# Patient Record
Sex: FEMALE | Race: WHITE | NOT HISPANIC OR LATINO | Employment: UNEMPLOYED | URBAN - METROPOLITAN AREA
[De-identification: names, ages, dates, MRNs, and addresses within clinical notes are randomized per-mention and may not be internally consistent; named-entity substitution may affect disease eponyms.]

---

## 2019-06-25 ENCOUNTER — APPOINTMENT (OUTPATIENT)
Dept: RADIOLOGY | Facility: CLINIC | Age: 59
End: 2019-06-25
Payer: COMMERCIAL

## 2019-06-25 ENCOUNTER — CONSULT (OUTPATIENT)
Dept: PAIN MEDICINE | Facility: CLINIC | Age: 59
End: 2019-06-25
Payer: COMMERCIAL

## 2019-06-25 VITALS
BODY MASS INDEX: 25.76 KG/M2 | DIASTOLIC BLOOD PRESSURE: 82 MMHG | HEIGHT: 68 IN | RESPIRATION RATE: 18 BRPM | WEIGHT: 170 LBS | SYSTOLIC BLOOD PRESSURE: 130 MMHG | HEART RATE: 78 BPM

## 2019-06-25 DIAGNOSIS — M51.36 DEGENERATION OF INTERVERTEBRAL DISC OF LUMBAR REGION: Primary | ICD-10-CM

## 2019-06-25 DIAGNOSIS — M43.10 ANTEROLISTHESIS: ICD-10-CM

## 2019-06-25 DIAGNOSIS — M47.816 LUMBAR SPONDYLOSIS: ICD-10-CM

## 2019-06-25 DIAGNOSIS — M51.36 DEGENERATION OF INTERVERTEBRAL DISC OF LUMBAR REGION: ICD-10-CM

## 2019-06-25 DIAGNOSIS — M54.16 LUMBAR RADICULOPATHY: ICD-10-CM

## 2019-06-25 PROCEDURE — 99244 OFF/OP CNSLTJ NEW/EST MOD 40: CPT | Performed by: ANESTHESIOLOGY

## 2019-06-25 PROCEDURE — 72114 X-RAY EXAM L-S SPINE BENDING: CPT

## 2019-06-25 RX ORDER — UREA 10 %
800 LOTION (ML) TOPICAL
COMMUNITY
End: 2020-01-01

## 2019-06-25 RX ORDER — CHOLECALCIFEROL (VITAMIN D3) 1250 MCG
50000 CAPSULE ORAL
COMMUNITY
Start: 2019-05-07 | End: 2020-01-01

## 2019-06-25 RX ORDER — MULTIVITAMIN
TABLET ORAL
COMMUNITY
End: 2020-01-01

## 2019-06-25 RX ORDER — GABAPENTIN 300 MG/1
300 CAPSULE ORAL
Qty: 30 CAPSULE | Refills: 1 | Status: SHIPPED | OUTPATIENT
Start: 2019-06-25 | End: 2020-01-01 | Stop reason: SDUPTHER

## 2019-06-25 RX ORDER — METHION/INOS/CHOL BT/B COM/LIV 110MG-86MG
CAPSULE ORAL
COMMUNITY
End: 2020-01-01 | Stop reason: HOSPADM

## 2019-06-25 RX ORDER — BACLOFEN 10 MG/1
10 TABLET ORAL
COMMUNITY
Start: 2019-05-07 | End: 2021-01-01 | Stop reason: HOSPADM

## 2019-06-25 RX ORDER — FUROSEMIDE 40 MG/1
40 TABLET ORAL
COMMUNITY
Start: 2019-05-20 | End: 2021-01-01 | Stop reason: HOSPADM

## 2019-06-25 RX ORDER — SPIRONOLACTONE 100 MG/1
TABLET, FILM COATED ORAL
COMMUNITY
Start: 2019-05-20 | End: 2021-01-01 | Stop reason: HOSPADM

## 2019-07-12 ENCOUNTER — TELEPHONE (OUTPATIENT)
Dept: RADIOLOGY | Facility: CLINIC | Age: 59
End: 2019-07-12

## 2019-07-12 NOTE — TELEPHONE ENCOUNTER
Pt called stating that the Gabapentin is not helping her  Pt states that she is being woken up by cramps every 15 mins         Pt can be reached at [phone]

## 2019-07-12 NOTE — TELEPHONE ENCOUNTER
----- Message from Inga Fischer MD sent at 7/10/2019 11:35 AM EDT -----  Please related results to patient  Patient does have arthritis in the lumbar spine  In addition, there is a compression deformity of L1 which could be chronic  Keep follow-up office visit with me  Patient is currently in physical therapy

## 2019-07-15 NOTE — TELEPHONE ENCOUNTER
ANTHONY    S/w pt  States gabapentin was helping a little but it made her too drowsy during the day so pt states she stopped it one week ago and wanted SI to be aware  Pt states she restarted her baclofen which helps the pain and cramps  States she gets cramps to feet, legs and hands  Pt is on a diuretic and advised dehydration or electrolyte imbalance can contribute to cramps  Pt verbalized understanding and states she is well hydrated  Will f/u 8/7as scheduled

## 2019-09-04 ENCOUNTER — TELEPHONE (OUTPATIENT)
Dept: PAIN MEDICINE | Facility: CLINIC | Age: 59
End: 2019-09-04

## 2019-09-04 NOTE — TELEPHONE ENCOUNTER
Recvd 2nd request from [facility] for Medical Records  Scanned to documents  [facility]

## 2019-09-09 NOTE — TELEPHONE ENCOUNTER
I have not received this request and it appears that  a request was sent to [facility]   Can you please follow up

## 2019-10-25 ENCOUNTER — OFFICE VISIT (OUTPATIENT)
Dept: GASTROENTEROLOGY | Facility: CLINIC | Age: 59
End: 2019-10-25
Payer: COMMERCIAL

## 2019-10-25 VITALS
SYSTOLIC BLOOD PRESSURE: 130 MMHG | DIASTOLIC BLOOD PRESSURE: 60 MMHG | RESPIRATION RATE: 18 BRPM | BODY MASS INDEX: 28.64 KG/M2 | HEART RATE: 88 BPM | WEIGHT: 189 LBS | HEIGHT: 68 IN

## 2019-10-25 DIAGNOSIS — B18.2 CHRONIC HEPATITIS C WITHOUT HEPATIC COMA (HCC): Primary | ICD-10-CM

## 2019-10-25 DIAGNOSIS — F10.10 ALCOHOL ABUSE: ICD-10-CM

## 2019-10-25 DIAGNOSIS — K74.69 OTHER CIRRHOSIS OF LIVER (HCC): ICD-10-CM

## 2019-10-25 DIAGNOSIS — Z12.11 COLON CANCER SCREENING: ICD-10-CM

## 2019-10-25 DIAGNOSIS — R18.8 OTHER ASCITES: ICD-10-CM

## 2019-10-25 PROCEDURE — 99244 OFF/OP CNSLTJ NEW/EST MOD 40: CPT | Performed by: INTERNAL MEDICINE

## 2019-10-25 RX ORDER — GABAPENTIN 300 MG/1
CAPSULE ORAL
Refills: 0 | COMMUNITY
Start: 2019-08-22 | End: 2019-11-04 | Stop reason: ALTCHOICE

## 2019-10-25 NOTE — H&P (VIEW-ONLY)
[facility]      Chief Complaint:  Cirrhosis    HPI:  Jose Vasques is a 64 y o   female who presents with cirrhosis  She is self-referred  According to the patient she has a history of diagnosed cirrhosis going back to 2013  Patient has history of heavy alcohol use and still actually drinks a beer a day or more and sometimes 2 or 3  She has been told in the past stop drinking but has not  She also has a history of transfusion at age 34 following ectopic pregnancy surgery  She has a history of intranasal drug use but not intravenous drug use  Recent testing shows 5 77 log 10 HCV viral RNA  Hepatitis-B serologies were negative  She has never had an EGD or colonoscopy  Patient denies any abdominal pain, change in bowel habits or stool caliber, melanotic stool, hematochezia, rectal bleeding, tenesmus, or weight loss  No heartburn chest pain or dysphagia  No dizziness or loss of consciousness  No other significant GI complaints at the present time  No jaundice  No new swelling  She has chronic joint pain         Review of Systems:   Constitutional: No fever or chills, feels well, no tiredness, no recent weight gain or weight loss  HENT: No complaints of earache, no hearing loss, no nosebleeds, no nasal discharge, no sore throat, no hoarseness  Eyes: No complaints of eye pain, no red eyes, no discharge from eyes, no itchy eyes  Cardiovascular: No complaints of slow heart rate, no fast heart rate, no chest pain, no palpitations, no leg claudication, no lower extremity edema  Respiratory: No complaints of shortness of breath, no wheezing, no cough, no SOB on exertion, no orthopnea  Gastrointestinal: As noted in HPI  Genitourinary: No complaints of dysuria, no incontinence, no hesitancy, no nocturia  Musculoskeletal:  Positiv f arthralgia, no myalgias, no joint swelling or stiffness, no limb pain or swelling     Neurological: No complaints of headache, no confusion, no convulsions, no numbness or tingling, no dizziness or fainting, no limb weakness, no difficulty walking  Skin: No complaints of skin rash or skin lesions, no itching, no skin wound, no dry skin  Hematological/Lymphatic: No complaints of swollen glands, does not bleed easy  Allergic/Immunologic: No immunocompromised state  Endocrine:  No complaints of polyuria, no polydipsia  Psychiatric/Behavioral: is not suicidal, no sleep disturbances, no anxiety or depression, no change in personality, no emotional problems  Historical Information   Past Medical History:   Diagnosis Date    Arthritis     Cirrhosis ([facility] [address] )     Hepatitis C      Past Surgical History:   Procedure Laterality Date    ECTOPIC PREGNANCY SURGERY      MOUTH SURGERY      TONSILLECTOMY       Social History   Social History     Substance and Sexual Activity   Alcohol Use Yes    Frequency: Monthly or less    Drinks per session: 1 or 2     Social History     Substance and Sexual Activity   Drug Use Yes    Types: Marijuana     Social History     Tobacco Use   Smoking Status Current Every Day Smoker   Smokeless Tobacco Never Used     Family History   Problem Relation Age of Onset    Alcohol abuse Mother     Heart disease Father          Current Medications: has a current medication list which includes the following prescription(s): baclofen, folic acid, furosemide, gabapentin, multi vitamin daily, spironolactone, vitamin b-1, vitamin d3, and gabapentin  Vital Signs: /60   Pulse 88   Resp 18   Ht 5' 8" (1 727 m)   Wt 85 7 kg (189 lb)   BMI 28 74 kg/m²       Physical Exam:   Constitutional  General Appearance: No acute distress, well appearing and well nourished  Head  Normocephalic  Eyes  Conjunctivae and lids: No swelling, erythema, or discharge  Pupils and irises: Equal, round and reactive to light     Ears, Nose, Mouth, and Throat  External inspection of ears and nose: Normal  Nasal mucosa, septum and turbinates: Normal without edema or erythema/   Oropharynx: Normal with no erythema, edema, exudate or lesions  Edentulous  Neck  Normal range of motion  Neck supple  Cardiovascular  Auscultation of the heart: Normal rate and rhythm, normal S1 and S2 without murmurs  Examination of the extremities for edema and/or varicosities: Normal  Pulmonary/Chest  Respiratory effort: No increased work of breathing or signs of respiratory distress  Auscultation of lungs: Clear to auscultation, equal breath sounds bilaterally, no wheezes, rales, no rhonchi  Abdomen  Abdomen: Non-tender, no masses  No fluid wave  Liver and spleen: No hepatomegaly or splenomegaly  Musculoskeletal  Gait and station: normal   Digits and Nails: normal without clubbing or cyanosis  Inspection/palpation of joints, bones, and muscles: Normal  Neurological  No nystagmus or asterixis  Skin  Skin and subcutaneous tissue: Normal without rashes or lesions  Lymphatic  Palpation of the lymph nodes in neck: No lymphadenopathy  Psychiatric  Orientation to person, place and time: Normal   Mood and affect: Normal          Labs:  No results found for: ALT, AST, BUN, CALCIUM, CL, CHOL, CO2, CREATININE, GFRAA, GFRNONAA, HDL, HCT, HGB, HGBA1C, LDL, MG, PHOS, PLT, K, PSA, NA, TRIG, WBC      X-Rays & Procedures:   CT abdomen w contrast    (Results Pending)           ______________________________________________________________________      Assessment & Plan:     Diagnoses and all orders for this visit:    Chronic hepatitis C without hepatic coma (HCC)  -     CBC; Future  -     HCV FIBROSURE; Future  -     Hepatic function panel; Future  -     Hepatitis C RNA, quantitative, PCR; Future  -     Hepatitis C genotype; Future  -     HIV 1/2 AG-AB combo; Future  -     Rapid drug screen, urine  -     CT abdomen w contrast; Future  -     Basic metabolic panel; Future    Other cirrhosis of liver (HCC)  -     CBC;  Future  -     HCV FIBROSURE; Future  - Hepatic function panel; Future  -     Hepatitis C RNA, quantitative, PCR; Future  -     Hepatitis C genotype; Future  -     HIV 1/2 AG-AB combo; Future  -     Rapid drug screen, urine  -     CT abdomen w contrast; Future  -     Basic metabolic panel; Future    Alcohol abuse    Other ascites    Colon cancer screening      Patient will undergo full hepatitis C workup  Hepatitis B is known to be negative by recent serologies  She will undergo EGD, CT scan of the abdomen, and colonoscopy  She will see me again in 2 months  In the meanwhile I have advised her that she must stop drinking entirely  It is unfortunate that she still drinks beer on a regular basis  I explained the necessity for this as far as her liver disease is concerned  She understands and will seek to comply  She will continue her current medication with Lasix 40 mg a day and Aldactone 150 mg a day  Further recommendations will depend on the study results

## 2019-10-25 NOTE — LETTER
October 25, 2019     Pateros Leah, DO  [address] [facility] [address] [place]  [address]    Patient: Karel Harrington   YOB: 1960   Date of Visit: 10/25/2019       Dear Dr Dillon Caballero: Thank you for referring Margo Huertas to me for evaluation  Below are my notes for this consultation  If you have questions, please do not hesitate to call me  I look forward to following your patient along with you  Sincerely,        Angelia Pate MD        CC: No Recipients  Angelia Pate MD  10/25/2019  9:09 AM  Incomplete  [facility]      Chief Complaint:  Cirrhosis    HPI:  Karel Harrington is a 64 y o   female who presents with cirrhosis  She is self-referred  According to the patient she has a history of diagnosed cirrhosis going back to 2013  Patient has history of heavy alcohol use and still actually drinks a beer a day or more and sometimes 2 or 3  She has been told in the past stop drinking but has not  She also has a history of transfusion at age 34 following ectopic pregnancy surgery  She has a history of intranasal drug use but not intravenous drug use  Recent testing shows 5 77 log 10 HCV viral RNA  Hepatitis-B serologies were negative  She has never had an EGD or colonoscopy  Patient denies any abdominal pain, change in bowel habits or stool caliber, melanotic stool, hematochezia, rectal bleeding, tenesmus, or weight loss  No heartburn chest pain or dysphagia  No dizziness or loss of consciousness  No other significant GI complaints at the present time  No jaundice  No new swelling  She has chronic joint pain         Review of Systems:   Constitutional: No fever or chills, feels well, no tiredness, no recent weight gain or weight loss  HENT: No complaints of earache, no hearing loss, no nosebleeds, no nasal discharge, no sore throat, no hoarseness      Eyes: No complaints of eye pain, no red eyes, no discharge from eyes, no itchy eyes   Cardiovascular: No complaints of slow heart rate, no fast heart rate, no chest pain, no palpitations, no leg claudication, no lower extremity edema  Respiratory: No complaints of shortness of breath, no wheezing, no cough, no SOB on exertion, no orthopnea  Gastrointestinal: As noted in HPI  Genitourinary: No complaints of dysuria, no incontinence, no hesitancy, no nocturia  Musculoskeletal:  Positiv f arthralgia, no myalgias, no joint swelling or stiffness, no limb pain or swelling  Neurological: No complaints of headache, no confusion, no convulsions, no numbness or tingling, no dizziness or fainting, no limb weakness, no difficulty walking  Skin: No complaints of skin rash or skin lesions, no itching, no skin wound, no dry skin  Hematological/Lymphatic: No complaints of swollen glands, does not bleed easy  Allergic/Immunologic: No immunocompromised state  Endocrine:  No complaints of polyuria, no polydipsia  Psychiatric/Behavioral: is not suicidal, no sleep disturbances, no anxiety or depression, no change in personality, no emotional problems         Historical Information   Past Medical History:   Diagnosis Date    Arthritis     Cirrhosis ([facility] [address]     Hepatitis C      Past Surgical History:   Procedure Laterality Date    ECTOPIC PREGNANCY SURGERY      MOUTH SURGERY      TONSILLECTOMY       Social History   Social History     Substance and Sexual Activity   Alcohol Use Yes    Frequency: Monthly or less    Drinks per session: 1 or 2     Social History     Substance and Sexual Activity   Drug Use Yes    Types: Marijuana     Social History     Tobacco Use   Smoking Status Current Every Day Smoker   Smokeless Tobacco Never Used     Family History   Problem Relation Age of Onset    Alcohol abuse Mother     Heart disease Father          Current Medications: has a current medication list which includes the following prescription(s): baclofen, folic acid, furosemide, gabapentin, multi vitamin daily, spironolactone, vitamin b-1, vitamin d3, and gabapentin  Vital Signs: /60   Pulse 88   Resp 18   Ht 5' 8" (1 727 m)   Wt 85 7 kg (189 lb)   BMI 28 74 kg/m²        Physical Exam:   Constitutional  General Appearance: No acute distress, well appearing and well nourished  Head  Normocephalic  Eyes  Conjunctivae and lids: No swelling, erythema, or discharge  Pupils and irises: Equal, round and reactive to light  Ears, Nose, Mouth, and Throat  External inspection of ears and nose: Normal  Nasal mucosa, septum and turbinates: Normal without edema or erythema/   Oropharynx: Normal with no erythema, edema, exudate or lesions  Edentulous  Neck  Normal range of motion  Neck supple  Cardiovascular  Auscultation of the heart: Normal rate and rhythm, normal S1 and S2 without murmurs  Examination of the extremities for edema and/or varicosities: Normal  Pulmonary/Chest  Respiratory effort: No increased work of breathing or signs of respiratory distress  Auscultation of lungs: Clear to auscultation, equal breath sounds bilaterally, no wheezes, rales, no rhonchi  Abdomen  Abdomen: Non-tender, no masses  No fluid wave  Liver and spleen: No hepatomegaly or splenomegaly  Musculoskeletal  Gait and station: normal   Digits and Nails: normal without clubbing or cyanosis  Inspection/palpation of joints, bones, and muscles: Normal  Neurological  No nystagmus or asterixis  Skin  Skin and subcutaneous tissue: Normal without rashes or lesions  Lymphatic  Palpation of the lymph nodes in neck: No lymphadenopathy     Psychiatric  Orientation to person, place and time: Normal   Mood and affect: Normal          Labs:  No results found for: ALT, AST, BUN, CALCIUM, CL, CHOL, CO2, CREATININE, GFRAA, GFRNONAA, HDL, HCT, HGB, HGBA1C, LDL, MG, PHOS, PLT, K, PSA, NA, TRIG, WBC      X-Rays & Procedures:   CT abdomen w contrast    (Results Pending) ______________________________________________________________________      Assessment & Plan:     Diagnoses and all orders for this visit:    Chronic hepatitis C without hepatic coma ([facility] [address] )  -     CBC; Future  -     HCV FIBROSURE; Future  -     Hepatic function panel; Future  -     Hepatitis C RNA, quantitative, PCR; Future  -     Hepatitis C genotype; Future  -     HIV 1/2 AG-AB combo; Future  -     Rapid drug screen, urine  -     CT abdomen w contrast; Future  -     Basic metabolic panel; Future    Other cirrhosis of liver (HCC)  -     CBC; Future  -     HCV FIBROSURE; Future  -     Hepatic function panel; Future  -     Hepatitis C RNA, quantitative, PCR; Future  -     Hepatitis C genotype; Future  -     HIV 1/2 AG-AB combo; Future  -     Rapid drug screen, urine  -     CT abdomen w contrast; Future  -     Basic metabolic panel; Future    Alcohol abuse    Other ascites    Colon cancer screening      Patient will undergo full hepatitis C workup  Hepatitis B is known to be negative by recent serologies  She will undergo EGD, CT scan of the abdomen, and colonoscopy  She will see me again in 2 months  In the meanwhile I have advised her that she must stop drinking entirely  It is unfortunate that she still drinks beer on a regular basis  I explained the necessity for this as far as her liver disease is concerned  She understands and will seek to comply  She will continue her current medication with Lasix 40 mg a day and Aldactone 150 mg a day  Further recommendations will depend on the study results

## 2019-10-25 NOTE — PROGRESS NOTES
[facility]      Chief Complaint:  Cirrhosis    HPI:  Pamela Delatorre is a 64 y o   female who presents with cirrhosis  She is self-referred  According to the patient she has a history of diagnosed cirrhosis going back to 2013  Patient has history of heavy alcohol use and still actually drinks a beer a day or more and sometimes 2 or 3  She has been told in the past stop drinking but has not  She also has a history of transfusion at age 34 following ectopic pregnancy surgery  She has a history of intranasal drug use but not intravenous drug use  Recent testing shows 5 77 log 10 HCV viral RNA  Hepatitis-B serologies were negative  She has never had an EGD or colonoscopy  Patient denies any abdominal pain, change in bowel habits or stool caliber, melanotic stool, hematochezia, rectal bleeding, tenesmus, or weight loss  No heartburn chest pain or dysphagia  No dizziness or loss of consciousness  No other significant GI complaints at the present time  No jaundice  No new swelling  She has chronic joint pain         Review of Systems:   Constitutional: No fever or chills, feels well, no tiredness, no recent weight gain or weight loss  HENT: No complaints of earache, no hearing loss, no nosebleeds, no nasal discharge, no sore throat, no hoarseness  Eyes: No complaints of eye pain, no red eyes, no discharge from eyes, no itchy eyes  Cardiovascular: No complaints of slow heart rate, no fast heart rate, no chest pain, no palpitations, no leg claudication, no lower extremity edema  Respiratory: No complaints of shortness of breath, no wheezing, no cough, no SOB on exertion, no orthopnea  Gastrointestinal: As noted in HPI  Genitourinary: No complaints of dysuria, no incontinence, no hesitancy, no nocturia  Musculoskeletal:  Positiv f arthralgia, no myalgias, no joint swelling or stiffness, no limb pain or swelling     Neurological: No complaints of headache, no confusion, no convulsions, no numbness or tingling, no dizziness or fainting, no limb weakness, no difficulty walking  Skin: No complaints of skin rash or skin lesions, no itching, no skin wound, no dry skin  Hematological/Lymphatic: No complaints of swollen glands, does not bleed easy  Allergic/Immunologic: No immunocompromised state  Endocrine:  No complaints of polyuria, no polydipsia  Psychiatric/Behavioral: is not suicidal, no sleep disturbances, no anxiety or depression, no change in personality, no emotional problems  Historical Information   Past Medical History:   Diagnosis Date    Arthritis     Cirrhosis ([facility] [address]     Hepatitis C      Past Surgical History:   Procedure Laterality Date    ECTOPIC PREGNANCY SURGERY      MOUTH SURGERY      TONSILLECTOMY       Social History   Social History     Substance and Sexual Activity   Alcohol Use Yes    Frequency: Monthly or less    Drinks per session: 1 or 2     Social History     Substance and Sexual Activity   Drug Use Yes    Types: Marijuana     Social History     Tobacco Use   Smoking Status Current Every Day Smoker   Smokeless Tobacco Never Used     Family History   Problem Relation Age of Onset    Alcohol abuse Mother     Heart disease Father          Current Medications: has a current medication list which includes the following prescription(s): baclofen, folic acid, furosemide, gabapentin, multi vitamin daily, spironolactone, vitamin b-1, vitamin d3, and gabapentin  Vital Signs: /60   Pulse 88   Resp 18   Ht 5' 8" (1 727 m)   Wt 85 7 kg (189 lb)   BMI 28 74 kg/m²       Physical Exam:   Constitutional  General Appearance: No acute distress, well appearing and well nourished  Head  Normocephalic  Eyes  Conjunctivae and lids: No swelling, erythema, or discharge  Pupils and irises: Equal, round and reactive to light     Ears, Nose, Mouth, and Throat  External inspection of ears and nose: Normal  Nasal mucosa, septum and turbinates: Normal without edema or erythema/   Oropharynx: Normal with no erythema, edema, exudate or lesions  Edentulous  Neck  Normal range of motion  Neck supple  Cardiovascular  Auscultation of the heart: Normal rate and rhythm, normal S1 and S2 without murmurs  Examination of the extremities for edema and/or varicosities: Normal  Pulmonary/Chest  Respiratory effort: No increased work of breathing or signs of respiratory distress  Auscultation of lungs: Clear to auscultation, equal breath sounds bilaterally, no wheezes, rales, no rhonchi  Abdomen  Abdomen: Non-tender, no masses  No fluid wave  Liver and spleen: No hepatomegaly or splenomegaly  Musculoskeletal  Gait and station: normal   Digits and Nails: normal without clubbing or cyanosis  Inspection/palpation of joints, bones, and muscles: Normal  Neurological  No nystagmus or asterixis  Skin  Skin and subcutaneous tissue: Normal without rashes or lesions  Lymphatic  Palpation of the lymph nodes in neck: No lymphadenopathy  Psychiatric  Orientation to person, place and time: Normal   Mood and affect: Normal          Labs:  No results found for: ALT, AST, BUN, CALCIUM, CL, CHOL, CO2, CREATININE, GFRAA, GFRNONAA, HDL, HCT, HGB, HGBA1C, LDL, MG, PHOS, PLT, K, PSA, NA, TRIG, WBC      X-Rays & Procedures:   CT abdomen w contrast    (Results Pending)           ______________________________________________________________________      Assessment & Plan:     Diagnoses and all orders for this visit:    Chronic hepatitis C without hepatic coma (HCC)  -     CBC; Future  -     HCV FIBROSURE; Future  -     Hepatic function panel; Future  -     Hepatitis C RNA, quantitative, PCR; Future  -     Hepatitis C genotype; Future  -     HIV 1/2 AG-AB combo; Future  -     Rapid drug screen, urine  -     CT abdomen w contrast; Future  -     Basic metabolic panel; Future    Other cirrhosis of liver (HCC)  -     CBC;  Future  -     HCV FIBROSURE; Future  - Hepatic function panel; Future  -     Hepatitis C RNA, quantitative, PCR; Future  -     Hepatitis C genotype; Future  -     HIV 1/2 AG-AB combo; Future  -     Rapid drug screen, urine  -     CT abdomen w contrast; Future  -     Basic metabolic panel; Future    Alcohol abuse    Other ascites    Colon cancer screening      Patient will undergo full hepatitis C workup  Hepatitis B is known to be negative by recent serologies  She will undergo EGD, CT scan of the abdomen, and colonoscopy  She will see me again in 2 months  In the meanwhile I have advised her that she must stop drinking entirely  It is unfortunate that she still drinks beer on a regular basis  I explained the necessity for this as far as her liver disease is concerned  She understands and will seek to comply  She will continue her current medication with Lasix 40 mg a day and Aldactone 150 mg a day  Further recommendations will depend on the study results

## 2019-11-04 ENCOUNTER — ANESTHESIA EVENT (OUTPATIENT)
Dept: GASTROENTEROLOGY | Facility: HOSPITAL | Age: 59
End: 2019-11-04

## 2019-11-04 ENCOUNTER — TELEPHONE (OUTPATIENT)
Dept: GASTROENTEROLOGY | Facility: CLINIC | Age: 59
End: 2019-11-04

## 2019-11-05 ENCOUNTER — APPOINTMENT (OUTPATIENT)
Dept: LAB | Facility: HOSPITAL | Age: 59
End: 2019-11-05
Attending: INTERNAL MEDICINE
Payer: COMMERCIAL

## 2019-11-05 ENCOUNTER — HOSPITAL ENCOUNTER (OUTPATIENT)
Dept: GASTROENTEROLOGY | Facility: HOSPITAL | Age: 59
Setting detail: OUTPATIENT SURGERY
Discharge: HOME | End: 2019-11-05
Attending: INTERNAL MEDICINE | Admitting: INTERNAL MEDICINE
Payer: COMMERCIAL

## 2019-11-05 ENCOUNTER — ANESTHESIA (OUTPATIENT)
Dept: GASTROENTEROLOGY | Facility: HOSPITAL | Age: 59
End: 2019-11-05

## 2019-11-05 VITALS
HEIGHT: 68 IN | RESPIRATION RATE: 18 BRPM | BODY MASS INDEX: 28.67 KG/M2 | WEIGHT: 189.15 LBS | DIASTOLIC BLOOD PRESSURE: 65 MMHG | OXYGEN SATURATION: 100 % | HEART RATE: 72 BPM | TEMPERATURE: 97.6 F | SYSTOLIC BLOOD PRESSURE: 137 MMHG

## 2019-11-05 DIAGNOSIS — R18.8 OTHER ASCITES: ICD-10-CM

## 2019-11-05 DIAGNOSIS — Z12.11 COLON CANCER SCREENING: ICD-10-CM

## 2019-11-05 DIAGNOSIS — B18.2 CHRONIC HEPATITIS C WITHOUT HEPATIC COMA (HCC): ICD-10-CM

## 2019-11-05 DIAGNOSIS — F10.10 ALCOHOL ABUSE: ICD-10-CM

## 2019-11-05 DIAGNOSIS — K74.69 OTHER CIRRHOSIS OF LIVER (HCC): ICD-10-CM

## 2019-11-05 LAB
ALBUMIN SERPL BCP-MCNC: 2.4 G/DL (ref 3.5–5)
ALP SERPL-CCNC: 102 U/L (ref 46–116)
ALT SERPL W P-5'-P-CCNC: 56 U/L (ref 12–78)
ANION GAP SERPL CALCULATED.3IONS-SCNC: 4 MMOL/L (ref 4–13)
AST SERPL W P-5'-P-CCNC: 104 U/L (ref 5–45)
BILIRUB DIRECT SERPL-MCNC: 1.56 MG/DL (ref 0–0.2)
BILIRUB SERPL-MCNC: 3.5 MG/DL (ref 0.2–1)
BUN SERPL-MCNC: 7 MG/DL (ref 5–25)
CALCIUM SERPL-MCNC: 8.6 MG/DL (ref 8.3–10.1)
CHLORIDE SERPL-SCNC: 97 MMOL/L (ref 100–108)
CO2 SERPL-SCNC: 27 MMOL/L (ref 21–32)
CREAT SERPL-MCNC: 0.88 MG/DL (ref 0.6–1.3)
ERYTHROCYTE [DISTWIDTH] IN BLOOD BY AUTOMATED COUNT: 14.6 % (ref 11.6–15.1)
GFR SERPL CREATININE-BSD FRML MDRD: 73 ML/MIN/1.73SQ M
GLUCOSE P FAST SERPL-MCNC: 115 MG/DL (ref 65–99)
HCT VFR BLD AUTO: 36.1 % (ref 34.8–46.1)
HGB BLD-MCNC: 12.3 G/DL (ref 11.5–15.4)
MCH RBC QN AUTO: 34.9 PG (ref 26.8–34.3)
MCHC RBC AUTO-ENTMCNC: 34.1 G/DL (ref 31.4–37.4)
MCV RBC AUTO: 103 FL (ref 82–98)
PLATELET # BLD AUTO: 62 THOUSANDS/UL (ref 149–390)
PMV BLD AUTO: 11.5 FL (ref 8.9–12.7)
POTASSIUM SERPL-SCNC: 4.1 MMOL/L (ref 3.5–5.3)
PROT SERPL-MCNC: 7.7 G/DL (ref 6.4–8.2)
RBC # BLD AUTO: 3.52 MILLION/UL (ref 3.81–5.12)
SODIUM SERPL-SCNC: 128 MMOL/L (ref 136–145)
WBC # BLD AUTO: 4.44 THOUSAND/UL (ref 4.31–10.16)

## 2019-11-05 PROCEDURE — 88305 TISSUE EXAM BY PATHOLOGIST: CPT | Performed by: PATHOLOGY

## 2019-11-05 PROCEDURE — 85027 COMPLETE CBC AUTOMATED: CPT

## 2019-11-05 PROCEDURE — 84460 ALANINE AMINO (ALT) (SGPT): CPT

## 2019-11-05 PROCEDURE — 45384 COLONOSCOPY W/LESION REMOVAL: CPT | Performed by: INTERNAL MEDICINE

## 2019-11-05 PROCEDURE — 87902 NFCT AGT GNTYP ALYS HEP C: CPT

## 2019-11-05 PROCEDURE — NC001 PR NO CHARGE: Performed by: INTERNAL MEDICINE

## 2019-11-05 PROCEDURE — 82977 ASSAY OF GGT: CPT

## 2019-11-05 PROCEDURE — 83883 ASSAY NEPHELOMETRY NOT SPEC: CPT

## 2019-11-05 PROCEDURE — 82247 BILIRUBIN TOTAL: CPT

## 2019-11-05 PROCEDURE — 87389 HIV-1 AG W/HIV-1&-2 AB AG IA: CPT

## 2019-11-05 PROCEDURE — 83010 ASSAY OF HAPTOGLOBIN QUANT: CPT

## 2019-11-05 PROCEDURE — 80076 HEPATIC FUNCTION PANEL: CPT

## 2019-11-05 PROCEDURE — 43239 EGD BIOPSY SINGLE/MULTIPLE: CPT | Performed by: INTERNAL MEDICINE

## 2019-11-05 PROCEDURE — 87522 HEPATITIS C REVRS TRNSCRPJ: CPT

## 2019-11-05 PROCEDURE — 36415 COLL VENOUS BLD VENIPUNCTURE: CPT

## 2019-11-05 PROCEDURE — 82172 ASSAY OF APOLIPOPROTEIN: CPT

## 2019-11-05 PROCEDURE — 80048 BASIC METABOLIC PNL TOTAL CA: CPT

## 2019-11-05 RX ORDER — LIDOCAINE HYDROCHLORIDE 10 MG/ML
0.5 INJECTION, SOLUTION EPIDURAL; INFILTRATION; INTRACAUDAL; PERINEURAL ONCE AS NEEDED
Status: DISCONTINUED | OUTPATIENT
Start: 2019-11-05 | End: 2019-11-09 | Stop reason: HOSPADM

## 2019-11-05 RX ORDER — LIDOCAINE HYDROCHLORIDE 10 MG/ML
INJECTION, SOLUTION INFILTRATION; PERINEURAL AS NEEDED
Status: DISCONTINUED | OUTPATIENT
Start: 2019-11-05 | End: 2019-11-05 | Stop reason: SURG

## 2019-11-05 RX ORDER — SODIUM CHLORIDE, SODIUM LACTATE, POTASSIUM CHLORIDE, CALCIUM CHLORIDE 600; 310; 30; 20 MG/100ML; MG/100ML; MG/100ML; MG/100ML
125 INJECTION, SOLUTION INTRAVENOUS CONTINUOUS
Status: DISCONTINUED | OUTPATIENT
Start: 2019-11-05 | End: 2019-11-09 | Stop reason: HOSPADM

## 2019-11-05 RX ORDER — PROPOFOL 10 MG/ML
INJECTION, EMULSION INTRAVENOUS AS NEEDED
Status: DISCONTINUED | OUTPATIENT
Start: 2019-11-05 | End: 2019-11-05 | Stop reason: SURG

## 2019-11-05 RX ORDER — KETAMINE HYDROCHLORIDE 50 MG/ML
INJECTION, SOLUTION, CONCENTRATE INTRAMUSCULAR; INTRAVENOUS AS NEEDED
Status: DISCONTINUED | OUTPATIENT
Start: 2019-11-05 | End: 2019-11-05 | Stop reason: SURG

## 2019-11-05 RX ADMIN — LIDOCAINE HYDROCHLORIDE 100 MG: 10 INJECTION, SOLUTION INFILTRATION; PERINEURAL at 09:56

## 2019-11-05 RX ADMIN — KETAMINE HYDROCHLORIDE 20 MG: 50 INJECTION INTRAMUSCULAR; INTRAVENOUS at 09:57

## 2019-11-05 RX ADMIN — PROPOFOL 20 MG: 10 INJECTION, EMULSION INTRAVENOUS at 10:12

## 2019-11-05 RX ADMIN — PROPOFOL 100 MG: 10 INJECTION, EMULSION INTRAVENOUS at 09:57

## 2019-11-05 RX ADMIN — PROPOFOL 20 MG: 10 INJECTION, EMULSION INTRAVENOUS at 10:19

## 2019-11-05 RX ADMIN — SODIUM CHLORIDE, SODIUM LACTATE, POTASSIUM CHLORIDE, AND CALCIUM CHLORIDE 125 ML/HR: .6; .31; .03; .02 INJECTION, SOLUTION INTRAVENOUS at 09:50

## 2019-11-05 RX ADMIN — PROPOFOL 50 MG: 10 INJECTION, EMULSION INTRAVENOUS at 10:01

## 2019-11-05 RX ADMIN — PROPOFOL 50 MG: 10 INJECTION, EMULSION INTRAVENOUS at 10:05

## 2019-11-05 NOTE — ANESTHESIA PREPROCEDURE EVALUATION
Review of Systems/Medical History  Patient summary reviewed  Chart reviewed  No history of anesthetic complications     Cardiovascular  Negative cardio ROS    Pulmonary  Negative pulmonary ROS Smoker cigarette smoker  , Tobacco cessation counseling given ,        GI/Hepatic  Negative GI/hepatic ROS   Liver disease , cirrhosis, Hepatitis C,        Negative  ROS        Endo/Other  Negative endo/other ROS      GYN  Negative gynecology ROS          Hematology  Negative hematology ROS      Musculoskeletal  Negative musculoskeletal ROS Back pain , lumbar pain,   Arthritis     Neurology  Negative neurology ROS      Psychology   Negative psychology ROS          No results found for this or any previous visit (from the past 1008 hour(s))  Physical Exam    Airway    Mallampati score: II  TM Distance: >3 FB  Neck ROM: full     Dental   No notable dental hx     Cardiovascular  Comment: Negative ROS, Rhythm: regular, Rate: normal,     Pulmonary  Breath sounds clear to auscultation,     Other Findings        Anesthesia Plan  ASA Score- 3     Anesthesia Type- IV sedation with anesthesia with ASA Monitors  Additional Monitors:   Airway Plan:         Plan Factors-    Induction-     Postoperative Plan-     Informed Consent- Anesthetic plan and risks discussed with patient  I personally reviewed this patient with the CRNA  Discussed and agreed on the Anesthesia Plan with the CRNA  Margarette Davalos

## 2019-11-05 NOTE — DISCHARGE INSTRUCTIONS

## 2019-11-05 NOTE — INTERVAL H&P NOTE
H&P reviewed  After examining the patient I find no changes in the patients condition since the H&P had been written      Vitals:    11/05/19 0945   Pulse: 87   Resp: 18   Temp: 97 8 °F (36 6 °C)   SpO2: 100%

## 2019-11-06 LAB — HIV 1+2 AB+HIV1 P24 AG SERPL QL IA: NORMAL

## 2019-11-07 LAB
A2 MACROGLOB SERPL-MCNC: 302 MG/DL (ref 110–276)
ALT SERPL W P-5'-P-CCNC: 55 IU/L (ref 0–40)
APO A-I SERPL-MCNC: 128 MG/DL (ref 116–209)
BILIRUB SERPL-MCNC: 3.2 MG/DL (ref 0–1.2)
COMMENT: ABNORMAL
FIBROSIS SCORING:: ABNORMAL
FIBROSIS STAGE SERPL QL: ABNORMAL
GGT SERPL-CCNC: 48 IU/L (ref 0–60)
HAPTOGLOB SERPL-MCNC: <10 MG/DL (ref 34–200)
HCV RNA SERPL NAA+PROBE-ACNC: NORMAL IU/ML
HCV RNA SERPL NAA+PROBE-LOG IU: 6.09 LOG10 IU/ML
INTERPRETATIONS: ABNORMAL
LIVER FIBR SCORE SERPL CALC.FIBROSURE: 0.94 (ref 0–0.21)
NECROINFLAMM ACTIVITY SCORING:: ABNORMAL
NECROINFLAMMATORY ACT GRADE SERPL QL: ABNORMAL
NECROINFLAMMATORY ACT SCORE SERPL: 0.56 (ref 0–0.17)
SERVICE CMNT-IMP: ABNORMAL
TEST INFORMATION: NORMAL

## 2019-11-08 LAB
HCV GENTYP SERPL NAA+PROBE: 3
HCV PLEASE NOTE: NORMAL

## 2019-11-12 ENCOUNTER — TELEPHONE (OUTPATIENT)
Dept: GASTROENTEROLOGY | Facility: CLINIC | Age: 59
End: 2019-11-12

## 2019-11-12 DIAGNOSIS — A04.8 H. PYLORI INFECTION: Primary | ICD-10-CM

## 2019-11-15 DIAGNOSIS — A04.8 H. PYLORI INFECTION: Primary | ICD-10-CM

## 2019-11-15 RX ORDER — OMEPRAZOLE 20 MG/1
20 CAPSULE, DELAYED RELEASE ORAL 2 TIMES DAILY
Qty: 28 CAPSULE | Refills: 0 | Status: SHIPPED | OUTPATIENT
Start: 2019-11-15 | End: 2020-01-01

## 2019-11-15 RX ORDER — CLARITHROMYCIN 500 MG/1
500 TABLET, COATED ORAL 2 TIMES DAILY
Qty: 28 TABLET | Refills: 0 | Status: SHIPPED | OUTPATIENT
Start: 2019-11-15 | End: 2019-11-29

## 2019-11-15 RX ORDER — AMOXICILLIN 500 MG/1
1000 TABLET, FILM COATED ORAL 2 TIMES DAILY
Qty: 56 TABLET | Refills: 0 | Status: SHIPPED | OUTPATIENT
Start: 2019-11-15 | End: 2019-11-29

## 2019-12-20 ENCOUNTER — TELEPHONE (OUTPATIENT)
Dept: GASTROENTEROLOGY | Facility: CLINIC | Age: 59
End: 2019-12-20

## 2019-12-20 DIAGNOSIS — R93.5 ABNORMAL ABDOMINAL CT SCAN: Primary | ICD-10-CM

## 2019-12-20 NOTE — TELEPHONE ENCOUNTER
PER DR LANGFORD PT NEEDS AN MRI , ORDERS PLACED AND MAILED TO PT / CALLED PT AND LVMOM STATING SHE NEEDS TO HAVE AN MRI DONE

## 2020-01-01 ENCOUNTER — HOSPITAL ENCOUNTER (OUTPATIENT)
Dept: MRI IMAGING | Facility: HOSPITAL | Age: 60
Discharge: HOME | End: 2020-09-11
Attending: SURGERY
Payer: COMMERCIAL

## 2020-01-01 ENCOUNTER — OFFICE VISIT (OUTPATIENT)
Dept: SURGICAL ONCOLOGY | Facility: CLINIC | Age: 60
End: 2020-01-01
Payer: COMMERCIAL

## 2020-01-01 ENCOUNTER — ANESTHESIA (OUTPATIENT)
Dept: CT IMAGING | Facility: HOSPITAL | Age: 60
End: 2020-01-01

## 2020-01-01 ENCOUNTER — TELEPHONE (OUTPATIENT)
Dept: SURGICAL ONCOLOGY | Facility: CLINIC | Age: 60
End: 2020-01-01

## 2020-01-01 ENCOUNTER — TELEPHONE (OUTPATIENT)
Dept: PAIN MEDICINE | Facility: MEDICAL CENTER | Age: 60
End: 2020-01-01

## 2020-01-01 ENCOUNTER — ANESTHESIA EVENT (OUTPATIENT)
Dept: CT IMAGING | Facility: HOSPITAL | Age: 60
End: 2020-01-01

## 2020-01-01 ENCOUNTER — TELEMEDICINE (OUTPATIENT)
Dept: PAIN MEDICINE | Facility: CLINIC | Age: 60
End: 2020-01-01
Payer: COMMERCIAL

## 2020-01-01 ENCOUNTER — OFFICE VISIT (OUTPATIENT)
Dept: PAIN MEDICINE | Facility: CLINIC | Age: 60
End: 2020-01-01
Payer: COMMERCIAL

## 2020-01-01 ENCOUNTER — TELEPHONE (OUTPATIENT)
Dept: GASTROENTEROLOGY | Facility: CLINIC | Age: 60
End: 2020-01-01

## 2020-01-01 ENCOUNTER — TELEMEDICINE (OUTPATIENT)
Dept: VASCULAR SURGERY | Facility: CLINIC | Age: 60
End: 2020-01-01
Payer: COMMERCIAL

## 2020-01-01 ENCOUNTER — APPOINTMENT (OUTPATIENT)
Dept: LAB | Facility: CLINIC | Age: 60
End: 2020-01-01
Payer: COMMERCIAL

## 2020-01-01 ENCOUNTER — HOSPITAL ENCOUNTER (OUTPATIENT)
Dept: MRI IMAGING | Facility: HOSPITAL | Age: 60
Discharge: HOME | End: 2020-06-16
Attending: SURGERY
Payer: COMMERCIAL

## 2020-01-01 ENCOUNTER — EVALUATION (OUTPATIENT)
Dept: PHYSICAL THERAPY | Facility: CLINIC | Age: 60
End: 2020-01-01
Payer: COMMERCIAL

## 2020-01-01 ENCOUNTER — HOSPITAL ENCOUNTER (OUTPATIENT)
Dept: CT IMAGING | Facility: HOSPITAL | Age: 60
Discharge: HOME | End: 2020-07-28
Attending: RADIOLOGY
Payer: COMMERCIAL

## 2020-01-01 ENCOUNTER — HOSPITAL ENCOUNTER (OUTPATIENT)
Dept: MRI IMAGING | Facility: HOSPITAL | Age: 60
Discharge: HOME | End: 2020-07-07
Attending: ANESTHESIOLOGY
Payer: COMMERCIAL

## 2020-01-01 VITALS
BODY MASS INDEX: 26.16 KG/M2 | WEIGHT: 172.6 LBS | RESPIRATION RATE: 20 BRPM | DIASTOLIC BLOOD PRESSURE: 77 MMHG | SYSTOLIC BLOOD PRESSURE: 130 MMHG | HEART RATE: 90 BPM | HEIGHT: 68 IN

## 2020-01-01 VITALS
TEMPERATURE: 98.4 F | HEIGHT: 68 IN | DIASTOLIC BLOOD PRESSURE: 76 MMHG | RESPIRATION RATE: 18 BRPM | WEIGHT: 173 LBS | SYSTOLIC BLOOD PRESSURE: 129 MMHG | HEART RATE: 88 BPM | BODY MASS INDEX: 26.22 KG/M2

## 2020-01-01 VITALS
DIASTOLIC BLOOD PRESSURE: 64 MMHG | RESPIRATION RATE: 17 BRPM | WEIGHT: 173.94 LBS | SYSTOLIC BLOOD PRESSURE: 124 MMHG | TEMPERATURE: 97.6 F | OXYGEN SATURATION: 97 % | HEART RATE: 88 BPM | BODY MASS INDEX: 26.36 KG/M2 | HEIGHT: 68 IN

## 2020-01-01 VITALS
SYSTOLIC BLOOD PRESSURE: 122 MMHG | BODY MASS INDEX: 26.67 KG/M2 | TEMPERATURE: 98.7 F | HEIGHT: 68 IN | DIASTOLIC BLOOD PRESSURE: 76 MMHG | HEART RATE: 91 BPM | WEIGHT: 176 LBS

## 2020-01-01 VITALS
WEIGHT: 172 LBS | RESPIRATION RATE: 18 BRPM | HEIGHT: 68 IN | HEART RATE: 86 BPM | BODY MASS INDEX: 26.07 KG/M2 | DIASTOLIC BLOOD PRESSURE: 88 MMHG | SYSTOLIC BLOOD PRESSURE: 122 MMHG | TEMPERATURE: 97.5 F

## 2020-01-01 DIAGNOSIS — M51.36 DEGENERATION OF INTERVERTEBRAL DISC OF LUMBAR REGION: ICD-10-CM

## 2020-01-01 DIAGNOSIS — M47.816 LUMBAR SPONDYLOSIS: Primary | ICD-10-CM

## 2020-01-01 DIAGNOSIS — R16.0 MASS OF LEFT LOBE OF LIVER: Primary | ICD-10-CM

## 2020-01-01 DIAGNOSIS — M51.36 DEGENERATION OF INTERVERTEBRAL DISC OF LUMBAR REGION: Primary | ICD-10-CM

## 2020-01-01 DIAGNOSIS — M47.816 LUMBAR SPONDYLOSIS: ICD-10-CM

## 2020-01-01 DIAGNOSIS — C22.0 HEPATOCELLULAR CARCINOMA (HCC): Primary | ICD-10-CM

## 2020-01-01 DIAGNOSIS — G89.4 CHRONIC PAIN SYNDROME: Primary | ICD-10-CM

## 2020-01-01 DIAGNOSIS — M54.16 LUMBAR RADICULOPATHY: ICD-10-CM

## 2020-01-01 DIAGNOSIS — C22.0 HEPATOCELLULAR CARCINOMA (HCC): ICD-10-CM

## 2020-01-01 DIAGNOSIS — G89.4 CHRONIC PAIN SYNDROME: ICD-10-CM

## 2020-01-01 DIAGNOSIS — Z20.828 EXPOSURE TO SARS-ASSOCIATED CORONAVIRUS: ICD-10-CM

## 2020-01-01 DIAGNOSIS — M54.16 LUMBAR RADICULOPATHY: Primary | ICD-10-CM

## 2020-01-01 DIAGNOSIS — R16.0 MASS OF LEFT LOBE OF LIVER: ICD-10-CM

## 2020-01-01 DIAGNOSIS — M43.10 ANTEROLISTHESIS: ICD-10-CM

## 2020-01-01 LAB
AFP-TM SERPL-MCNC: 3.6 NG/ML (ref 0.5–8)
AFP-TM SERPL-MCNC: 4.9 NG/ML (ref 0.5–8)
ALBUMIN SERPL BCP-MCNC: 2.6 G/DL (ref 3.5–5)
ALP SERPL-CCNC: 107 U/L (ref 46–116)
ALT SERPL W P-5'-P-CCNC: 42 U/L (ref 12–78)
ANION GAP SERPL CALCULATED.3IONS-SCNC: 10 MMOL/L (ref 4–13)
ANION GAP SERPL CALCULATED.3IONS-SCNC: 6 MMOL/L (ref 4–13)
AST SERPL W P-5'-P-CCNC: 103 U/L (ref 5–45)
BILIRUB SERPL-MCNC: 3.19 MG/DL (ref 0.2–1)
BUN SERPL-MCNC: 11 MG/DL (ref 5–25)
BUN SERPL-MCNC: 5 MG/DL (ref 5–25)
BUN SERPL-MCNC: 7 MG/DL (ref 5–25)
CALCIUM SERPL-MCNC: 8.3 MG/DL (ref 8.3–10.1)
CALCIUM SERPL-MCNC: 8.5 MG/DL (ref 8.3–10.1)
CHLORIDE SERPL-SCNC: 89 MMOL/L (ref 100–108)
CHLORIDE SERPL-SCNC: 90 MMOL/L (ref 100–108)
CO2 SERPL-SCNC: 21 MMOL/L (ref 21–32)
CO2 SERPL-SCNC: 26 MMOL/L (ref 21–32)
CREAT SERPL-MCNC: 0.69 MG/DL (ref 0.6–1.3)
CREAT SERPL-MCNC: 0.77 MG/DL (ref 0.6–1.3)
CREAT SERPL-MCNC: 0.83 MG/DL (ref 0.6–1.3)
ERYTHROCYTE [DISTWIDTH] IN BLOOD BY AUTOMATED COUNT: 14.5 % (ref 11.6–15.1)
GFR SERPL CREATININE-BSD FRML MDRD: 78 ML/MIN/1.73SQ M
GFR SERPL CREATININE-BSD FRML MDRD: 85 ML/MIN/1.73SQ M
GFR SERPL CREATININE-BSD FRML MDRD: 96 ML/MIN/1.73SQ M
GLUCOSE P FAST SERPL-MCNC: 71 MG/DL (ref 65–99)
GLUCOSE P FAST SERPL-MCNC: 94 MG/DL (ref 65–99)
GLUCOSE SERPL-MCNC: 94 MG/DL (ref 65–140)
HCT VFR BLD AUTO: 34.2 % (ref 34.8–46.1)
HGB BLD-MCNC: 12.3 G/DL (ref 11.5–15.4)
INR PPP: 1.42 (ref 0.84–1.19)
MCH RBC QN AUTO: 38.1 PG (ref 26.8–34.3)
MCHC RBC AUTO-ENTMCNC: 36 G/DL (ref 31.4–37.4)
MCV RBC AUTO: 106 FL (ref 82–98)
PLATELET # BLD AUTO: 67 THOUSANDS/UL (ref 149–390)
PMV BLD AUTO: 10.3 FL (ref 8.9–12.7)
POTASSIUM SERPL-SCNC: 3.6 MMOL/L (ref 3.5–5.3)
POTASSIUM SERPL-SCNC: 5 MMOL/L (ref 3.5–5.3)
PROT SERPL-MCNC: 7.6 G/DL (ref 6.4–8.2)
PROTHROMBIN TIME: 16.7 SECONDS (ref 11.6–14.5)
RBC # BLD AUTO: 3.23 MILLION/UL (ref 3.81–5.12)
SARS-COV-2 RNA SPEC QL NAA+PROBE: NOT DETECTED
SODIUM SERPL-SCNC: 120 MMOL/L (ref 136–145)
SODIUM SERPL-SCNC: 122 MMOL/L (ref 136–145)
WBC # BLD AUTO: 3.77 THOUSAND/UL (ref 4.31–10.16)

## 2020-01-01 PROCEDURE — 82105 ALPHA-FETOPROTEIN SERUM: CPT

## 2020-01-01 PROCEDURE — A9585 GADOBUTROL INJECTION: HCPCS | Performed by: SURGERY

## 2020-01-01 PROCEDURE — 77013 CT GUIDE FOR TISSUE ABLATION: CPT

## 2020-01-01 PROCEDURE — 99214 OFFICE O/P EST MOD 30 MIN: CPT | Performed by: SURGERY

## 2020-01-01 PROCEDURE — 97161 PT EVAL LOW COMPLEX 20 MIN: CPT | Performed by: PHYSICAL THERAPIST

## 2020-01-01 PROCEDURE — 72148 MRI LUMBAR SPINE W/O DYE: CPT

## 2020-01-01 PROCEDURE — 99442 PR PHYS/QHP TELEPHONE EVALUATION 11-20 MIN: CPT | Performed by: RADIOLOGY

## 2020-01-01 PROCEDURE — G1004 CDSM NDSC: HCPCS

## 2020-01-01 PROCEDURE — 84520 ASSAY OF UREA NITROGEN: CPT

## 2020-01-01 PROCEDURE — 99214 OFFICE O/P EST MOD 30 MIN: CPT | Performed by: ANESTHESIOLOGY

## 2020-01-01 PROCEDURE — 80048 BASIC METABOLIC PNL TOTAL CA: CPT | Performed by: RADIOLOGY

## 2020-01-01 PROCEDURE — 97110 THERAPEUTIC EXERCISES: CPT | Performed by: PHYSICAL THERAPIST

## 2020-01-01 PROCEDURE — 80053 COMPREHEN METABOLIC PANEL: CPT

## 2020-01-01 PROCEDURE — 74183 MRI ABD W/O CNTR FLWD CNTR: CPT

## 2020-01-01 PROCEDURE — 85610 PROTHROMBIN TIME: CPT | Performed by: RADIOLOGY

## 2020-01-01 PROCEDURE — C1886 CATHETER, ABLATION: HCPCS

## 2020-01-01 PROCEDURE — 99213 OFFICE O/P EST LOW 20 MIN: CPT | Performed by: NURSE PRACTITIONER

## 2020-01-01 PROCEDURE — 85027 COMPLETE CBC AUTOMATED: CPT | Performed by: RADIOLOGY

## 2020-01-01 PROCEDURE — 36415 COLL VENOUS BLD VENIPUNCTURE: CPT

## 2020-01-01 PROCEDURE — U0003 INFECTIOUS AGENT DETECTION BY NUCLEIC ACID (DNA OR RNA); SEVERE ACUTE RESPIRATORY SYNDROME CORONAVIRUS 2 (SARS-COV-2) (CORONAVIRUS DISEASE [COVID-19]), AMPLIFIED PROBE TECHNIQUE, MAKING USE OF HIGH THROUGHPUT TECHNOLOGIES AS DESCRIBED BY CMS-2020-01-R: HCPCS | Performed by: RADIOLOGY

## 2020-01-01 PROCEDURE — 82565 ASSAY OF CREATININE: CPT

## 2020-01-01 RX ORDER — LIDOCAINE WITH 8.4% SOD BICARB 0.9%(10ML)
SYRINGE (ML) INJECTION CODE/TRAUMA/SEDATION MEDICATION
Status: COMPLETED | OUTPATIENT
Start: 2020-01-01 | End: 2020-01-01

## 2020-01-01 RX ORDER — SODIUM CHLORIDE 9 MG/ML
75 INJECTION, SOLUTION INTRAVENOUS CONTINUOUS
Status: DISCONTINUED | OUTPATIENT
Start: 2020-01-01 | End: 2020-01-01 | Stop reason: HOSPADM

## 2020-01-01 RX ORDER — ACETAMINOPHEN 325 MG/1
975 TABLET ORAL ONCE
Status: COMPLETED | OUTPATIENT
Start: 2020-01-01 | End: 2020-01-01

## 2020-01-01 RX ORDER — ALBUTEROL SULFATE 2.5 MG/3ML
2.5 SOLUTION RESPIRATORY (INHALATION) ONCE AS NEEDED
Status: CANCELLED | OUTPATIENT
Start: 2020-01-01

## 2020-01-01 RX ORDER — METHYLPREDNISOLONE 4 MG/1
TABLET ORAL
Qty: 1 EACH | Refills: 0 | Status: SHIPPED | OUTPATIENT
Start: 2020-01-01 | End: 2020-01-01

## 2020-01-01 RX ORDER — METOCLOPRAMIDE HYDROCHLORIDE 5 MG/ML
10 INJECTION INTRAMUSCULAR; INTRAVENOUS ONCE AS NEEDED
Status: CANCELLED | OUTPATIENT
Start: 2020-01-01

## 2020-01-01 RX ORDER — AMITRIPTYLINE HYDROCHLORIDE 10 MG/1
10 TABLET, FILM COATED ORAL
Qty: 30 TABLET | Refills: 0 | Status: SHIPPED | OUTPATIENT
Start: 2020-01-01 | End: 2020-01-01

## 2020-01-01 RX ORDER — HYDROMORPHONE HCL/PF 1 MG/ML
0.4 SYRINGE (ML) INJECTION
Status: CANCELLED | OUTPATIENT
Start: 2020-01-01

## 2020-01-01 RX ORDER — MIDAZOLAM HYDROCHLORIDE 2 MG/2ML
INJECTION, SOLUTION INTRAMUSCULAR; INTRAVENOUS AS NEEDED
Status: DISCONTINUED | OUTPATIENT
Start: 2020-01-01 | End: 2020-01-01 | Stop reason: SURG

## 2020-01-01 RX ORDER — FENTANYL CITRATE/PF 50 MCG/ML
25 SYRINGE (ML) INJECTION
Status: CANCELLED | OUTPATIENT
Start: 2020-01-01

## 2020-01-01 RX ORDER — GABAPENTIN 300 MG/1
300 CAPSULE ORAL
Qty: 90 CAPSULE | Refills: 0 | Status: SHIPPED | OUTPATIENT
Start: 2020-01-01 | End: 2020-01-01

## 2020-01-01 RX ORDER — ONDANSETRON 2 MG/ML
4 INJECTION INTRAMUSCULAR; INTRAVENOUS ONCE AS NEEDED
Status: CANCELLED | OUTPATIENT
Start: 2020-01-01

## 2020-01-01 RX ORDER — PROMETHAZINE HYDROCHLORIDE 25 MG/ML
12.5 INJECTION, SOLUTION INTRAMUSCULAR; INTRAVENOUS ONCE AS NEEDED
Status: CANCELLED | OUTPATIENT
Start: 2020-01-01

## 2020-01-01 RX ORDER — GABAPENTIN 300 MG/1
300 CAPSULE ORAL 2 TIMES DAILY
Qty: 60 CAPSULE | Refills: 1 | Status: SHIPPED | OUTPATIENT
Start: 2020-01-01 | End: 2021-01-01 | Stop reason: HOSPADM

## 2020-01-01 RX ORDER — SODIUM CHLORIDE, SODIUM LACTATE, POTASSIUM CHLORIDE, CALCIUM CHLORIDE 600; 310; 30; 20 MG/100ML; MG/100ML; MG/100ML; MG/100ML
50 INJECTION, SOLUTION INTRAVENOUS CONTINUOUS
Status: CANCELLED | OUTPATIENT
Start: 2020-01-01

## 2020-01-01 RX ORDER — PROPOFOL 10 MG/ML
INJECTION, EMULSION INTRAVENOUS CONTINUOUS PRN
Status: DISCONTINUED | OUTPATIENT
Start: 2020-01-01 | End: 2020-01-01 | Stop reason: SURG

## 2020-01-01 RX ORDER — FENTANYL CITRATE 50 UG/ML
INJECTION, SOLUTION INTRAMUSCULAR; INTRAVENOUS AS NEEDED
Status: DISCONTINUED | OUTPATIENT
Start: 2020-01-01 | End: 2020-01-01 | Stop reason: SURG

## 2020-01-01 RX ADMIN — FENTANYL CITRATE 25 MCG: 50 INJECTION, SOLUTION INTRAMUSCULAR; INTRAVENOUS at 09:23

## 2020-01-01 RX ADMIN — MIDAZOLAM HYDROCHLORIDE 2 MG: 1 INJECTION, SOLUTION INTRAMUSCULAR; INTRAVENOUS at 09:15

## 2020-01-01 RX ADMIN — FENTANYL CITRATE 25 MCG: 50 INJECTION, SOLUTION INTRAMUSCULAR; INTRAVENOUS at 09:38

## 2020-01-01 RX ADMIN — FENTANYL CITRATE 25 MCG: 50 INJECTION, SOLUTION INTRAMUSCULAR; INTRAVENOUS at 09:30

## 2020-01-01 RX ADMIN — ACETAMINOPHEN 975 MG: 325 TABLET, FILM COATED ORAL at 13:19

## 2020-01-01 RX ADMIN — PROPOFOL 80 MCG/KG/MIN: 10 INJECTION, EMULSION INTRAVENOUS at 09:23

## 2020-01-01 RX ADMIN — GADOBUTROL 7 ML: 604.72 INJECTION INTRAVENOUS at 12:09

## 2020-01-01 RX ADMIN — SODIUM CHLORIDE: 0.9 INJECTION, SOLUTION INTRAVENOUS at 08:50

## 2020-01-01 RX ADMIN — GADOBUTROL 7 ML: 604.72 INJECTION INTRAVENOUS at 13:07

## 2020-01-01 RX ADMIN — Medication 10 ML: at 09:37

## 2020-01-01 RX ADMIN — FENTANYL CITRATE 25 MCG: 50 INJECTION, SOLUTION INTRAMUSCULAR; INTRAVENOUS at 09:52

## 2020-01-06 ENCOUNTER — TELEPHONE (OUTPATIENT)
Dept: SURGICAL ONCOLOGY | Facility: CLINIC | Age: 60
End: 2020-01-06

## 2020-01-06 ENCOUNTER — TELEPHONE (OUTPATIENT)
Dept: GASTROENTEROLOGY | Facility: CLINIC | Age: 60
End: 2020-01-06

## 2020-01-06 DIAGNOSIS — C22.0 HEPATOCELLULAR CARCINOMA (HCC): Primary | ICD-10-CM

## 2020-01-06 NOTE — TELEPHONE ENCOUNTER
Results given  Patient probably has hepatocellular carcinoma    Please get her an appointment with Dr Radha Vargas

## 2020-01-06 NOTE — TELEPHONE ENCOUNTER
New Patient Encounter    New Patient Intake Form   Patient Details:  Kb Mclaughlin  1960  [id number]    Background Information:  [address] starts by opening a telephone encounter and gathering the following information   Who is calling to schedule? If not self, relationship to patient? self   Referring Provider Theone English   What is the diagnosis? Liver lesion pos surg  resection   Is this diagnosis confirmed Yes   Is there a confirmed diagnosis from a biopsy/tissue reviewed by pathology? No   Is there any prior history of Cancer? No   If yes, please explain    When was the diagnosis? 11/2019   Is patient aware of diagnosis? Yes   Reason for visit? NP DX   Have you had any testing done? If so: when, where? Yes   Are records in [employer]? yes   Was the patient told to bring a disk? yes   Scheduling Information:   Preferred [place]:  [facility] Provider? dumont   Are there any dates/time the patient cannot be seen? no      Miscellaneous: n/a   After completing the above information, please route to Financial Counselor and the appropriate Nurse Navigator for review

## 2020-01-29 ENCOUNTER — HOSPITAL ENCOUNTER (OUTPATIENT)
Dept: MRI IMAGING | Facility: HOSPITAL | Age: 60
Discharge: HOME | End: 2020-01-29
Attending: INTERNAL MEDICINE
Payer: COMMERCIAL

## 2020-01-29 DIAGNOSIS — R93.5 ABNORMAL ABDOMINAL CT SCAN: ICD-10-CM

## 2020-01-29 PROCEDURE — 74183 MRI ABD W/O CNTR FLWD CNTR: CPT

## 2020-01-29 PROCEDURE — A9585 GADOBUTROL INJECTION: HCPCS | Performed by: INTERNAL MEDICINE

## 2020-01-29 RX ADMIN — GADOBUTROL 8 ML: 604.72 INJECTION INTRAVENOUS at 17:27

## 2020-02-18 ENCOUNTER — TELEPHONE (OUTPATIENT)
Dept: GASTROENTEROLOGY | Facility: CLINIC | Age: 60
End: 2020-02-18

## 2020-02-18 NOTE — TELEPHONE ENCOUNTER
Patient wants you to call her she cant make it into office and Dr Yara Terrell due to transportation  She had MRI done and wants results that she thought Dr Yara Terrell office had ordered

## 2020-02-18 NOTE — TELEPHONE ENCOUNTER
Discussed results with patient  I also sent a note to Dr Karoline Lanza for him to review these results for when the patient goes in to see him

## 2020-02-19 PROBLEM — R16.0 MASS OF LEFT LOBE OF LIVER: Status: ACTIVE | Noted: 2020-02-19

## 2020-02-20 ENCOUNTER — CONSULT (OUTPATIENT)
Dept: SURGICAL ONCOLOGY | Facility: CLINIC | Age: 60
End: 2020-02-20
Payer: COMMERCIAL

## 2020-02-20 VITALS
HEIGHT: 68 IN | HEART RATE: 78 BPM | WEIGHT: 183 LBS | TEMPERATURE: 98 F | BODY MASS INDEX: 27.74 KG/M2 | DIASTOLIC BLOOD PRESSURE: 84 MMHG | SYSTOLIC BLOOD PRESSURE: 122 MMHG

## 2020-02-20 DIAGNOSIS — R16.0 MASS OF LEFT LOBE OF LIVER: Primary | ICD-10-CM

## 2020-02-20 PROCEDURE — 99243 OFF/OP CNSLTJ NEW/EST LOW 30: CPT | Performed by: SURGERY

## 2020-02-20 RX ORDER — MELATONIN
COMMUNITY
End: 2021-01-01 | Stop reason: HOSPADM

## 2020-02-20 NOTE — PROGRESS NOTES
Surgical Oncology Consult       [facility]  [facility] [place]  [address]  [facility] [address]  1960  [id number]  [facility]  [facility] [place]  [address]  [facility] [place] [address]    Diagnoses and all orders for this visit:    Mass of left lobe of liver    Other orders  -     cholecalciferol (VITAMIN D3) 1,000 units tablet        Chief Complaint   Patient presents with    Consult     Pt with history of hepatits and cirrhosis here for a consult for a liver mass  Denies any nausea, vomiting, weight loss, bloating, or change in appetite  Pt complains of weight gain  Return in about 3 months (around 5/20/2020) for Office Visit  No history exists  History of Present Illness:  77-year-old female with a history of cirrhosis  The patient states she had cirrhosis initially diagnosed in 2013  This was felt to be due to alcohol abuse  The patient states she has stop drinking since she met Dr Hari velez  She does have a history of transfusion at age 34 following an ectopic pregnancy  She was found to have an elevated hepatitis C virus  Because of her cirrhosis she was getting screening imaging and she had a suspicious liver lesion seen on previous CT  MRI on January 29, 2020 reveals a 2 4 x 1 8 cm enhancing lesion in segment 4 of the liver  This was LIRADS 4A  There was partial thrombosis of the intrahepatic left and right portal veins and large collaterals consistent with portal hypertension  There was also nonenhancing pancreatic cysts  The largest measured 1 cm  The main duct was normal   I personally reviewed the films  She denies any abdominal pain, nausea, vomiting, change in appetite or fatigue      Review of Systems  Complete ROS Surg Onc:   Constitutional: The patient denies new or recent history of general fatigue, no recent weight loss, no change in appetite  Eyes: No complaints of visual problems, no scleral icterus  ENT: no complaints of ear pain, no hoarseness, no difficulty swallowing,  no tinnitus and no new masses in head, oral cavity, or neck  Cardiovascular: No complaints of chest pain, no palpitations, no ankle edema  Respiratory: No complaints of shortness of breath, no cough  Gastrointestinal: No complaints of jaundice, no bloody stools, no pale stools  Genitourinary: No complaints of dysuria, no hematuria, no nocturia, no frequent urination, no urethral discharge  Musculoskeletal: No complaints of weakness, paralysis, joint stiffness or arthralgias  Integumentary: No complaints of rash, no new lesions  Neurological: No complaints of convulsions, no seizures, no dizziness  Hematologic/Lymphatic: No complaints of easy bruising  Endocrine:  No hot or cold intolerance  No polydipsia, polyphagia, or polyuria  Allergy/immunology:  No environmental allergies  No food allergies  Not immunocompromised  Skin:  No pallor or rash  No wound            Patient Active Problem List   Diagnosis    Degeneration of intervertebral disc of lumbar region    Lumbar spondylosis    Mass of left lobe of liver     Past Medical History:   Diagnosis Date    Arthritis     [facility]     Hepatitis C      Past Surgical History:   Procedure Laterality Date    ECTOPIC PREGNANCY SURGERY      MOUTH SURGERY      TONSILLECTOMY       Family History   Problem Relation Age of Onset    Alcohol abuse Mother     Heart disease Father      Social History     Socioeconomic History    Marital status:      Spouse name: Not on file    Number of children: Not on file    Years of education: Not on file    Highest education level: Not on file   Occupational History    Not on file   Social Needs    Financial resource strain: Not on file    Food insecurity:     Worry: Not on file     Inability: Not on file    Transportation needs:     Medical: Not on file     Non-medical: Not on file   Tobacco Use    Smoking status: Current Every Day Smoker     Packs/day: 0 50    Smokeless tobacco: Never Used   Substance and Sexual Activity    Alcohol use: Not Currently     Frequency: Monthly or less     Drinks per session: 1 or 2    Drug use: Yes     Frequency: 7 0 times per week     Types: Marijuana     Comment: yesterday was last dose    Sexual activity: Not on file   Lifestyle    Physical activity:     Days per week: Not on file     Minutes per session: Not on file    Stress: Not on file   Relationships    Social connections:     Talks on phone: Not on file     Gets together: Not on file     Attends Rastafarian service: Not on file     Active member of club or organization: Not on file     Attends meetings of clubs or organizations: Not on file     Relationship status: Not on file    Intimate partner violence:     Fear of current or ex partner: Not on file     Emotionally abused: Not on file     Physically abused: Not on file     Forced sexual activity: Not on file   Other Topics Concern    Not on file   Social History Narrative    Not on file       Current Outpatient Medications:     baclofen 10 mg tablet, Take 10 mg by mouth, Disp: , Rfl:     cholecalciferol (VITAMIN D3) 1,000 units tablet, , Disp: , Rfl:     folic acid (FOLVITE) 903 MCG tablet, Take 800 mcg by mouth, Disp: , Rfl:     furosemide (LASIX) 40 mg tablet, Take 40 mg by mouth, Disp: , Rfl:     spironolactone (ALDACTONE) 100 mg tablet, TAKE 1 & 1/2 TABLETS BY MOUTH DAILY, Disp: , Rfl:     Thiamine HCl (VITAMIN B-1) 100 MG TABS, Take by mouth, Disp: , Rfl:     Cholecalciferol (VITAMIN D3) 87146 units CAPS, Take 50,000 Units by mouth, Disp: , Rfl:     gabapentin (NEURONTIN) 300 mg capsule, Take 1 capsule (300 mg total) by mouth daily at bedtime for 30 days, Disp: 30 capsule, Rfl: 1    Multiple Vitamin (MULTI VITAMIN DAILY) TABS, Take by mouth, Disp: , Rfl:     omeprazole (PriLOSEC) 20 mg delayed release capsule, Take 1 capsule (20 mg total) by mouth 2 (two) times a day for 14 days, Disp: 28 capsule, Rfl: 0  No Known Allergies  Vitals:    02/20/20 1436   BP: 122/84   Pulse: 78   Temp: 98 °F (36 7 °C)       Physical Exam   Constitutional: General appearance: The Patient is well-developed and well-nourished who appears the stated age in no acute distress  Patient is pleasant and talkative  HEENT:  Normocephalic  Sclerae are anicteric  Mucous membranes are moist  Neck is supple without adenopathy  No JVD  Chest: The lungs are clear to auscultation  Cardiac: Heart is regular rate  Abdomen: Abdomen is soft, non-tender, non-distended and without masses  Extremities: There is no clubbing or cyanosis  There is no edema  Symmetric  Neuro: Grossly nonfocal  Gait is normal      Lymphatic: No evidence of cervical adenopathy bilaterally  No evidence of axillary adenopathy bilaterally  No evidence of inguinal adenopathy bilaterally  Skin: Warm, anicteric  Psych:  Patient is pleasant and talkative  Breasts:      Pathology:  [unfilled]    Labs:      Imaging  Mri Abdomen W Wo Contrast    Result Date: 2/18/2020  Narrative: MRI - ABDOMEN - WITH AND WITHOUT CONTRAST INDICATION:  Liver cirrhosis with chronic hepatitis C  COMPARISON: As of 2/18/2020, the patient's outside CT has not arrived for comparison  If this is made available, an addendum will be cemented 2 this report  A small focus of arterial enhancement in segment 4A of the liver had been described measuring "23 x 21 mm"   TECHNIQUE:  The following pulse sequences were obtained prior to and following the administration of intravenous contrast:     Coronal and axial T2 with TE of 90 and 180 respectively, axial T2 with fat saturation, axial FIESTA fat-sat, axial T1-weighted in-and-out-of phase, axial DWI/ADC, precontrast axial T1 with fat saturation, post-contrast dynamic axial T1 with fat saturation at 20, 70, and 180 seconds, coronal T1  with fat saturation and 7 minute delayed axial T1 with fat saturation  IV Contrast:  8 mL of gadobutrol injection (MULTI-DOSE) FINDINGS: LOWER CHEST:   Unremarkable  LIVER: Mild hepatic steatosis  Diffuse surface nodularity consistent with known cirrhosis  Within segment 4A, a mildly enhancing mass measures 2 4 x 1 8 cm (11/37) which demonstrates central washout on delayed images with persistent peripheral rim enhancement  The lesion is hyperintense on the T1-weighted sequence, minimally hyperintense on the T2-weighted sequence, and does not restrict water diffusion  The signal and enhancement characteristics favoring an LR4 lesion and close surveillance recommended  Several benign cysts are also scattered throughout the liver  The hepatic veins are patent  There is marked attenuation however of the intrahepatic portal veins at the zeeshan hepatis concerning for partial thrombosis  BILE DUCTS: No intrahepatic or extrahepatic bile duct dilation  GALLBLADDER:  Multiple gallstones with no wall thickening or pericholecystic fluid to suggest acute cholecystitis  PANCREAS: Multiple nonenhancing cysts are scattered throughout the parenchyma the largest measuring approximately 1 cm the pancreatic neck  There is no dilatation of the main duct nor acute changes in the peripancreatic fat  ADRENAL GLANDS:  Normal  SPLEEN:  Normal  KIDNEYS/PROXIMAL URETERS: No hydroureteronephrosis  No suspicious renal mass  Small left midpole cyst noted  BOWEL:   No dilated loops of bowel  PERITONEUM/RETROPERITONEUM: No ascites  LYMPH NODES: No abdominal lymphadenopathy  VASCULAR STRUCTURES:  A prominent collateral shunt via the IMV into the splenic vein noted as described on the outside CT  ABDOMINAL WALL:  Unremarkable  OSSEOUS STRUCTURES:  No suspicious osseous lesion  Impression: 1  Cirrhosis with enhancing 2 4 x 1 8 cm lesion in segment 4A of the left lobe   Based on the Liver-Imaging-Reporting and Data System lexicon version 2018, this is a LI-RADS 4 (probably [address] ) lesion  For lesions without a definite diagnosis by imaging (LR-2, LR-3, LR-4), decisions between accelerated follow-up (shorter than standard surveillance interval), alternative imaging, biopsy or treatment without biopsy, do not follow directly from the LI-RADS category or from a clinician's estimated probability of [address] , but rather from a clinical assessment that integrates all available medical information, including patient biomarkers, prior probability of developing or having HCC, co-morbidities and patient preference  2   Partial thrombosis of the intrahepatic left and right portal veins  Large collaterals of the IMV consistent with portal hypertension  3   Multiple nonenhancing pancreatic cysts, the largest measuring 1 cm with no dilatation of the main duct  For simple cyst(s) less than 1 5 cm, recommend yearly followup 5 times, then every 2 year for 2 times  If cyst(s) stable after 9 years, no further  followups  Recommend next followup in 1 year  Preferred imaging modality: abdomen MRI and MRCP with and without IV contrast, or triple phase abdomen CT with IV contrast, or abdomen MRI and MRCP without IV contrast  4   Cholelithiasis  Workstation performed: [id number]     I reviewed the above laboratory and imaging data  Discussion/Summary:  73-year-old female with hepatitis C and a LIRADS 4A lesion  I discussed with her this may need to be biopsied based on its appearance  I am concerned about much it enhances  Will plan on presenting her case at upper GI working group  If treatment is recommended, I would recommend biopsy and immediate ablation of the lesion at the same sitting  If this is still indeterminate, I would plan on repeating the MRI in 3 months  I will tentatively see her in 3 months  We will call her with the results of the consensus from working group and a biopsy is recommended we will schedule this    In regard to the pancreatic cyst, I discussed these may be side branch IPMN  There is a 10-20% risk of malignancy in her lifetime  These will be observed with serial MRI as well  She is agreeable to this plan  All her questions were answered

## 2020-02-20 NOTE — LETTER
February 20, 2020     Natalya Schofield DO  [address] [address] [facility]  [address]    Patient: Leonora Mei   YOB: 1960   Date of Visit: 2/20/2020       Dear Dr Chris Soto: Thank you for referring Ekta Quinn to me for evaluation  Below are my notes for this consultation  If you have questions, please do not hesitate to call me  I look forward to following your patient along with you  Sincerely,        Mario Benavidez MD        CC: MD Mario Rich MD  2/20/2020  3:09 PM  Sign at close encounter               Alexis Fry [address]  [address] [facility] [address] [facility]  1960  [id number]  [address] [facility]  [facility] [place]  [address] [facility],[address]  [facility] [address]    Diagnoses and all orders for this visit:    Mass of left lobe of liver    Other orders  -     cholecalciferol (VITAMIN D3) 1,000 units tablet        Chief Complaint   Patient presents with    Consult     Pt with history of hepatits and cirrhosis here for a consult for a liver mass  Denies any nausea, vomiting, weight loss, bloating, or change in appetite  Pt complains of weight gain  Return in about 3 months (around 5/20/2020) for Office Visit  No history exists  History of Present Illness:  19-year-old female with a history of cirrhosis  The patient states she had cirrhosis initially diagnosed in 2013  This was felt to be due to alcohol abuse  The patient states she has stop drinking since she met Dr Catracho Barone line  She does have a history of transfusion at age 34 following an ectopic pregnancy  She was found to have an elevated hepatitis C virus  Because of her cirrhosis she was getting screening imaging and she had a suspicious liver lesion seen on previous CT    MRI on January 29, 2020 reveals a 2 4 x 1 8 cm enhancing lesion in segment 4 of the liver  This was LIRADS 4A  There was partial thrombosis of the intrahepatic left and right portal veins and large collaterals consistent with portal hypertension  There was also nonenhancing pancreatic cysts  The largest measured 1 cm  The main duct was normal   I personally reviewed the films  She denies any abdominal pain, nausea, vomiting, change in appetite or fatigue  Review of Systems  Complete ROS Surg Onc:   Constitutional: The patient denies new or recent history of general fatigue, no recent weight loss, no change in appetite  Eyes: No complaints of visual problems, no scleral icterus  ENT: no complaints of ear pain, no hoarseness, no difficulty swallowing,  no tinnitus and no new masses in head, oral cavity, or neck  Cardiovascular: No complaints of chest pain, no palpitations, no ankle edema  Respiratory: No complaints of shortness of breath, no cough  Gastrointestinal: No complaints of jaundice, no bloody stools, no pale stools  Genitourinary: No complaints of dysuria, no hematuria, no nocturia, no frequent urination, no urethral discharge  Musculoskeletal: No complaints of weakness, paralysis, joint stiffness or arthralgias  Integumentary: No complaints of rash, no new lesions  Neurological: No complaints of convulsions, no seizures, no dizziness  Hematologic/Lymphatic: No complaints of easy bruising  Endocrine:  No hot or cold intolerance  No polydipsia, polyphagia, or polyuria  Allergy/immunology:  No environmental allergies  No food allergies  Not immunocompromised  Skin:  No pallor or rash  No wound            Patient Active Problem List   Diagnosis    Degeneration of intervertebral disc of lumbar region    Lumbar spondylosis    Mass of left lobe of liver     Past Medical History:   Diagnosis Date    Arthritis     Cirrhosis ([facility] [address] )     Hepatitis C      Past Surgical History:   Procedure Laterality Date    ECTOPIC PREGNANCY SURGERY      MOUTH SURGERY      TONSILLECTOMY       Family History   Problem Relation Age of Onset    Alcohol abuse Mother     Heart disease Father      Social History     Socioeconomic History    Marital status:      Spouse name: Not on file    Number of children: Not on file    Years of education: Not on file    Highest education level: Not on file   Occupational History    Not on file   Social Needs    Financial resource strain: Not on file    Food insecurity:     Worry: Not on file     Inability: Not on file    Transportation needs:     Medical: Not on file     Non-medical: Not on file   Tobacco Use    Smoking status: Current Every Day Smoker     Packs/day: 0 50    Smokeless tobacco: Never Used   Substance and Sexual Activity    Alcohol use: Not Currently     Frequency: Monthly or less     Drinks per session: 1 or 2    Drug use: Yes     Frequency: 7 0 times per week     Types: Marijuana     Comment: yesterday was last dose    Sexual activity: Not on file   Lifestyle    Physical activity:     Days per week: Not on file     Minutes per session: Not on file    Stress: Not on file   Relationships    Social connections:     Talks on phone: Not on file     Gets together: Not on file     Attends Faith service: Not on file     Active member of club or organization: Not on file     Attends meetings of clubs or organizations: Not on file     Relationship status: Not on file    Intimate partner violence:     Fear of current or ex partner: Not on file     Emotionally abused: Not on file     Physically abused: Not on file     Forced sexual activity: Not on file   Other Topics Concern    Not on file   Social History Narrative    Not on file       Current Outpatient Medications:     baclofen 10 mg tablet, Take 10 mg by mouth, Disp: , Rfl:     cholecalciferol (VITAMIN D3) 1,000 units tablet, , Disp: , Rfl:     folic acid (FOLVITE) 139 MCG tablet, Take 800 mcg by mouth, Disp: , Rfl:     furosemide (LASIX) 40 mg tablet, Take 40 mg by mouth, Disp: , Rfl:     spironolactone (ALDACTONE) 100 mg tablet, TAKE 1 & 1/2 TABLETS BY MOUTH DAILY, Disp: , Rfl:     Thiamine HCl (VITAMIN B-1) 100 MG TABS, Take by mouth, Disp: , Rfl:     Cholecalciferol (VITAMIN D3) 76697 units CAPS, Take 50,000 Units by mouth, Disp: , Rfl:     gabapentin (NEURONTIN) 300 mg capsule, Take 1 capsule (300 mg total) by mouth daily at bedtime for 30 days, Disp: 30 capsule, Rfl: 1    Multiple Vitamin (MULTI VITAMIN DAILY) TABS, Take by mouth, Disp: , Rfl:     omeprazole (PriLOSEC) 20 mg delayed release capsule, Take 1 capsule (20 mg total) by mouth 2 (two) times a day for 14 days, Disp: 28 capsule, Rfl: 0  No Known Allergies  Vitals:    02/20/20 1436   BP: 122/84   Pulse: 78   Temp: 98 °F (36 7 °C)       Physical Exam   Constitutional: General appearance: The Patient is well-developed and well-nourished who appears the stated age in no acute distress  Patient is pleasant and talkative  HEENT:  Normocephalic  Sclerae are anicteric  Mucous membranes are moist  Neck is supple without adenopathy  No JVD  Chest: The lungs are clear to auscultation  Cardiac: Heart is regular rate  Abdomen: Abdomen is soft, non-tender, non-distended and without masses  Extremities: There is no clubbing or cyanosis  There is no edema  Symmetric  Neuro: Grossly nonfocal  Gait is normal      Lymphatic: No evidence of cervical adenopathy bilaterally  No evidence of axillary adenopathy bilaterally  No evidence of inguinal adenopathy bilaterally  Skin: Warm, anicteric  Psych:  Patient is pleasant and talkative  Breasts:      Pathology:  [unfilled]    Labs:      Imaging  Mri Abdomen W Wo Contrast    Result Date: 2/18/2020  Narrative: MRI - ABDOMEN - WITH AND WITHOUT CONTRAST INDICATION:  Liver cirrhosis with chronic hepatitis C  COMPARISON: As of 2/18/2020, the patient's outside CT has not arrived for comparison    If this is made available, an addendum will be cemented 2 this report  A small focus of arterial enhancement in segment 4A of the liver had been described measuring "23 x 21 mm"  TECHNIQUE:  The following pulse sequences were obtained prior to and following the administration of intravenous contrast:     Coronal and axial T2 with TE of 90 and 180 respectively, axial T2 with fat saturation, axial FIESTA fat-sat, axial T1-weighted in-and-out-of phase, axial DWI/ADC, precontrast axial T1 with fat saturation, post-contrast dynamic axial T1 with fat saturation at 20, 70, and 180 seconds, coronal T1  with fat saturation and 7 minute delayed axial T1 with fat saturation  IV Contrast:  8 mL of gadobutrol injection (MULTI-DOSE) FINDINGS: LOWER CHEST:   Unremarkable  LIVER: Mild hepatic steatosis  Diffuse surface nodularity consistent with known cirrhosis  Within segment 4A, a mildly enhancing mass measures 2 4 x 1 8 cm (11/37) which demonstrates central washout on delayed images with persistent peripheral rim enhancement  The lesion is hyperintense on the T1-weighted sequence, minimally hyperintense on the T2-weighted sequence, and does not restrict water diffusion  The signal and enhancement characteristics favoring an LR4 lesion and close surveillance recommended  Several benign cysts are also scattered throughout the liver  The hepatic veins are patent  There is marked attenuation however of the intrahepatic portal veins at the zeeshan hepatis concerning for partial thrombosis  BILE DUCTS: No intrahepatic or extrahepatic bile duct dilation  GALLBLADDER:  Multiple gallstones with no wall thickening or pericholecystic fluid to suggest acute cholecystitis  PANCREAS: Multiple nonenhancing cysts are scattered throughout the parenchyma the largest measuring approximately 1 cm the pancreatic neck  There is no dilatation of the main duct nor acute changes in the peripancreatic fat   ADRENAL GLANDS:  Normal  SPLEEN:  Normal  KIDNEYS/PROXIMAL URETERS: No hydroureteronephrosis  No suspicious renal mass  Small left midpole cyst noted  BOWEL:   No dilated loops of bowel  PERITONEUM/RETROPERITONEUM: No ascites  LYMPH NODES: No abdominal lymphadenopathy  VASCULAR STRUCTURES:  A prominent collateral shunt via the IMV into the splenic vein noted as described on the outside CT  ABDOMINAL WALL:  Unremarkable  OSSEOUS STRUCTURES:  No suspicious osseous lesion  Impression: 1  Cirrhosis with enhancing 2 4 x 1 8 cm lesion in segment 4A of the left lobe  Based on the Liver-Imaging-Reporting and Data System lexicon version 2018, this is a LI-RADS 4 (probably [address] ) lesion  For lesions without a definite diagnosis by imaging (LR-2, LR-3, LR-4), decisions between accelerated follow-up (shorter than standard surveillance interval), alternative imaging, biopsy or treatment without biopsy, do not follow directly from the LI-RADS category or from a clinician's estimated probability of [address] , but rather from a clinical assessment that integrates all available medical information, including patient biomarkers, prior probability of developing or having HCC, co-morbidities and patient preference  2   Partial thrombosis of the intrahepatic left and right portal veins  Large collaterals of the IMV consistent with portal hypertension  3   Multiple nonenhancing pancreatic cysts, the largest measuring 1 cm with no dilatation of the main duct  For simple cyst(s) less than 1 5 cm, recommend yearly followup 5 times, then every 2 year for 2 times  If cyst(s) stable after 9 years, no further  followups  Recommend next followup in 1 year  Preferred imaging modality: abdomen MRI and MRCP with and without IV contrast, or triple phase abdomen CT with IV contrast, or abdomen MRI and MRCP without IV contrast  4   Cholelithiasis  Workstation performed: [id number]     I reviewed the above laboratory and imaging data      Discussion/Summary:  53-year-old female with hepatitis C and a LIRADS 4A lesion  I discussed with her this may need to be biopsied based on its appearance  I am concerned about much it enhances  Will plan on presenting her case at upper GI working group  If treatment is recommended, I would recommend biopsy and immediate ablation of the lesion at the same sitting  If this is still indeterminate, I would plan on repeating the MRI in 3 months  I will tentatively see her in 3 months  We will call her with the results of the consensus from working group and a biopsy is recommended we will schedule this  In regard to the pancreatic cyst, I discussed these may be side branch IPMN  There is a 10-20% risk of malignancy in her lifetime  These will be observed with serial MRI as well  She is agreeable to this plan  All her questions were answered

## 2020-03-05 ENCOUNTER — DOCUMENTATION (OUTPATIENT)
Dept: HEMATOLOGY ONCOLOGY | Facility: CLINIC | Age: 60
End: 2020-03-05

## 2020-03-05 DIAGNOSIS — R16.0 MASS OF LEFT LOBE OF LIVER: Primary | ICD-10-CM

## 2020-03-05 NOTE — PROGRESS NOTES
Rectal/GI Multidisciplinary Case Review date: 3/5/2020      Presenting Doctor: Dr Lizzie Rivers      Diagnosis: Brittany Juarez was presented at the Rectal/GI Multidisciplinary Conference today  PHYSICIAN RECOMMENDED PLAN:    -Recheck bilirubin level    -If bilirubin is normal - refer to Rad/Onc  -If bilirubin is elevated - refer to IR for percutaneous ablation, also refer to [facility] and asked her to please have bloodwork done and informed her of above plan, she stated she does not use a [facility] lab, printed order for CMP and put in mail, informed her she should receive it early next week and to please call me when she has this done so we can obtain results, she was agreeable, provided her with my contact information    Team agreed to plan

## 2020-05-19 PROBLEM — G89.4 CHRONIC PAIN SYNDROME: Status: ACTIVE | Noted: 2020-01-01

## 2020-06-12 NOTE — TELEPHONE ENCOUNTER
S/w pt  Pt upset and states she has severe pain that has been getting worse in back and legs  States it takes her all day to get OOB  States she tried and failed PT  "I can not do it "  Pt taking gabapentin, tylenol and muscle relaxer and using biofreeze    Pt states SI has to do something for her now or she will go to ED  Advised will check with SI for recommendations and if pain is that severe she should go to ED

## 2020-06-12 NOTE — TELEPHONE ENCOUNTER
Pt called stating she is in severe pain  She has to rub bio freeze all over her back down to her feet  Pt states someone has to give her a call today!! Or she will go to the hospital !! She states we don't understand she is really in severe pain!! Please call her immediately ! ! At the end og conversation she said understand me I want the Dr to call me!     Pt can be reached at [phone]

## 2020-06-17 NOTE — TELEPHONE ENCOUNTER
Reviewed  I will order MRI LS  Upon completion, I will give her a call to review accordingly  She may benefit from pain intervention   In the interim, she can up her neurontion to 300mg TID

## 2020-06-17 NOTE — TELEPHONE ENCOUNTER
S/W pt and advised of below  Pt states she just had an MRI of her abdomen with and without contrast yesterday and would like to know if SI can get what he needs from that study? Pt states she really does not want to take the Gabapentin that often as all it really does is make her sleep  She states she will try it though  Advised to CB with issues    Pt is requesting something stronger for pain  States she has been going through this since 2017  And is "sick of it!"  States she can hardly walk when she wakes up in the am and it takes her til 1300 til she can move around      Please advise

## 2020-06-17 NOTE — TELEPHONE ENCOUNTER
Patient is calling back to speak to Jg Newell she is going to take one therapy pack today and has one left for tomorrow  She is in sever pain still its not working       Please advise     Best call back [phone]

## 2020-06-18 NOTE — TELEPHONE ENCOUNTER
Not much I can ascertain from MRI abdomen results  I will place an order for MRI of lumbar spine without contrast   Upon review, I will consider pain intervention

## 2020-06-19 NOTE — TELEPHONE ENCOUNTER
S/w pt and advised of same  Provided central scheduling contact #  Advised SPA will cb as soon as results are available

## 2020-06-22 NOTE — TELEPHONE ENCOUNTER
Pt scheduled MRI 7/7  Asking for recommendations for pain until then  Completed steroid raoul last week

## 2020-06-24 NOTE — TELEPHONE ENCOUNTER
Pt called stating she would like to know if she should go to a chiropractor         Pt can be reached at [phone]

## 2020-06-24 NOTE — TELEPHONE ENCOUNTER
I spoke with patient  Patient states that she would like Elavil at bedtime to help with neuropathic pain as well as sleep  Prescription sent to her [address] on file

## 2020-07-17 NOTE — PROGRESS NOTES
Assessment:  1  Lumbar spondylosis     2  Degeneration of intervertebral disc of lumbar region  Ambulatory referral to Orthopedic Surgery    X-ray lumbar spine complete with bending   3  Anterolisthesis  X-ray lumbar spine complete with bending       Plan:  21-year-old female who presents today for followup evaluation  Patient reports worsening low back pain with bilateral leg weakness  Most recent MRI demonstrates grade 1 anterolisthesis, severe canal stenosis at L4-L5 with moderate to severe degenerative facet arthrosis  She is hesitant regarding proceeding forward with injections  I will order bending lumbar plain films to r/o instability  At this time, I will refer her to orthopedic spine surgeon Dr Viv Joiner for surgical evaluation  She will continue with gabapentin as prescribed  My impressions and treatment recommendations were discussed in detail with the patient who verbalized understanding and had no further questions  Discharge instructions were provided  I personally saw and examined the patient and I agree with the above discussed plan of care  History of Present Illness:  Lisa Rodriguez is a 64 y o  female who presents for a follow up office visit in regards to Back Pain; Shoulder Pain; Leg Pain; and Neck Pain  The patients current symptoms include dull achy low back pain which is shooting in nature  MRI demonstrates chronic compression deformity and DDD with moderate to severe stenosis  Patient reports bilateral leg weakness  Pain is rated 7/10  She has done PT and partakes in home exercises  She is on gabapentin OTC and baclofen  I have personally reviewed and/or updated the patient's past medical history, past surgical history, family history, social history, current medications, allergies, and vital signs today  Review of Systems   Respiratory: Negative for shortness of breath  Cardiovascular: Negative for chest pain     Gastrointestinal: Negative for constipation, diarrhea, nausea and vomiting  Musculoskeletal: Positive for arthralgias, back pain and gait problem  Negative for joint swelling and myalgias  Decreased range of motion, muscle weakness, joint stiffness and swelling  Skin: Negative for rash  Neurological: Negative for dizziness, seizures and weakness  All other systems reviewed and are negative        Patient Active Problem List   Diagnosis    Degeneration of intervertebral disc of lumbar region    Lumbar spondylosis    Mass of left lobe of liver    Chronic pain syndrome       Past Medical History:   Diagnosis Date    Arthritis     Cirrhosis ([address] )     Hepatitis C        Past Surgical History:   Procedure Laterality Date    ECTOPIC PREGNANCY SURGERY      MOUTH SURGERY      TONSILLECTOMY         Family History   Problem Relation Age of Onset    Alcohol abuse Mother     Heart disease Father        Social History     Occupational History    Not on file   Tobacco Use    Smoking status: Current Every Day Smoker     Packs/day: 0 50    Smokeless tobacco: Never Used   Substance and Sexual Activity    Alcohol use: Not Currently     Frequency: Monthly or less     Drinks per session: 1 or 2    Drug use: Yes     Frequency: 7 0 times per week     Types: Marijuana     Comment: everyday    Sexual activity: Not on file       Current Outpatient Medications on File Prior to Visit   Medication Sig    amitriptyline (ELAVIL) 10 mg tablet Take 1 tablet (10 mg total) by mouth daily at bedtime    baclofen 10 mg tablet Take 10 mg by mouth    cholecalciferol (VITAMIN D3) 1,000 units tablet     folic acid (FOLVITE) 901 MCG tablet Take 800 mcg by mouth    furosemide (LASIX) 40 mg tablet Take 40 mg by mouth    gabapentin (NEURONTIN) 300 mg capsule Take 1 capsule (300 mg total) by mouth daily at bedtime for 30 days    gabapentin (NEURONTIN) 300 mg capsule Take 1 capsule (300 mg total) by mouth daily at bedtime    methylPREDNISolone 4 MG tablet therapy pack Use as directed on package    Multiple Vitamin (MULTI VITAMIN DAILY) TABS Take by mouth    spironolactone (ALDACTONE) 100 mg tablet TAKE 1 & 1/2 TABLETS BY MOUTH DAILY    Thiamine HCl (VITAMIN B-1) 100 MG TABS Take by mouth    Cholecalciferol (VITAMIN D3) 82352 units CAPS Take 50,000 Units by mouth    omeprazole (PriLOSEC) 20 mg delayed release capsule Take 1 capsule (20 mg total) by mouth 2 (two) times a day for 14 days     No current facility-administered medications on file prior to visit  No Known Allergies    Physical Exam:    /77   Pulse 90   Resp 20   Ht 5' 8" (1 727 m)   Wt 78 3 kg (172 lb 9 6 oz)   BMI 26 24 kg/m²     Constitutional:normal, well developed, well nourished, alert, in no distress and non-toxic and no overt pain behavior  Eyes:anicteric  HEENT:grossly intact  Neck:supple, symmetric, trachea midline and no masses   Pulmonary:even and unlabored  Cardiovascular:No edema or pitting edema present  Skin:Normal without rashes or lesions and well hydrated  Psychiatric:Mood and affect appropriate  Neurologic:Cranial Nerves II-XII grossly intact  Musculoskeletal:normal    Imaging    MRI LUMBAR SPINE WITHOUT CONTRAST     INDICATION: [id number]: Spondylosis without myelopathy or radiculopathy, lumbar region  M51 [id number]: Other intervertebral disc degeneration, lumbar region      COMPARISON:  6/25/2019 x-rays     TECHNIQUE:  Sagittal T1, sagittal T2, sagittal inversion recovery, axial T1 and axial T2, coronal T2     IMAGE QUALITY:  Diagnostic     FINDINGS:     VERTEBRAL BODIES:  There are 5 lumbar type vertebral bodies  Mild dextroscoliosis, apex approximately L2-3  Moderate chronic compression deformity of T12, unchanged, although it was described at L1 on the prior study        SACRUM:  Normal signal within the sacrum   No evidence of insufficiency or stress fracture      DISTAL CORD AND CONUS:  Normal size and signal within the distal cord and conus      PARASPINAL SOFT TISSUES:  Paraspinal soft tissues are unremarkable      LOWER THORACIC DISC SPACES:  Normal disc height and signal   No disc herniation, canal stenosis or foraminal narrowing      LUMBAR DISC SPACES:     L1-L2:  Mild degenerative spondylosis and bulging annulus, no stenosis     L2-L3:  Mild degenerative spondylosis and bulging annulus, no stenosis     L3-L4:  Moderate degenerative sclerotic changes involve the left side of the interspace and the endplates  There appear to be left-sided Schmorl's defects at the inferior endplate of L3 and the superior endplate of L4 containing granulation tissue  Generalized bulging annulus  Moderate left foraminal stenosis      L4-L5:  Moderate degenerative disc disease, severe degenerative facet arthrosis, grade 1 anterolisthesis, bulging annulus, severe central canal and bilateral foraminal stenosis     L5-S1:  Mild degenerative disc disease  Moderate degenerative facet arthrosis  Mild bilateral foraminal stenosis      IMPRESSION:  Multilevel degenerative spondylosis     Moderate left foraminal stenosis L3-4     Grade 1 anterolisthesis, severe central canal and bilateral foraminal stenosis L4-5     Mild bilateral foraminal stenosis L5-S1     Chronic compression deformity at T12, unchanged compared with 6/25/2019, although it was described as being at the L1 level on that study  If surgical intervention is planned, plain films of the thoracic spine would be recommended to count the number of   ribs  The current level assignment on this report assumes that the lumbosacral junction is typical in configuration and not transitional and that there may be 11 ribs

## 2020-07-28 NOTE — H&P
Interventional Radiology  History and Physical 7/28/2020     History of Present Illness:  64year old female with LI-RADS 5 lesion in segment 4A of liver presents for ablation      Past Medical History:   Diagnosis Date    Arthritis     Cirrhosis ([address] )     Hepatitis C         Past Surgical History:   Procedure Laterality Date    ECTOPIC PREGNANCY SURGERY      MOUTH SURGERY      TONSILLECTOMY          Social History     Tobacco Use   Smoking Status Current Every Day Smoker    Packs/day: 0 50   Smokeless Tobacco Never Used        Social History     Substance and Sexual Activity   Alcohol Use Not Currently    Frequency: Monthly or less    Drinks per session: 1 or 2        Social History     Substance and Sexual Activity   Drug Use Yes    Frequency: 7 0 times per week    Types: Marijuana    Comment: everyday        No Known Allergies    Current Outpatient Medications   Medication Sig Dispense Refill    amitriptyline (ELAVIL) 10 mg tablet Take 1 tablet (10 mg total) by mouth daily at bedtime 30 tablet 0    baclofen 10 mg tablet Take 10 mg by mouth      cholecalciferol (VITAMIN D3) 1,000 units tablet       Cholecalciferol (VITAMIN D3) 55192 units CAPS Take 50,000 Units by mouth      folic acid (FOLVITE) 863 MCG tablet Take 800 mcg by mouth      furosemide (LASIX) 40 mg tablet Take 40 mg by mouth      gabapentin (NEURONTIN) 300 mg capsule Take 1 capsule (300 mg total) by mouth daily at bedtime for 30 days 30 capsule 1    gabapentin (NEURONTIN) 300 mg capsule Take 1 capsule (300 mg total) by mouth daily at bedtime 90 capsule 0    methylPREDNISolone 4 MG tablet therapy pack Use as directed on package 1 each 0    Multiple Vitamin (MULTI VITAMIN DAILY) TABS Take by mouth      omeprazole (PriLOSEC) 20 mg delayed release capsule Take 1 capsule (20 mg total) by mouth 2 (two) times a day for 14 days 28 capsule 0    spironolactone (ALDACTONE) 100 mg tablet TAKE 1 & 1/2 TABLETS BY MOUTH DAILY      Thiamine HCl (VITAMIN B-1) 100 MG TABS Take by mouth       No current facility-administered medications for this encounter  Objective:    Vitals:    07/28/20 0800   Resp: 18   Temp: 97 9 °F (36 6 °C)   TempSrc: Temporal   Weight: 78 9 kg (173 lb 15 1 oz)   Height: 5' 8" (1 727 m)        Physical Exam    Const: NAD  Abd: Soft    Lab Results   Component Value Date    WBC 4 44 11/05/2019    HGB 12 3 11/05/2019    HCT 36 1 11/05/2019     (H) 11/05/2019    PLT 62 (L) 11/05/2019     I have personally reviewed pertinent imaging and laboratory results  Assessment/Plan:  - Segment 4A LI-RADS 5 lesion microwave ablation  This procedure has been fully reviewed with the patient and written informed consent has been obtained

## 2020-07-28 NOTE — DISCHARGE INSTRUCTIONS
Ablation of the Liver   WHAT YOU NEED TO KNOW:   Microwave ablation (MFA) is a procedure that uses electrical currents to destroy cancer cells in the liver  A needle electrode delivers an electrical current that creates heat and destroys the tumor  This procedure is commonly used for small tumors  DISCHARGE INSTRUCTIONS:   Call 911 for the following:   · You have chest pain or shortness of breath  Seek care immediately if:   · You have severe pain that does not get better with medicine  Contact your healthcare provider if:   · You have a fever  · You continue to have pain a week after your procedure  · You have questions or concerns about your condition or care  Medicines:   · Pain medicine  may be given  Ask how to take this medicine safely  · Take your medicine as directed  Contact your healthcare provider if you think your medicine is not helping or if you have side effects  Tell him or her if you are allergic to any medicine  Keep a list of the medicines, vitamins, and herbs you take  Include the amounts, and when and why you take them  Bring the list or the pill bottles to follow-up visits  Carry your medicine list with you in case of an emergency  Follow up with your healthcare provider as directed: You will need to return within a month for a CT scan or MRI of your liver  Write down your questions so you remember to ask them during your visits  Rest as needed:  Slowly start to do more each day  Return to your daily activities as directed by your healthcare provider  © 2017 [employer] Information is for End User's use only and may not be sold, redistributed or otherwise used for commercial purposes  All illustrations and images included in CareNotes® are the copyrighted property of A D A M , Inc  or Ernie Persaud  The above information is an  only  It is not intended as medical advice for individual conditions or treatments   Talk to your doctor, nurse or pharmacist before following any medical regimen to see if it is safe and effective for you

## 2020-07-28 NOTE — ANESTHESIA POSTPROCEDURE EVALUATION
Post-Op Assessment Note    CV Status:  Stable    Pain management: adequate     Mental Status:  Alert and awake   Hydration Status:  Euvolemic   PONV Controlled:  Controlled   Airway Patency:  Patent   Post Op Vitals Reviewed: Yes      Staff: CRNA           BP   120/18   Temp   98   Pulse  78   Resp   20   SpO2   99

## 2020-07-28 NOTE — BRIEF OP NOTE (RAD/CATH)
CT GUIDED AND MONITORING PARENCHYMAL TISSUE ABLATION Procedure Note    PATIENT NAME: Micaela Maya  : 1960  MRN: [id number]    Pre-op Diagnosis:   1  Hepatocellular carcinoma (HCC)      Post-op Diagnosis:   1  Hepatocellular carcinoma [facility])        Surgeon:   Ava Liz MD    Estimated Blood Loss: 2 mL    Findings: Successful segment 4 liver LI-RADS 5 lesion ablation using microwave      Specimens: None    Complications:  None    Anesthesia: Local and Ebonie Cosby MD     Date: 2020  Time: 10:36 AM

## 2020-07-28 NOTE — ANESTHESIA PREPROCEDURE EVALUATION
Review of Systems/Medical History  Patient summary reviewed  Chart reviewed  No history of anesthetic complications     Cardiovascular  EKG reviewed,    Pulmonary       GI/Hepatic    Liver disease (Hepatocellular carcinoma ) , cirrhosis and history of liver cancer, Hepatitis C,             Endo/Other     GYN       Hematology   Musculoskeletal  Osteoarthritis,   Arthritis     Neurology   Psychology       PSH:    MOUTH SURGERY ECTOPIC PREGNANCY SURGERY   TONSILLECTOMY          Physical Exam    Airway    Mallampati score: II  TM Distance: >3 FB  Neck ROM: full     Dental   No notable dental hx     Cardiovascular  Cardiovascular exam normal    Pulmonary  Pulmonary exam normal Breath sounds clear to auscultation,     Other Findings        Anesthesia Plan  ASA Score- 3     Anesthesia Type- IV sedation with anesthesia with ASA Monitors  Additional Monitors:   Airway Plan:         Plan Factors- Patient instructed to abstain from smoking on day of procedure       Induction- intravenous  Postoperative Plan- Plan for postoperative opioid use  Informed Consent- Anesthetic plan and risks discussed with patient  I personally reviewed this patient with the CRNA  Discussed and agreed on the Anesthesia Plan with the CRNA             Lab Results   Component Value Date    WBC 4 44 11/05/2019    HGB 12 3 11/05/2019    HCT 36 1 11/05/2019     (H) 11/05/2019    PLT 62 (L) 11/05/2019     Lab Results   Component Value Date    CALCIUM 8 5 06/03/2020    K 3 6 06/03/2020    CO2 21 06/03/2020    CL 89 (L) 06/03/2020    BUN 5 06/03/2020    CREATININE 0 77 06/03/2020     No results found for: INR, PROTIME  No results found for: PTT        Discussed with pt the benefits/alternatives and risks or General Anesthesia including breathing tube remaining in place if not strong enough, PONV, damage to lips and teeth, sore throat, eye injury or blindness    I, Dr Sakshi Jansen, the attending physician, have personally seen and evaluated the patient prior to anesthetic care  I have reviewed the pre-anesthetic record, and other medical records if appropriate to the anesthetic care  If a CRNA is involved in the case, I have reviewed the CRNA assessment, if present, and agree  The patient is in a suitable condition to proceed with my formulated anesthetic plan

## 2020-08-03 NOTE — TELEPHONE ENCOUNTER
Patient is returning nurses phone call regarding med refill  Last ovs is 7/17/2020    Please advise     [phone]    Thank you

## 2020-08-03 NOTE — TELEPHONE ENCOUNTER
Pt contacted Call Center requested refill of their medication  Medication Name:  Gabapentin     Dosage of Med:  300 mg     Frequency of Med:  Twice at bed time     Remaining Medication:  2 pills left     Pharmacy and Location:  [facility] [address]       Pt  Preferred Callback Phone Number:      Thank you

## 2020-08-03 NOTE — TELEPHONE ENCOUNTER
Attempted to reach pt on both lines, both not accepting calls  Please try again 8/4  Last ordered June 2019 with 1 refill

## 2020-08-04 NOTE — TELEPHONE ENCOUNTER
S/W pt and advised of below  Advised will need NP appt first week of September with [facility]  Pt did not want to make appt at this time and will CB to schedule

## 2020-08-04 NOTE — TELEPHONE ENCOUNTER
Patient called requesting to speak to a nurse about her Gabapentin medication status   Please advise asap, thx    Call back# [phone]

## 2020-08-04 NOTE — TELEPHONE ENCOUNTER
See below  Pt states she has been taking Gabapentin 300mg, 2 tabs nightly, which she said SI told her she could do    Please send updated Rx until she can be seen by NP

## 2020-08-04 NOTE — TELEPHONE ENCOUNTER
ES SPA Patient of Dr Fu Crescent    Can refill of Gabapentin be sent? Last filled 5/19/2020 as 90 day supply  Last OV 7/17/2020    Will schedule OV f/u with NP in one month

## 2020-09-16 PROBLEM — C22.0 HEPATOCELLULAR CARCINOMA (HCC): Status: ACTIVE | Noted: 2020-01-01

## 2020-09-17 NOTE — LETTER
September 17, 2020     Lambert Lam DO  [address] [address] [facility]  [address]    Patient: Brody Garnica   YOB: 1960   Date of Visit: 9/17/2020       Dear Dr Maria Fernanda Harkins: Thank you for referring Brody Garnica to me for evaluation  Below are my notes for this consultation  If you have questions, please do not hesitate to call me  I look forward to following your patient along with you  Sincerely,        Sejal Heard MD        CC: No Recipients  Sejal Heard MD  9/17/2020  2:34 PM  Incomplete               Surgical Oncology Follow Up       Trish Marcano [place]GICAL ONCOLOG[facility] [place]  [address] [place]  [place] [address]    [facility]  1960  [id number]  Trish Marcano [facility] SURGICAL ONCOLOGY [place]  [address]  [place] [address]    Diagnoses and all orders for this visit:    Mass of left lobe of liver    Hepatocellular carcinoma ([facility] [address] )  -     Cancel: US head neck lymph node mapping; Future  -     MRI abdomen w wo contrast; Future  -     AFP tumor marker; Future  -     BUN; Future  -     Creatinine, serum; Future        Chief Complaint   Patient presents with    Follow-up     1 Month       Return in about 3 months (around 12/17/2020) for Office Visit, Imaging - See orders, Labs - See Treatment Plan  Oncology History    No history exists  Staging: [facility] [address]   Treatment history:   Percutaneous liver ablation, July 2020  Current treatment:  observation  Disease status:   Questionable residual enhancement    History of Present Illness:  Patient returns in follow-up of her [facility] [address]  This was percutaneously ablated in July of 2020  Follow-up AFP level is normal   Her MRI from September 11, 2020 reveals that lesion is stable in size  There is questionable residual enhancement at the periphery  Postprocedure changes are seen as well  There is decreased thrombus in the portal veins    Pancreatic cysts are stable  I personally reviewed the films  Her main complaint continues to be back pain  She has no abdominal pain, nausea or vomiting  Her appetite is good  Review of Systems  Complete ROS Surg Onc:   Complete ROS Surg Onc:   Constitutional: The patient denies new or recent history of general fatigue, no recent weight loss, no change in appetite  Eyes: No complaints of visual problems, no scleral icterus  ENT: no complaints of ear pain, no hoarseness, no difficulty swallowing,  no tinnitus and no new masses in head, oral cavity, or neck  Cardiovascular: No complaints of chest pain, no palpitations, no ankle edema  Respiratory: No complaints of shortness of breath, no cough  Gastrointestinal: No complaints of jaundice, no bloody stools, no pale stools  Genitourinary: No complaints of dysuria, no hematuria, no nocturia, no frequent urination, no urethral discharge  Musculoskeletal: No complaints of weakness, paralysis, joint stiffness or arthralgias  Integumentary: No complaints of rash, no new lesions  Neurological: No complaints of convulsions, no seizures, no dizziness  Hematologic/Lymphatic: No complaints of easy bruising  Endocrine:  No hot or cold intolerance  No polydipsia, polyphagia, or polyuria  Allergy/immunology:  No environmental allergies  No food allergies  Not immunocompromised  Skin:  No pallor or rash  No wound          Patient Active Problem List   Diagnosis    Degeneration of intervertebral disc of lumbar region    Lumbar spondylosis    Mass of left lobe of liver    Chronic pain syndrome    Hepatocellular carcinoma (HCC)     Past Medical History:   Diagnosis Date    Arthritis     Cirrhosis ([address] )     Extremity pain     BLE    Hepatitis C     Joint pain     Bilateral Shoulders    Liver disease     Low back pain      Past Surgical History:   Procedure Laterality Date    CT GUIDED AND MONITORING PARENCHYMAL TISSUE ABLATION  7/28/2020    ECTOPIC PREGNANCY SURGERY      MOUTH SURGERY      TONSILLECTOMY       Family History   Problem Relation Age of Onset    Alcohol abuse Mother     Heart disease Father      Social History     Socioeconomic History    Marital status:      Spouse name: Not on file    Number of children: Not on file    Years of education: Not on file    Highest education level: Not on file   Occupational History    Not on file   Social Needs    Financial resource strain: Not on file    Food insecurity     Worry: Not on file     Inability: Not on file    Transportation needs     Medical: Not on file     Non-medical: Not on file   Tobacco Use    Smoking status: Current Every Day Smoker     Packs/day: 0 50    Smokeless tobacco: Never Used   Substance and Sexual Activity    Alcohol use: Not Currently     Frequency: Monthly or less     Drinks per session: 1 or 2    Drug use: Yes     Frequency: 7 0 times per week     Types: Marijuana     Comment: everyday    Sexual activity: Not on file   Lifestyle    Physical activity     Days per week: Not on file     Minutes per session: Not on file    Stress: Not on file   Relationships    Social connections     Talks on phone: Not on file     Gets together: Not on file     Attends Jewish service: Not on file     Active member of club or organization: Not on file     Attends meetings of clubs or organizations: Not on file     Relationship status: Not on file    Intimate partner violence     Fear of current or ex partner: Not on file     Emotionally abused: Not on file     Physically abused: Not on file     Forced sexual activity: Not on file   Other Topics Concern    Not on file   Social History Narrative    Not on file       Current Outpatient Medications:     baclofen 10 mg tablet, Take 10 mg by mouth, Disp: , Rfl:     cholecalciferol (VITAMIN D3) 1,000 units tablet, , Disp: , Rfl:     furosemide (LASIX) 40 mg tablet, Take 40 mg by mouth, Disp: , Rfl:     gabapentin (NEURONTIN) 300 mg capsule, Take 1 capsule (300 mg total) by mouth 2 (two) times a day, Disp: 60 capsule, Rfl: 1    spironolactone (ALDACTONE) 100 mg tablet, TAKE 1 & 1/2 TABLETS BY MOUTH DAILY, Disp: , Rfl:   No Known Allergies  Vitals:    09/17/20 1414   BP: 122/88   Pulse: 86   Resp: 18   Temp: 97 5 °F (36 4 °C)       Physical Exam  Constitutional: General appearance: The Patient is well-developed and well-nourished who appears the stated age in no acute distress  Patient is pleasant and talkative  HEENT:  Normocephalic  Sclerae are anicteric  Mucous membranes are moist  Neck is supple without adenopathy  No JVD  Chest: The lungs are clear to auscultation  Cardiac: Heart is regular rate  Abdomen: Abdomen is soft, non-tender, non-distended and without masses  Extremities: There is no clubbing or cyanosis  There is no edema  Symmetric  Neuro: Grossly nonfocal  Gait is normal      Lymphatic: No evidence of cervical adenopathy bilaterally  No evidence of axillary adenopathy bilaterally  Skin: Warm, anicteric  Psych:  Patient is pleasant and talkative  Breasts:        Pathology:  [unfilled]    Labs:   AFP level is 3 6  Imaging  Mri Abdomen W Wo Contrast    Result Date: 9/16/2020  Narrative: MRI - ABDOMEN - WITH AND WITHOUT CONTRAST INDICATION:  Hepatocellular carcinoma  Patient had microwave ablation of segment 4 liver lesion 7/28/2020  Follow-up  COMPARISON: MRI abdomen 6/16/2020, 1/29/2020 TECHNIQUE:  The following pulse sequences were obtained prior to and following the administration of intravenous contrast:  Coronal and axial T2 with TE of 90 and 180 respectively, axial T2 with fat saturation, axial FIESTA fat-sat, axial T1-weighted in-and-out-of phase, axial DWI/ADC, precontrast axial T1 with fat saturation, post-contrast dynamic axial T1 with fat saturation at 20, 70, and 180 seconds, coronal T1  with fat saturation and 7 minute delayed axial T1 with fat saturation    2D radial MRCP images were also provided  Pre- and postcontrast subtraction images were also obtained  IV Contrast:  7 mL of gadobutrol injection (MULTI-DOSE) FINDINGS: LOWER CHEST:   Unremarkable  LIVER: Diffuse nodularity typical of cirrhosis  2 7 x 2 4 cm well-circumscribed mass in segment 4A which is isointense centrally on T1 with peripheral T1 hyperintensity and isointense centrally on T2 with peripheral T2 low signal   Previously this measured 2 7 x 2 1 cm  On the subtraction images 42-45  (series [id number]) there is questionable minimal residual nodular enhancement around the periphery of the lesion  Tiny amount of residual viable tumor cannot be excluded  No discernible central enhancement  3 7 x 2 4 cm hypoenhancing area of iso to slightly increased T1 signal in the left lobe anterior and inferior to the above ablated lesion, likely reflecting posttreatment hemorrhage/edema  There is mild central left hepatic duct dilatation now noted  No new masses  Subcentimeter cysts in the right lobe  Hepatic veins grossly patent  Normal enhancement of the main portal vein  Attenuation of the intrahepatic portal veins with decreasing thrombus compared to previous study  BILE DUCTS: CBD again prominent at about 9 mm (previously measured 10 mm by report), tapering distally  No choledocholithiasis, stricture or obstructing mass  GALLBLADDER:  Cholelithiasis again noted  PANCREAS: Multiple small pancreatic cysts, unchanged from prior study, most likely side branch IPMNs  Main pancreatic duct normal in course and caliber  ADRENAL GLANDS:  Normal  SPLEEN:  Mildly enlarged measuring 13 7 cm in length  KIDNEYS/PROXIMAL URETERS: Small left renal cyst  BOWEL:   No dilated loops of bowel  PERITONEUM/RETROPERITONEUM: No significant ascites  LYMPH NODES: No pathologic lymphadenopathy  VASCULAR STRUCTURES:  No aneurysm  Large varix on the left compatible with portosystemic shunt  ABDOMINAL WALL:  Unremarkable   OSSEOUS STRUCTURES: Chronic compression fracture of T12, unchanged since the last MRI  Impression: 1  Status post ablation of segment 4A lesion, similar in size to previous study accounting for interobserver variability  There is no central arterial enhancement  There is questionable minimal residual nodular enhancement around the periphery of the lesion (series [id number] images 42-45) for which a tiny amount of residual viable tumor cannot be excluded  No new lesions detected  Three-month follow-up MRI recommended for reevaluation  2  3 7 x 2 4 cm hypoenhancing area in the left lobe adjacent to the ablated lesion likely reflects posttreatment hemorrhage/edema  3  Attenuation of the intrahepatic portal veins with decreasing thrombus compared to previous study  4  Cirrhosis with splenomegaly and large left mesocaval collateral vessel indicative of portal hypertension  5  Multiple small pancreatic cysts, unchanged from prior study, most likely side branch IPMNs  6  Cholelithiasis  The study was marked in [facility] for significant notification and follow-up  Workstation performed: [id number]     I reviewed the above laboratory and imaging data  Discussion/Summary:   53-year-old female with hepatitis C status post percutaneous ablation a LIRADS 5 lesion  She is doing well  Her AFP level is normal   This may all be postprocedural   Will plan on repeating her MRI in 3 months  If there is residual enhancement, her repeat percutaneous ablation will be scheduled  I will see her back in 3 months with a repeat MRI and AFP level  She is agreeable to this  All her questions were answered

## 2020-09-17 NOTE — PROGRESS NOTES
Surgical Oncology Follow Up       [facility]  [facility] [place]  [address]  [place] [id number]    Bryan Barbara  1960  [id number]  [facility] [place]  [facility] [place]  [address]  [place] [id number]    Diagnoses and all orders for this visit:    Mass of left lobe of liver    Hepatocellular carcinoma ([address] )  -     Cancel: US head neck lymph node mapping; Future  -     MRI abdomen w wo contrast; Future  -     AFP tumor marker; Future  -     BUN; Future  -     Creatinine, serum; Future        Chief Complaint   Patient presents with    Follow-up     1 Month       Return in about 3 months (around 12/17/2020) for Office Visit, Imaging - See orders, Labs - See Treatment Plan  Oncology History    No history exists  Staging: [address]   Treatment history:   Percutaneous liver ablation, July 2020  Current treatment:  observation  Disease status:   Questionable residual enhancement    History of Present Illness:  Patient returns in follow-up of her [address]  This was percutaneously ablated in July of 2020  Follow-up AFP level is normal   Her MRI from September 11, 2020 reveals that lesion is stable in size  There is questionable residual enhancement at the periphery  Postprocedure changes are seen as well  There is decreased thrombus in the portal veins  Pancreatic cysts are stable  I personally reviewed the films  Her main complaint continues to be back pain  She has no abdominal pain, nausea or vomiting  Her appetite is good  Review of Systems  Complete ROS Surg Onc:   Complete ROS Surg Onc:   Constitutional: The patient denies new or recent history of general fatigue, no recent weight loss, no change in appetite  Eyes: No complaints of visual problems, no scleral icterus     ENT: no complaints of ear pain, no hoarseness, no difficulty swallowing,  no tinnitus and no new masses in head, oral cavity, or neck  Cardiovascular: No complaints of chest pain, no palpitations, no ankle edema  Respiratory: No complaints of shortness of breath, no cough  Gastrointestinal: No complaints of jaundice, no bloody stools, no pale stools  Genitourinary: No complaints of dysuria, no hematuria, no nocturia, no frequent urination, no urethral discharge  Musculoskeletal: No complaints of weakness, paralysis, joint stiffness or arthralgias  Integumentary: No complaints of rash, no new lesions  Neurological: No complaints of convulsions, no seizures, no dizziness  Hematologic/Lymphatic: No complaints of easy bruising  Endocrine:  No hot or cold intolerance  No polydipsia, polyphagia, or polyuria  Allergy/immunology:  No environmental allergies  No food allergies  Not immunocompromised  Skin:  No pallor or rash  No wound          Patient Active Problem List   Diagnosis    Degeneration of intervertebral disc of lumbar region    Lumbar spondylosis    Mass of left lobe of liver    Chronic pain syndrome    Hepatocellular carcinoma (HCC)     Past Medical History:   Diagnosis Date    Arthritis     Cirrhosis ([address] )     Extremity pain     BLE    Hepatitis C     Joint pain     Bilateral Shoulders    Liver disease     Low back pain      Past Surgical History:   Procedure Laterality Date    CT GUIDED AND MONITORING PARENCHYMAL TISSUE ABLATION  7/28/2020    ECTOPIC PREGNANCY SURGERY      MOUTH SURGERY      TONSILLECTOMY       Family History   Problem Relation Age of Onset    Alcohol abuse Mother     Heart disease Father      Social History     Socioeconomic History    Marital status:      Spouse name: Not on file    Number of children: Not on file    Years of education: Not on file    Highest education level: Not on file   Occupational History    Not on file   Social Needs    Financial resource strain: Not on file    Food insecurity     Worry: Not on file     Inability: Not on file   [employer] needs     Medical: Not on file     Non-medical: Not on file   Tobacco Use    Smoking status: Current Every Day Smoker     Packs/day: 0 50    Smokeless tobacco: Never Used   Substance and Sexual Activity    Alcohol use: Not Currently     Frequency: Monthly or less     Drinks per session: 1 or 2    Drug use: Yes     Frequency: 7 0 times per week     Types: Marijuana     Comment: everyday    Sexual activity: Not on file   Lifestyle    Physical activity     Days per week: Not on file     Minutes per session: Not on file    Stress: Not on file   Relationships    Social connections     Talks on phone: Not on file     Gets together: Not on file     Attends Zoroastrian service: Not on file     Active member of club or organization: Not on file     Attends meetings of clubs or organizations: Not on file     Relationship status: Not on file    Intimate partner violence     Fear of current or ex partner: Not on file     Emotionally abused: Not on file     Physically abused: Not on file     Forced sexual activity: Not on file   Other Topics Concern    Not on file   Social History Narrative    Not on file       Current Outpatient Medications:     baclofen 10 mg tablet, Take 10 mg by mouth, Disp: , Rfl:     cholecalciferol (VITAMIN D3) 1,000 units tablet, , Disp: , Rfl:     furosemide (LASIX) 40 mg tablet, Take 40 mg by mouth, Disp: , Rfl:     gabapentin (NEURONTIN) 300 mg capsule, Take 1 capsule (300 mg total) by mouth 2 (two) times a day, Disp: 60 capsule, Rfl: 1    spironolactone (ALDACTONE) 100 mg tablet, TAKE 1 & 1/2 TABLETS BY MOUTH DAILY, Disp: , Rfl:   No Known Allergies  Vitals:    09/17/20 1414   BP: 122/88   Pulse: 86   Resp: 18   Temp: 97 5 °F (36 4 °C)       Physical Exam  Constitutional: General appearance: The Patient is well-developed and well-nourished who appears the stated age in no acute distress  Patient is pleasant and talkative  HEENT:  Normocephalic    Sclerae are anicteric  Mucous membranes are moist  Neck is supple without adenopathy  No JVD  Chest: The lungs are clear to auscultation  Cardiac: Heart is regular rate  Abdomen: Abdomen is soft, non-tender, non-distended and without masses  Extremities: There is no clubbing or cyanosis  There is no edema  Symmetric  Neuro: Grossly nonfocal  Gait is normal      Lymphatic: No evidence of cervical adenopathy bilaterally  No evidence of axillary adenopathy bilaterally  Skin: Warm, anicteric  Psych:  Patient is pleasant and talkative  Breasts:        Pathology:  [unfilled]    Labs:   AFP level is 3 6  Imaging  Mri Abdomen W Wo Contrast    Result Date: 9/16/2020  Narrative: MRI - ABDOMEN - WITH AND WITHOUT CONTRAST INDICATION:  Hepatocellular carcinoma  Patient had microwave ablation of segment 4 liver lesion 7/28/2020  Follow-up  COMPARISON: MRI abdomen 6/16/2020, 1/29/2020 TECHNIQUE:  The following pulse sequences were obtained prior to and following the administration of intravenous contrast:  Coronal and axial T2 with TE of 90 and 180 respectively, axial T2 with fat saturation, axial FIESTA fat-sat, axial T1-weighted in-and-out-of phase, axial DWI/ADC, precontrast axial T1 with fat saturation, post-contrast dynamic axial T1 with fat saturation at 20, 70, and 180 seconds, coronal T1  with fat saturation and 7 minute delayed axial T1 with fat saturation  2D radial MRCP images were also provided  Pre- and postcontrast subtraction images were also obtained  IV Contrast:  7 mL of gadobutrol injection (MULTI-DOSE) FINDINGS: LOWER CHEST:   Unremarkable  LIVER: Diffuse nodularity typical of cirrhosis  2 7 x 2 4 cm well-circumscribed mass in segment 4A which is isointense centrally on T1 with peripheral T1 hyperintensity and isointense centrally on T2 with peripheral T2 low signal   Previously this measured 2 7 x 2 1 cm   On the subtraction images 42-45  (series [id number]) there is questionable minimal residual nodular enhancement around the periphery of the lesion  Tiny amount of residual viable tumor cannot be excluded  No discernible central enhancement  3 7 x 2 4 cm hypoenhancing area of iso to slightly increased T1 signal in the left lobe anterior and inferior to the above ablated lesion, likely reflecting posttreatment hemorrhage/edema  There is mild central left hepatic duct dilatation now noted  No new masses  Subcentimeter cysts in the right lobe  Hepatic veins grossly patent  Normal enhancement of the main portal vein  Attenuation of the intrahepatic portal veins with decreasing thrombus compared to previous study  BILE DUCTS: CBD again prominent at about 9 mm (previously measured 10 mm by report), tapering distally  No choledocholithiasis, stricture or obstructing mass  GALLBLADDER:  Cholelithiasis again noted  PANCREAS: Multiple small pancreatic cysts, unchanged from prior study, most likely side branch IPMNs  Main pancreatic duct normal in course and caliber  ADRENAL GLANDS:  Normal  SPLEEN:  Mildly enlarged measuring 13 7 cm in length  KIDNEYS/PROXIMAL URETERS: Small left renal cyst  BOWEL:   No dilated loops of bowel  PERITONEUM/RETROPERITONEUM: No significant ascites  LYMPH NODES: No pathologic lymphadenopathy  VASCULAR STRUCTURES:  No aneurysm  Large varix on the left compatible with portosystemic shunt  ABDOMINAL WALL:  Unremarkable  OSSEOUS STRUCTURES:  Chronic compression fracture of T12, unchanged since the last MRI  Impression: 1  Status post ablation of segment 4A lesion, similar in size to previous study accounting for interobserver variability  There is no central arterial enhancement  There is questionable minimal residual nodular enhancement around the periphery of the lesion (series [id number] images 42-45) for which a tiny amount of residual viable tumor cannot be excluded  No new lesions detected  Three-month follow-up MRI recommended for reevaluation    2  3 7 x 2 4 cm hypoenhancing area in the left lobe adjacent to the ablated lesion likely reflects posttreatment hemorrhage/edema  3  Attenuation of the intrahepatic portal veins with decreasing thrombus compared to previous study  4  Cirrhosis with splenomegaly and large left mesocaval collateral vessel indicative of portal hypertension  5  Multiple small pancreatic cysts, unchanged from prior study, most likely side branch IPMNs  6  Cholelithiasis  The study was marked in [facility] for significant notification and follow-up  Workstation performed: [id number]     I reviewed the above laboratory and imaging data  Discussion/Summary:   17-year-old female with hepatitis C status post percutaneous ablation a LIRADS 5 lesion  She is doing well  Her AFP level is normal   This may all be postprocedural   Will plan on repeating her MRI in 3 months  If there is residual enhancement, her repeat percutaneous ablation will be scheduled  I will see her back in 3 months with a repeat MRI and AFP level  She is agreeable to this  All her questions were answered

## 2021-01-01 ENCOUNTER — APPOINTMENT (EMERGENCY)
Dept: CT IMAGING | Facility: HOSPITAL | Age: 61
DRG: 279 | End: 2021-01-01
Payer: COMMERCIAL

## 2021-01-01 ENCOUNTER — TELEPHONE (OUTPATIENT)
Dept: NEPHROLOGY | Facility: CLINIC | Age: 61
End: 2021-01-01

## 2021-01-01 ENCOUNTER — APPOINTMENT (INPATIENT)
Dept: RADIOLOGY | Facility: HOSPITAL | Age: 61
DRG: 053 | End: 2021-01-01
Payer: COMMERCIAL

## 2021-01-01 ENCOUNTER — APPOINTMENT (INPATIENT)
Dept: NEUROLOGY | Facility: CLINIC | Age: 61
DRG: 053 | End: 2021-01-01
Payer: COMMERCIAL

## 2021-01-01 ENCOUNTER — APPOINTMENT (INPATIENT)
Dept: CT IMAGING | Facility: HOSPITAL | Age: 61
DRG: 279 | End: 2021-01-01
Payer: COMMERCIAL

## 2021-01-01 ENCOUNTER — APPOINTMENT (INPATIENT)
Dept: ULTRASOUND IMAGING | Facility: HOSPITAL | Age: 61
DRG: 279 | End: 2021-01-01
Payer: COMMERCIAL

## 2021-01-01 ENCOUNTER — APPOINTMENT (INPATIENT)
Dept: NON INVASIVE DIAGNOSTICS | Facility: HOSPITAL | Age: 61
DRG: 053 | End: 2021-01-01
Payer: COMMERCIAL

## 2021-01-01 ENCOUNTER — HOSPITAL ENCOUNTER (INPATIENT)
Facility: HOSPITAL | Age: 61
LOS: 5 days | Discharge: HOME | DRG: 279 | End: 2021-03-16
Attending: EMERGENCY MEDICINE | Admitting: STUDENT IN AN ORGANIZED HEALTH CARE EDUCATION/TRAINING PROGRAM
Payer: COMMERCIAL

## 2021-01-01 ENCOUNTER — TELEPHONE (OUTPATIENT)
Dept: SURGICAL ONCOLOGY | Facility: CLINIC | Age: 61
End: 2021-01-01

## 2021-01-01 ENCOUNTER — APPOINTMENT (INPATIENT)
Dept: MRI IMAGING | Facility: HOSPITAL | Age: 61
DRG: 279 | End: 2021-01-01
Payer: COMMERCIAL

## 2021-01-01 ENCOUNTER — TELEPHONE (OUTPATIENT)
Dept: CT IMAGING | Facility: HOSPITAL | Age: 61
End: 2021-01-01

## 2021-01-01 ENCOUNTER — APPOINTMENT (INPATIENT)
Dept: GASTROENTEROLOGY | Facility: HOSPITAL | Age: 61
DRG: 053 | End: 2021-01-01
Payer: COMMERCIAL

## 2021-01-01 ENCOUNTER — OFFICE VISIT (OUTPATIENT)
Dept: SURGICAL ONCOLOGY | Facility: CLINIC | Age: 61
End: 2021-01-01
Payer: COMMERCIAL

## 2021-01-01 ENCOUNTER — LAB (OUTPATIENT)
Dept: LAB | Facility: CLINIC | Age: 61
End: 2021-01-01
Payer: COMMERCIAL

## 2021-01-01 ENCOUNTER — APPOINTMENT (INPATIENT)
Dept: RADIOLOGY | Facility: HOSPITAL | Age: 61
DRG: 279 | End: 2021-01-01
Payer: COMMERCIAL

## 2021-01-01 ENCOUNTER — PATIENT OUTREACH (OUTPATIENT)
Dept: HEMATOLOGY ONCOLOGY | Facility: CLINIC | Age: 61
End: 2021-01-01

## 2021-01-01 ENCOUNTER — HOSPITAL ENCOUNTER (INPATIENT)
Facility: HOSPITAL | Age: 61
LOS: 5 days | DRG: 279 | End: 2021-04-08
Attending: EMERGENCY MEDICINE | Admitting: INTERNAL MEDICINE
Payer: COMMERCIAL

## 2021-01-01 ENCOUNTER — DOCUMENTATION (OUTPATIENT)
Dept: HEMATOLOGY ONCOLOGY | Facility: CLINIC | Age: 61
End: 2021-01-01

## 2021-01-01 ENCOUNTER — HOSPITAL ENCOUNTER (OUTPATIENT)
Dept: MRI IMAGING | Facility: HOSPITAL | Age: 61
Discharge: HOME | End: 2021-01-28
Attending: SURGERY
Payer: COMMERCIAL

## 2021-01-01 ENCOUNTER — TELEPHONE (OUTPATIENT)
Dept: RADIOLOGY | Facility: HOSPITAL | Age: 61
End: 2021-01-01

## 2021-01-01 ENCOUNTER — APPOINTMENT (INPATIENT)
Dept: RADIOLOGY | Facility: HOSPITAL | Age: 61
DRG: 053 | End: 2021-01-01
Attending: INTERNAL MEDICINE
Payer: COMMERCIAL

## 2021-01-01 ENCOUNTER — HOSPITAL ENCOUNTER (INPATIENT)
Facility: HOSPITAL | Age: 61
LOS: 11 days | DRG: 053 | End: 2021-04-19
Attending: INTERNAL MEDICINE | Admitting: ANESTHESIOLOGY
Payer: COMMERCIAL

## 2021-01-01 VITALS
OXYGEN SATURATION: 96 % | TEMPERATURE: 95.7 F | DIASTOLIC BLOOD PRESSURE: 67 MMHG | SYSTOLIC BLOOD PRESSURE: 112 MMHG | BODY MASS INDEX: 32.11 KG/M2 | WEIGHT: 211.2 LBS

## 2021-01-01 VITALS
OXYGEN SATURATION: 100 % | HEIGHT: 68 IN | HEART RATE: 84 BPM | BODY MASS INDEX: 21.35 KG/M2 | SYSTOLIC BLOOD PRESSURE: 96 MMHG | WEIGHT: 140.87 LBS | RESPIRATION RATE: 17 BRPM | TEMPERATURE: 98 F | DIASTOLIC BLOOD PRESSURE: 53 MMHG

## 2021-01-01 VITALS
RESPIRATION RATE: 20 BRPM | HEART RATE: 80 BPM | TEMPERATURE: 97.7 F | BODY MASS INDEX: 22.95 KG/M2 | HEIGHT: 68 IN | OXYGEN SATURATION: 91 % | WEIGHT: 151.46 LBS | SYSTOLIC BLOOD PRESSURE: 99 MMHG | DIASTOLIC BLOOD PRESSURE: 65 MMHG

## 2021-01-01 VITALS
TEMPERATURE: 98.6 F | RESPIRATION RATE: 18 BRPM | DIASTOLIC BLOOD PRESSURE: 90 MMHG | HEART RATE: 91 BPM | SYSTOLIC BLOOD PRESSURE: 136 MMHG

## 2021-01-01 DIAGNOSIS — K70.30 ALCOHOLIC CIRRHOSIS (HCC): ICD-10-CM

## 2021-01-01 DIAGNOSIS — N17.9 ACUTE RENAL FAILURE, UNSPECIFIED ACUTE RENAL FAILURE TYPE (HCC): Primary | ICD-10-CM

## 2021-01-01 DIAGNOSIS — K30 DELAYED GASTRIC EMPTYING: ICD-10-CM

## 2021-01-01 DIAGNOSIS — D64.9 ANEMIA: ICD-10-CM

## 2021-01-01 DIAGNOSIS — R17 SERUM TOTAL BILIRUBIN ELEVATED: ICD-10-CM

## 2021-01-01 DIAGNOSIS — C22.0 HEPATOCELLULAR CARCINOMA (HCC): ICD-10-CM

## 2021-01-01 DIAGNOSIS — B19.20 DECOMPENSATED CIRRHOSIS RELATED TO HEPATITIS C VIRUS (HCV) (HCC): ICD-10-CM

## 2021-01-01 DIAGNOSIS — T07.XXXA WOUNDS, MULTIPLE: ICD-10-CM

## 2021-01-01 DIAGNOSIS — N17.9 ACUTE KIDNEY INJURY (HCC): ICD-10-CM

## 2021-01-01 DIAGNOSIS — G93.40 ACUTE ENCEPHALOPATHY: ICD-10-CM

## 2021-01-01 DIAGNOSIS — J18.9 PNEUMONIA: Primary | ICD-10-CM

## 2021-01-01 DIAGNOSIS — R56.9 SEIZURE (HCC): ICD-10-CM

## 2021-01-01 DIAGNOSIS — C22.0 HEPATOCELLULAR CARCINOMA (HCC): Primary | ICD-10-CM

## 2021-01-01 DIAGNOSIS — L03.119 CELLULITIS OF LOWER EXTREMITY, UNSPECIFIED LATERALITY: ICD-10-CM

## 2021-01-01 DIAGNOSIS — N17.9 AKI (ACUTE KIDNEY INJURY) (HCC): ICD-10-CM

## 2021-01-01 DIAGNOSIS — N39.0 UTI (URINARY TRACT INFECTION): ICD-10-CM

## 2021-01-01 DIAGNOSIS — E87.2 LACTIC ACIDOSIS: ICD-10-CM

## 2021-01-01 DIAGNOSIS — K74.69 DECOMPENSATED CIRRHOSIS RELATED TO HEPATITIS C VIRUS (HCV) (HCC): ICD-10-CM

## 2021-01-01 DIAGNOSIS — K72.90 HEPATIC ENCEPHALOPATHY (HCC): Primary | ICD-10-CM

## 2021-01-01 DIAGNOSIS — J96.01 ACUTE RESPIRATORY FAILURE WITH HYPOXIA (HCC): ICD-10-CM

## 2021-01-01 DIAGNOSIS — K56.7 ILEUS (HCC): ICD-10-CM

## 2021-01-01 DIAGNOSIS — K72.90 HEPATIC ENCEPHALOPATHY (HCC): ICD-10-CM

## 2021-01-01 DIAGNOSIS — B19.20 HEPATITIS C: ICD-10-CM

## 2021-01-01 DIAGNOSIS — K74.60 CIRRHOSIS OF LIVER WITHOUT ASCITES, UNSPECIFIED HEPATIC CIRRHOSIS TYPE (HCC): ICD-10-CM

## 2021-01-01 DIAGNOSIS — R41.82 ALTERED MENTAL STATUS: Primary | ICD-10-CM

## 2021-01-01 LAB
25(OH)D3 SERPL-MCNC: 25.3 NG/ML (ref 30–100)
A1 AG RBC QL: POSITIVE
ABO GROUP BLD BPU: NORMAL
ABO GROUP BLD: NORMAL
ACTH PLAS-MCNC: 9.6 PG/ML (ref 7.2–63.3)
AFP-TM SERPL-MCNC: 2.9 NG/ML (ref 0.5–8)
AFP-TM SERPL-MCNC: 5.8 NG/ML (ref 0.5–8)
ALB CSF/SERPL: 20 {RATIO} (ref 0–8)
ALBUMIN CSF-MCNC: 53 MG/DL (ref 11–48)
ALBUMIN MFR CSF ELPH: 45.7 % (ref 56.8–76.4)
ALBUMIN SERPL BCP-MCNC: 1.8 G/DL (ref 3.5–5)
ALBUMIN SERPL BCP-MCNC: 1.9 G/DL (ref 3.5–5)
ALBUMIN SERPL BCP-MCNC: 2 G/DL (ref 3.5–5)
ALBUMIN SERPL BCP-MCNC: 2.1 G/DL (ref 3.5–5)
ALBUMIN SERPL BCP-MCNC: 2.2 G/DL (ref 3.5–5)
ALBUMIN SERPL BCP-MCNC: 2.2 G/DL (ref 3.5–5)
ALBUMIN SERPL BCP-MCNC: 2.3 G/DL (ref 3.5–5)
ALBUMIN SERPL BCP-MCNC: 2.4 G/DL (ref 3.5–5)
ALBUMIN SERPL BCP-MCNC: 2.4 G/DL (ref 3.5–5)
ALBUMIN SERPL BCP-MCNC: 2.5 G/DL (ref 3.5–5)
ALBUMIN SERPL BCP-MCNC: 2.5 G/DL (ref 3.5–5)
ALBUMIN SERPL BCP-MCNC: 2.7 G/DL (ref 3.5–5)
ALBUMIN SERPL BCP-MCNC: 3 G/DL (ref 3.5–5)
ALBUMIN SERPL BCP-MCNC: 3.3 G/DL (ref 3.5–5)
ALBUMIN SERPL BCP-MCNC: 3.6 G/DL (ref 3.5–5)
ALBUMIN SERPL BCP-MCNC: 3.8 G/DL (ref 3.5–5)
ALBUMIN SERPL BCP-MCNC: 4.2 G/DL (ref 3.5–5)
ALBUMIN SERPL-MCNC: 2.7 G/DL (ref 3.8–4.9)
ALP SERPL-CCNC: 111 U/L (ref 46–116)
ALP SERPL-CCNC: 112 U/L (ref 46–116)
ALP SERPL-CCNC: 112 U/L (ref 46–116)
ALP SERPL-CCNC: 58 U/L (ref 46–116)
ALP SERPL-CCNC: 58 U/L (ref 46–116)
ALP SERPL-CCNC: 60 U/L (ref 46–116)
ALP SERPL-CCNC: 61 U/L (ref 46–116)
ALP SERPL-CCNC: 74 U/L (ref 46–116)
ALP SERPL-CCNC: 83 U/L (ref 46–116)
ALP SERPL-CCNC: 84 U/L (ref 46–116)
ALP SERPL-CCNC: 86 U/L (ref 46–116)
ALP SERPL-CCNC: 89 U/L (ref 46–116)
ALP SERPL-CCNC: 90 U/L (ref 46–116)
ALP SERPL-CCNC: 90 U/L (ref 46–116)
ALP SERPL-CCNC: 95 U/L (ref 46–116)
ALP SERPL-CCNC: 97 U/L (ref 46–116)
ALP SERPL-CCNC: 98 U/L (ref 46–116)
ALP SERPL-CCNC: 99 U/L (ref 46–116)
ALPHA1 GLOB MFR CSF ELPH: 3.4 % (ref 1.1–6.6)
ALPHA2 GLOB MFR CSF ELPH: 5.2 % (ref 3–12.6)
ALT SERPL W P-5'-P-CCNC: 20 U/L (ref 12–78)
ALT SERPL W P-5'-P-CCNC: 20 U/L (ref 12–78)
ALT SERPL W P-5'-P-CCNC: 26 U/L (ref 12–78)
ALT SERPL W P-5'-P-CCNC: 26 U/L (ref 12–78)
ALT SERPL W P-5'-P-CCNC: 27 U/L (ref 12–78)
ALT SERPL W P-5'-P-CCNC: 30 U/L (ref 12–78)
ALT SERPL W P-5'-P-CCNC: 34 U/L (ref 12–78)
ALT SERPL W P-5'-P-CCNC: 35 U/L (ref 12–78)
ALT SERPL W P-5'-P-CCNC: 38 U/L (ref 12–78)
ALT SERPL W P-5'-P-CCNC: 39 U/L (ref 12–78)
ALT SERPL W P-5'-P-CCNC: 40 U/L (ref 12–78)
ALT SERPL W P-5'-P-CCNC: 41 U/L (ref 12–78)
ALT SERPL W P-5'-P-CCNC: 48 U/L (ref 12–78)
ALT SERPL W P-5'-P-CCNC: 55 U/L (ref 12–78)
ALT SERPL W P-5'-P-CCNC: 69 U/L (ref 12–78)
ALT SERPL W P-5'-P-CCNC: 80 U/L (ref 12–78)
AMMONIA PLAS-SCNC: 101 UMOL/L (ref 11–35)
AMMONIA PLAS-SCNC: 22 UMOL/L (ref 11–35)
AMMONIA PLAS-SCNC: 48 UMOL/L (ref 11–35)
AMMONIA PLAS-SCNC: 50 UMOL/L (ref 11–35)
AMMONIA PLAS-SCNC: 51 UMOL/L (ref 11–35)
AMMONIA PLAS-SCNC: 72 UMOL/L (ref 11–35)
AMMONIA PLAS-SCNC: 79 UMOL/L (ref 11–35)
AMMONIA PLAS-SCNC: 81 UMOL/L (ref 11–35)
AMPHETAMINES SERPL QL SCN: NEGATIVE
ANION GAP SERPL CALCULATED.3IONS-SCNC: 10 MMOL/L (ref 4–13)
ANION GAP SERPL CALCULATED.3IONS-SCNC: 10 MMOL/L (ref 4–13)
ANION GAP SERPL CALCULATED.3IONS-SCNC: 11 MMOL/L (ref 4–13)
ANION GAP SERPL CALCULATED.3IONS-SCNC: 12 MMOL/L (ref 4–13)
ANION GAP SERPL CALCULATED.3IONS-SCNC: 14 MMOL/L (ref 4–13)
ANION GAP SERPL CALCULATED.3IONS-SCNC: 19 MMOL/L (ref 4–13)
ANION GAP SERPL CALCULATED.3IONS-SCNC: 2 MMOL/L (ref 4–13)
ANION GAP SERPL CALCULATED.3IONS-SCNC: 20 MMOL/L (ref 4–13)
ANION GAP SERPL CALCULATED.3IONS-SCNC: 4 MMOL/L (ref 4–13)
ANION GAP SERPL CALCULATED.3IONS-SCNC: 5 MMOL/L (ref 4–13)
ANION GAP SERPL CALCULATED.3IONS-SCNC: 6 MMOL/L (ref 4–13)
ANION GAP SERPL CALCULATED.3IONS-SCNC: 7 MMOL/L (ref 4–13)
ANION GAP SERPL CALCULATED.3IONS-SCNC: 9 MMOL/L (ref 4–13)
ANION GAP SERPL CALCULATED.3IONS-SCNC: 9 MMOL/L (ref 4–13)
ANISOCYTOSIS BLD QL SMEAR: PRESENT
APAP SERPL-MCNC: <2 UG/ML (ref 10–20)
APAP SERPL-MCNC: <2 UG/ML (ref 10–20)
APPEARANCE CSF: ABNORMAL
APTT PPP: 32 SECONDS (ref 23–37)
APTT PPP: 33 SECONDS (ref 23–37)
AST SERPL W P-5'-P-CCNC: 131 U/L (ref 5–45)
AST SERPL W P-5'-P-CCNC: 131 U/L (ref 5–45)
AST SERPL W P-5'-P-CCNC: 255 U/L (ref 5–45)
AST SERPL W P-5'-P-CCNC: 293 U/L (ref 5–45)
AST SERPL W P-5'-P-CCNC: 35 U/L (ref 5–45)
AST SERPL W P-5'-P-CCNC: 39 U/L (ref 5–45)
AST SERPL W P-5'-P-CCNC: 42 U/L (ref 5–45)
AST SERPL W P-5'-P-CCNC: 45 U/L (ref 5–45)
AST SERPL W P-5'-P-CCNC: 46 U/L (ref 5–45)
AST SERPL W P-5'-P-CCNC: 49 U/L (ref 5–45)
AST SERPL W P-5'-P-CCNC: 53 U/L (ref 5–45)
AST SERPL W P-5'-P-CCNC: 54 U/L (ref 5–45)
AST SERPL W P-5'-P-CCNC: 61 U/L (ref 5–45)
AST SERPL W P-5'-P-CCNC: 65 U/L (ref 5–45)
AST SERPL W P-5'-P-CCNC: 73 U/L (ref 5–45)
AST SERPL W P-5'-P-CCNC: 76 U/L (ref 5–45)
AST SERPL W P-5'-P-CCNC: 85 U/L (ref 5–45)
AST SERPL W P-5'-P-CCNC: 88 U/L (ref 5–45)
AST SERPL W P-5'-P-CCNC: 97 U/L (ref 5–45)
AST SERPL W P-5'-P-CCNC: 99 U/L (ref 5–45)
ATRIAL RATE: 107 BPM
ATRIAL RATE: 113 BPM
ATRIAL RATE: 134 BPM
ATRIAL RATE: 241 BPM
ATRIAL RATE: 82 BPM
ATRIAL RATE: 86 BPM
ATRIAL RATE: 99 BPM
B-GLOBULIN MFR CSF ELPH: 29.1 % (ref 7.3–17.9)
BACTERIA BLD CULT: NORMAL
BACTERIA BRONCH AEROBE CULT: ABNORMAL
BACTERIA CSF CULT: NO GROWTH
BACTERIA SPT RESP CULT: NO GROWTH
BACTERIA UR CULT: ABNORMAL
BACTERIA UR QL AUTO: ABNORMAL /HPF
BACTERIA WND AEROBE CULT: ABNORMAL
BARBITURATES UR QL: NEGATIVE
BASE EXCESS BLDA CALC-SCNC: -11 MMOL/L (ref -2–3)
BASE EXCESS BLDA CALC-SCNC: -3.4 MMOL/L
BASE EXCESS BLDA CALC-SCNC: -4.3 MMOL/L
BASOPHILS # BLD AUTO: 0 THOUSANDS/ΜL (ref 0–0.1)
BASOPHILS # BLD AUTO: 0.01 THOUSANDS/ΜL (ref 0–0.1)
BASOPHILS # BLD AUTO: 0.02 THOUSANDS/ΜL (ref 0–0.1)
BASOPHILS # BLD AUTO: 0.02 THOUSANDS/ΜL (ref 0–0.1)
BASOPHILS # BLD AUTO: 0.03 THOUSANDS/ΜL (ref 0–0.1)
BASOPHILS # BLD AUTO: 0.03 THOUSANDS/ΜL (ref 0–0.1)
BASOPHILS # BLD AUTO: 0.05 THOUSANDS/ΜL (ref 0–0.1)
BASOPHILS # BLD AUTO: 0.08 THOUSANDS/ΜL (ref 0–0.1)
BASOPHILS # BLD MANUAL: 0 THOUSAND/UL (ref 0–0.1)
BASOPHILS # BLD MANUAL: 0.18 THOUSAND/UL (ref 0–0.1)
BASOPHILS NFR BLD AUTO: 0 % (ref 0–1)
BASOPHILS NFR BLD AUTO: 1 % (ref 0–1)
BASOPHILS NFR MAR MANUAL: 0 % (ref 0–1)
BASOPHILS NFR MAR MANUAL: 0 % (ref 0–1)
BASOPHILS NFR MAR MANUAL: 2 % (ref 0–1)
BENZODIAZ UR QL: NEGATIVE
BILIRUB DIRECT SERPL-MCNC: 1.97 MG/DL (ref 0–0.2)
BILIRUB DIRECT SERPL-MCNC: 2.11 MG/DL (ref 0–0.2)
BILIRUB DIRECT SERPL-MCNC: 2.9 MG/DL (ref 0–0.2)
BILIRUB DIRECT SERPL-MCNC: 8.41 MG/DL (ref 0–0.2)
BILIRUB DIRECT SERPL-MCNC: 8.99 MG/DL (ref 0–0.2)
BILIRUB SERPL-MCNC: 10.25 MG/DL (ref 0.2–1)
BILIRUB SERPL-MCNC: 11.29 MG/DL (ref 0.2–1)
BILIRUB SERPL-MCNC: 12.79 MG/DL (ref 0.2–1)
BILIRUB SERPL-MCNC: 16.58 MG/DL (ref 0.2–1)
BILIRUB SERPL-MCNC: 2.01 MG/DL (ref 0.2–1)
BILIRUB SERPL-MCNC: 2.6 MG/DL (ref 0.2–1)
BILIRUB SERPL-MCNC: 3 MG/DL (ref 0.2–1)
BILIRUB SERPL-MCNC: 3.26 MG/DL (ref 0.2–1)
BILIRUB SERPL-MCNC: 3.3 MG/DL (ref 0.2–1)
BILIRUB SERPL-MCNC: 3.3 MG/DL (ref 0.2–1)
BILIRUB SERPL-MCNC: 4.94 MG/DL (ref 0.2–1)
BILIRUB SERPL-MCNC: 5.44 MG/DL (ref 0.2–1)
BILIRUB SERPL-MCNC: 6.39 MG/DL (ref 0.2–1)
BILIRUB SERPL-MCNC: 6.41 MG/DL (ref 0.2–1)
BILIRUB SERPL-MCNC: 6.47 MG/DL (ref 0.2–1)
BILIRUB SERPL-MCNC: 6.48 MG/DL (ref 0.2–1)
BILIRUB SERPL-MCNC: 6.98 MG/DL (ref 0.2–1)
BILIRUB SERPL-MCNC: 7.46 MG/DL (ref 0.2–1)
BILIRUB SERPL-MCNC: 8.1 MG/DL (ref 0.2–1)
BILIRUB SERPL-MCNC: 8.21 MG/DL (ref 0.2–1)
BILIRUB UR QL STRIP: ABNORMAL
BILIRUB UR QL STRIP: ABNORMAL
BILIRUB UR QL STRIP: NEGATIVE
BILIRUB UR QL STRIP: NEGATIVE
BLASTS NFR BLD MANUAL: 0 %
BLD GP AB SCN SERPL QL: POSITIVE
BLD SMEAR INTERP: NORMAL
BLOOD GROUP ANTIBODIES SERPL: NORMAL
BPU ID: NORMAL
BUN SERPL-MCNC: 11 MG/DL (ref 5–25)
BUN SERPL-MCNC: 11 MG/DL (ref 5–25)
BUN SERPL-MCNC: 12 MG/DL (ref 5–25)
BUN SERPL-MCNC: 13 MG/DL (ref 5–25)
BUN SERPL-MCNC: 15 MG/DL (ref 5–25)
BUN SERPL-MCNC: 16 MG/DL (ref 5–25)
BUN SERPL-MCNC: 16 MG/DL (ref 5–25)
BUN SERPL-MCNC: 17 MG/DL (ref 5–25)
BUN SERPL-MCNC: 17 MG/DL (ref 5–25)
BUN SERPL-MCNC: 18 MG/DL (ref 5–25)
BUN SERPL-MCNC: 19 MG/DL (ref 5–25)
BUN SERPL-MCNC: 20 MG/DL (ref 5–25)
BUN SERPL-MCNC: 20 MG/DL (ref 5–25)
BUN SERPL-MCNC: 23 MG/DL (ref 5–25)
BUN SERPL-MCNC: 27 MG/DL (ref 5–25)
BUN SERPL-MCNC: 29 MG/DL (ref 5–25)
BUN SERPL-MCNC: 29 MG/DL (ref 5–25)
BUN SERPL-MCNC: 30 MG/DL (ref 5–25)
BUN SERPL-MCNC: 33 MG/DL (ref 5–25)
BUN SERPL-MCNC: 35 MG/DL (ref 5–25)
BUN SERPL-MCNC: 35 MG/DL (ref 5–25)
BUN SERPL-MCNC: 39 MG/DL (ref 5–25)
BUN SERPL-MCNC: 40 MG/DL (ref 5–25)
BUN SERPL-MCNC: 40 MG/DL (ref 5–25)
BUN SERPL-MCNC: 42 MG/DL (ref 5–25)
BUN SERPL-MCNC: 45 MG/DL (ref 5–25)
BUN SERPL-MCNC: 51 MG/DL (ref 5–25)
BUN SERPL-MCNC: 6 MG/DL (ref 5–25)
BUN SERPL-MCNC: 64 MG/DL (ref 5–25)
BUN SERPL-MCNC: 73 MG/DL (ref 5–25)
BUN SERPL-MCNC: 74 MG/DL (ref 5–25)
BUN SERPL-MCNC: 80 MG/DL (ref 5–25)
BURR CELLS BLD QL SMEAR: PRESENT
C DIFF TOX B TCDB STL QL NAA+PROBE: NEGATIVE
C GATTII+NEOFOR DNA CSF QL NAA+NON-PROBE: NOT DETECTED
CA-I BLD-SCNC: 1.19 MMOL/L (ref 1.12–1.32)
CA-I BLD-SCNC: 1.2 MMOL/L (ref 1.12–1.32)
CA-I BLD-SCNC: 1.25 MMOL/L (ref 1.12–1.32)
CA-I BLD-SCNC: 1.27 MMOL/L (ref 1.12–1.32)
CALCIUM ALBUM COR SERPL-MCNC: 10 MG/DL (ref 8.3–10.1)
CALCIUM ALBUM COR SERPL-MCNC: 10 MG/DL (ref 8.3–10.1)
CALCIUM ALBUM COR SERPL-MCNC: 10.2 MG/DL (ref 8.3–10.1)
CALCIUM ALBUM COR SERPL-MCNC: 10.3 MG/DL (ref 8.3–10.1)
CALCIUM ALBUM COR SERPL-MCNC: 10.4 MG/DL (ref 8.3–10.1)
CALCIUM ALBUM COR SERPL-MCNC: 10.4 MG/DL (ref 8.3–10.1)
CALCIUM ALBUM COR SERPL-MCNC: 10.7 MG/DL (ref 8.3–10.1)
CALCIUM ALBUM COR SERPL-MCNC: 9.2 MG/DL (ref 8.3–10.1)
CALCIUM ALBUM COR SERPL-MCNC: 9.4 MG/DL (ref 8.3–10.1)
CALCIUM ALBUM COR SERPL-MCNC: 9.6 MG/DL (ref 8.3–10.1)
CALCIUM ALBUM COR SERPL-MCNC: 9.9 MG/DL (ref 8.3–10.1)
CALCIUM SERPL-MCNC: 7.5 MG/DL (ref 8.3–10.1)
CALCIUM SERPL-MCNC: 7.6 MG/DL (ref 8.3–10.1)
CALCIUM SERPL-MCNC: 7.8 MG/DL (ref 8.3–10.1)
CALCIUM SERPL-MCNC: 7.9 MG/DL (ref 8.3–10.1)
CALCIUM SERPL-MCNC: 8 MG/DL (ref 8.3–10.1)
CALCIUM SERPL-MCNC: 8.1 MG/DL (ref 8.3–10.1)
CALCIUM SERPL-MCNC: 8.2 MG/DL (ref 8.3–10.1)
CALCIUM SERPL-MCNC: 8.3 MG/DL (ref 8.3–10.1)
CALCIUM SERPL-MCNC: 8.4 MG/DL (ref 8.3–10.1)
CALCIUM SERPL-MCNC: 8.5 MG/DL (ref 8.3–10.1)
CALCIUM SERPL-MCNC: 8.6 MG/DL (ref 8.3–10.1)
CALCIUM SERPL-MCNC: 8.7 MG/DL (ref 8.3–10.1)
CALCIUM SERPL-MCNC: 8.7 MG/DL (ref 8.3–10.1)
CALCIUM SERPL-MCNC: 8.8 MG/DL (ref 8.3–10.1)
CALCIUM SERPL-MCNC: 9 MG/DL (ref 8.3–10.1)
CALCIUM SERPL-MCNC: 9.2 MG/DL (ref 8.3–10.1)
CALCIUM SERPL-MCNC: 9.2 MG/DL (ref 8.3–10.1)
CALCIUM SERPL-MCNC: 9.3 MG/DL (ref 8.3–10.1)
CALCIUM SERPL-MCNC: 9.4 MG/DL (ref 8.3–10.1)
CHLORIDE SERPL-SCNC: 100 MMOL/L (ref 100–108)
CHLORIDE SERPL-SCNC: 104 MMOL/L (ref 100–108)
CHLORIDE SERPL-SCNC: 108 MMOL/L (ref 100–108)
CHLORIDE SERPL-SCNC: 113 MMOL/L (ref 100–108)
CHLORIDE SERPL-SCNC: 114 MMOL/L (ref 100–108)
CHLORIDE SERPL-SCNC: 115 MMOL/L (ref 100–108)
CHLORIDE SERPL-SCNC: 115 MMOL/L (ref 100–108)
CHLORIDE SERPL-SCNC: 117 MMOL/L (ref 100–108)
CHLORIDE SERPL-SCNC: 118 MMOL/L (ref 100–108)
CHLORIDE SERPL-SCNC: 120 MMOL/L (ref 100–108)
CHLORIDE SERPL-SCNC: 122 MMOL/L (ref 100–108)
CHLORIDE SERPL-SCNC: 123 MMOL/L (ref 100–108)
CHLORIDE SERPL-SCNC: 124 MMOL/L (ref 100–108)
CHLORIDE SERPL-SCNC: 125 MMOL/L (ref 100–108)
CHLORIDE SERPL-SCNC: 125 MMOL/L (ref 100–108)
CHLORIDE SERPL-SCNC: 126 MMOL/L (ref 100–108)
CHLORIDE SERPL-SCNC: 129 MMOL/L (ref 100–108)
CHLORIDE SERPL-SCNC: 130 MMOL/L (ref 100–108)
CHLORIDE SERPL-SCNC: 134 MMOL/L (ref 100–108)
CHLORIDE SERPL-SCNC: 134 MMOL/L (ref 100–108)
CHLORIDE SERPL-SCNC: 137 MMOL/L (ref 100–108)
CHLORIDE SERPL-SCNC: 90 MMOL/L (ref 100–108)
CHLORIDE SERPL-SCNC: 91 MMOL/L (ref 100–108)
CHLORIDE SERPL-SCNC: 93 MMOL/L (ref 100–108)
CHLORIDE SERPL-SCNC: 93 MMOL/L (ref 100–108)
CHLORIDE SERPL-SCNC: 96 MMOL/L (ref 100–108)
CHLORIDE SERPL-SCNC: 97 MMOL/L (ref 100–108)
CHLORIDE UR-SCNC: <10 MMOL/L
CK SERPL-CCNC: 76 U/L (ref 26–192)
CLARITY UR: ABNORMAL
CLARITY UR: NORMAL
CMV DNA CSF QL NAA+NON-PROBE: NOT DETECTED
CO2 SERPL-SCNC: 16 MMOL/L (ref 21–32)
CO2 SERPL-SCNC: 17 MMOL/L (ref 21–32)
CO2 SERPL-SCNC: 18 MMOL/L (ref 21–32)
CO2 SERPL-SCNC: 19 MMOL/L (ref 21–32)
CO2 SERPL-SCNC: 20 MMOL/L (ref 21–32)
CO2 SERPL-SCNC: 22 MMOL/L (ref 21–32)
CO2 SERPL-SCNC: 23 MMOL/L (ref 21–32)
CO2 SERPL-SCNC: 24 MMOL/L (ref 21–32)
CO2 SERPL-SCNC: 25 MMOL/L (ref 21–32)
CO2 SERPL-SCNC: 26 MMOL/L (ref 21–32)
CO2 SERPL-SCNC: 27 MMOL/L (ref 21–32)
COCAINE UR QL: NEGATIVE
COLOR UR: ABNORMAL
COLOR UR: NORMAL
COLOR UR: YELLOW
COLOR UR: YELLOW
CORTIS SERPL-MCNC: 10.3 UG/DL
CORTIS SERPL-MCNC: 13.1 UG/DL
CORTIS SERPL-MCNC: 23.8 UG/DL
CORTIS SERPL-MCNC: 33.6 UG/DL
CORTIS SERPL-MCNC: 9.6 UG/DL
CREAT SERPL-MCNC: 0.86 MG/DL (ref 0.6–1.3)
CREAT SERPL-MCNC: 0.92 MG/DL (ref 0.6–1.3)
CREAT SERPL-MCNC: 0.93 MG/DL (ref 0.6–1.3)
CREAT SERPL-MCNC: 0.96 MG/DL (ref 0.6–1.3)
CREAT SERPL-MCNC: 0.97 MG/DL (ref 0.6–1.3)
CREAT SERPL-MCNC: 0.97 MG/DL (ref 0.6–1.3)
CREAT SERPL-MCNC: 0.98 MG/DL (ref 0.6–1.3)
CREAT SERPL-MCNC: 0.99 MG/DL (ref 0.6–1.3)
CREAT SERPL-MCNC: 0.99 MG/DL (ref 0.6–1.3)
CREAT SERPL-MCNC: 1.01 MG/DL (ref 0.6–1.3)
CREAT SERPL-MCNC: 1.04 MG/DL (ref 0.6–1.3)
CREAT SERPL-MCNC: 1.06 MG/DL (ref 0.6–1.3)
CREAT SERPL-MCNC: 1.06 MG/DL (ref 0.6–1.3)
CREAT SERPL-MCNC: 1.08 MG/DL (ref 0.6–1.3)
CREAT SERPL-MCNC: 1.08 MG/DL (ref 0.6–1.3)
CREAT SERPL-MCNC: 1.13 MG/DL (ref 0.6–1.3)
CREAT SERPL-MCNC: 1.14 MG/DL (ref 0.6–1.3)
CREAT SERPL-MCNC: 1.15 MG/DL (ref 0.6–1.3)
CREAT SERPL-MCNC: 1.16 MG/DL (ref 0.6–1.3)
CREAT SERPL-MCNC: 1.19 MG/DL (ref 0.6–1.3)
CREAT SERPL-MCNC: 1.21 MG/DL (ref 0.6–1.3)
CREAT SERPL-MCNC: 1.21 MG/DL (ref 0.6–1.3)
CREAT SERPL-MCNC: 1.23 MG/DL (ref 0.6–1.3)
CREAT SERPL-MCNC: 1.25 MG/DL (ref 0.6–1.3)
CREAT SERPL-MCNC: 1.41 MG/DL (ref 0.6–1.3)
CREAT SERPL-MCNC: 1.45 MG/DL (ref 0.6–1.3)
CREAT SERPL-MCNC: 1.82 MG/DL (ref 0.6–1.3)
CREAT SERPL-MCNC: 1.98 MG/DL (ref 0.6–1.3)
CREAT SERPL-MCNC: 2.3 MG/DL (ref 0.6–1.3)
CREAT SERPL-MCNC: 3.83 MG/DL (ref 0.6–1.3)
CREAT SERPL-MCNC: 4.05 MG/DL (ref 0.6–1.3)
CROSSMATCH: NORMAL
E AG RBC QL: NEGATIVE
E COLI K1 DNA CSF QL NAA+NON-PROBE: NOT DETECTED
EOSINOPHIL # BLD AUTO: 0 THOUSAND/ΜL (ref 0–0.61)
EOSINOPHIL # BLD AUTO: 0.01 THOUSAND/ΜL (ref 0–0.61)
EOSINOPHIL # BLD AUTO: 0.08 THOUSAND/ΜL (ref 0–0.61)
EOSINOPHIL # BLD AUTO: 0.09 THOUSAND/ΜL (ref 0–0.61)
EOSINOPHIL # BLD AUTO: 0.15 THOUSAND/ΜL (ref 0–0.61)
EOSINOPHIL # BLD AUTO: 0.17 THOUSAND/ΜL (ref 0–0.61)
EOSINOPHIL # BLD AUTO: 0.32 THOUSAND/ΜL (ref 0–0.61)
EOSINOPHIL # BLD AUTO: 0.45 THOUSAND/ΜL (ref 0–0.61)
EOSINOPHIL # BLD AUTO: 0.77 THOUSAND/ΜL (ref 0–0.61)
EOSINOPHIL # BLD MANUAL: 0 THOUSAND/UL (ref 0–0.4)
EOSINOPHIL # BLD MANUAL: 0 THOUSAND/UL (ref 0–0.4)
EOSINOPHIL NFR BLD AUTO: 0 % (ref 0–6)
EOSINOPHIL NFR BLD AUTO: 1 % (ref 0–6)
EOSINOPHIL NFR BLD AUTO: 10 % (ref 0–6)
EOSINOPHIL NFR BLD AUTO: 2 % (ref 0–6)
EOSINOPHIL NFR BLD AUTO: 3 % (ref 0–6)
EOSINOPHIL NFR BLD AUTO: 3 % (ref 0–6)
EOSINOPHIL NFR BLD AUTO: 4 % (ref 0–6)
EOSINOPHIL NFR BLD AUTO: 6 % (ref 0–6)
EOSINOPHIL NFR BLD MANUAL: 0 % (ref 0–6)
EOSINOPHIL NFR BLD MANUAL: 0 % (ref 0–6)
EOSINOPHIL NFR BLD MANUAL: 1 % (ref 0–6)
EOSINOPHIL NFR CSF MANUAL: 2 %
ERYTHROCYTE [DISTWIDTH] IN BLOOD BY AUTOMATED COUNT: 18.9 % (ref 11.6–15.1)
ERYTHROCYTE [DISTWIDTH] IN BLOOD BY AUTOMATED COUNT: 19.2 % (ref 11.6–15.1)
ERYTHROCYTE [DISTWIDTH] IN BLOOD BY AUTOMATED COUNT: 19.2 % (ref 11.6–15.1)
ERYTHROCYTE [DISTWIDTH] IN BLOOD BY AUTOMATED COUNT: 19.3 % (ref 11.6–15.1)
ERYTHROCYTE [DISTWIDTH] IN BLOOD BY AUTOMATED COUNT: 19.4 % (ref 11.6–15.1)
ERYTHROCYTE [DISTWIDTH] IN BLOOD BY AUTOMATED COUNT: 19.4 % (ref 11.6–15.1)
ERYTHROCYTE [DISTWIDTH] IN BLOOD BY AUTOMATED COUNT: 19.6 % (ref 11.6–15.1)
ERYTHROCYTE [DISTWIDTH] IN BLOOD BY AUTOMATED COUNT: 19.7 % (ref 11.6–15.1)
ERYTHROCYTE [DISTWIDTH] IN BLOOD BY AUTOMATED COUNT: 19.9 % (ref 11.6–15.1)
ERYTHROCYTE [DISTWIDTH] IN BLOOD BY AUTOMATED COUNT: 20.1 % (ref 11.6–15.1)
ERYTHROCYTE [DISTWIDTH] IN BLOOD BY AUTOMATED COUNT: 20.7 % (ref 11.6–15.1)
ERYTHROCYTE [DISTWIDTH] IN BLOOD BY AUTOMATED COUNT: 23.1 % (ref 11.6–15.1)
ERYTHROCYTE [DISTWIDTH] IN BLOOD BY AUTOMATED COUNT: 23.5 % (ref 11.6–15.1)
ERYTHROCYTE [DISTWIDTH] IN BLOOD BY AUTOMATED COUNT: 23.5 % (ref 11.6–15.1)
ERYTHROCYTE [DISTWIDTH] IN BLOOD BY AUTOMATED COUNT: 25.1 % (ref 11.6–15.1)
ERYTHROCYTE [DISTWIDTH] IN BLOOD BY AUTOMATED COUNT: 26.4 % (ref 11.6–15.1)
ERYTHROCYTE [DISTWIDTH] IN BLOOD BY AUTOMATED COUNT: 26.8 % (ref 11.6–15.1)
ERYTHROCYTE [DISTWIDTH] IN BLOOD BY AUTOMATED COUNT: 27 % (ref 11.6–15.1)
ERYTHROCYTE [DISTWIDTH] IN BLOOD BY AUTOMATED COUNT: 27.5 % (ref 11.6–15.1)
ERYTHROCYTE [DISTWIDTH] IN BLOOD BY AUTOMATED COUNT: 28.1 % (ref 11.6–15.1)
ERYTHROCYTE [DISTWIDTH] IN BLOOD BY AUTOMATED COUNT: 28.2 % (ref 11.6–15.1)
ERYTHROCYTE [DISTWIDTH] IN BLOOD BY AUTOMATED COUNT: 28.4 % (ref 11.6–15.1)
ERYTHROCYTE [DISTWIDTH] IN BLOOD BY AUTOMATED COUNT: 28.4 % (ref 11.6–15.1)
ERYTHROCYTE [DISTWIDTH] IN BLOOD BY AUTOMATED COUNT: 29.1 % (ref 11.6–15.1)
ERYTHROCYTE [DISTWIDTH] IN BLOOD BY AUTOMATED COUNT: 29.5 % (ref 11.6–15.1)
ERYTHROCYTE [DISTWIDTH] IN BLOOD BY AUTOMATED COUNT: 29.9 % (ref 11.6–15.1)
ETHANOL SERPL-MCNC: <3 MG/DL (ref 0–3)
ETHANOL SERPL-MCNC: <3 MG/DL (ref 0–3)
EV RNA CSF QL NAA+NON-PROBE: NOT DETECTED
FERRITIN SERPL-MCNC: 206 NG/ML (ref 8–388)
FERRITIN SERPL-MCNC: 208 NG/ML (ref 8–388)
FIBRINOGEN PPP-MCNC: 149 MG/DL (ref 227–495)
FLUAV RNA RESP QL NAA+PROBE: NEGATIVE
FLUAV RNA RESP QL NAA+PROBE: NEGATIVE
FLUBV RNA RESP QL NAA+PROBE: NEGATIVE
FLUBV RNA RESP QL NAA+PROBE: NEGATIVE
FOLATE SERPL-MCNC: 4.6 NG/ML (ref 3.1–17.5)
FY SUP(A) AG RBC QL: NEGATIVE
GAMMA GLOB MFR CSF ELPH: 14.3 % (ref 3–13)
GFR SERPL CREATININE-BSD FRML MDRD: 11 ML/MIN/1.73SQ M
GFR SERPL CREATININE-BSD FRML MDRD: 12 ML/MIN/1.73SQ M
GFR SERPL CREATININE-BSD FRML MDRD: 23 ML/MIN/1.73SQ M
GFR SERPL CREATININE-BSD FRML MDRD: 27 ML/MIN/1.73SQ M
GFR SERPL CREATININE-BSD FRML MDRD: 30 ML/MIN/1.73SQ M
GFR SERPL CREATININE-BSD FRML MDRD: 39 ML/MIN/1.73SQ M
GFR SERPL CREATININE-BSD FRML MDRD: 41 ML/MIN/1.73SQ M
GFR SERPL CREATININE-BSD FRML MDRD: 47 ML/MIN/1.73SQ M
GFR SERPL CREATININE-BSD FRML MDRD: 48 ML/MIN/1.73SQ M
GFR SERPL CREATININE-BSD FRML MDRD: 49 ML/MIN/1.73SQ M
GFR SERPL CREATININE-BSD FRML MDRD: 49 ML/MIN/1.73SQ M
GFR SERPL CREATININE-BSD FRML MDRD: 50 ML/MIN/1.73SQ M
GFR SERPL CREATININE-BSD FRML MDRD: 52 ML/MIN/1.73SQ M
GFR SERPL CREATININE-BSD FRML MDRD: 52 ML/MIN/1.73SQ M
GFR SERPL CREATININE-BSD FRML MDRD: 53 ML/MIN/1.73SQ M
GFR SERPL CREATININE-BSD FRML MDRD: 56 ML/MIN/1.73SQ M
GFR SERPL CREATININE-BSD FRML MDRD: 56 ML/MIN/1.73SQ M
GFR SERPL CREATININE-BSD FRML MDRD: 58 ML/MIN/1.73SQ M
GFR SERPL CREATININE-BSD FRML MDRD: 58 ML/MIN/1.73SQ M
GFR SERPL CREATININE-BSD FRML MDRD: 59 ML/MIN/1.73SQ M
GFR SERPL CREATININE-BSD FRML MDRD: 61 ML/MIN/1.73SQ M
GFR SERPL CREATININE-BSD FRML MDRD: 63 ML/MIN/1.73SQ M
GFR SERPL CREATININE-BSD FRML MDRD: 64 ML/MIN/1.73SQ M
GFR SERPL CREATININE-BSD FRML MDRD: 64 ML/MIN/1.73SQ M
GFR SERPL CREATININE-BSD FRML MDRD: 65 ML/MIN/1.73SQ M
GFR SERPL CREATININE-BSD FRML MDRD: 67 ML/MIN/1.73SQ M
GFR SERPL CREATININE-BSD FRML MDRD: 68 ML/MIN/1.73SQ M
GFR SERPL CREATININE-BSD FRML MDRD: 75 ML/MIN/1.73SQ M
GLUCOSE CSF-MCNC: 65 MG/DL (ref 50–80)
GLUCOSE SERPL-MCNC: 100 MG/DL (ref 65–140)
GLUCOSE SERPL-MCNC: 101 MG/DL (ref 65–140)
GLUCOSE SERPL-MCNC: 102 MG/DL (ref 65–140)
GLUCOSE SERPL-MCNC: 103 MG/DL (ref 65–140)
GLUCOSE SERPL-MCNC: 105 MG/DL (ref 65–140)
GLUCOSE SERPL-MCNC: 106 MG/DL (ref 65–140)
GLUCOSE SERPL-MCNC: 108 MG/DL (ref 65–140)
GLUCOSE SERPL-MCNC: 109 MG/DL (ref 65–140)
GLUCOSE SERPL-MCNC: 109 MG/DL (ref 65–140)
GLUCOSE SERPL-MCNC: 113 MG/DL (ref 65–140)
GLUCOSE SERPL-MCNC: 115 MG/DL (ref 65–140)
GLUCOSE SERPL-MCNC: 117 MG/DL (ref 65–140)
GLUCOSE SERPL-MCNC: 117 MG/DL (ref 65–140)
GLUCOSE SERPL-MCNC: 121 MG/DL (ref 65–140)
GLUCOSE SERPL-MCNC: 121 MG/DL (ref 65–140)
GLUCOSE SERPL-MCNC: 128 MG/DL (ref 65–140)
GLUCOSE SERPL-MCNC: 154 MG/DL (ref 65–140)
GLUCOSE SERPL-MCNC: 156 MG/DL (ref 65–140)
GLUCOSE SERPL-MCNC: 168 MG/DL (ref 65–140)
GLUCOSE SERPL-MCNC: 171 MG/DL (ref 65–140)
GLUCOSE SERPL-MCNC: 172 MG/DL (ref 65–140)
GLUCOSE SERPL-MCNC: 174 MG/DL (ref 65–140)
GLUCOSE SERPL-MCNC: 174 MG/DL (ref 65–140)
GLUCOSE SERPL-MCNC: 178 MG/DL (ref 65–140)
GLUCOSE SERPL-MCNC: 179 MG/DL (ref 65–140)
GLUCOSE SERPL-MCNC: 186 MG/DL (ref 65–140)
GLUCOSE SERPL-MCNC: 187 MG/DL (ref 65–140)
GLUCOSE SERPL-MCNC: 188 MG/DL (ref 65–140)
GLUCOSE SERPL-MCNC: 198 MG/DL (ref 65–140)
GLUCOSE SERPL-MCNC: 215 MG/DL (ref 65–140)
GLUCOSE SERPL-MCNC: 220 MG/DL (ref 65–140)
GLUCOSE SERPL-MCNC: 223 MG/DL (ref 65–140)
GLUCOSE SERPL-MCNC: 33 MG/DL (ref 65–140)
GLUCOSE SERPL-MCNC: 67 MG/DL (ref 65–140)
GLUCOSE SERPL-MCNC: 71 MG/DL (ref 65–140)
GLUCOSE SERPL-MCNC: 76 MG/DL (ref 65–140)
GLUCOSE SERPL-MCNC: 76 MG/DL (ref 65–140)
GLUCOSE SERPL-MCNC: 78 MG/DL (ref 65–140)
GLUCOSE SERPL-MCNC: 82 MG/DL (ref 65–140)
GLUCOSE SERPL-MCNC: 83 MG/DL (ref 65–140)
GLUCOSE SERPL-MCNC: 84 MG/DL (ref 65–140)
GLUCOSE SERPL-MCNC: 84 MG/DL (ref 65–140)
GLUCOSE SERPL-MCNC: 86 MG/DL (ref 65–140)
GLUCOSE SERPL-MCNC: 87 MG/DL (ref 65–140)
GLUCOSE SERPL-MCNC: 88 MG/DL (ref 65–140)
GLUCOSE SERPL-MCNC: 90 MG/DL (ref 65–140)
GLUCOSE SERPL-MCNC: 92 MG/DL (ref 65–140)
GLUCOSE SERPL-MCNC: 92 MG/DL (ref 65–140)
GLUCOSE SERPL-MCNC: 99 MG/DL (ref 65–140)
GLUCOSE UR STRIP-MCNC: ABNORMAL MG/DL
GLUCOSE UR STRIP-MCNC: NEGATIVE MG/DL
GP B STREP DNA CSF QL NAA+NON-PROBE: NOT DETECTED
GRAM STN SPEC: ABNORMAL
GRAM STN SPEC: NORMAL
HAEM INFLU DNA CSF QL NAA+NON-PROBE: NOT DETECTED
HAPTOGLOB SERPL-MCNC: <10 MG/DL (ref 33–346)
HAV IGM SER QL: ABNORMAL
HBV CORE IGM SER QL: ABNORMAL
HBV SURFACE AG SER QL: ABNORMAL
HBV SURFACE AG SER QL: NORMAL
HCO3 BLDA-SCNC: 14.1 MMOL/L (ref 22–28)
HCO3 BLDA-SCNC: 21.8 MMOL/L (ref 22–28)
HCO3 BLDA-SCNC: 22.2 MMOL/L (ref 22–28)
HCT VFR BLD AUTO: 17.8 % (ref 34.8–46.1)
HCT VFR BLD AUTO: 19.5 % (ref 34.8–46.1)
HCT VFR BLD AUTO: 19.6 % (ref 34.8–46.1)
HCT VFR BLD AUTO: 19.8 % (ref 34.8–46.1)
HCT VFR BLD AUTO: 20.8 % (ref 34.8–46.1)
HCT VFR BLD AUTO: 21.6 % (ref 34.8–46.1)
HCT VFR BLD AUTO: 21.7 % (ref 34.8–46.1)
HCT VFR BLD AUTO: 21.8 % (ref 34.8–46.1)
HCT VFR BLD AUTO: 22.3 % (ref 34.8–46.1)
HCT VFR BLD AUTO: 22.4 % (ref 34.8–46.1)
HCT VFR BLD AUTO: 22.5 % (ref 34.8–46.1)
HCT VFR BLD AUTO: 22.7 % (ref 34.8–46.1)
HCT VFR BLD AUTO: 23.3 % (ref 34.8–46.1)
HCT VFR BLD AUTO: 23.7 % (ref 34.8–46.1)
HCT VFR BLD AUTO: 24.1 % (ref 34.8–46.1)
HCT VFR BLD AUTO: 24.3 % (ref 34.8–46.1)
HCT VFR BLD AUTO: 24.8 % (ref 34.8–46.1)
HCT VFR BLD AUTO: 25.5 % (ref 34.8–46.1)
HCT VFR BLD AUTO: 25.6 % (ref 34.8–46.1)
HCT VFR BLD AUTO: 25.8 % (ref 34.8–46.1)
HCT VFR BLD AUTO: 25.9 % (ref 34.8–46.1)
HCT VFR BLD AUTO: 26 % (ref 34.8–46.1)
HCT VFR BLD AUTO: 26.2 % (ref 34.8–46.1)
HCT VFR BLD AUTO: 26.4 % (ref 34.8–46.1)
HCT VFR BLD AUTO: 26.8 % (ref 34.8–46.1)
HCT VFR BLD AUTO: 27 % (ref 34.8–46.1)
HCT VFR BLD AUTO: 27 % (ref 34.8–46.1)
HCT VFR BLD AUTO: 27.1 % (ref 34.8–46.1)
HCT VFR BLD AUTO: 27.6 % (ref 34.8–46.1)
HCT VFR BLD AUTO: 28.1 % (ref 34.8–46.1)
HCT VFR BLD AUTO: 28.3 % (ref 34.8–46.1)
HCT VFR BLD AUTO: 29.1 % (ref 34.8–46.1)
HCT VFR BLD AUTO: 29.7 % (ref 34.8–46.1)
HCT VFR BLD CALC: 24 % (ref 34.8–46.1)
HCV AB SER QL: ABNORMAL
HCV AB SER QL: ABNORMAL
HCV RNA SERPL NAA+PROBE-ACNC: NORMAL IU/ML
HCV RNA SERPL NAA+PROBE-LOG IU: 5.95 LOG10 IU/ML
HGB BLD-MCNC: 6.5 G/DL (ref 11.5–15.4)
HGB BLD-MCNC: 6.6 G/DL (ref 11.5–15.4)
HGB BLD-MCNC: 6.8 G/DL (ref 11.5–15.4)
HGB BLD-MCNC: 7 G/DL (ref 11.5–15.4)
HGB BLD-MCNC: 7 G/DL (ref 11.5–15.4)
HGB BLD-MCNC: 7.3 G/DL (ref 11.5–15.4)
HGB BLD-MCNC: 7.5 G/DL (ref 11.5–15.4)
HGB BLD-MCNC: 7.5 G/DL (ref 11.5–15.4)
HGB BLD-MCNC: 7.6 G/DL (ref 11.5–15.4)
HGB BLD-MCNC: 7.7 G/DL (ref 11.5–15.4)
HGB BLD-MCNC: 7.8 G/DL (ref 11.5–15.4)
HGB BLD-MCNC: 7.9 G/DL (ref 11.5–15.4)
HGB BLD-MCNC: 8 G/DL (ref 11.5–15.4)
HGB BLD-MCNC: 8.1 G/DL (ref 11.5–15.4)
HGB BLD-MCNC: 8.2 G/DL (ref 11.5–15.4)
HGB BLD-MCNC: 8.2 G/DL (ref 11.5–15.4)
HGB BLD-MCNC: 8.4 G/DL (ref 11.5–15.4)
HGB BLD-MCNC: 8.5 G/DL (ref 11.5–15.4)
HGB BLD-MCNC: 8.5 G/DL (ref 11.5–15.4)
HGB BLD-MCNC: 8.6 G/DL (ref 11.5–15.4)
HGB BLD-MCNC: 8.7 G/DL (ref 11.5–15.4)
HGB BLD-MCNC: 8.8 G/DL (ref 11.5–15.4)
HGB BLD-MCNC: 8.8 G/DL (ref 11.5–15.4)
HGB BLD-MCNC: 9.4 G/DL (ref 11.5–15.4)
HGB BLDA-MCNC: 8.2 G/DL (ref 11.5–15.4)
HGB UR QL STRIP.AUTO: ABNORMAL
HGB UR QL STRIP.AUTO: ABNORMAL
HGB UR QL STRIP.AUTO: NEGATIVE
HGB UR QL STRIP.AUTO: NEGATIVE
HHV6 DNA CSF QL NAA+NON-PROBE: NOT DETECTED
HIV 1+2 AB+HIV1 P24 AG SERPL QL IA: NORMAL
HIV1 P24 AG SER QL: NORMAL
HOROWITZ INDEX BLDA+IHG-RTO: 40 MM[HG]
HOROWITZ INDEX BLDA+IHG-RTO: 50 MM[HG]
HSV1 DNA CSF QL NAA+NON-PROBE: NOT DETECTED
HSV2 DNA CSF QL NAA+NON-PROBE: NOT DETECTED
IGG CSF-MCNC: 21.8 MG/DL (ref 0–8.6)
IGG SERPL-MCNC: 1448 MG/DL (ref 586–1602)
IGG SYNTH RATE SER+CSF CALC-MRATE: 41.8 MG/DAY
IGG/ALB CLEAR SER+CSF-RTO: 0.8 (ref 0–0.7)
IGG/ALB CSF: 0.41 {RATIO} (ref 0–0.25)
IMM GRANULOCYTES # BLD AUTO: 0.03 THOUSAND/UL (ref 0–0.2)
IMM GRANULOCYTES # BLD AUTO: 0.04 THOUSAND/UL (ref 0–0.2)
IMM GRANULOCYTES # BLD AUTO: 0.05 THOUSAND/UL (ref 0–0.2)
IMM GRANULOCYTES # BLD AUTO: 0.06 THOUSAND/UL (ref 0–0.2)
IMM GRANULOCYTES # BLD AUTO: 0.07 THOUSAND/UL (ref 0–0.2)
IMM GRANULOCYTES # BLD AUTO: 0.08 THOUSAND/UL (ref 0–0.2)
IMM GRANULOCYTES # BLD AUTO: 0.1 THOUSAND/UL (ref 0–0.2)
IMM GRANULOCYTES # BLD AUTO: 0.11 THOUSAND/UL (ref 0–0.2)
IMM GRANULOCYTES # BLD AUTO: 0.12 THOUSAND/UL (ref 0–0.2)
IMM GRANULOCYTES # BLD AUTO: 0.14 THOUSAND/UL (ref 0–0.2)
IMM GRANULOCYTES # BLD AUTO: 0.19 THOUSAND/UL (ref 0–0.2)
IMM GRANULOCYTES # BLD AUTO: 0.2 THOUSAND/UL (ref 0–0.2)
IMM GRANULOCYTES NFR BLD AUTO: 1 % (ref 0–2)
IMM GRANULOCYTES NFR BLD AUTO: 2 % (ref 0–2)
INR PPP: 1.64 (ref 0.84–1.19)
INR PPP: 1.73 (ref 0.84–1.19)
INR PPP: 1.86 (ref 0.84–1.19)
INR PPP: 1.91 (ref 0.84–1.19)
INR PPP: 1.95 (ref 0.84–1.19)
INR PPP: 2.01 (ref 0.84–1.19)
INR PPP: 2.03 (ref 0.84–1.19)
INR PPP: 2.07 (ref 0.84–1.19)
INR PPP: 2.08 (ref 0.84–1.19)
INR PPP: 2.14 (ref 0.84–1.19)
INR PPP: 2.14 (ref 0.84–1.19)
INR PPP: 2.17 (ref 0.84–1.19)
INR PPP: 2.32 (ref 0.84–1.19)
INR PPP: 2.34 (ref 0.84–1.19)
INR PPP: 2.53 (ref 0.84–1.19)
INR PPP: 2.61 (ref 0.84–1.19)
INR PPP: 2.67 (ref 0.84–1.19)
INR PPP: 2.68 (ref 0.84–1.19)
INR PPP: 2.86 (ref 0.84–1.19)
INR PPP: 3.25 (ref 0.84–1.19)
INTERPRETATION CSF IFE-IMP: ABNORMAL
IRON SATN MFR SERPL: 63 %
IRON SERPL-MCNC: 122 UG/DL (ref 50–170)
KELL GROUP AG RBC: NEGATIVE
KETONES UR STRIP-MCNC: ABNORMAL MG/DL
KETONES UR STRIP-MCNC: ABNORMAL MG/DL
KETONES UR STRIP-MCNC: NEGATIVE MG/DL
KETONES UR STRIP-MCNC: NEGATIVE MG/DL
L MONOCYTOG DNA CSF QL NAA+NON-PROBE: NOT DETECTED
LACTATE SERPL-SCNC: 1.5 MMOL/L (ref 0.5–2)
LACTATE SERPL-SCNC: 1.8 MMOL/L (ref 0.5–2)
LACTATE SERPL-SCNC: 10.6 MMOL/L (ref 0.5–2)
LACTATE SERPL-SCNC: 10.8 MMOL/L (ref 0.5–2)
LACTATE SERPL-SCNC: 2 MMOL/L (ref 0.5–2)
LACTATE SERPL-SCNC: 2.5 MMOL/L (ref 0.5–2)
LACTATE SERPL-SCNC: 2.6 MMOL/L (ref 0.5–2)
LACTATE SERPL-SCNC: 2.9 MMOL/L (ref 0.5–2)
LACTATE SERPL-SCNC: 3.3 MMOL/L (ref 0.5–2)
LACTATE SERPL-SCNC: 5.1 MMOL/L (ref 0.5–2)
LDH SERPL-CCNC: 296 U/L (ref 81–234)
LEUKOCYTE ESTERASE UR QL STRIP: ABNORMAL
LEUKOCYTE ESTERASE UR QL STRIP: NEGATIVE
LITTLE C AG RBC QL: NEGATIVE
LITTLE E AG RBC QL: POSITIVE
LYMPHOCYTES # BLD AUTO: 0.32 THOUSANDS/ΜL (ref 0.6–4.47)
LYMPHOCYTES # BLD AUTO: 0.46 THOUSANDS/ΜL (ref 0.6–4.47)
LYMPHOCYTES # BLD AUTO: 0.49 THOUSANDS/ΜL (ref 0.6–4.47)
LYMPHOCYTES # BLD AUTO: 0.64 THOUSAND/UL (ref 0.6–4.47)
LYMPHOCYTES # BLD AUTO: 0.68 THOUSANDS/ΜL (ref 0.6–4.47)
LYMPHOCYTES # BLD AUTO: 0.76 THOUSANDS/ΜL (ref 0.6–4.47)
LYMPHOCYTES # BLD AUTO: 0.8 THOUSAND/UL (ref 0.6–4.47)
LYMPHOCYTES # BLD AUTO: 0.89 THOUSANDS/ΜL (ref 0.6–4.47)
LYMPHOCYTES # BLD AUTO: 0.97 THOUSANDS/ΜL (ref 0.6–4.47)
LYMPHOCYTES # BLD AUTO: 0.98 THOUSANDS/ΜL (ref 0.6–4.47)
LYMPHOCYTES # BLD AUTO: 1.2 THOUSANDS/ΜL (ref 0.6–4.47)
LYMPHOCYTES # BLD AUTO: 1.2 THOUSANDS/ΜL (ref 0.6–4.47)
LYMPHOCYTES # BLD AUTO: 1.25 THOUSANDS/ΜL (ref 0.6–4.47)
LYMPHOCYTES # BLD AUTO: 1.26 THOUSANDS/ΜL (ref 0.6–4.47)
LYMPHOCYTES # BLD AUTO: 16 % (ref 14–44)
LYMPHOCYTES # BLD AUTO: 8 % (ref 14–44)
LYMPHOCYTES # BLD AUTO: 9 % (ref 14–44)
LYMPHOCYTES NFR BLD AUTO: 12 % (ref 14–44)
LYMPHOCYTES NFR BLD AUTO: 13 % (ref 14–44)
LYMPHOCYTES NFR BLD AUTO: 14 % (ref 14–44)
LYMPHOCYTES NFR BLD AUTO: 14 % (ref 14–44)
LYMPHOCYTES NFR BLD AUTO: 15 % (ref 14–44)
LYMPHOCYTES NFR BLD AUTO: 16 % (ref 14–44)
LYMPHOCYTES NFR BLD AUTO: 16 % (ref 14–44)
LYMPHOCYTES NFR BLD AUTO: 20 % (ref 14–44)
LYMPHOCYTES NFR BLD AUTO: 20 % (ref 14–44)
LYMPHOCYTES NFR BLD AUTO: 5 % (ref 14–44)
LYMPHOCYTES NFR BLD AUTO: 8 % (ref 14–44)
LYMPHOCYTES NFR BLD AUTO: 9 % (ref 14–44)
LYMPHOCYTES NFR CSF MANUAL: 19 %
MACROCYTES BLD QL AUTO: PRESENT
MACROCYTES BLD QL AUTO: PRESENT
MAGNESIUM SERPL-MCNC: 1.8 MG/DL (ref 1.6–2.6)
MAGNESIUM SERPL-MCNC: 1.9 MG/DL (ref 1.6–2.6)
MAGNESIUM SERPL-MCNC: 2 MG/DL (ref 1.6–2.6)
MAGNESIUM SERPL-MCNC: 2.1 MG/DL (ref 1.6–2.6)
MAGNESIUM SERPL-MCNC: 2.2 MG/DL (ref 1.6–2.6)
MAGNESIUM SERPL-MCNC: 2.3 MG/DL (ref 1.6–2.6)
MAGNESIUM SERPL-MCNC: 2.3 MG/DL (ref 1.6–2.6)
MAGNESIUM SERPL-MCNC: 2.4 MG/DL (ref 1.6–2.6)
MAGNESIUM SERPL-MCNC: 2.6 MG/DL (ref 1.6–2.6)
MAGNESIUM SERPL-MCNC: 2.8 MG/DL (ref 1.6–2.6)
MAGNESIUM SERPL-MCNC: 3.1 MG/DL (ref 1.6–2.6)
MAGNESIUM SERPL-MCNC: 3.2 MG/DL (ref 1.6–2.6)
MBP CSF-MCNC: 55.1 NG/ML (ref 0–3.7)
MCH RBC QN AUTO: 34.8 PG (ref 26.8–34.3)
MCH RBC QN AUTO: 35.6 PG (ref 26.8–34.3)
MCH RBC QN AUTO: 35.7 PG (ref 26.8–34.3)
MCH RBC QN AUTO: 36.9 PG (ref 26.8–34.3)
MCH RBC QN AUTO: 37.2 PG (ref 26.8–34.3)
MCH RBC QN AUTO: 38 PG (ref 26.8–34.3)
MCH RBC QN AUTO: 38 PG (ref 26.8–34.3)
MCH RBC QN AUTO: 38.1 PG (ref 26.8–34.3)
MCH RBC QN AUTO: 38.5 PG (ref 26.8–34.3)
MCH RBC QN AUTO: 38.7 PG (ref 26.8–34.3)
MCH RBC QN AUTO: 38.8 PG (ref 26.8–34.3)
MCH RBC QN AUTO: 38.9 PG (ref 26.8–34.3)
MCH RBC QN AUTO: 39 PG (ref 26.8–34.3)
MCH RBC QN AUTO: 39.2 PG (ref 26.8–34.3)
MCH RBC QN AUTO: 39.8 PG (ref 26.8–34.3)
MCH RBC QN AUTO: 40.8 PG (ref 26.8–34.3)
MCH RBC QN AUTO: 41 PG (ref 26.8–34.3)
MCH RBC QN AUTO: 41.7 PG (ref 26.8–34.3)
MCH RBC QN AUTO: 42.7 PG (ref 26.8–34.3)
MCH RBC QN AUTO: 44.6 PG (ref 26.8–34.3)
MCH RBC QN AUTO: 47.2 PG (ref 26.8–34.3)
MCH RBC QN AUTO: 48.8 PG (ref 26.8–34.3)
MCH RBC QN AUTO: 49.1 PG (ref 26.8–34.3)
MCH RBC QN AUTO: 49.6 PG (ref 26.8–34.3)
MCHC RBC AUTO-ENTMCNC: 29.7 G/DL (ref 31.4–37.4)
MCHC RBC AUTO-ENTMCNC: 30 G/DL (ref 31.4–37.4)
MCHC RBC AUTO-ENTMCNC: 30.2 G/DL (ref 31.4–37.4)
MCHC RBC AUTO-ENTMCNC: 30.5 G/DL (ref 31.4–37.4)
MCHC RBC AUTO-ENTMCNC: 30.6 G/DL (ref 31.4–37.4)
MCHC RBC AUTO-ENTMCNC: 30.7 G/DL (ref 31.4–37.4)
MCHC RBC AUTO-ENTMCNC: 31.3 G/DL (ref 31.4–37.4)
MCHC RBC AUTO-ENTMCNC: 31.4 G/DL (ref 31.4–37.4)
MCHC RBC AUTO-ENTMCNC: 31.5 G/DL (ref 31.4–37.4)
MCHC RBC AUTO-ENTMCNC: 31.5 G/DL (ref 31.4–37.4)
MCHC RBC AUTO-ENTMCNC: 31.6 G/DL (ref 31.4–37.4)
MCHC RBC AUTO-ENTMCNC: 31.8 G/DL (ref 31.4–37.4)
MCHC RBC AUTO-ENTMCNC: 31.9 G/DL (ref 31.4–37.4)
MCHC RBC AUTO-ENTMCNC: 32.2 G/DL (ref 31.4–37.4)
MCHC RBC AUTO-ENTMCNC: 32.4 G/DL (ref 31.4–37.4)
MCHC RBC AUTO-ENTMCNC: 32.5 G/DL (ref 31.4–37.4)
MCHC RBC AUTO-ENTMCNC: 33.5 G/DL (ref 31.4–37.4)
MCHC RBC AUTO-ENTMCNC: 33.7 G/DL (ref 31.4–37.4)
MCHC RBC AUTO-ENTMCNC: 33.8 G/DL (ref 31.4–37.4)
MCHC RBC AUTO-ENTMCNC: 34 G/DL (ref 31.4–37.4)
MCHC RBC AUTO-ENTMCNC: 34.3 G/DL (ref 31.4–37.4)
MCHC RBC AUTO-ENTMCNC: 34.6 G/DL (ref 31.4–37.4)
MCHC RBC AUTO-ENTMCNC: 34.7 G/DL (ref 31.4–37.4)
MCHC RBC AUTO-ENTMCNC: 35.3 G/DL (ref 31.4–37.4)
MCHC RBC AUTO-ENTMCNC: 36.3 G/DL (ref 31.4–37.4)
MCHC RBC AUTO-ENTMCNC: 36.5 G/DL (ref 31.4–37.4)
MCV RBC AUTO: 115 FL (ref 82–98)
MCV RBC AUTO: 115 FL (ref 82–98)
MCV RBC AUTO: 116 FL (ref 82–98)
MCV RBC AUTO: 117 FL (ref 82–98)
MCV RBC AUTO: 118 FL (ref 82–98)
MCV RBC AUTO: 119 FL (ref 82–98)
MCV RBC AUTO: 119 FL (ref 82–98)
MCV RBC AUTO: 120 FL (ref 82–98)
MCV RBC AUTO: 121 FL (ref 82–98)
MCV RBC AUTO: 122 FL (ref 82–98)
MCV RBC AUTO: 122 FL (ref 82–98)
MCV RBC AUTO: 123 FL (ref 82–98)
MCV RBC AUTO: 124 FL (ref 82–98)
MCV RBC AUTO: 127 FL (ref 82–98)
MCV RBC AUTO: 129 FL (ref 82–98)
MCV RBC AUTO: 132 FL (ref 82–98)
MCV RBC AUTO: 133 FL (ref 82–98)
MCV RBC AUTO: 135 FL (ref 82–98)
MCV RBC AUTO: 136 FL (ref 82–98)
MCV RBC AUTO: 136 FL (ref 82–98)
MCV RBC AUTO: 138 FL (ref 82–98)
METAMYELOCYTES NFR BLD MANUAL: 1 % (ref 0–1)
METAMYELOCYTES NFR BLD MANUAL: 3 % (ref 0–1)
METHADONE UR QL: NEGATIVE
MICROCYTES BLD QL AUTO: PRESENT
MISCELLANEOUS LAB TEST RESULT: NORMAL
MONOCYTES # BLD AUTO: 0.25 THOUSAND/ΜL (ref 0.17–1.22)
MONOCYTES # BLD AUTO: 0.34 THOUSAND/ΜL (ref 0.17–1.22)
MONOCYTES # BLD AUTO: 0.35 THOUSAND/ΜL (ref 0.17–1.22)
MONOCYTES # BLD AUTO: 0.37 THOUSAND/ΜL (ref 0.17–1.22)
MONOCYTES # BLD AUTO: 0.47 THOUSAND/ΜL (ref 0.17–1.22)
MONOCYTES # BLD AUTO: 0.52 THOUSAND/ΜL (ref 0.17–1.22)
MONOCYTES # BLD AUTO: 0.53 THOUSAND/ΜL (ref 0.17–1.22)
MONOCYTES # BLD AUTO: 0.56 THOUSAND/UL (ref 0–1.22)
MONOCYTES # BLD AUTO: 0.59 THOUSAND/ΜL (ref 0.17–1.22)
MONOCYTES # BLD AUTO: 0.64 THOUSAND/ΜL (ref 0.17–1.22)
MONOCYTES # BLD AUTO: 0.65 THOUSAND/ΜL (ref 0.17–1.22)
MONOCYTES # BLD AUTO: 0.65 THOUSAND/ΜL (ref 0.17–1.22)
MONOCYTES # BLD AUTO: 0.7 THOUSAND/ΜL (ref 0.17–1.22)
MONOCYTES # BLD AUTO: 0.7 THOUSAND/ΜL (ref 0.17–1.22)
MONOCYTES # BLD AUTO: 1.15 THOUSAND/UL (ref 0–1.22)
MONOCYTES # BLD AUTO: 1.28 THOUSAND/ΜL (ref 0.17–1.22)
MONOCYTES NFR BLD AUTO: 10 % (ref 4–12)
MONOCYTES NFR BLD AUTO: 10 % (ref 4–12)
MONOCYTES NFR BLD AUTO: 11 % (ref 4–12)
MONOCYTES NFR BLD AUTO: 12 % (ref 4–12)
MONOCYTES NFR BLD AUTO: 4 % (ref 4–12)
MONOCYTES NFR BLD AUTO: 6 % (ref 4–12)
MONOCYTES NFR BLD AUTO: 7 % (ref 4–12)
MONOCYTES NFR BLD AUTO: 8 % (ref 4–12)
MONOCYTES NFR BLD AUTO: 9 % (ref 4–12)
MONOCYTES NFR BLD AUTO: 9 % (ref 4–12)
MONOCYTES NFR BLD: 13 % (ref 4–12)
MONOCYTES NFR BLD: 7 % (ref 4–12)
MONOCYTES NFR BLD: 8 % (ref 4–12)
MONOS+MACROS CSF MANUAL: 5 %
MRSA NOSE QL CULT: ABNORMAL
MRSA NOSE QL CULT: ABNORMAL
MYELOCYTES NFR BLD MANUAL: 0 % (ref 0–1)
MYELOCYTES NFR BLD MANUAL: 1 % (ref 0–1)
N MEN DNA CSF QL NAA+NON-PROBE: NOT DETECTED
NEUTROPHILS # BLD AUTO: 2.62 THOUSANDS/ΜL (ref 1.85–7.62)
NEUTROPHILS # BLD AUTO: 2.89 THOUSANDS/ΜL (ref 1.85–7.62)
NEUTROPHILS # BLD AUTO: 3.57 THOUSANDS/ΜL (ref 1.85–7.62)
NEUTROPHILS # BLD AUTO: 3.84 THOUSANDS/ΜL (ref 1.85–7.62)
NEUTROPHILS # BLD AUTO: 3.93 THOUSANDS/ΜL (ref 1.85–7.62)
NEUTROPHILS # BLD AUTO: 4.23 THOUSANDS/ΜL (ref 1.85–7.62)
NEUTROPHILS # BLD AUTO: 4.62 THOUSANDS/ΜL (ref 1.85–7.62)
NEUTROPHILS # BLD AUTO: 4.91 THOUSANDS/ΜL (ref 1.85–7.62)
NEUTROPHILS # BLD AUTO: 5.22 THOUSANDS/ΜL (ref 1.85–7.62)
NEUTROPHILS # BLD AUTO: 5.28 THOUSANDS/ΜL (ref 1.85–7.62)
NEUTROPHILS # BLD AUTO: 5.59 THOUSANDS/ΜL (ref 1.85–7.62)
NEUTROPHILS # BLD AUTO: 5.68 THOUSANDS/ΜL (ref 1.85–7.62)
NEUTROPHILS # BLD AUTO: 6.29 THOUSANDS/ΜL (ref 1.85–7.62)
NEUTROPHILS # BLD AUTO: 8.13 THOUSANDS/ΜL (ref 1.85–7.62)
NEUTROPHILS # BLD MANUAL: 6.58 THOUSAND/UL (ref 1.85–7.62)
NEUTROPHILS # BLD MANUAL: 6.65 THOUSAND/UL (ref 1.85–7.62)
NEUTROPHILS NFR CSF MANUAL: 74 %
NEUTS BAND NFR BLD MANUAL: 0 % (ref 0–8)
NEUTS BAND NFR BLD MANUAL: 1 % (ref 0–8)
NEUTS BAND NFR BLD MANUAL: 1 % (ref 0–8)
NEUTS SEG NFR BLD AUTO: 62 % (ref 43–75)
NEUTS SEG NFR BLD AUTO: 64 % (ref 43–75)
NEUTS SEG NFR BLD AUTO: 64 % (ref 43–75)
NEUTS SEG NFR BLD AUTO: 70 % (ref 43–75)
NEUTS SEG NFR BLD AUTO: 72 % (ref 43–75)
NEUTS SEG NFR BLD AUTO: 73 % (ref 43–75)
NEUTS SEG NFR BLD AUTO: 74 % (ref 43–75)
NEUTS SEG NFR BLD AUTO: 75 % (ref 43–75)
NEUTS SEG NFR BLD AUTO: 76 % (ref 43–75)
NEUTS SEG NFR BLD AUTO: 77 % (ref 43–75)
NEUTS SEG NFR BLD AUTO: 78 % (ref 43–75)
NEUTS SEG NFR BLD AUTO: 79 % (ref 43–75)
NEUTS SEG NFR BLD AUTO: 81 % (ref 43–75)
NEUTS SEG NFR BLD AUTO: 86 % (ref 43–75)
NEUTS SEG NFR BLD AUTO: 86 % (ref 43–75)
NITRITE UR QL STRIP: NEGATIVE
NITRITE UR QL STRIP: POSITIVE
NON-SQ EPI CELLS URNS QL MICRO: ABNORMAL /HPF
NRBC BLD AUTO-RTO: 0 /100 WBCS
NRBC BLD AUTO-RTO: 1 /100 WBC (ref 0–2)
NRBC BLD AUTO-RTO: 1 /100 WBC (ref 0–2)
NRBC BLD AUTO-RTO: 1 /100 WBCS
NRBC BLD AUTO-RTO: 2 /100 WBCS
NRBC BLD AUTO-RTO: 4 /100 WBC (ref 0–2)
NT-PROBNP SERPL-MCNC: 536 PG/ML
O2 CT BLDA-SCNC: 11.1 ML/DL (ref 16–23)
O2 CT BLDA-SCNC: 13 ML/DL (ref 16–23)
OLIGOCLONAL BANDS CSF ELPH-IMP: ABNORMAL %
OLIGOCLONAL BANDS.IT SER+CSF QL: ABNORMAL
OPIATES UR QL SCN: NEGATIVE
OSMOLALITY UR: 443 MMOL/KG
OTHER STN SPEC: ABNORMAL
OXYCODONE+OXYMORPHONE UR QL SCN: NEGATIVE
OXYHGB MFR BLDA: 96.6 % (ref 94–97)
OXYHGB MFR BLDA: 97.2 % (ref 94–97)
P AXIS: 0 DEGREES
P AXIS: 45 DEGREES
P AXIS: 60 DEGREES
PARECHOVIRUS A RNA CSF QL NAA+NON-PROBE: NOT DETECTED
PCO2 BLD: 15 MMOL/L (ref 21–32)
PCO2 BLD: 28.6 MM HG (ref 36–44)
PCO2 BLDA: 42.8 MM HG (ref 36–44)
PCO2 BLDA: 45.1 MM HG (ref 36–44)
PCP UR QL: NEGATIVE
PEEP RESPIRATORY: 6 CM[H2O]
PEEP RESPIRATORY: 8 CM[H2O]
PH BLD: 7.3 [PH] (ref 7.35–7.45)
PH BLDA: 7.3 [PH] (ref 7.35–7.45)
PH BLDA: 7.33 [PH] (ref 7.35–7.45)
PH UR STRIP.AUTO: 5.5 [PH]
PH UR STRIP.AUTO: 6 [PH]
PHOSPHATE SERPL-MCNC: 2.3 MG/DL (ref 2.7–4.5)
PHOSPHATE SERPL-MCNC: 2.7 MG/DL (ref 2.7–4.5)
PHOSPHATE SERPL-MCNC: 3 MG/DL (ref 2.7–4.5)
PHOSPHATE SERPL-MCNC: 3.6 MG/DL (ref 2.7–4.5)
PHOSPHATE SERPL-MCNC: 3.6 MG/DL (ref 2.7–4.5)
PHOSPHATE SERPL-MCNC: 3.9 MG/DL (ref 2.7–4.5)
PHOSPHATE SERPL-MCNC: 4.2 MG/DL (ref 2.7–4.5)
PHOSPHATE SERPL-MCNC: 4.5 MG/DL (ref 2.7–4.5)
PHOSPHATE SERPL-MCNC: 4.5 MG/DL (ref 2.7–4.5)
PHOSPHATE SERPL-MCNC: 5.1 MG/DL (ref 2.7–4.5)
PLASMA CELLS NFR BLD: 0 % (ref 0–0)
PLATELET # BLD AUTO: 24 THOUSANDS/UL (ref 149–390)
PLATELET # BLD AUTO: 29 THOUSANDS/UL (ref 149–390)
PLATELET # BLD AUTO: 33 THOUSANDS/UL (ref 149–390)
PLATELET # BLD AUTO: 39 THOUSANDS/UL (ref 149–390)
PLATELET # BLD AUTO: 42 THOUSANDS/UL (ref 149–390)
PLATELET # BLD AUTO: 44 THOUSANDS/UL (ref 149–390)
PLATELET # BLD AUTO: 47 THOUSANDS/UL (ref 149–390)
PLATELET # BLD AUTO: 48 THOUSANDS/UL (ref 149–390)
PLATELET # BLD AUTO: 49 THOUSANDS/UL (ref 149–390)
PLATELET # BLD AUTO: 50 THOUSANDS/UL (ref 149–390)
PLATELET # BLD AUTO: 52 THOUSANDS/UL (ref 149–390)
PLATELET # BLD AUTO: 52 THOUSANDS/UL (ref 149–390)
PLATELET # BLD AUTO: 55 THOUSANDS/UL (ref 149–390)
PLATELET # BLD AUTO: 55 THOUSANDS/UL (ref 149–390)
PLATELET # BLD AUTO: 56 THOUSANDS/UL (ref 149–390)
PLATELET # BLD AUTO: 57 THOUSANDS/UL (ref 149–390)
PLATELET # BLD AUTO: 59 THOUSANDS/UL (ref 149–390)
PLATELET # BLD AUTO: 59 THOUSANDS/UL (ref 149–390)
PLATELET # BLD AUTO: 63 THOUSANDS/UL (ref 149–390)
PLATELET # BLD AUTO: 67 THOUSANDS/UL (ref 149–390)
PLATELET # BLD AUTO: 70 THOUSANDS/UL (ref 149–390)
PLATELET # BLD AUTO: 71 THOUSANDS/UL (ref 149–390)
PLATELET # BLD AUTO: 72 THOUSANDS/UL (ref 149–390)
PLATELET # BLD AUTO: 75 THOUSANDS/UL (ref 149–390)
PLATELET # BLD AUTO: 75 THOUSANDS/UL (ref 149–390)
PLATELET # BLD AUTO: 79 THOUSANDS/UL (ref 149–390)
PLATELET # BLD AUTO: 89 THOUSANDS/UL (ref 149–390)
PLATELET BLD QL SMEAR: ABNORMAL
PMV BLD AUTO: 10.2 FL (ref 8.9–12.7)
PMV BLD AUTO: 10.5 FL (ref 8.9–12.7)
PMV BLD AUTO: 10.6 FL (ref 8.9–12.7)
PMV BLD AUTO: 10.8 FL (ref 8.9–12.7)
PMV BLD AUTO: 10.9 FL (ref 8.9–12.7)
PMV BLD AUTO: 11 FL (ref 8.9–12.7)
PMV BLD AUTO: 11 FL (ref 8.9–12.7)
PMV BLD AUTO: 11.2 FL (ref 8.9–12.7)
PMV BLD AUTO: 11.4 FL (ref 8.9–12.7)
PMV BLD AUTO: 11.5 FL (ref 8.9–12.7)
PMV BLD AUTO: 11.5 FL (ref 8.9–12.7)
PMV BLD AUTO: 11.6 FL (ref 8.9–12.7)
PMV BLD AUTO: 11.6 FL (ref 8.9–12.7)
PMV BLD AUTO: 11.7 FL (ref 8.9–12.7)
PMV BLD AUTO: 11.8 FL (ref 8.9–12.7)
PMV BLD AUTO: 11.9 FL (ref 8.9–12.7)
PMV BLD AUTO: 12.1 FL (ref 8.9–12.7)
PMV BLD AUTO: 12.3 FL (ref 8.9–12.7)
PMV BLD AUTO: 12.3 FL (ref 8.9–12.7)
PMV BLD AUTO: 12.4 FL (ref 8.9–12.7)
PO2 BLD: 93 MM HG (ref 75–129)
PO2 BLDA: 138.9 MM HG (ref 75–129)
PO2 BLDA: 163.3 MM HG (ref 75–129)
POIKILOCYTOSIS BLD QL SMEAR: PRESENT
POLYCHROMASIA BLD QL SMEAR: PRESENT
POLYCHROMASIA BLD QL SMEAR: PRESENT
POTASSIUM BLD-SCNC: 3.6 MMOL/L (ref 3.5–5.3)
POTASSIUM SERPL-SCNC: 2.4 MMOL/L (ref 3.5–5.3)
POTASSIUM SERPL-SCNC: 2.9 MMOL/L (ref 3.5–5.3)
POTASSIUM SERPL-SCNC: 2.9 MMOL/L (ref 3.5–5.3)
POTASSIUM SERPL-SCNC: 3 MMOL/L (ref 3.5–5.3)
POTASSIUM SERPL-SCNC: 3.2 MMOL/L (ref 3.5–5.3)
POTASSIUM SERPL-SCNC: 3.3 MMOL/L (ref 3.5–5.3)
POTASSIUM SERPL-SCNC: 3.4 MMOL/L (ref 3.5–5.3)
POTASSIUM SERPL-SCNC: 3.5 MMOL/L (ref 3.5–5.3)
POTASSIUM SERPL-SCNC: 3.6 MMOL/L (ref 3.5–5.3)
POTASSIUM SERPL-SCNC: 3.7 MMOL/L (ref 3.5–5.3)
POTASSIUM SERPL-SCNC: 3.7 MMOL/L (ref 3.5–5.3)
POTASSIUM SERPL-SCNC: 3.8 MMOL/L (ref 3.5–5.3)
POTASSIUM SERPL-SCNC: 3.8 MMOL/L (ref 3.5–5.3)
POTASSIUM SERPL-SCNC: 3.9 MMOL/L (ref 3.5–5.3)
POTASSIUM SERPL-SCNC: 4 MMOL/L (ref 3.5–5.3)
POTASSIUM SERPL-SCNC: 4.1 MMOL/L (ref 3.5–5.3)
POTASSIUM SERPL-SCNC: 4.1 MMOL/L (ref 3.5–5.3)
POTASSIUM SERPL-SCNC: 4.2 MMOL/L (ref 3.5–5.3)
POTASSIUM SERPL-SCNC: 4.2 MMOL/L (ref 3.5–5.3)
POTASSIUM SERPL-SCNC: 4.3 MMOL/L (ref 3.5–5.3)
POTASSIUM SERPL-SCNC: 4.3 MMOL/L (ref 3.5–5.3)
POTASSIUM SERPL-SCNC: 4.5 MMOL/L (ref 3.5–5.3)
POTASSIUM SERPL-SCNC: 4.8 MMOL/L (ref 3.5–5.3)
POTASSIUM SERPL-SCNC: 5 MMOL/L (ref 3.5–5.3)
POTASSIUM SERPL-SCNC: 5 MMOL/L (ref 3.5–5.3)
POTASSIUM SERPL-SCNC: 5.6 MMOL/L (ref 3.5–5.3)
POTASSIUM SERPL-SCNC: 5.7 MMOL/L (ref 3.5–5.3)
PR INTERVAL: 133 MS
PR INTERVAL: 138 MS
PR INTERVAL: 146 MS
PREALB MFR CSF ELPH: 2.3 % (ref 2.2–7.1)
PROCALCITONIN SERPL-MCNC: 0.1 NG/ML
PROCALCITONIN SERPL-MCNC: 0.14 NG/ML
PROCALCITONIN SERPL-MCNC: 0.36 NG/ML
PROCALCITONIN SERPL-MCNC: 0.48 NG/ML
PROMYELOCYTES NFR BLD MANUAL: 0 % (ref 0–0)
PROT CSF-MCNC: 100 MG/DL (ref 15–45)
PROT CSF-MCNC: 82.9 MG/DL (ref 0–44)
PROT SERPL-MCNC: 5.3 G/DL (ref 6.4–8.2)
PROT SERPL-MCNC: 5.4 G/DL (ref 6.4–8.2)
PROT SERPL-MCNC: 5.5 G/DL (ref 6.4–8.2)
PROT SERPL-MCNC: 5.6 G/DL (ref 6.4–8.2)
PROT SERPL-MCNC: 5.7 G/DL (ref 6.4–8.2)
PROT SERPL-MCNC: 5.8 G/DL (ref 6.4–8.2)
PROT SERPL-MCNC: 5.9 G/DL (ref 6.4–8.2)
PROT SERPL-MCNC: 6 G/DL (ref 6.4–8.2)
PROT SERPL-MCNC: 6.1 G/DL (ref 6.4–8.2)
PROT SERPL-MCNC: 6.2 G/DL (ref 6.4–8.2)
PROT SERPL-MCNC: 6.2 G/DL (ref 6.4–8.2)
PROT SERPL-MCNC: 6.3 G/DL (ref 6.4–8.2)
PROT SERPL-MCNC: 6.3 G/DL (ref 6.4–8.2)
PROT SERPL-MCNC: 6.5 G/DL (ref 6.4–8.2)
PROT SERPL-MCNC: 6.5 G/DL (ref 6.4–8.2)
PROT SERPL-MCNC: 6.7 G/DL (ref 6.4–8.2)
PROT SERPL-MCNC: 6.7 G/DL (ref 6.4–8.2)
PROT SERPL-MCNC: 6.9 G/DL (ref 6.4–8.2)
PROT UR STRIP-MCNC: ABNORMAL MG/DL
PROT UR STRIP-MCNC: NEGATIVE MG/DL
PROTHROMBIN TIME: 19.4 SECONDS (ref 11.6–14.5)
PROTHROMBIN TIME: 20.2 SECONDS (ref 11.6–14.5)
PROTHROMBIN TIME: 21.3 SECONDS (ref 11.6–14.5)
PROTHROMBIN TIME: 21.8 SECONDS (ref 11.6–14.5)
PROTHROMBIN TIME: 22.3 SECONDS (ref 11.6–14.5)
PROTHROMBIN TIME: 22.4 SECONDS (ref 11.6–14.5)
PROTHROMBIN TIME: 22.5 SECONDS (ref 11.6–14.5)
PROTHROMBIN TIME: 22.7 SECONDS (ref 11.6–14.5)
PROTHROMBIN TIME: 22.9 SECONDS (ref 11.6–14.5)
PROTHROMBIN TIME: 23.8 SECONDS (ref 11.6–14.5)
PROTHROMBIN TIME: 23.8 SECONDS (ref 11.6–14.5)
PROTHROMBIN TIME: 24.1 SECONDS (ref 11.6–14.5)
PROTHROMBIN TIME: 24.5 SECONDS (ref 11.6–14.5)
PROTHROMBIN TIME: 25.4 SECONDS (ref 11.6–14.5)
PROTHROMBIN TIME: 26.1 SECONDS (ref 11.6–14.5)
PROTHROMBIN TIME: 27.2 SECONDS (ref 11.6–14.5)
PROTHROMBIN TIME: 27.3 SECONDS (ref 11.6–14.5)
PROTHROMBIN TIME: 28.2 SECONDS (ref 11.6–14.5)
PROTHROMBIN TIME: 30.3 SECONDS (ref 11.6–14.5)
PROTHROMBIN TIME: 32.9 SECONDS (ref 11.6–14.5)
QRS AXIS: 10 DEGREES
QRS AXIS: 22 DEGREES
QRS AXIS: 25 DEGREES
QRS AXIS: 53 DEGREES
QRS AXIS: 60 DEGREES
QRS AXIS: 62 DEGREES
QRS AXIS: 68 DEGREES
QRSD INTERVAL: 66 MS
QRSD INTERVAL: 83 MS
QRSD INTERVAL: 84 MS
QRSD INTERVAL: 88 MS
QRSD INTERVAL: 96 MS
QT INTERVAL: 288 MS
QT INTERVAL: 304 MS
QT INTERVAL: 338 MS
QT INTERVAL: 367 MS
QT INTERVAL: 402 MS
QT INTERVAL: 417 MS
QT INTERVAL: 574 MS
QTC INTERVAL: 406 MS
QTC INTERVAL: 414 MS
QTC INTERVAL: 436 MS
QTC INTERVAL: 439 MS
QTC INTERVAL: 469 MS
QTC INTERVAL: 505 MS
QTC INTERVAL: 713 MS
RBC # BLD AUTO: 1.31 MILLION/UL (ref 3.81–5.12)
RBC # BLD AUTO: 1.44 MILLION/UL (ref 3.81–5.12)
RBC # BLD AUTO: 1.57 MILLION/UL (ref 3.81–5.12)
RBC # BLD AUTO: 1.62 MILLION/UL (ref 3.81–5.12)
RBC # BLD AUTO: 1.64 MILLION/UL (ref 3.81–5.12)
RBC # BLD AUTO: 1.65 MILLION/UL (ref 3.81–5.12)
RBC # BLD AUTO: 1.66 MILLION/UL (ref 3.81–5.12)
RBC # BLD AUTO: 1.79 MILLION/UL (ref 3.81–5.12)
RBC # BLD AUTO: 1.8 MILLION/UL (ref 3.81–5.12)
RBC # BLD AUTO: 1.84 MILLION/UL (ref 3.81–5.12)
RBC # BLD AUTO: 1.87 MILLION/UL (ref 3.81–5.12)
RBC # BLD AUTO: 1.95 MILLION/UL (ref 3.81–5.12)
RBC # BLD AUTO: 2 MILLION/UL (ref 3.81–5.12)
RBC # BLD AUTO: 2.12 MILLION/UL (ref 3.81–5.12)
RBC # BLD AUTO: 2.13 MILLION/UL (ref 3.81–5.12)
RBC # BLD AUTO: 2.15 MILLION/UL (ref 3.81–5.12)
RBC # BLD AUTO: 2.16 MILLION/UL (ref 3.81–5.12)
RBC # BLD AUTO: 2.19 MILLION/UL (ref 3.81–5.12)
RBC # BLD AUTO: 2.21 MILLION/UL (ref 3.81–5.12)
RBC # BLD AUTO: 2.22 MILLION/UL (ref 3.81–5.12)
RBC # BLD AUTO: 2.23 MILLION/UL (ref 3.81–5.12)
RBC # BLD AUTO: 2.33 MILLION/UL (ref 3.81–5.12)
RBC # BLD AUTO: 2.36 MILLION/UL (ref 3.81–5.12)
RBC # BLD AUTO: 2.41 MILLION/UL (ref 3.81–5.12)
RBC # BLD AUTO: 2.41 MILLION/UL (ref 3.81–5.12)
RBC # BLD AUTO: 2.53 MILLION/UL (ref 3.81–5.12)
RBC # CSF MANUAL: 3746 UL (ref 0–10)
RBC #/AREA URNS AUTO: ABNORMAL /HPF
RBC MORPH BLD: PRESENT
RH BLD: POSITIVE
RSV RNA RESP QL NAA+PROBE: NEGATIVE
RSV RNA RESP QL NAA+PROBE: NEGATIVE
S PNEUM DNA CSF QL NAA+NON-PROBE: NOT DETECTED
SALICYLATES SERPL-MCNC: <3 MG/DL (ref 3–20)
SALICYLATES SERPL-MCNC: <3 MG/DL (ref 3–20)
SAO2 % BLD FROM PO2: 97 % (ref 60–85)
SARS-COV-2 RNA RESP QL NAA+PROBE: NEGATIVE
SARS-COV-2 RNA RESP QL NAA+PROBE: NEGATIVE
SCAN RESULT: NORMAL
SODIUM 24H UR-SCNC: <5 MOL/L
SODIUM 24H UR-SCNC: <5 MOL/L
SODIUM BLD-SCNC: 150 MMOL/L (ref 136–145)
SODIUM SERPL-SCNC: 125 MMOL/L (ref 136–145)
SODIUM SERPL-SCNC: 129 MMOL/L (ref 136–145)
SODIUM SERPL-SCNC: 129 MMOL/L (ref 136–145)
SODIUM SERPL-SCNC: 131 MMOL/L (ref 136–145)
SODIUM SERPL-SCNC: 132 MMOL/L (ref 136–145)
SODIUM SERPL-SCNC: 134 MMOL/L (ref 136–145)
SODIUM SERPL-SCNC: 141 MMOL/L (ref 136–145)
SODIUM SERPL-SCNC: 143 MMOL/L (ref 136–145)
SODIUM SERPL-SCNC: 144 MMOL/L (ref 136–145)
SODIUM SERPL-SCNC: 145 MMOL/L (ref 136–145)
SODIUM SERPL-SCNC: 146 MMOL/L (ref 136–145)
SODIUM SERPL-SCNC: 146 MMOL/L (ref 136–145)
SODIUM SERPL-SCNC: 147 MMOL/L (ref 136–145)
SODIUM SERPL-SCNC: 148 MMOL/L (ref 136–145)
SODIUM SERPL-SCNC: 149 MMOL/L (ref 136–145)
SODIUM SERPL-SCNC: 150 MMOL/L (ref 136–145)
SODIUM SERPL-SCNC: 152 MMOL/L (ref 136–145)
SODIUM SERPL-SCNC: 153 MMOL/L (ref 136–145)
SODIUM SERPL-SCNC: 154 MMOL/L (ref 136–145)
SODIUM SERPL-SCNC: 155 MMOL/L (ref 136–145)
SODIUM SERPL-SCNC: 155 MMOL/L (ref 136–145)
SODIUM SERPL-SCNC: 156 MMOL/L (ref 136–145)
SODIUM SERPL-SCNC: 157 MMOL/L (ref 136–145)
SODIUM SERPL-SCNC: 158 MMOL/L (ref 136–145)
SP GR UR STRIP.AUTO: 1.02 (ref 1–1.03)
SPECIMEN EXPIRATION DATE: NORMAL
SPECIMEN SOURCE: ABNORMAL
T WAVE AXIS: 19 DEGREES
T WAVE AXIS: 250 DEGREES
T WAVE AXIS: 251 DEGREES
T WAVE AXIS: 47 DEGREES
T WAVE AXIS: 64 DEGREES
T WAVE AXIS: 67 DEGREES
T WAVE AXIS: 75 DEGREES
T4 FREE SERPL-MCNC: 1.05 NG/DL (ref 0.76–1.46)
TEST INFORMATION: NORMAL
THC UR QL: POSITIVE
TIBC SERPL-MCNC: 195 UG/DL (ref 250–450)
TOPIRAMATE SERPL-MCNC: 3.9 UG/ML (ref 2–25)
TOTAL CELLS COUNTED BLD: YES
TOTAL CELLS COUNTED SPEC: 100
TRIGL SERPL-MCNC: 297 MG/DL
TRIGL SERPL-MCNC: 312 MG/DL
TRIGL SERPL-MCNC: 74 MG/DL
TROPONIN I SERPL-MCNC: 0.07 NG/ML
TROPONIN I SERPL-MCNC: 0.08 NG/ML
TROPONIN I SERPL-MCNC: 0.09 NG/ML
TROPONIN I SERPL-MCNC: <0.02 NG/ML
TROPONIN I SERPL-MCNC: <0.02 NG/ML
TSH SERPL DL<=0.05 MIU/L-ACNC: 5.06 UIU/ML (ref 0.36–3.74)
TUBE # CSF: 4
UNIDENT CELLS # BLD: 0 % (ref 0–0)
UNIT DISPENSE STATUS: NORMAL
UNIT PRODUCT CODE: NORMAL
UNIT RH: NORMAL
URATE SERPL-MCNC: 10.9 MG/DL (ref 2–6.8)
UROBILINOGEN UR QL STRIP.AUTO: 0.2 E.U./DL
UROBILINOGEN UR QL STRIP.AUTO: 2 E.U./DL
UROBILINOGEN UR QL STRIP.AUTO: 2 E.U./DL
UROBILINOGEN UR QL STRIP.AUTO: >=8 E.U./DL
VANCOMYCIN TROUGH SERPL-MCNC: 14.9 UG/ML (ref 10–20)
VARIANT LYMPHS # BLD AUTO: 0 %
VENT AC: 12
VENT AC: 12
VENT- AC: AC
VENT- AC: AC
VENTRICULAR RATE: 100 BPM
VENTRICULAR RATE: 107 BPM
VENTRICULAR RATE: 124 BPM
VENTRICULAR RATE: 82 BPM
VENTRICULAR RATE: 86 BPM
VENTRICULAR RATE: 88 BPM
VENTRICULAR RATE: 93 BPM
VIT B12 SERPL-MCNC: 2900 PG/ML (ref 100–900)
VT SETTING VENT: 400 ML
VT SETTING VENT: 400 ML
VZV DNA CSF QL NAA+NON-PROBE: NOT DETECTED
WBC # BLD AUTO: 10.26 THOUSAND/UL (ref 4.31–10.16)
WBC # BLD AUTO: 10.35 THOUSAND/UL (ref 4.31–10.16)
WBC # BLD AUTO: 10.48 THOUSAND/UL (ref 4.31–10.16)
WBC # BLD AUTO: 11.77 THOUSAND/UL (ref 4.31–10.16)
WBC # BLD AUTO: 14.25 THOUSAND/UL (ref 4.31–10.16)
WBC # BLD AUTO: 3.57 THOUSAND/UL (ref 4.31–10.16)
WBC # BLD AUTO: 3.86 THOUSAND/UL (ref 4.31–10.16)
WBC # BLD AUTO: 4.89 THOUSAND/UL (ref 4.31–10.16)
WBC # BLD AUTO: 5.33 THOUSAND/UL (ref 4.31–10.16)
WBC # BLD AUTO: 5.49 THOUSAND/UL (ref 4.31–10.16)
WBC # BLD AUTO: 5.6 THOUSAND/UL (ref 4.31–10.16)
WBC # BLD AUTO: 5.95 THOUSAND/UL (ref 4.31–10.16)
WBC # BLD AUTO: 6.07 THOUSAND/UL (ref 4.31–10.16)
WBC # BLD AUTO: 6.12 THOUSAND/UL (ref 4.31–10.16)
WBC # BLD AUTO: 6.15 THOUSAND/UL (ref 4.31–10.16)
WBC # BLD AUTO: 6.19 THOUSAND/UL (ref 4.31–10.16)
WBC # BLD AUTO: 6.21 THOUSAND/UL (ref 4.31–10.16)
WBC # BLD AUTO: 7.27 THOUSAND/UL (ref 4.31–10.16)
WBC # BLD AUTO: 7.6 THOUSAND/UL (ref 4.31–10.16)
WBC # BLD AUTO: 7.91 THOUSAND/UL (ref 4.31–10.16)
WBC # BLD AUTO: 8.03 THOUSAND/UL (ref 4.31–10.16)
WBC # BLD AUTO: 8.04 THOUSAND/UL (ref 4.31–10.16)
WBC # BLD AUTO: 8.11 THOUSAND/UL (ref 4.31–10.16)
WBC # BLD AUTO: 8.27 THOUSAND/UL (ref 4.31–10.16)
WBC # BLD AUTO: 8.43 THOUSAND/UL (ref 4.31–10.16)
WBC # BLD AUTO: 8.87 THOUSAND/UL (ref 4.31–10.16)
WBC # CSF AUTO: 3 /UL (ref 0–5)
WBC #/AREA URNS AUTO: ABNORMAL /HPF

## 2021-01-01 PROCEDURE — 82306 VITAMIN D 25 HYDROXY: CPT | Performed by: INTERNAL MEDICINE

## 2021-01-01 PROCEDURE — 86921 COMPATIBILITY TEST INCUBATE: CPT

## 2021-01-01 PROCEDURE — 95720 EEG PHY/QHP EA INCR W/VEEG: CPT | Performed by: STUDENT IN AN ORGANIZED HEALTH CARE EDUCATION/TRAINING PROGRAM

## 2021-01-01 PROCEDURE — 80048 BASIC METABOLIC PNL TOTAL CA: CPT | Performed by: INTERNAL MEDICINE

## 2021-01-01 PROCEDURE — 85027 COMPLETE CBC AUTOMATED: CPT | Performed by: INTERNAL MEDICINE

## 2021-01-01 PROCEDURE — 99255 IP/OBS CONSLTJ NEW/EST HI 80: CPT | Performed by: INTERNAL MEDICINE

## 2021-01-01 PROCEDURE — 80048 BASIC METABOLIC PNL TOTAL CA: CPT | Performed by: EMERGENCY MEDICINE

## 2021-01-01 PROCEDURE — 99254 IP/OBS CNSLTJ NEW/EST MOD 60: CPT | Performed by: PHYSICIAN ASSISTANT

## 2021-01-01 PROCEDURE — 82140 ASSAY OF AMMONIA: CPT | Performed by: NURSE PRACTITIONER

## 2021-01-01 PROCEDURE — 94760 N-INVAS EAR/PLS OXIMETRY 1: CPT

## 2021-01-01 PROCEDURE — 84295 ASSAY OF SERUM SODIUM: CPT | Performed by: FAMILY MEDICINE

## 2021-01-01 PROCEDURE — 99291 CRITICAL CARE FIRST HOUR: CPT | Performed by: INTERNAL MEDICINE

## 2021-01-01 PROCEDURE — 85610 PROTHROMBIN TIME: CPT | Performed by: PHYSICIAN ASSISTANT

## 2021-01-01 PROCEDURE — 36620 INSERTION CATHETER ARTERY: CPT | Performed by: INTERNAL MEDICINE

## 2021-01-01 PROCEDURE — 80048 BASIC METABOLIC PNL TOTAL CA: CPT | Performed by: NURSE PRACTITIONER

## 2021-01-01 PROCEDURE — 87483 CNS DNA AMP PROBE TYPE 12-25: CPT | Performed by: PSYCHIATRY & NEUROLOGY

## 2021-01-01 PROCEDURE — 88108 CYTOPATH CONCENTRATE TECH: CPT | Performed by: PATHOLOGY

## 2021-01-01 PROCEDURE — 83550 IRON BINDING TEST: CPT | Performed by: INTERNAL MEDICINE

## 2021-01-01 PROCEDURE — 85027 COMPLETE CBC AUTOMATED: CPT | Performed by: NURSE PRACTITIONER

## 2021-01-01 PROCEDURE — 70553 MRI BRAIN STEM W/O & W/DYE: CPT

## 2021-01-01 PROCEDURE — A9585 GADOBUTROL INJECTION: HCPCS | Performed by: PHYSICIAN ASSISTANT

## 2021-01-01 PROCEDURE — 85610 PROTHROMBIN TIME: CPT | Performed by: EMERGENCY MEDICINE

## 2021-01-01 PROCEDURE — 83873 ASSAY OF CSF PROTEIN: CPT | Performed by: PSYCHIATRY & NEUROLOGY

## 2021-01-01 PROCEDURE — 85025 COMPLETE CBC W/AUTO DIFF WBC: CPT | Performed by: NURSE PRACTITIONER

## 2021-01-01 PROCEDURE — 71045 X-RAY EXAM CHEST 1 VIEW: CPT

## 2021-01-01 PROCEDURE — P9016 RBC LEUKOCYTES REDUCED: HCPCS

## 2021-01-01 PROCEDURE — 88112 CYTOPATH CELL ENHANCE TECH: CPT | Performed by: PATHOLOGY

## 2021-01-01 PROCEDURE — 85730 THROMBOPLASTIN TIME PARTIAL: CPT | Performed by: EMERGENCY MEDICINE

## 2021-01-01 PROCEDURE — 99292 CRITICAL CARE ADDL 30 MIN: CPT | Performed by: INTERNAL MEDICINE

## 2021-01-01 PROCEDURE — 30233K1 TRANSFUSION OF NONAUTOLOGOUS FROZEN PLASMA INTO PERIPHERAL VEIN, PERCUTANEOUS APPROACH: ICD-10-PCS | Performed by: INTERNAL MEDICINE

## 2021-01-01 PROCEDURE — 85027 COMPLETE CBC AUTOMATED: CPT | Performed by: STUDENT IN AN ORGANIZED HEALTH CARE EDUCATION/TRAINING PROGRAM

## 2021-01-01 PROCEDURE — 93976 VASCULAR STUDY: CPT

## 2021-01-01 PROCEDURE — 87070 CULTURE OTHR SPECIMN AEROBIC: CPT | Performed by: INTERNAL MEDICINE

## 2021-01-01 PROCEDURE — 81003 URINALYSIS AUTO W/O SCOPE: CPT | Performed by: NURSE PRACTITIONER

## 2021-01-01 PROCEDURE — 99255 IP/OBS CONSLTJ NEW/EST HI 80: CPT | Performed by: PSYCHIATRY & NEUROLOGY

## 2021-01-01 PROCEDURE — 0DJ08ZZ INSPECTION OF UPPER INTESTINAL TRACT, VIA NATURAL OR ARTIFICIAL OPENING ENDOSCOPIC: ICD-10-PCS | Performed by: INTERNAL MEDICINE

## 2021-01-01 PROCEDURE — 83735 ASSAY OF MAGNESIUM: CPT | Performed by: FAMILY MEDICINE

## 2021-01-01 PROCEDURE — 82077 ASSAY SPEC XCP UR&BREATH IA: CPT | Performed by: EMERGENCY MEDICINE

## 2021-01-01 PROCEDURE — P9017 PLASMA 1 DONOR FRZ W/IN 8 HR: HCPCS

## 2021-01-01 PROCEDURE — 87077 CULTURE AEROBIC IDENTIFY: CPT | Performed by: INTERNAL MEDICINE

## 2021-01-01 PROCEDURE — 94003 VENT MGMT INPAT SUBQ DAY: CPT

## 2021-01-01 PROCEDURE — 83605 ASSAY OF LACTIC ACID: CPT | Performed by: EMERGENCY MEDICINE

## 2021-01-01 PROCEDURE — 82436 ASSAY OF URINE CHLORIDE: CPT | Performed by: INTERNAL MEDICINE

## 2021-01-01 PROCEDURE — 95715 VEEG EA 12-26HR INTMT MNTR: CPT

## 2021-01-01 PROCEDURE — 83735 ASSAY OF MAGNESIUM: CPT | Performed by: NURSE PRACTITIONER

## 2021-01-01 PROCEDURE — 84100 ASSAY OF PHOSPHORUS: CPT | Performed by: INTERNAL MEDICINE

## 2021-01-01 PROCEDURE — 86341 ISLET CELL ANTIBODY: CPT

## 2021-01-01 PROCEDURE — 84100 ASSAY OF PHOSPHORUS: CPT | Performed by: STUDENT IN AN ORGANIZED HEALTH CARE EDUCATION/TRAINING PROGRAM

## 2021-01-01 PROCEDURE — 93923 UPR/LXTR ART STDY 3+ LVLS: CPT | Performed by: SURGERY

## 2021-01-01 PROCEDURE — 85027 COMPLETE CBC AUTOMATED: CPT | Performed by: PHYSICIAN ASSISTANT

## 2021-01-01 PROCEDURE — 99232 SBSQ HOSP IP/OBS MODERATE 35: CPT | Performed by: INTERNAL MEDICINE

## 2021-01-01 PROCEDURE — 85014 HEMATOCRIT: CPT | Performed by: EMERGENCY MEDICINE

## 2021-01-01 PROCEDURE — 85025 COMPLETE CBC W/AUTO DIFF WBC: CPT | Performed by: INTERNAL MEDICINE

## 2021-01-01 PROCEDURE — 93005 ELECTROCARDIOGRAM TRACING: CPT

## 2021-01-01 PROCEDURE — 84100 ASSAY OF PHOSPHORUS: CPT | Performed by: PHYSICIAN ASSISTANT

## 2021-01-01 PROCEDURE — 95700 EEG CONT REC W/VID EEG TECH: CPT

## 2021-01-01 PROCEDURE — 36556 INSERT NON-TUNNEL CV CATH: CPT | Performed by: INTERNAL MEDICINE

## 2021-01-01 PROCEDURE — 95720 EEG PHY/QHP EA INCR W/VEEG: CPT | Performed by: PSYCHIATRY & NEUROLOGY

## 2021-01-01 PROCEDURE — 85014 HEMATOCRIT: CPT | Performed by: NURSE PRACTITIONER

## 2021-01-01 PROCEDURE — P9100 PATHOGEN TEST FOR PLATELETS: HCPCS

## 2021-01-01 PROCEDURE — 82330 ASSAY OF CALCIUM: CPT | Performed by: PHYSICIAN ASSISTANT

## 2021-01-01 PROCEDURE — 87186 SC STD MICRODIL/AGAR DIL: CPT | Performed by: INTERNAL MEDICINE

## 2021-01-01 PROCEDURE — P9035 PLATELET PHERES LEUKOREDUCED: HCPCS

## 2021-01-01 PROCEDURE — 85007 BL SMEAR W/DIFF WBC COUNT: CPT | Performed by: EMERGENCY MEDICINE

## 2021-01-01 PROCEDURE — 82746 ASSAY OF FOLIC ACID SERUM: CPT | Performed by: INTERNAL MEDICINE

## 2021-01-01 PROCEDURE — 87040 BLOOD CULTURE FOR BACTERIA: CPT | Performed by: PHYSICIAN ASSISTANT

## 2021-01-01 PROCEDURE — 87040 BLOOD CULTURE FOR BACTERIA: CPT | Performed by: EMERGENCY MEDICINE

## 2021-01-01 PROCEDURE — 36600 WITHDRAWAL OF ARTERIAL BLOOD: CPT

## 2021-01-01 PROCEDURE — 99233 SBSQ HOSP IP/OBS HIGH 50: CPT | Performed by: INTERNAL MEDICINE

## 2021-01-01 PROCEDURE — 99232 SBSQ HOSP IP/OBS MODERATE 35: CPT | Performed by: PSYCHIATRY & NEUROLOGY

## 2021-01-01 PROCEDURE — 80177 DRUG SCRN QUAN LEVETIRACETAM: CPT | Performed by: PHYSICIAN ASSISTANT

## 2021-01-01 PROCEDURE — 85014 HEMATOCRIT: CPT | Performed by: FAMILY MEDICINE

## 2021-01-01 PROCEDURE — 87102 FUNGUS ISOLATION CULTURE: CPT | Performed by: INTERNAL MEDICINE

## 2021-01-01 PROCEDURE — 99223 1ST HOSP IP/OBS HIGH 75: CPT | Performed by: INTERNAL MEDICINE

## 2021-01-01 PROCEDURE — 80053 COMPREHEN METABOLIC PANEL: CPT | Performed by: NURSE PRACTITIONER

## 2021-01-01 PROCEDURE — 99239 HOSP IP/OBS DSCHRG MGMT >30: CPT | Performed by: NURSE PRACTITIONER

## 2021-01-01 PROCEDURE — 89051 BODY FLUID CELL COUNT: CPT | Performed by: PSYCHIATRY & NEUROLOGY

## 2021-01-01 PROCEDURE — 82948 REAGENT STRIP/BLOOD GLUCOSE: CPT

## 2021-01-01 PROCEDURE — 85018 HEMOGLOBIN: CPT | Performed by: FAMILY MEDICINE

## 2021-01-01 PROCEDURE — 82947 ASSAY GLUCOSE BLOOD QUANT: CPT

## 2021-01-01 PROCEDURE — 84100 ASSAY OF PHOSPHORUS: CPT | Performed by: NURSE PRACTITIONER

## 2021-01-01 PROCEDURE — A9585 GADOBUTROL INJECTION: HCPCS | Performed by: SURGERY

## 2021-01-01 PROCEDURE — 86905 BLOOD TYPING RBC ANTIGENS: CPT

## 2021-01-01 PROCEDURE — 83605 ASSAY OF LACTIC ACID: CPT | Performed by: INTERNAL MEDICINE

## 2021-01-01 PROCEDURE — 83605 ASSAY OF LACTIC ACID: CPT | Performed by: PHYSICIAN ASSISTANT

## 2021-01-01 PROCEDURE — 82565 ASSAY OF CREATININE: CPT

## 2021-01-01 PROCEDURE — G1004 CDSM NDSC: HCPCS

## 2021-01-01 PROCEDURE — 93010 ELECTROCARDIOGRAM REPORT: CPT | Performed by: INTERNAL MEDICINE

## 2021-01-01 PROCEDURE — 82550 ASSAY OF CK (CPK): CPT | Performed by: INTERNAL MEDICINE

## 2021-01-01 PROCEDURE — C9113 INJ PANTOPRAZOLE SODIUM, VIA: HCPCS | Performed by: PHYSICIAN ASSISTANT

## 2021-01-01 PROCEDURE — 83735 ASSAY OF MAGNESIUM: CPT | Performed by: EMERGENCY MEDICINE

## 2021-01-01 PROCEDURE — 31645 BRNCHSC W/THER ASPIR 1ST: CPT | Performed by: INTERNAL MEDICINE

## 2021-01-01 PROCEDURE — 82803 BLOOD GASES ANY COMBINATION: CPT

## 2021-01-01 PROCEDURE — 84145 PROCALCITONIN (PCT): CPT | Performed by: EMERGENCY MEDICINE

## 2021-01-01 PROCEDURE — 80053 COMPREHEN METABOLIC PANEL: CPT | Performed by: INTERNAL MEDICINE

## 2021-01-01 PROCEDURE — NC001 PR NO CHARGE: Performed by: PSYCHIATRY & NEUROLOGY

## 2021-01-01 PROCEDURE — 82140 ASSAY OF AMMONIA: CPT | Performed by: EMERGENCY MEDICINE

## 2021-01-01 PROCEDURE — 99285 EMERGENCY DEPT VISIT HI MDM: CPT

## 2021-01-01 PROCEDURE — 80143 DRUG ASSAY ACETAMINOPHEN: CPT | Performed by: EMERGENCY MEDICINE

## 2021-01-01 PROCEDURE — 43235 EGD DIAGNOSTIC BRUSH WASH: CPT | Performed by: INTERNAL MEDICINE

## 2021-01-01 PROCEDURE — 85018 HEMOGLOBIN: CPT | Performed by: EMERGENCY MEDICINE

## 2021-01-01 PROCEDURE — 84145 PROCALCITONIN (PCT): CPT | Performed by: NURSE PRACTITIONER

## 2021-01-01 PROCEDURE — 97163 PT EVAL HIGH COMPLEX 45 MIN: CPT

## 2021-01-01 PROCEDURE — 82945 GLUCOSE OTHER FLUID: CPT | Performed by: PSYCHIATRY & NEUROLOGY

## 2021-01-01 PROCEDURE — 87522 HEPATITIS C REVRS TRNSCRPJ: CPT | Performed by: PHYSICIAN ASSISTANT

## 2021-01-01 PROCEDURE — NC001 PR NO CHARGE: Performed by: PHYSICIAN ASSISTANT

## 2021-01-01 PROCEDURE — 82533 TOTAL CORTISOL: CPT | Performed by: INTERNAL MEDICINE

## 2021-01-01 PROCEDURE — 86850 RBC ANTIBODY SCREEN: CPT | Performed by: NURSE PRACTITIONER

## 2021-01-01 PROCEDURE — 31500 INSERT EMERGENCY AIRWAY: CPT | Performed by: ANESTHESIOLOGY

## 2021-01-01 PROCEDURE — 0241U HB NFCT DS VIR RESP RNA 4 TRGT: CPT | Performed by: INTERNAL MEDICINE

## 2021-01-01 PROCEDURE — 86900 BLOOD TYPING SEROLOGIC ABO: CPT | Performed by: INTERNAL MEDICINE

## 2021-01-01 PROCEDURE — 87040 BLOOD CULTURE FOR BACTERIA: CPT | Performed by: NURSE PRACTITIONER

## 2021-01-01 PROCEDURE — 99233 SBSQ HOSP IP/OBS HIGH 50: CPT | Performed by: PSYCHIATRY & NEUROLOGY

## 2021-01-01 PROCEDURE — 83735 ASSAY OF MAGNESIUM: CPT | Performed by: PHYSICIAN ASSISTANT

## 2021-01-01 PROCEDURE — 81001 URINALYSIS AUTO W/SCOPE: CPT | Performed by: INTERNAL MEDICINE

## 2021-01-01 PROCEDURE — 86922 COMPATIBILITY TEST ANTIGLOB: CPT

## 2021-01-01 PROCEDURE — 87081 CULTURE SCREEN ONLY: CPT | Performed by: INTERNAL MEDICINE

## 2021-01-01 PROCEDURE — 80048 BASIC METABOLIC PNL TOTAL CA: CPT | Performed by: PHYSICIAN ASSISTANT

## 2021-01-01 PROCEDURE — 84478 ASSAY OF TRIGLYCERIDES: CPT | Performed by: NURSE PRACTITIONER

## 2021-01-01 PROCEDURE — 80048 BASIC METABOLIC PNL TOTAL CA: CPT | Performed by: STUDENT IN AN ORGANIZED HEALTH CARE EDUCATION/TRAINING PROGRAM

## 2021-01-01 PROCEDURE — 85025 COMPLETE CBC W/AUTO DIFF WBC: CPT | Performed by: FAMILY MEDICINE

## 2021-01-01 PROCEDURE — 86901 BLOOD TYPING SEROLOGIC RH(D): CPT | Performed by: NURSE PRACTITIONER

## 2021-01-01 PROCEDURE — 85027 COMPLETE CBC AUTOMATED: CPT | Performed by: EMERGENCY MEDICINE

## 2021-01-01 PROCEDURE — 83010 ASSAY OF HAPTOGLOBIN QUANT: CPT | Performed by: NURSE PRACTITIONER

## 2021-01-01 PROCEDURE — 85018 HEMOGLOBIN: CPT | Performed by: NURSE PRACTITIONER

## 2021-01-01 PROCEDURE — 84300 ASSAY OF URINE SODIUM: CPT | Performed by: PHYSICIAN ASSISTANT

## 2021-01-01 PROCEDURE — 70450 CT HEAD/BRAIN W/O DYE: CPT

## 2021-01-01 PROCEDURE — 99232 SBSQ HOSP IP/OBS MODERATE 35: CPT | Performed by: NURSE PRACTITIONER

## 2021-01-01 PROCEDURE — 85014 HEMATOCRIT: CPT

## 2021-01-01 PROCEDURE — 82248 BILIRUBIN DIRECT: CPT | Performed by: STUDENT IN AN ORGANIZED HEALTH CARE EDUCATION/TRAINING PROGRAM

## 2021-01-01 PROCEDURE — 84520 ASSAY OF UREA NITROGEN: CPT

## 2021-01-01 PROCEDURE — 82533 TOTAL CORTISOL: CPT | Performed by: STUDENT IN AN ORGANIZED HEALTH CARE EDUCATION/TRAINING PROGRAM

## 2021-01-01 PROCEDURE — 80307 DRUG TEST PRSMV CHEM ANLYZR: CPT | Performed by: EMERGENCY MEDICINE

## 2021-01-01 PROCEDURE — 88185 FLOWCYTOMETRY/TC ADD-ON: CPT

## 2021-01-01 PROCEDURE — 74018 RADEX ABDOMEN 1 VIEW: CPT

## 2021-01-01 PROCEDURE — 83880 ASSAY OF NATRIURETIC PEPTIDE: CPT | Performed by: FAMILY MEDICINE

## 2021-01-01 PROCEDURE — 88184 FLOWCYTOMETRY/ TC 1 MARKER: CPT | Performed by: PSYCHIATRY & NEUROLOGY

## 2021-01-01 PROCEDURE — 94760 N-INVAS EAR/PLS OXIMETRY 1: CPT | Performed by: SOCIAL WORKER

## 2021-01-01 PROCEDURE — 80202 ASSAY OF VANCOMYCIN: CPT | Performed by: INTERNAL MEDICINE

## 2021-01-01 PROCEDURE — 87205 SMEAR GRAM STAIN: CPT | Performed by: NURSE PRACTITIONER

## 2021-01-01 PROCEDURE — 85007 BL SMEAR W/DIFF WBC COUNT: CPT | Performed by: INTERNAL MEDICINE

## 2021-01-01 PROCEDURE — A9585 GADOBUTROL INJECTION: HCPCS | Performed by: PSYCHIATRY & NEUROLOGY

## 2021-01-01 PROCEDURE — 80076 HEPATIC FUNCTION PANEL: CPT | Performed by: EMERGENCY MEDICINE

## 2021-01-01 PROCEDURE — 80048 BASIC METABOLIC PNL TOTAL CA: CPT | Performed by: FAMILY MEDICINE

## 2021-01-01 PROCEDURE — 87206 SMEAR FLUORESCENT/ACID STAI: CPT | Performed by: INTERNAL MEDICINE

## 2021-01-01 PROCEDURE — 84295 ASSAY OF SERUM SODIUM: CPT

## 2021-01-01 PROCEDURE — 99291 CRITICAL CARE FIRST HOUR: CPT | Performed by: EMERGENCY MEDICINE

## 2021-01-01 PROCEDURE — 84550 ASSAY OF BLOOD/URIC ACID: CPT | Performed by: INTERNAL MEDICINE

## 2021-01-01 PROCEDURE — 99215 OFFICE O/P EST HI 40 MIN: CPT | Performed by: SURGERY

## 2021-01-01 PROCEDURE — 82040 ASSAY OF SERUM ALBUMIN: CPT | Performed by: PSYCHIATRY & NEUROLOGY

## 2021-01-01 PROCEDURE — 82607 VITAMIN B-12: CPT | Performed by: INTERNAL MEDICINE

## 2021-01-01 PROCEDURE — 0BH17EZ INSERTION OF ENDOTRACHEAL AIRWAY INTO TRACHEA, VIA NATURAL OR ARTIFICIAL OPENING: ICD-10-PCS | Performed by: ANESTHESIOLOGY

## 2021-01-01 PROCEDURE — 84484 ASSAY OF TROPONIN QUANT: CPT | Performed by: INTERNAL MEDICINE

## 2021-01-01 PROCEDURE — NC001 PR NO CHARGE: Performed by: INTERNAL MEDICINE

## 2021-01-01 PROCEDURE — 82805 BLOOD GASES W/O2 SATURATION: CPT | Performed by: PHYSICIAN ASSISTANT

## 2021-01-01 PROCEDURE — 83615 LACTATE (LD) (LDH) ENZYME: CPT | Performed by: STUDENT IN AN ORGANIZED HEALTH CARE EDUCATION/TRAINING PROGRAM

## 2021-01-01 PROCEDURE — 84157 ASSAY OF PROTEIN OTHER: CPT | Performed by: PSYCHIATRY & NEUROLOGY

## 2021-01-01 PROCEDURE — 85610 PROTHROMBIN TIME: CPT | Performed by: NURSE PRACTITIONER

## 2021-01-01 PROCEDURE — 82140 ASSAY OF AMMONIA: CPT | Performed by: PHYSICIAN ASSISTANT

## 2021-01-01 PROCEDURE — 02HV33Z INSERTION OF INFUSION DEVICE INTO SUPERIOR VENA CAVA, PERCUTANEOUS APPROACH: ICD-10-PCS | Performed by: INTERNAL MEDICINE

## 2021-01-01 PROCEDURE — 86900 BLOOD TYPING SEROLOGIC ABO: CPT | Performed by: NURSE PRACTITIONER

## 2021-01-01 PROCEDURE — 87070 CULTURE OTHR SPECIMN AEROBIC: CPT | Performed by: PSYCHIATRY & NEUROLOGY

## 2021-01-01 PROCEDURE — 99233 SBSQ HOSP IP/OBS HIGH 50: CPT | Performed by: NURSE PRACTITIONER

## 2021-01-01 PROCEDURE — 83935 ASSAY OF URINE OSMOLALITY: CPT | Performed by: INTERNAL MEDICINE

## 2021-01-01 PROCEDURE — 99232 SBSQ HOSP IP/OBS MODERATE 35: CPT | Performed by: PHYSICIAN ASSISTANT

## 2021-01-01 PROCEDURE — 76377 3D RENDER W/INTRP POSTPROCES: CPT

## 2021-01-01 PROCEDURE — 74183 MRI ABD W/O CNTR FLWD CNTR: CPT

## 2021-01-01 PROCEDURE — 30233N1 TRANSFUSION OF NONAUTOLOGOUS RED BLOOD CELLS INTO PERIPHERAL VEIN, PERCUTANEOUS APPROACH: ICD-10-PCS | Performed by: INTERNAL MEDICINE

## 2021-01-01 PROCEDURE — 82105 ALPHA-FETOPROTEIN SERUM: CPT | Performed by: PHYSICIAN ASSISTANT

## 2021-01-01 PROCEDURE — NC001 PR NO CHARGE: Performed by: EMERGENCY MEDICINE

## 2021-01-01 PROCEDURE — 99291 CRITICAL CARE FIRST HOUR: CPT | Performed by: NURSE PRACTITIONER

## 2021-01-01 PROCEDURE — 94002 VENT MGMT INPAT INIT DAY: CPT

## 2021-01-01 PROCEDURE — 86902 BLOOD TYPE ANTIGEN DONOR EA: CPT

## 2021-01-01 PROCEDURE — 80179 DRUG ASSAY SALICYLATE: CPT | Performed by: EMERGENCY MEDICINE

## 2021-01-01 PROCEDURE — 93306 TTE W/DOPPLER COMPLETE: CPT

## 2021-01-01 PROCEDURE — 99254 IP/OBS CNSLTJ NEW/EST MOD 60: CPT | Performed by: INTERNAL MEDICINE

## 2021-01-01 PROCEDURE — 97110 THERAPEUTIC EXERCISES: CPT

## 2021-01-01 PROCEDURE — 85025 COMPLETE CBC W/AUTO DIFF WBC: CPT | Performed by: STUDENT IN AN ORGANIZED HEALTH CARE EDUCATION/TRAINING PROGRAM

## 2021-01-01 PROCEDURE — 76705 ECHO EXAM OF ABDOMEN: CPT

## 2021-01-01 PROCEDURE — 84439 ASSAY OF FREE THYROXINE: CPT | Performed by: NURSE PRACTITIONER

## 2021-01-01 PROCEDURE — 87106 FUNGI IDENTIFICATION YEAST: CPT | Performed by: INTERNAL MEDICINE

## 2021-01-01 PROCEDURE — 80076 HEPATIC FUNCTION PANEL: CPT | Performed by: PHYSICIAN ASSISTANT

## 2021-01-01 PROCEDURE — 83735 ASSAY OF MAGNESIUM: CPT | Performed by: INTERNAL MEDICINE

## 2021-01-01 PROCEDURE — 85025 COMPLETE CBC W/AUTO DIFF WBC: CPT | Performed by: PHYSICIAN ASSISTANT

## 2021-01-01 PROCEDURE — 94640 AIRWAY INHALATION TREATMENT: CPT

## 2021-01-01 PROCEDURE — 99233 SBSQ HOSP IP/OBS HIGH 50: CPT | Performed by: FAMILY MEDICINE

## 2021-01-01 PROCEDURE — 36415 COLL VENOUS BLD VENIPUNCTURE: CPT | Performed by: EMERGENCY MEDICINE

## 2021-01-01 PROCEDURE — 82784 ASSAY IGA/IGD/IGG/IGM EACH: CPT | Performed by: PSYCHIATRY & NEUROLOGY

## 2021-01-01 PROCEDURE — 95718 EEG PHYS/QHP 2-12 HR W/VEEG: CPT | Performed by: PSYCHIATRY & NEUROLOGY

## 2021-01-01 PROCEDURE — 85610 PROTHROMBIN TIME: CPT | Performed by: INTERNAL MEDICINE

## 2021-01-01 PROCEDURE — 96365 THER/PROPH/DIAG IV INF INIT: CPT

## 2021-01-01 PROCEDURE — 87102 FUNGUS ISOLATION CULTURE: CPT | Performed by: PSYCHIATRY & NEUROLOGY

## 2021-01-01 PROCEDURE — 97167 OT EVAL HIGH COMPLEX 60 MIN: CPT

## 2021-01-01 PROCEDURE — 86803 HEPATITIS C AB TEST: CPT | Performed by: PHYSICIAN ASSISTANT

## 2021-01-01 PROCEDURE — 89050 BODY FLUID CELL COUNT: CPT | Performed by: PSYCHIATRY & NEUROLOGY

## 2021-01-01 PROCEDURE — 36415 COLL VENOUS BLD VENIPUNCTURE: CPT

## 2021-01-01 PROCEDURE — 62328 DX LMBR SPI PNXR W/FLUOR/CT: CPT

## 2021-01-01 PROCEDURE — 99291 CRITICAL CARE FIRST HOUR: CPT | Performed by: PHYSICIAN ASSISTANT

## 2021-01-01 PROCEDURE — 83916 OLIGOCLONAL BANDS: CPT | Performed by: PSYCHIATRY & NEUROLOGY

## 2021-01-01 PROCEDURE — 82105 ALPHA-FETOPROTEIN SERUM: CPT

## 2021-01-01 PROCEDURE — 74250 X-RAY XM SM INT 1CNTRST STD: CPT

## 2021-01-01 PROCEDURE — 80201 ASSAY OF TOPIRAMATE: CPT | Performed by: PHYSICIAN ASSISTANT

## 2021-01-01 PROCEDURE — 80076 HEPATIC FUNCTION PANEL: CPT | Performed by: INTERNAL MEDICINE

## 2021-01-01 PROCEDURE — 82140 ASSAY OF AMMONIA: CPT | Performed by: INTERNAL MEDICINE

## 2021-01-01 PROCEDURE — 86870 RBC ANTIBODY IDENTIFICATION: CPT | Performed by: NURSE PRACTITIONER

## 2021-01-01 PROCEDURE — 80074 ACUTE HEPATITIS PANEL: CPT | Performed by: INTERNAL MEDICINE

## 2021-01-01 PROCEDURE — 5A1955Z RESPIRATORY VENTILATION, GREATER THAN 96 CONSECUTIVE HOURS: ICD-10-PCS | Performed by: ANESTHESIOLOGY

## 2021-01-01 PROCEDURE — 99233 SBSQ HOSP IP/OBS HIGH 50: CPT | Performed by: ANESTHESIOLOGY

## 2021-01-01 PROCEDURE — 99223 1ST HOSP IP/OBS HIGH 75: CPT | Performed by: PHYSICIAN ASSISTANT

## 2021-01-01 PROCEDURE — 83519 RIA NONANTIBODY: CPT | Performed by: PSYCHIATRY & NEUROLOGY

## 2021-01-01 PROCEDURE — 009U3ZX DRAINAGE OF SPINAL CANAL, PERCUTANEOUS APPROACH, DIAGNOSTIC: ICD-10-PCS | Performed by: RADIOLOGY

## 2021-01-01 PROCEDURE — 93306 TTE W/DOPPLER COMPLETE: CPT | Performed by: INTERNAL MEDICINE

## 2021-01-01 PROCEDURE — 85384 FIBRINOGEN ACTIVITY: CPT | Performed by: NURSE PRACTITIONER

## 2021-01-01 PROCEDURE — 83735 ASSAY OF MAGNESIUM: CPT | Performed by: STUDENT IN AN ORGANIZED HEALTH CARE EDUCATION/TRAINING PROGRAM

## 2021-01-01 PROCEDURE — 86039 ANTINUCLEAR ANTIBODIES (ANA): CPT | Performed by: PSYCHIATRY & NEUROLOGY

## 2021-01-01 PROCEDURE — 99292 CRITICAL CARE ADDL 30 MIN: CPT | Performed by: ANESTHESIOLOGY

## 2021-01-01 PROCEDURE — 80053 COMPREHEN METABOLIC PANEL: CPT | Performed by: STUDENT IN AN ORGANIZED HEALTH CARE EDUCATION/TRAINING PROGRAM

## 2021-01-01 PROCEDURE — 99291 CRITICAL CARE FIRST HOUR: CPT | Performed by: ANESTHESIOLOGY

## 2021-01-01 PROCEDURE — 99284 EMERGENCY DEPT VISIT MOD MDM: CPT | Performed by: EMERGENCY MEDICINE

## 2021-01-01 PROCEDURE — 80053 COMPREHEN METABOLIC PANEL: CPT | Performed by: FAMILY MEDICINE

## 2021-01-01 PROCEDURE — 0241U HB NFCT DS VIR RESP RNA 4 TRGT: CPT | Performed by: FAMILY MEDICINE

## 2021-01-01 PROCEDURE — 84300 ASSAY OF URINE SODIUM: CPT | Performed by: INTERNAL MEDICINE

## 2021-01-01 PROCEDURE — 0B9B8ZX DRAINAGE OF LEFT LOWER LOBE BRONCHUS, VIA NATURAL OR ARTIFICIAL OPENING ENDOSCOPIC, DIAGNOSTIC: ICD-10-PCS | Performed by: INTERNAL MEDICINE

## 2021-01-01 PROCEDURE — 87340 HEPATITIS B SURFACE AG IA: CPT | Performed by: PHYSICIAN ASSISTANT

## 2021-01-01 PROCEDURE — 84132 ASSAY OF SERUM POTASSIUM: CPT | Performed by: FAMILY MEDICINE

## 2021-01-01 PROCEDURE — 83540 ASSAY OF IRON: CPT | Performed by: INTERNAL MEDICINE

## 2021-01-01 PROCEDURE — 85049 AUTOMATED PLATELET COUNT: CPT | Performed by: PHYSICIAN ASSISTANT

## 2021-01-01 PROCEDURE — 87077 CULTURE AEROBIC IDENTIFY: CPT | Performed by: EMERGENCY MEDICINE

## 2021-01-01 PROCEDURE — 86255 FLUORESCENT ANTIBODY SCREEN: CPT | Performed by: PSYCHIATRY & NEUROLOGY

## 2021-01-01 PROCEDURE — 86850 RBC ANTIBODY SCREEN: CPT | Performed by: INTERNAL MEDICINE

## 2021-01-01 PROCEDURE — 87086 URINE CULTURE/COLONY COUNT: CPT | Performed by: EMERGENCY MEDICINE

## 2021-01-01 PROCEDURE — 86901 BLOOD TYPING SEROLOGIC RH(D): CPT | Performed by: INTERNAL MEDICINE

## 2021-01-01 PROCEDURE — 85025 COMPLETE CBC W/AUTO DIFF WBC: CPT | Performed by: EMERGENCY MEDICINE

## 2021-01-01 PROCEDURE — 87070 CULTURE OTHR SPECIMN AEROBIC: CPT | Performed by: NURSE PRACTITIONER

## 2021-01-01 PROCEDURE — 84484 ASSAY OF TROPONIN QUANT: CPT | Performed by: EMERGENCY MEDICINE

## 2021-01-01 PROCEDURE — 82533 TOTAL CORTISOL: CPT | Performed by: NURSE PRACTITIONER

## 2021-01-01 PROCEDURE — 82140 ASSAY OF AMMONIA: CPT | Performed by: FAMILY MEDICINE

## 2021-01-01 PROCEDURE — 87116 MYCOBACTERIA CULTURE: CPT | Performed by: INTERNAL MEDICINE

## 2021-01-01 PROCEDURE — 82042 OTHER SOURCE ALBUMIN QUAN EA: CPT | Performed by: PSYCHIATRY & NEUROLOGY

## 2021-01-01 PROCEDURE — 87493 C DIFF AMPLIFIED PROBE: CPT | Performed by: INTERNAL MEDICINE

## 2021-01-01 PROCEDURE — 87205 SMEAR GRAM STAIN: CPT | Performed by: INTERNAL MEDICINE

## 2021-01-01 PROCEDURE — 80053 COMPREHEN METABOLIC PANEL: CPT | Performed by: PHYSICIAN ASSISTANT

## 2021-01-01 PROCEDURE — 81001 URINALYSIS AUTO W/SCOPE: CPT | Performed by: EMERGENCY MEDICINE

## 2021-01-01 PROCEDURE — 5A1935Z RESPIRATORY VENTILATION, LESS THAN 24 CONSECUTIVE HOURS: ICD-10-PCS | Performed by: ANESTHESIOLOGY

## 2021-01-01 PROCEDURE — 93923 UPR/LXTR ART STDY 3+ LVLS: CPT

## 2021-01-01 PROCEDURE — 86255 FLUORESCENT ANTIBODY SCREEN: CPT

## 2021-01-01 PROCEDURE — 76937 US GUIDE VASCULAR ACCESS: CPT | Performed by: INTERNAL MEDICINE

## 2021-01-01 PROCEDURE — 99358 PROLONG SERVICE W/O CONTACT: CPT | Performed by: PHYSICIAN ASSISTANT

## 2021-01-01 PROCEDURE — 87806 HIV AG W/HIV1&2 ANTB W/OPTIC: CPT | Performed by: PHYSICIAN ASSISTANT

## 2021-01-01 PROCEDURE — 84443 ASSAY THYROID STIM HORMONE: CPT | Performed by: NURSE PRACTITIONER

## 2021-01-01 PROCEDURE — 84166 PROTEIN E-PHORESIS/URINE/CSF: CPT | Performed by: PSYCHIATRY & NEUROLOGY

## 2021-01-01 PROCEDURE — 82024 ASSAY OF ACTH: CPT | Performed by: STUDENT IN AN ORGANIZED HEALTH CARE EDUCATION/TRAINING PROGRAM

## 2021-01-01 PROCEDURE — 87798 DETECT AGENT NOS DNA AMP: CPT | Performed by: PSYCHIATRY & NEUROLOGY

## 2021-01-01 PROCEDURE — P9037 PLATE PHERES LEUKOREDU IRRAD: HCPCS

## 2021-01-01 PROCEDURE — B01B1ZZ FLUOROSCOPY OF SPINAL CORD USING LOW OSMOLAR CONTRAST: ICD-10-PCS | Performed by: RADIOLOGY

## 2021-01-01 PROCEDURE — 87186 SC STD MICRODIL/AGAR DIL: CPT | Performed by: EMERGENCY MEDICINE

## 2021-01-01 PROCEDURE — 85610 PROTHROMBIN TIME: CPT | Performed by: STUDENT IN AN ORGANIZED HEALTH CARE EDUCATION/TRAINING PROGRAM

## 2021-01-01 PROCEDURE — NC001 PR NO CHARGE: Performed by: STUDENT IN AN ORGANIZED HEALTH CARE EDUCATION/TRAINING PROGRAM

## 2021-01-01 PROCEDURE — 82728 ASSAY OF FERRITIN: CPT | Performed by: INTERNAL MEDICINE

## 2021-01-01 PROCEDURE — 85610 PROTHROMBIN TIME: CPT | Performed by: FAMILY MEDICINE

## 2021-01-01 PROCEDURE — 30233R1 TRANSFUSION OF NONAUTOLOGOUS PLATELETS INTO PERIPHERAL VEIN, PERCUTANEOUS APPROACH: ICD-10-PCS | Performed by: INTERNAL MEDICINE

## 2021-01-01 PROCEDURE — 82330 ASSAY OF CALCIUM: CPT | Performed by: NURSE PRACTITIONER

## 2021-01-01 PROCEDURE — 84132 ASSAY OF SERUM POTASSIUM: CPT

## 2021-01-01 PROCEDURE — 99232 SBSQ HOSP IP/OBS MODERATE 35: CPT | Performed by: FAMILY MEDICINE

## 2021-01-01 RX ORDER — HEPARIN SODIUM 5000 [USP'U]/ML
5000 INJECTION, SOLUTION INTRAVENOUS; SUBCUTANEOUS EVERY 8 HOURS SCHEDULED
Status: DISCONTINUED | OUTPATIENT
Start: 2021-01-01 | End: 2021-01-01

## 2021-01-01 RX ORDER — FUROSEMIDE 10 MG/ML
40 INJECTION INTRAMUSCULAR; INTRAVENOUS
Status: DISCONTINUED | OUTPATIENT
Start: 2021-01-01 | End: 2021-01-01

## 2021-01-01 RX ORDER — LORAZEPAM 2 MG/ML
10 INJECTION INTRAMUSCULAR ONCE
Status: COMPLETED | OUTPATIENT
Start: 2021-01-01 | End: 2021-01-01

## 2021-01-01 RX ORDER — LACTULOSE 20 G/30ML
30 SOLUTION ORAL 3 TIMES DAILY
Status: DISCONTINUED | OUTPATIENT
Start: 2021-01-01 | End: 2021-01-01 | Stop reason: HOSPADM

## 2021-01-01 RX ORDER — ALBUMIN, HUMAN INJ 5% 5 %
12.5 SOLUTION INTRAVENOUS ONCE
Status: COMPLETED | OUTPATIENT
Start: 2021-01-01 | End: 2021-01-01

## 2021-01-01 RX ORDER — KETAMINE HCL IN NACL, ISO-OSM 100MG/10ML
70 SYRINGE (ML) INJECTION ONCE AS NEEDED
Status: DISCONTINUED | OUTPATIENT
Start: 2021-01-01 | End: 2021-01-01

## 2021-01-01 RX ORDER — LORAZEPAM 2 MG/ML
2 INJECTION INTRAMUSCULAR EVERY 4 HOURS PRN
Status: DISCONTINUED | OUTPATIENT
Start: 2021-01-01 | End: 2021-01-01

## 2021-01-01 RX ORDER — CHLORHEXIDINE GLUCONATE 0.12 MG/ML
15 RINSE ORAL EVERY 12 HOURS SCHEDULED
Status: DISCONTINUED | OUTPATIENT
Start: 2021-01-01 | End: 2021-01-01

## 2021-01-01 RX ORDER — MIDAZOLAM HYDROCHLORIDE 2 MG/2ML
10 INJECTION, SOLUTION INTRAMUSCULAR; INTRAVENOUS ONCE
Status: DISCONTINUED | OUTPATIENT
Start: 2021-01-01 | End: 2021-01-01

## 2021-01-01 RX ORDER — DEXTROSE MONOHYDRATE 50 MG/ML
75 INJECTION, SOLUTION INTRAVENOUS CONTINUOUS
Status: DISCONTINUED | OUTPATIENT
Start: 2021-01-01 | End: 2021-01-01

## 2021-01-01 RX ORDER — NOREPINEPHRINE BITARTRATE 1 MG/ML
INJECTION, SOLUTION INTRAVENOUS
Status: COMPLETED
Start: 2021-01-01 | End: 2021-01-01

## 2021-01-01 RX ORDER — POTASSIUM CHLORIDE 14.9 MG/ML
20 INJECTION INTRAVENOUS ONCE
Status: DISCONTINUED | OUTPATIENT
Start: 2021-01-01 | End: 2021-01-01

## 2021-01-01 RX ORDER — MAGNESIUM HYDROXIDE/ALUMINUM HYDROXICE/SIMETHICONE 120; 1200; 1200 MG/30ML; MG/30ML; MG/30ML
30 SUSPENSION ORAL EVERY 4 HOURS PRN
Status: DISCONTINUED | OUTPATIENT
Start: 2021-01-01 | End: 2021-01-01 | Stop reason: HOSPADM

## 2021-01-01 RX ORDER — GLYCOPYRROLATE 0.2 MG/ML
0.1 INJECTION INTRAMUSCULAR; INTRAVENOUS
Status: DISCONTINUED | OUTPATIENT
Start: 2021-01-01 | End: 2021-04-20 | Stop reason: HOSPADM

## 2021-01-01 RX ORDER — PROPOFOL 10 MG/ML
5-50 INJECTION, EMULSION INTRAVENOUS
Status: CANCELLED | OUTPATIENT
Start: 2021-01-01

## 2021-01-01 RX ORDER — POTASSIUM CHLORIDE 20MEQ/15ML
40 LIQUID (ML) ORAL ONCE
Status: COMPLETED | OUTPATIENT
Start: 2021-01-01 | End: 2021-01-01

## 2021-01-01 RX ORDER — LACTULOSE 20 G/30ML
30 SOLUTION ORAL 3 TIMES DAILY
Qty: 4050 ML | Refills: 0 | Status: SHIPPED | OUTPATIENT
Start: 2021-01-01 | End: 2021-01-01 | Stop reason: HOSPADM

## 2021-01-01 RX ORDER — LEVALBUTEROL 1.25 MG/.5ML
1.25 SOLUTION, CONCENTRATE RESPIRATORY (INHALATION)
Status: DISCONTINUED | OUTPATIENT
Start: 2021-01-01 | End: 2021-01-01

## 2021-01-01 RX ORDER — LACOSAMIDE 10 MG/ML
200 SOLUTION ORAL EVERY 12 HOURS SCHEDULED
Status: DISCONTINUED | OUTPATIENT
Start: 2021-01-01 | End: 2021-01-01

## 2021-01-01 RX ORDER — POTASSIUM CHLORIDE 20MEQ/15ML
20 LIQUID (ML) ORAL ONCE
Status: COMPLETED | OUTPATIENT
Start: 2021-01-01 | End: 2021-01-01

## 2021-01-01 RX ORDER — FUROSEMIDE 10 MG/ML
10 INJECTION INTRAMUSCULAR; INTRAVENOUS ONCE
Status: COMPLETED | OUTPATIENT
Start: 2021-01-01 | End: 2021-01-01

## 2021-01-01 RX ORDER — LORAZEPAM 2 MG/ML
8 INJECTION INTRAMUSCULAR ONCE
Status: COMPLETED | OUTPATIENT
Start: 2021-01-01 | End: 2021-01-01

## 2021-01-01 RX ORDER — DIPHENHYDRAMINE HCL 25 MG
25 TABLET ORAL ONCE AS NEEDED
Status: DISCONTINUED | OUTPATIENT
Start: 2021-01-01 | End: 2021-01-01 | Stop reason: HOSPADM

## 2021-01-01 RX ORDER — LANOLIN ALCOHOL/MO/W.PET/CERES
100 CREAM (GRAM) TOPICAL DAILY
Status: DISCONTINUED | OUTPATIENT
Start: 2021-01-01 | End: 2021-01-01

## 2021-01-01 RX ORDER — MAGNESIUM SULFATE HEPTAHYDRATE 40 MG/ML
2 INJECTION, SOLUTION INTRAVENOUS ONCE
Status: COMPLETED | OUTPATIENT
Start: 2021-01-01 | End: 2021-01-01

## 2021-01-01 RX ORDER — SODIUM CHLORIDE, SODIUM GLUCONATE, SODIUM ACETATE, POTASSIUM CHLORIDE, MAGNESIUM CHLORIDE, SODIUM PHOSPHATE, DIBASIC, AND POTASSIUM PHOSPHATE .53; .5; .37; .037; .03; .012; .00082 G/100ML; G/100ML; G/100ML; G/100ML; G/100ML; G/100ML; G/100ML
500 INJECTION, SOLUTION INTRAVENOUS ONCE
Status: COMPLETED | OUTPATIENT
Start: 2021-01-01 | End: 2021-01-01

## 2021-01-01 RX ORDER — SODIUM CHLORIDE, SODIUM GLUCONATE, SODIUM ACETATE, POTASSIUM CHLORIDE, MAGNESIUM CHLORIDE, SODIUM PHOSPHATE, DIBASIC, AND POTASSIUM PHOSPHATE .53; .5; .37; .037; .03; .012; .00082 G/100ML; G/100ML; G/100ML; G/100ML; G/100ML; G/100ML; G/100ML
100 INJECTION, SOLUTION INTRAVENOUS CONTINUOUS
Status: DISCONTINUED | OUTPATIENT
Start: 2021-01-01 | End: 2021-01-01 | Stop reason: HOSPADM

## 2021-01-01 RX ORDER — MIDAZOLAM HYDROCHLORIDE 2 MG/2ML
20 INJECTION, SOLUTION INTRAMUSCULAR; INTRAVENOUS ONCE AS NEEDED
Status: DISCONTINUED | OUTPATIENT
Start: 2021-01-01 | End: 2021-01-01

## 2021-01-01 RX ORDER — SENNOSIDES 8.8 MG/5ML
17.6 LIQUID ORAL 2 TIMES DAILY
Status: DISCONTINUED | OUTPATIENT
Start: 2021-01-01 | End: 2021-01-01

## 2021-01-01 RX ORDER — LORAZEPAM 2 MG/ML
2 INJECTION INTRAMUSCULAR ONCE
Status: COMPLETED | OUTPATIENT
Start: 2021-01-01 | End: 2021-01-01

## 2021-01-01 RX ORDER — POLYETHYLENE GLYCOL 3350 17 G/17G
17 POWDER, FOR SOLUTION ORAL DAILY
Status: DISCONTINUED | OUTPATIENT
Start: 2021-01-01 | End: 2021-01-01

## 2021-01-01 RX ORDER — PREDNISOLONE SODIUM PHOSPHATE 15 MG/5ML
40 SOLUTION ORAL DAILY
Status: DISCONTINUED | OUTPATIENT
Start: 2021-01-01 | End: 2021-01-01

## 2021-01-01 RX ORDER — DEXTROSE MONOHYDRATE 25 G/50ML
INJECTION, SOLUTION INTRAVENOUS
Status: COMPLETED
Start: 2021-01-01 | End: 2021-01-01

## 2021-01-01 RX ORDER — LORAZEPAM 2 MG/ML
0.5 INJECTION INTRAMUSCULAR ONCE AS NEEDED
Status: COMPLETED | OUTPATIENT
Start: 2021-01-01 | End: 2021-01-01

## 2021-01-01 RX ORDER — DIAZEPAM 10 MG/1
10 TABLET ORAL EVERY 6 HOURS
Status: DISCONTINUED | OUTPATIENT
Start: 2021-01-01 | End: 2021-01-01

## 2021-01-01 RX ORDER — FENTANYL CITRATE 50 UG/ML
INJECTION, SOLUTION INTRAMUSCULAR; INTRAVENOUS
Status: COMPLETED
Start: 2021-01-01 | End: 2021-01-01

## 2021-01-01 RX ORDER — MIDAZOLAM HYDROCHLORIDE 2 MG/2ML
10 INJECTION, SOLUTION INTRAMUSCULAR; INTRAVENOUS ONCE
Status: COMPLETED | OUTPATIENT
Start: 2021-01-01 | End: 2021-01-01

## 2021-01-01 RX ORDER — LORAZEPAM 2 MG/ML
INJECTION INTRAMUSCULAR
Status: COMPLETED
Start: 2021-01-01 | End: 2021-01-01

## 2021-01-01 RX ORDER — VECURONIUM BROMIDE 1 MG/ML
10 INJECTION, POWDER, LYOPHILIZED, FOR SOLUTION INTRAVENOUS ONCE
Status: COMPLETED | OUTPATIENT
Start: 2021-01-01 | End: 2021-01-01

## 2021-01-01 RX ORDER — SULFAMETHOXAZOLE AND TRIMETHOPRIM 800; 160 MG/1; MG/1
2 TABLET ORAL EVERY 12 HOURS SCHEDULED
Status: DISCONTINUED | OUTPATIENT
Start: 2021-01-01 | End: 2021-01-01

## 2021-01-01 RX ORDER — PROPOFOL 10 MG/ML
5-50 INJECTION, EMULSION INTRAVENOUS
Status: DISCONTINUED | OUTPATIENT
Start: 2021-01-01 | End: 2021-01-01

## 2021-01-01 RX ORDER — FENTANYL CITRATE 50 UG/ML
50 INJECTION, SOLUTION INTRAMUSCULAR; INTRAVENOUS
Status: DISCONTINUED | OUTPATIENT
Start: 2021-01-01 | End: 2021-01-01

## 2021-01-01 RX ORDER — POTASSIUM CHLORIDE 14.9 MG/ML
20 INJECTION INTRAVENOUS ONCE
Status: COMPLETED | OUTPATIENT
Start: 2021-01-01 | End: 2021-01-01

## 2021-01-01 RX ORDER — FENTANYL CITRATE 50 UG/ML
50 INJECTION, SOLUTION INTRAMUSCULAR; INTRAVENOUS
Status: DISCONTINUED | OUTPATIENT
Start: 2021-01-01 | End: 2021-04-20 | Stop reason: HOSPADM

## 2021-01-01 RX ORDER — FUROSEMIDE 10 MG/ML
40 INJECTION INTRAMUSCULAR; INTRAVENOUS ONCE
Status: COMPLETED | OUTPATIENT
Start: 2021-01-01 | End: 2021-01-01

## 2021-01-01 RX ORDER — FUROSEMIDE 10 MG/ML
60 INJECTION INTRAMUSCULAR; INTRAVENOUS
Status: DISCONTINUED | OUTPATIENT
Start: 2021-01-01 | End: 2021-01-01

## 2021-01-01 RX ORDER — SENNOSIDES 8.8 MG/5ML
8.8 LIQUID ORAL 2 TIMES DAILY
Status: DISCONTINUED | OUTPATIENT
Start: 2021-01-01 | End: 2021-01-01

## 2021-01-01 RX ORDER — ACETAMINOPHEN 325 MG/1
650 TABLET ORAL EVERY 6 HOURS PRN
Status: DISCONTINUED | OUTPATIENT
Start: 2021-01-01 | End: 2021-01-01 | Stop reason: HOSPADM

## 2021-01-01 RX ORDER — NICOTINE 21 MG/24HR
1 PATCH, TRANSDERMAL 24 HOURS TRANSDERMAL DAILY
Status: DISCONTINUED | OUTPATIENT
Start: 2021-01-01 | End: 2021-01-01 | Stop reason: HOSPADM

## 2021-01-01 RX ORDER — OMEGA-3S/DHA/EPA/FISH OIL/D3 300MG-1000
400 CAPSULE ORAL DAILY
Status: CANCELLED | OUTPATIENT
Start: 2021-01-01

## 2021-01-01 RX ORDER — FOLIC ACID 1 MG/1
1 TABLET ORAL DAILY
Status: DISCONTINUED | OUTPATIENT
Start: 2021-01-01 | End: 2021-01-01

## 2021-01-01 RX ORDER — DIAZEPAM 5 MG/ML
10 INJECTION, SOLUTION INTRAMUSCULAR; INTRAVENOUS
Status: DISCONTINUED | OUTPATIENT
Start: 2021-01-01 | End: 2021-04-20 | Stop reason: HOSPADM

## 2021-01-01 RX ORDER — NICOTINE 21 MG/24HR
1 PATCH, TRANSDERMAL 24 HOURS TRANSDERMAL DAILY
Status: CANCELLED | OUTPATIENT
Start: 2021-01-01

## 2021-01-01 RX ORDER — FUROSEMIDE 10 MG/ML
60 INJECTION INTRAMUSCULAR; INTRAVENOUS
Status: DISCONTINUED | OUTPATIENT
Start: 2021-01-01 | End: 2021-04-20 | Stop reason: HOSPADM

## 2021-01-01 RX ORDER — NOREPINEPHRINE BITARTRATE 1 MG/ML
INJECTION, SOLUTION INTRAVENOUS
Status: DISPENSED
Start: 2021-01-01 | End: 2021-01-01

## 2021-01-01 RX ORDER — LACTULOSE 20 G/30ML
20 SOLUTION ORAL 2 TIMES DAILY
Status: DISCONTINUED | OUTPATIENT
Start: 2021-01-01 | End: 2021-01-01

## 2021-01-01 RX ORDER — LEVETIRACETAM 100 MG/ML
2500 SOLUTION ORAL EVERY 12 HOURS SCHEDULED
Status: DISCONTINUED | OUTPATIENT
Start: 2021-01-01 | End: 2021-01-01

## 2021-01-01 RX ORDER — LORAZEPAM 2 MG/ML
5 INJECTION INTRAMUSCULAR ONCE
Status: COMPLETED | OUTPATIENT
Start: 2021-01-01 | End: 2021-01-01

## 2021-01-01 RX ORDER — CEFAZOLIN SODIUM 2 G/50ML
2000 SOLUTION INTRAVENOUS EVERY 8 HOURS
Status: DISCONTINUED | OUTPATIENT
Start: 2021-01-01 | End: 2021-01-01 | Stop reason: HOSPADM

## 2021-01-01 RX ORDER — PROPOFOL 10 MG/ML
80 INJECTION, EMULSION INTRAVENOUS
Status: DISCONTINUED | OUTPATIENT
Start: 2021-01-01 | End: 2021-01-01

## 2021-01-01 RX ORDER — CEFAZOLIN SODIUM 2 G/50ML
2000 SOLUTION INTRAVENOUS EVERY 8 HOURS
Status: DISCONTINUED | OUTPATIENT
Start: 2021-01-01 | End: 2021-01-01

## 2021-01-01 RX ORDER — POTASSIUM CHLORIDE 20 MEQ/1
40 TABLET, EXTENDED RELEASE ORAL ONCE
Status: COMPLETED | OUTPATIENT
Start: 2021-01-01 | End: 2021-01-01

## 2021-01-01 RX ORDER — ALBUMIN, HUMAN INJ 5% 5 %
SOLUTION INTRAVENOUS
Status: COMPLETED
Start: 2021-01-01 | End: 2021-01-01

## 2021-01-01 RX ORDER — LORAZEPAM 2 MG/ML
1 INJECTION INTRAMUSCULAR
Status: DISCONTINUED | OUTPATIENT
Start: 2021-01-01 | End: 2021-04-20 | Stop reason: HOSPADM

## 2021-01-01 RX ORDER — DOCUSATE SODIUM 100 MG/1
100 CAPSULE, LIQUID FILLED ORAL 2 TIMES DAILY
Status: DISCONTINUED | OUTPATIENT
Start: 2021-01-01 | End: 2021-01-01

## 2021-01-01 RX ORDER — CEFAZOLIN SODIUM 2 G/50ML
2000 SOLUTION INTRAVENOUS EVERY 8 HOURS
Status: CANCELLED | OUTPATIENT
Start: 2021-01-01

## 2021-01-01 RX ORDER — PROPOFOL 10 MG/ML
5-60 INJECTION, EMULSION INTRAVENOUS
Status: DISCONTINUED | OUTPATIENT
Start: 2021-01-01 | End: 2021-01-01

## 2021-01-01 RX ORDER — POTASSIUM CHLORIDE 29.8 MG/ML
40 INJECTION INTRAVENOUS ONCE
Status: COMPLETED | OUTPATIENT
Start: 2021-01-01 | End: 2021-01-01

## 2021-01-01 RX ORDER — LORAZEPAM 2 MG/ML
1 INJECTION INTRAMUSCULAR ONCE
Status: COMPLETED | OUTPATIENT
Start: 2021-01-01 | End: 2021-01-01

## 2021-01-01 RX ORDER — LACTULOSE 20 G/30ML
30 SOLUTION ORAL 3 TIMES DAILY
Status: DISCONTINUED | OUTPATIENT
Start: 2021-01-01 | End: 2021-01-01

## 2021-01-01 RX ORDER — POTASSIUM CHLORIDE 20MEQ/15ML
20 LIQUID (ML) ORAL ONCE
Status: DISCONTINUED | OUTPATIENT
Start: 2021-01-01 | End: 2021-01-01

## 2021-01-01 RX ORDER — SODIUM CHLORIDE 9 MG/ML
75 INJECTION, SOLUTION INTRAVENOUS CONTINUOUS
Status: DISCONTINUED | OUTPATIENT
Start: 2021-01-01 | End: 2021-01-01

## 2021-01-01 RX ORDER — DIAZEPAM 5 MG/1
5 TABLET ORAL EVERY 6 HOURS
Status: DISCONTINUED | OUTPATIENT
Start: 2021-01-01 | End: 2021-01-01

## 2021-01-01 RX ORDER — ERYTHROMYCIN ETHYLSUCCINATE 200 MG/5ML
200 SUSPENSION ORAL ONCE
Status: DISCONTINUED | OUTPATIENT
Start: 2021-01-01 | End: 2021-01-01

## 2021-01-01 RX ORDER — KETAMINE HCL IN NACL, ISO-OSM 100MG/10ML
70 SYRINGE (ML) INJECTION ONCE
Status: COMPLETED | OUTPATIENT
Start: 2021-01-01 | End: 2021-01-01

## 2021-01-01 RX ORDER — VANCOMYCIN HYDROCHLORIDE 1 G/200ML
15 INJECTION, SOLUTION INTRAVENOUS EVERY 12 HOURS
Status: DISCONTINUED | OUTPATIENT
Start: 2021-01-01 | End: 2021-01-01

## 2021-01-01 RX ORDER — LACTULOSE 20 G/30ML
30 SOLUTION ORAL 3 TIMES DAILY
Status: CANCELLED | OUTPATIENT
Start: 2021-01-01

## 2021-01-01 RX ORDER — TOPIRAMATE 100 MG/1
400 TABLET, FILM COATED ORAL ONCE
Status: COMPLETED | OUTPATIENT
Start: 2021-01-01 | End: 2021-01-01

## 2021-01-01 RX ORDER — OMEGA-3S/DHA/EPA/FISH OIL/D3 300MG-1000
400 CAPSULE ORAL DAILY
Status: DISCONTINUED | OUTPATIENT
Start: 2021-01-01 | End: 2021-01-01

## 2021-01-01 RX ORDER — DEXTROSE MONOHYDRATE 50 MG/ML
125 INJECTION, SOLUTION INTRAVENOUS CONTINUOUS
Status: DISCONTINUED | OUTPATIENT
Start: 2021-01-01 | End: 2021-01-01

## 2021-01-01 RX ORDER — ALBUMIN (HUMAN) 12.5 G/50ML
12.5 SOLUTION INTRAVENOUS ONCE
Status: DISCONTINUED | OUTPATIENT
Start: 2021-01-01 | End: 2021-01-01

## 2021-01-01 RX ORDER — OMEGA-3S/DHA/EPA/FISH OIL/D3 300MG-1000
400 CAPSULE ORAL DAILY
Status: DISCONTINUED | OUTPATIENT
Start: 2021-01-01 | End: 2021-01-01 | Stop reason: HOSPADM

## 2021-01-01 RX ORDER — NICOTINE 21 MG/24HR
1 PATCH, TRANSDERMAL 24 HOURS TRANSDERMAL DAILY
Status: DISCONTINUED | OUTPATIENT
Start: 2021-01-01 | End: 2021-01-01

## 2021-01-01 RX ORDER — ALBUMIN (HUMAN) 12.5 G/50ML
25 SOLUTION INTRAVENOUS EVERY 6 HOURS
Status: DISCONTINUED | OUTPATIENT
Start: 2021-01-01 | End: 2021-01-01 | Stop reason: HOSPADM

## 2021-01-01 RX ORDER — DEXTROSE, SODIUM CHLORIDE, AND POTASSIUM CHLORIDE 5; .45; .15 G/100ML; G/100ML; G/100ML
75 INJECTION INTRAVENOUS CONTINUOUS
Status: DISCONTINUED | OUTPATIENT
Start: 2021-01-01 | End: 2021-01-01

## 2021-01-01 RX ORDER — ALBUMIN (HUMAN) 12.5 G/50ML
50 SOLUTION INTRAVENOUS ONCE
Status: COMPLETED | OUTPATIENT
Start: 2021-01-01 | End: 2021-01-01

## 2021-01-01 RX ORDER — LACTULOSE 20 G/30ML
30 SOLUTION ORAL 2 TIMES DAILY
Status: DISCONTINUED | OUTPATIENT
Start: 2021-01-01 | End: 2021-01-01

## 2021-01-01 RX ORDER — PHYTONADIONE 5 MG/1
5 TABLET ORAL DAILY
Status: DISCONTINUED | OUTPATIENT
Start: 2021-01-01 | End: 2021-01-01

## 2021-01-01 RX ORDER — ONDANSETRON 2 MG/ML
4 INJECTION INTRAMUSCULAR; INTRAVENOUS EVERY 6 HOURS PRN
Status: DISCONTINUED | OUTPATIENT
Start: 2021-01-01 | End: 2021-04-20 | Stop reason: HOSPADM

## 2021-01-01 RX ORDER — DIPHENHYDRAMINE HCL 25 MG
25 TABLET ORAL ONCE
Status: DISCONTINUED | OUTPATIENT
Start: 2021-01-01 | End: 2021-01-01

## 2021-01-01 RX ORDER — DIAZEPAM 5 MG/ML
10 INJECTION, SOLUTION INTRAMUSCULAR; INTRAVENOUS EVERY 6 HOURS PRN
Status: DISCONTINUED | OUTPATIENT
Start: 2021-01-01 | End: 2021-01-01

## 2021-01-01 RX ORDER — SODIUM CHLORIDE, SODIUM GLUCONATE, SODIUM ACETATE, POTASSIUM CHLORIDE, MAGNESIUM CHLORIDE, SODIUM PHOSPHATE, DIBASIC, AND POTASSIUM PHOSPHATE .53; .5; .37; .037; .03; .012; .00082 G/100ML; G/100ML; G/100ML; G/100ML; G/100ML; G/100ML; G/100ML
100 INJECTION, SOLUTION INTRAVENOUS CONTINUOUS
Status: DISCONTINUED | OUTPATIENT
Start: 2021-01-01 | End: 2021-01-01

## 2021-01-01 RX ORDER — POTASSIUM CHLORIDE 20 MEQ/1
40 TABLET, EXTENDED RELEASE ORAL ONCE
Status: DISCONTINUED | OUTPATIENT
Start: 2021-01-01 | End: 2021-01-01

## 2021-01-01 RX ORDER — FENTANYL CITRATE 50 UG/ML
50 INJECTION, SOLUTION INTRAMUSCULAR; INTRAVENOUS EVERY 2 HOUR PRN
Status: DISCONTINUED | OUTPATIENT
Start: 2021-01-01 | End: 2021-01-01

## 2021-01-01 RX ORDER — DEXTROSE MONOHYDRATE 25 G/50ML
25 INJECTION, SOLUTION INTRAVENOUS ONCE
Status: COMPLETED | OUTPATIENT
Start: 2021-01-01 | End: 2021-01-01

## 2021-01-01 RX ORDER — PANTOPRAZOLE SODIUM 40 MG/1
40 INJECTION, POWDER, FOR SOLUTION INTRAVENOUS
Status: DISCONTINUED | OUTPATIENT
Start: 2021-01-01 | End: 2021-01-01

## 2021-01-01 RX ORDER — SODIUM CHLORIDE, SODIUM GLUCONATE, SODIUM ACETATE, POTASSIUM CHLORIDE, MAGNESIUM CHLORIDE, SODIUM PHOSPHATE, DIBASIC, AND POTASSIUM PHOSPHATE .53; .5; .37; .037; .03; .012; .00082 G/100ML; G/100ML; G/100ML; G/100ML; G/100ML; G/100ML; G/100ML
100 INJECTION, SOLUTION INTRAVENOUS CONTINUOUS
Status: CANCELLED | OUTPATIENT
Start: 2021-01-01

## 2021-01-01 RX ORDER — PROPOFOL 10 MG/ML
5-50 INJECTION, EMULSION INTRAVENOUS
Status: DISCONTINUED | OUTPATIENT
Start: 2021-01-01 | End: 2021-01-01 | Stop reason: HOSPADM

## 2021-01-01 RX ORDER — ACETAMINOPHEN 325 MG/1
650 TABLET ORAL EVERY 6 HOURS PRN
Status: CANCELLED | OUTPATIENT
Start: 2021-01-01

## 2021-01-01 RX ORDER — ACETAMINOPHEN 325 MG/1
650 TABLET ORAL EVERY 6 HOURS PRN
Status: DISCONTINUED | OUTPATIENT
Start: 2021-01-01 | End: 2021-01-01

## 2021-01-01 RX ORDER — DEXTROSE MONOHYDRATE 50 MG/ML
50 INJECTION, SOLUTION INTRAVENOUS CONTINUOUS
Status: DISCONTINUED | OUTPATIENT
Start: 2021-01-01 | End: 2021-01-01

## 2021-01-01 RX ORDER — LACTULOSE 20 G/30ML
30 SOLUTION ORAL 4 TIMES DAILY
Status: DISCONTINUED | OUTPATIENT
Start: 2021-01-01 | End: 2021-01-01

## 2021-01-01 RX ORDER — TOPIRAMATE 100 MG/1
200 TABLET, FILM COATED ORAL EVERY 12 HOURS SCHEDULED
Status: DISCONTINUED | OUTPATIENT
Start: 2021-01-01 | End: 2021-01-01

## 2021-01-01 RX ORDER — METOCLOPRAMIDE HYDROCHLORIDE 5 MG/ML
10 INJECTION INTRAMUSCULAR; INTRAVENOUS 2 TIMES DAILY
Status: DISCONTINUED | OUTPATIENT
Start: 2021-01-01 | End: 2021-01-01

## 2021-01-01 RX ORDER — MIDAZOLAM HYDROCHLORIDE 2 MG/2ML
20 INJECTION, SOLUTION INTRAMUSCULAR; INTRAVENOUS ONCE
Status: COMPLETED | OUTPATIENT
Start: 2021-01-01 | End: 2021-01-01

## 2021-01-01 RX ORDER — MIDAZOLAM HYDROCHLORIDE 2 MG/2ML
30 INJECTION, SOLUTION INTRAMUSCULAR; INTRAVENOUS ONCE AS NEEDED
Status: DISCONTINUED | OUTPATIENT
Start: 2021-01-01 | End: 2021-01-01

## 2021-01-01 RX ORDER — LIDOCAINE 50 MG/G
1 PATCH TOPICAL DAILY
Status: DISCONTINUED | OUTPATIENT
Start: 2021-01-01 | End: 2021-01-01 | Stop reason: HOSPADM

## 2021-01-01 RX ORDER — METOCLOPRAMIDE HCL 10 MG/2 ML
20 SYRINGE (ML) INJECTION ONCE
Status: COMPLETED | OUTPATIENT
Start: 2021-01-01 | End: 2021-01-01

## 2021-01-01 RX ORDER — PROPOFOL 10 MG/ML
INJECTION, EMULSION INTRAVENOUS
Status: COMPLETED
Start: 2021-01-01 | End: 2021-01-01

## 2021-01-01 RX ORDER — COSYNTROPIN 0.25 MG/ML
0.25 INJECTION, POWDER, FOR SOLUTION INTRAMUSCULAR; INTRAVENOUS ONCE
Status: COMPLETED | OUTPATIENT
Start: 2021-01-01 | End: 2021-01-01

## 2021-01-01 RX ORDER — METOCLOPRAMIDE HYDROCHLORIDE 5 MG/ML
10 INJECTION INTRAMUSCULAR; INTRAVENOUS EVERY 6 HOURS SCHEDULED
Status: COMPLETED | OUTPATIENT
Start: 2021-01-01 | End: 2021-01-01

## 2021-01-01 RX ORDER — MIDAZOLAM HYDROCHLORIDE 2 MG/2ML
INJECTION, SOLUTION INTRAMUSCULAR; INTRAVENOUS
Status: COMPLETED
Start: 2021-01-01 | End: 2021-01-01

## 2021-01-01 RX ORDER — KETAMINE HCL IN NACL, ISO-OSM 100MG/10ML
100 SYRINGE (ML) INJECTION ONCE
Status: DISCONTINUED | OUTPATIENT
Start: 2021-01-01 | End: 2021-01-01

## 2021-01-01 RX ORDER — LORAZEPAM 2 MG/ML
0.5 INJECTION INTRAMUSCULAR ONCE
Status: COMPLETED | OUTPATIENT
Start: 2021-01-01 | End: 2021-01-01

## 2021-01-01 RX ORDER — SODIUM CHLORIDE 9 MG/ML
50 INJECTION, SOLUTION INTRAVENOUS CONTINUOUS
Status: DISCONTINUED | OUTPATIENT
Start: 2021-01-01 | End: 2021-01-01 | Stop reason: HOSPADM

## 2021-01-01 RX ORDER — LACTULOSE 20 G/30ML
20 SOLUTION ORAL 3 TIMES DAILY
Status: DISCONTINUED | OUTPATIENT
Start: 2021-01-01 | End: 2021-01-01

## 2021-01-01 RX ADMIN — AMIODARONE HYDROCHLORIDE 150 MG: 50 INJECTION, SOLUTION INTRAVENOUS at 12:13

## 2021-01-01 RX ADMIN — SODIUM CHLORIDE 200 MG: 9 INJECTION, SOLUTION INTRAVENOUS at 22:51

## 2021-01-01 RX ADMIN — FOLIC ACID 1 MG: 5 INJECTION, SOLUTION INTRAMUSCULAR; INTRAVENOUS; SUBCUTANEOUS at 10:54

## 2021-01-01 RX ADMIN — MIDAZOLAM HYDROCHLORIDE 60 MG/HR: 5 INJECTION, SOLUTION INTRAMUSCULAR; INTRAVENOUS at 17:33

## 2021-01-01 RX ADMIN — DEXTROSE 75 ML/HR: 5 SOLUTION INTRAVENOUS at 01:57

## 2021-01-01 RX ADMIN — LACTULOSE 30 G: 20 SOLUTION ORAL at 16:38

## 2021-01-01 RX ADMIN — CHLORHEXIDINE GLUCONATE 0.12% ORAL RINSE 15 ML: 1.2 LIQUID ORAL at 22:03

## 2021-01-01 RX ADMIN — ALBUMIN (HUMAN) 25 G: 0.25 INJECTION, SOLUTION INTRAVENOUS at 02:23

## 2021-01-01 RX ADMIN — LEVETIRACETAM 2500 MG: 100 INJECTION, SOLUTION INTRAVENOUS at 07:45

## 2021-01-01 RX ADMIN — ALBUMIN (HUMAN) 25 G: 0.25 INJECTION, SOLUTION INTRAVENOUS at 04:27

## 2021-01-01 RX ADMIN — INSULIN HUMAN 10 UNITS: 100 INJECTION, SOLUTION PARENTERAL at 21:46

## 2021-01-01 RX ADMIN — PROPOFOL 60 MCG/KG/MIN: 10 INJECTION, EMULSION INTRAVENOUS at 07:47

## 2021-01-01 RX ADMIN — LEVETIRACETAM 2500 MG: 100 INJECTION, SOLUTION INTRAVENOUS at 19:48

## 2021-01-01 RX ADMIN — NOREPINEPHRINE BITARTRATE 5 MCG/MIN: 1 INJECTION, SOLUTION, CONCENTRATE INTRAVENOUS at 12:50

## 2021-01-01 RX ADMIN — Medication 2 MG/KG/HR: at 17:44

## 2021-01-01 RX ADMIN — THIAMINE HCL TAB 100 MG 100 MG: 100 TAB at 08:23

## 2021-01-01 RX ADMIN — DIAZEPAM 10 MG: 10 TABLET ORAL at 15:56

## 2021-01-01 RX ADMIN — LEVALBUTEROL HYDROCHLORIDE 1.25 MG: 1.25 SOLUTION, CONCENTRATE RESPIRATORY (INHALATION) at 19:05

## 2021-01-01 RX ADMIN — LACTULOSE 30 G: 20 SOLUTION ORAL at 21:06

## 2021-01-01 RX ADMIN — SODIUM CHLORIDE 200 MG: 9 INJECTION, SOLUTION INTRAVENOUS at 10:07

## 2021-01-01 RX ADMIN — Medication 20 MG: at 06:05

## 2021-01-01 RX ADMIN — LACTULOSE 30 G: 20 SOLUTION ORAL at 22:26

## 2021-01-01 RX ADMIN — LACTULOSE 30 G: 10 SOLUTION ORAL at 21:23

## 2021-01-01 RX ADMIN — DIATRIZOATE MEGLUMINE AND DIATRIZOATE SODIUM 120 ML: 660; 100 LIQUID ORAL; RECTAL at 12:00

## 2021-01-01 RX ADMIN — NOREPINEPHRINE BITARTRATE 7 MCG/MIN: 1 INJECTION, SOLUTION, CONCENTRATE INTRAVENOUS at 15:29

## 2021-01-01 RX ADMIN — MIDAZOLAM HYDROCHLORIDE 60 MG/HR: 5 INJECTION, SOLUTION INTRAMUSCULAR; INTRAVENOUS at 13:05

## 2021-01-01 RX ADMIN — DIAZEPAM 10 MG: 10 TABLET ORAL at 14:25

## 2021-01-01 RX ADMIN — IPRATROPIUM BROMIDE 0.5 MG: 0.5 SOLUTION RESPIRATORY (INHALATION) at 13:27

## 2021-01-01 RX ADMIN — DIAZEPAM 10 MG: 10 TABLET ORAL at 03:04

## 2021-01-01 RX ADMIN — HYDROCORTISONE SODIUM SUCCINATE 100 MG: 100 INJECTION, POWDER, FOR SOLUTION INTRAMUSCULAR; INTRAVENOUS at 22:11

## 2021-01-01 RX ADMIN — NOREPINEPHRINE BITARTRATE 15 MCG/MIN: 1 INJECTION, SOLUTION, CONCENTRATE INTRAVENOUS at 09:03

## 2021-01-01 RX ADMIN — HYDROCORTISONE SODIUM SUCCINATE 100 MG: 100 INJECTION, POWDER, FOR SOLUTION INTRAMUSCULAR; INTRAVENOUS at 13:55

## 2021-01-01 RX ADMIN — CHOLECALCIFEROL TAB 10 MCG (400 UNIT) 400 UNITS: 10 TAB at 09:27

## 2021-01-01 RX ADMIN — RIFAXIMIN 550 MG: 550 TABLET ORAL at 21:06

## 2021-01-01 RX ADMIN — METOCLOPRAMIDE 20 MG: 5 INJECTION, SOLUTION INTRAMUSCULAR; INTRAVENOUS at 10:13

## 2021-01-01 RX ADMIN — KETAMINE HYDROCHLORIDE 0.5 MG/KG/HR: 50 INJECTION, SOLUTION INTRAMUSCULAR; INTRAVENOUS at 20:59

## 2021-01-01 RX ADMIN — PROPOFOL 100 MCG/KG/MIN: 10 INJECTION, EMULSION INTRAVENOUS at 23:13

## 2021-01-01 RX ADMIN — MIDAZOLAM HYDROCHLORIDE 60 MG/HR: 5 INJECTION, SOLUTION INTRAMUSCULAR; INTRAVENOUS at 06:38

## 2021-01-01 RX ADMIN — ALBUMIN (HUMAN) 12.5 G: 12.5 INJECTION, SOLUTION INTRAVENOUS at 04:07

## 2021-01-01 RX ADMIN — VANCOMYCIN HYDROCHLORIDE 750 MG: 750 INJECTION, SOLUTION INTRAVENOUS at 01:10

## 2021-01-01 RX ADMIN — PROPOFOL 50 MCG/KG/MIN: 10 INJECTION, EMULSION INTRAVENOUS at 20:04

## 2021-01-01 RX ADMIN — FENTANYL CITRATE 50 MCG: 50 INJECTION INTRAMUSCULAR; INTRAVENOUS at 16:36

## 2021-01-01 RX ADMIN — LACTULOSE 30 G: 10 SOLUTION ORAL at 20:47

## 2021-01-01 RX ADMIN — LEVETIRACETAM 2500 MG: 100 INJECTION, SOLUTION INTRAVENOUS at 22:00

## 2021-01-01 RX ADMIN — LACTULOSE 30 G: 10 SOLUTION ORAL at 09:18

## 2021-01-01 RX ADMIN — SODIUM CHLORIDE 100 ML/HR: 0.9 INJECTION, SOLUTION INTRAVENOUS at 06:00

## 2021-01-01 RX ADMIN — FOLIC ACID 1 MG: 1 TABLET ORAL at 10:13

## 2021-01-01 RX ADMIN — AMPICILLIN SODIUM AND SULBACTAM SODIUM 3 G: 100; 50 INJECTION, POWDER, FOR SOLUTION INTRAMUSCULAR; INTRAVENOUS at 04:31

## 2021-01-01 RX ADMIN — NOREPINEPHRINE BITARTRATE 18 MCG/MIN: 1 INJECTION, SOLUTION, CONCENTRATE INTRAVENOUS at 17:54

## 2021-01-01 RX ADMIN — DEXTROSE 50 ML/HR: 5 SOLUTION INTRAVENOUS at 06:47

## 2021-01-01 RX ADMIN — THIAMINE HCL TAB 100 MG 100 MG: 100 TAB at 10:13

## 2021-01-01 RX ADMIN — RIFAXIMIN 550 MG: 550 TABLET ORAL at 21:36

## 2021-01-01 RX ADMIN — ALBUMIN (HUMAN) 25 G: 0.25 INJECTION, SOLUTION INTRAVENOUS at 16:38

## 2021-01-01 RX ADMIN — RIFAXIMIN 550 MG: 550 TABLET ORAL at 08:41

## 2021-01-01 RX ADMIN — NOREPINEPHRINE BITARTRATE 12 MCG/MIN: 1 INJECTION, SOLUTION, CONCENTRATE INTRAVENOUS at 10:04

## 2021-01-01 RX ADMIN — TOPIRAMATE 400 MG: 100 TABLET, FILM COATED ORAL at 05:15

## 2021-01-01 RX ADMIN — THIAMINE HYDROCHLORIDE 100 MG: 100 INJECTION, SOLUTION INTRAMUSCULAR; INTRAVENOUS at 12:54

## 2021-01-01 RX ADMIN — ACYCLOVIR SODIUM 650 MG: 50 INJECTION, SOLUTION INTRAVENOUS at 21:30

## 2021-01-01 RX ADMIN — MIDAZOLAM HYDROCHLORIDE 60 MG/HR: 5 INJECTION, SOLUTION INTRAMUSCULAR; INTRAVENOUS at 07:29

## 2021-01-01 RX ADMIN — MIDAZOLAM HYDROCHLORIDE 60 MG/HR: 5 INJECTION, SOLUTION INTRAMUSCULAR; INTRAVENOUS at 01:41

## 2021-01-01 RX ADMIN — MIDAZOLAM HYDROCHLORIDE 60 MG/HR: 5 INJECTION, SOLUTION INTRAMUSCULAR; INTRAVENOUS at 22:18

## 2021-01-01 RX ADMIN — ACYCLOVIR SODIUM 650 MG: 50 INJECTION, SOLUTION INTRAVENOUS at 04:45

## 2021-01-01 RX ADMIN — SODIUM CHLORIDE 200 MG: 9 INJECTION, SOLUTION INTRAVENOUS at 11:30

## 2021-01-01 RX ADMIN — TOPIRAMATE 200 MG: 100 TABLET, FILM COATED ORAL at 08:22

## 2021-01-01 RX ADMIN — POTASSIUM CHLORIDE 20 MEQ: 14.9 INJECTION, SOLUTION INTRAVENOUS at 17:32

## 2021-01-01 RX ADMIN — CHLORHEXIDINE GLUCONATE 0.12% ORAL RINSE 15 ML: 1.2 LIQUID ORAL at 08:34

## 2021-01-01 RX ADMIN — INSULIN LISPRO 1 UNITS: 100 INJECTION, SOLUTION INTRAVENOUS; SUBCUTANEOUS at 05:49

## 2021-01-01 RX ADMIN — MIDAZOLAM HYDROCHLORIDE 60 MG/HR: 5 INJECTION, SOLUTION INTRAMUSCULAR; INTRAVENOUS at 17:45

## 2021-01-01 RX ADMIN — CHOLECALCIFEROL TAB 10 MCG (400 UNIT) 400 UNITS: 10 TAB at 08:47

## 2021-01-01 RX ADMIN — LACTULOSE 30 G: 10 SOLUTION ORAL at 17:03

## 2021-01-01 RX ADMIN — DEXTROSE MONOHYDRATE 50 ML: 500 INJECTION PARENTERAL at 23:15

## 2021-01-01 RX ADMIN — SODIUM CHLORIDE 100 MG: 9 INJECTION, SOLUTION INTRAVENOUS at 22:52

## 2021-01-01 RX ADMIN — LACTULOSE 20 G: 20 SOLUTION ORAL at 08:27

## 2021-01-01 RX ADMIN — RIFAXIMIN 550 MG: 550 TABLET ORAL at 12:14

## 2021-01-01 RX ADMIN — Medication 2 MG/KG/HR: at 10:11

## 2021-01-01 RX ADMIN — ALBUMIN (HUMAN) 25 G: 0.25 INJECTION, SOLUTION INTRAVENOUS at 08:28

## 2021-01-01 RX ADMIN — ALBUMIN, HUMAN INJ 5% 12.5 G: 5 SOLUTION at 08:29

## 2021-01-01 RX ADMIN — ACETAMINOPHEN 650 MG: 325 TABLET, COATED ORAL at 00:44

## 2021-01-01 RX ADMIN — CHOLECALCIFEROL TAB 10 MCG (400 UNIT) 400 UNITS: 10 TAB at 10:14

## 2021-01-01 RX ADMIN — THIAMINE HYDROCHLORIDE: 100 INJECTION, SOLUTION INTRAMUSCULAR; INTRAVENOUS at 10:36

## 2021-01-01 RX ADMIN — MIDAZOLAM HYDROCHLORIDE 60 MG/HR: 5 INJECTION, SOLUTION INTRAMUSCULAR; INTRAVENOUS at 11:11

## 2021-01-01 RX ADMIN — PROPOFOL 30 MCG/KG/MIN: 10 INJECTION, EMULSION INTRAVENOUS at 05:30

## 2021-01-01 RX ADMIN — IPRATROPIUM BROMIDE 0.5 MG: 0.5 SOLUTION RESPIRATORY (INHALATION) at 20:19

## 2021-01-01 RX ADMIN — NOREPINEPHRINE BITARTRATE 4 MG: 1 INJECTION, SOLUTION, CONCENTRATE INTRAVENOUS at 20:20

## 2021-01-01 RX ADMIN — SODIUM CHLORIDE 75 ML/HR: 0.9 INJECTION, SOLUTION INTRAVENOUS at 04:03

## 2021-01-01 RX ADMIN — THIAMINE HYDROCHLORIDE 100 MG: 100 INJECTION, SOLUTION INTRAMUSCULAR; INTRAVENOUS at 08:09

## 2021-01-01 RX ADMIN — AMPICILLIN SODIUM AND SULBACTAM SODIUM 3 G: 100; 50 INJECTION, POWDER, FOR SOLUTION INTRAMUSCULAR; INTRAVENOUS at 11:01

## 2021-01-01 RX ADMIN — LEVETIRACETAM 2500 MG: 100 SOLUTION ORAL at 20:20

## 2021-01-01 RX ADMIN — MIDAZOLAM HYDROCHLORIDE 50 MG/HR: 5 INJECTION, SOLUTION INTRAMUSCULAR; INTRAVENOUS at 21:00

## 2021-01-01 RX ADMIN — SULFAMETHOXAZOLE AND TRIMETHOPRIM 2 TABLET: 800; 160 TABLET ORAL at 12:14

## 2021-01-01 RX ADMIN — INSULIN LISPRO 1 UNITS: 100 INJECTION, SOLUTION INTRAVENOUS; SUBCUTANEOUS at 00:56

## 2021-01-01 RX ADMIN — CEFAZOLIN SODIUM 2000 MG: 2 SOLUTION INTRAVENOUS at 06:22

## 2021-01-01 RX ADMIN — CHOLECALCIFEROL TAB 10 MCG (400 UNIT) 400 UNITS: 10 TAB at 14:20

## 2021-01-01 RX ADMIN — RIFAXIMIN 550 MG: 550 TABLET ORAL at 21:21

## 2021-01-01 RX ADMIN — LEVETIRACETAM 2500 MG: 100 SOLUTION ORAL at 10:14

## 2021-01-01 RX ADMIN — FUROSEMIDE 40 MG: 10 INJECTION, SOLUTION INTRAMUSCULAR; INTRAVENOUS at 02:38

## 2021-01-01 RX ADMIN — PHYTONADIONE 5 MG: 5 TABLET ORAL at 08:35

## 2021-01-01 RX ADMIN — SENNOSIDES 8.8 MG: 8.8 SYRUP ORAL at 17:37

## 2021-01-01 RX ADMIN — SODIUM CHLORIDE 50 ML/HR: 0.9 INJECTION, SOLUTION INTRAVENOUS at 23:05

## 2021-01-01 RX ADMIN — AMPICILLIN SODIUM AND SULBACTAM SODIUM 3 G: 100; 50 INJECTION, POWDER, FOR SOLUTION INTRAMUSCULAR; INTRAVENOUS at 10:21

## 2021-01-01 RX ADMIN — CHOLECALCIFEROL TAB 10 MCG (400 UNIT) 400 UNITS: 10 TAB at 09:31

## 2021-01-01 RX ADMIN — VASOPRESSIN 0.04 UNITS/MIN: 20 INJECTION INTRAVENOUS at 14:54

## 2021-01-01 RX ADMIN — POTASSIUM CHLORIDE 40 MEQ: 20 SOLUTION ORAL at 09:10

## 2021-01-01 RX ADMIN — LEVETIRACETAM 2500 MG: 100 SOLUTION ORAL at 08:22

## 2021-01-01 RX ADMIN — SODIUM CHLORIDE 200 MG: 9 INJECTION, SOLUTION INTRAVENOUS at 09:56

## 2021-01-01 RX ADMIN — DEXTROSE MONOHYDRATE 25 ML: 500 INJECTION PARENTERAL at 21:46

## 2021-01-01 RX ADMIN — LACTULOSE 200 G: 10 SOLUTION ORAL; RECTAL at 11:52

## 2021-01-01 RX ADMIN — ACYCLOVIR SODIUM 650 MG: 50 INJECTION, SOLUTION INTRAVENOUS at 12:18

## 2021-01-01 RX ADMIN — FENTANYL CITRATE 50 MCG: 50 INJECTION INTRAMUSCULAR; INTRAVENOUS at 20:04

## 2021-01-01 RX ADMIN — ALBUMIN (HUMAN) 12.5 G: 12.5 INJECTION, SOLUTION INTRAVENOUS at 10:14

## 2021-01-01 RX ADMIN — RIFAXIMIN 550 MG: 550 TABLET ORAL at 20:03

## 2021-01-01 RX ADMIN — PROPOFOL 100 MCG/KG/MIN: 10 INJECTION, EMULSION INTRAVENOUS at 10:04

## 2021-01-01 RX ADMIN — CHLORHEXIDINE GLUCONATE 0.12% ORAL RINSE 15 ML: 1.2 LIQUID ORAL at 20:06

## 2021-01-01 RX ADMIN — LEVETIRACETAM 2500 MG: 100 INJECTION, SOLUTION INTRAVENOUS at 19:59

## 2021-01-01 RX ADMIN — LACTULOSE 20 G: 20 SOLUTION ORAL at 10:52

## 2021-01-01 RX ADMIN — LACTULOSE 30 G: 20 SOLUTION ORAL at 21:15

## 2021-01-01 RX ADMIN — VASOPRESSIN 0.04 UNITS/MIN: 20 INJECTION INTRAVENOUS at 18:38

## 2021-01-01 RX ADMIN — AMPICILLIN SODIUM AND SULBACTAM SODIUM 3 G: 100; 50 INJECTION, POWDER, FOR SOLUTION INTRAMUSCULAR; INTRAVENOUS at 11:10

## 2021-01-01 RX ADMIN — DEXTROSE 75 ML/HR: 5 SOLUTION INTRAVENOUS at 08:25

## 2021-01-01 RX ADMIN — LEVALBUTEROL HYDROCHLORIDE 1.25 MG: 1.25 SOLUTION, CONCENTRATE RESPIRATORY (INHALATION) at 13:17

## 2021-01-01 RX ADMIN — MIDAZOLAM HYDROCHLORIDE 60 MG/HR: 5 INJECTION, SOLUTION INTRAMUSCULAR; INTRAVENOUS at 04:39

## 2021-01-01 RX ADMIN — LIDOCAINE 5% 1 PATCH: 700 PATCH TOPICAL at 15:11

## 2021-01-01 RX ADMIN — LORAZEPAM 10 MG: 2 INJECTION INTRAMUSCULAR; INTRAVENOUS at 16:19

## 2021-01-01 RX ADMIN — MIDAZOLAM HYDROCHLORIDE 60 MG/HR: 5 INJECTION, SOLUTION INTRAMUSCULAR; INTRAVENOUS at 03:35

## 2021-01-01 RX ADMIN — MIDAZOLAM 10 MG: 1 INJECTION INTRAMUSCULAR; INTRAVENOUS at 21:52

## 2021-01-01 RX ADMIN — POTASSIUM CHLORIDE 40 MEQ: 20 SOLUTION ORAL at 17:56

## 2021-01-01 RX ADMIN — RIFAXIMIN 550 MG: 550 TABLET ORAL at 21:23

## 2021-01-01 RX ADMIN — Medication 2 MG/KG/HR: at 21:58

## 2021-01-01 RX ADMIN — VASOPRESSIN 0.04 UNITS/MIN: 20 INJECTION INTRAVENOUS at 22:11

## 2021-01-01 RX ADMIN — NOREPINEPHRINE BITARTRATE 16 MCG/MIN: 1 INJECTION, SOLUTION, CONCENTRATE INTRAVENOUS at 18:41

## 2021-01-01 RX ADMIN — LACTULOSE 30 G: 20 SOLUTION ORAL at 22:09

## 2021-01-01 RX ADMIN — FUROSEMIDE 40 MG: 10 INJECTION, SOLUTION INTRAVENOUS at 12:25

## 2021-01-01 RX ADMIN — IPRATROPIUM BROMIDE 0.5 MG: 0.5 SOLUTION RESPIRATORY (INHALATION) at 13:17

## 2021-01-01 RX ADMIN — MIDAZOLAM HYDROCHLORIDE 60 MG/HR: 5 INJECTION, SOLUTION INTRAMUSCULAR; INTRAVENOUS at 13:47

## 2021-01-01 RX ADMIN — NOREPINEPHRINE BITARTRATE 4 MG: 1 INJECTION, SOLUTION, CONCENTRATE INTRAVENOUS at 15:49

## 2021-01-01 RX ADMIN — PHYTONADIONE 5 MG: 5 TABLET ORAL at 11:47

## 2021-01-01 RX ADMIN — NOREPINEPHRINE BITARTRATE 8 MCG/MIN: 1 INJECTION, SOLUTION, CONCENTRATE INTRAVENOUS at 18:27

## 2021-01-01 RX ADMIN — VANCOMYCIN HYDROCHLORIDE 750 MG: 750 INJECTION, SOLUTION INTRAVENOUS at 02:16

## 2021-01-01 RX ADMIN — PREDNISOLONE SODIUM PHOSPHATE 40 MG: 15 SOLUTION ORAL at 10:52

## 2021-01-01 RX ADMIN — THIAMINE HYDROCHLORIDE: 100 INJECTION, SOLUTION INTRAMUSCULAR; INTRAVENOUS at 08:42

## 2021-01-01 RX ADMIN — Medication 1 PATCH: at 08:55

## 2021-01-01 RX ADMIN — LEVETIRACETAM 2500 MG: 100 INJECTION, SOLUTION INTRAVENOUS at 11:18

## 2021-01-01 RX ADMIN — MIDAZOLAM 10 MG: 1 INJECTION INTRAMUSCULAR; INTRAVENOUS at 23:25

## 2021-01-01 RX ADMIN — POTASSIUM CHLORIDE 40 MEQ: 20 SOLUTION ORAL at 11:06

## 2021-01-01 RX ADMIN — DIAZEPAM 5 MG: 5 TABLET ORAL at 12:43

## 2021-01-01 RX ADMIN — DIAZEPAM 10 MG: 10 TABLET ORAL at 21:00

## 2021-01-01 RX ADMIN — GADOBUTROL 9 ML: 604.72 INJECTION INTRAVENOUS at 10:23

## 2021-01-01 RX ADMIN — MIDAZOLAM HYDROCHLORIDE 60 MG/HR: 5 INJECTION, SOLUTION INTRAMUSCULAR; INTRAVENOUS at 10:11

## 2021-01-01 RX ADMIN — Medication 2 MG/KG/HR: at 01:58

## 2021-01-01 RX ADMIN — MIDAZOLAM HYDROCHLORIDE 60 MG/HR: 5 INJECTION, SOLUTION INTRAMUSCULAR; INTRAVENOUS at 23:55

## 2021-01-01 RX ADMIN — PROPOFOL 100 MCG/KG/MIN: 10 INJECTION, EMULSION INTRAVENOUS at 00:33

## 2021-01-01 RX ADMIN — CHOLECALCIFEROL TAB 10 MCG (400 UNIT) 400 UNITS: 10 TAB at 08:35

## 2021-01-01 RX ADMIN — AMIODARONE HYDROCHLORIDE 1 MG/MIN: 50 INJECTION, SOLUTION INTRAVENOUS at 12:28

## 2021-01-01 RX ADMIN — LEVALBUTEROL HYDROCHLORIDE 1.25 MG: 1.25 SOLUTION, CONCENTRATE RESPIRATORY (INHALATION) at 07:15

## 2021-01-01 RX ADMIN — LACOSAMIDE 200 MG: 10 SOLUTION ORAL at 20:03

## 2021-01-01 RX ADMIN — GLYCOPYRROLATE 0.1 MG: 0.2 INJECTION, SOLUTION INTRAMUSCULAR; INTRAVENOUS at 16:36

## 2021-01-01 RX ADMIN — HEPARIN SODIUM 5000 UNITS: 5000 INJECTION INTRAVENOUS; SUBCUTANEOUS at 21:23

## 2021-01-01 RX ADMIN — SODIUM CHLORIDE 300 MG: 9 INJECTION, SOLUTION INTRAVENOUS at 22:08

## 2021-01-01 RX ADMIN — FUROSEMIDE 60 MG: 10 INJECTION, SOLUTION INTRAMUSCULAR; INTRAVENOUS at 05:52

## 2021-01-01 RX ADMIN — Medication 1 PATCH: at 08:47

## 2021-01-01 RX ADMIN — SODIUM CHLORIDE 200 MG: 9 INJECTION, SOLUTION INTRAVENOUS at 10:15

## 2021-01-01 RX ADMIN — CHLORHEXIDINE GLUCONATE 0.12% ORAL RINSE 15 ML: 1.2 LIQUID ORAL at 21:05

## 2021-01-01 RX ADMIN — LACTULOSE 30 G: 10 SOLUTION ORAL at 21:00

## 2021-01-01 RX ADMIN — LEVETIRACETAM 2500 MG: 100 SOLUTION ORAL at 20:04

## 2021-01-01 RX ADMIN — MIDAZOLAM HYDROCHLORIDE 60 MG/HR: 5 INJECTION, SOLUTION INTRAMUSCULAR; INTRAVENOUS at 04:56

## 2021-01-01 RX ADMIN — LEVETIRACETAM 2500 MG: 100 SOLUTION ORAL at 11:13

## 2021-01-01 RX ADMIN — INSULIN LISPRO 1 UNITS: 100 INJECTION, SOLUTION INTRAVENOUS; SUBCUTANEOUS at 00:04

## 2021-01-01 RX ADMIN — SULFAMETHOXAZOLE AND TRIMETHOPRIM 2 TABLET: 800; 160 TABLET ORAL at 22:59

## 2021-01-01 RX ADMIN — MIDAZOLAM HYDROCHLORIDE 60 MG/HR: 5 INJECTION, SOLUTION INTRAMUSCULAR; INTRAVENOUS at 14:48

## 2021-01-01 RX ADMIN — LORAZEPAM 2 MG: 2 INJECTION INTRAMUSCULAR; INTRAVENOUS at 21:15

## 2021-01-01 RX ADMIN — LACTULOSE 200 G: 10 SOLUTION ORAL; RECTAL at 12:31

## 2021-01-01 RX ADMIN — RIFAXIMIN 550 MG: 550 TABLET ORAL at 09:26

## 2021-01-01 RX ADMIN — VASOPRESSIN 0.04 UNITS/MIN: 20 INJECTION INTRAVENOUS at 07:05

## 2021-01-01 RX ADMIN — LACOSAMIDE 200 MG: 10 SOLUTION ORAL at 11:05

## 2021-01-01 RX ADMIN — THIAMINE HYDROCHLORIDE: 100 INJECTION, SOLUTION INTRAMUSCULAR; INTRAVENOUS at 10:52

## 2021-01-01 RX ADMIN — CHOLECALCIFEROL TAB 10 MCG (400 UNIT) 400 UNITS: 10 TAB at 09:10

## 2021-01-01 RX ADMIN — FOLIC ACID 1 MG: 1 TABLET ORAL at 09:26

## 2021-01-01 RX ADMIN — LACTULOSE 200 G: 10 SOLUTION ORAL; RECTAL at 13:12

## 2021-01-01 RX ADMIN — POTASSIUM CHLORIDE 20 MEQ: 20 SOLUTION ORAL at 17:54

## 2021-01-01 RX ADMIN — DIAZEPAM 10 MG: 10 TABLET ORAL at 21:33

## 2021-01-01 RX ADMIN — VASOPRESSIN 0.04 UNITS/MIN: 20 INJECTION INTRAVENOUS at 04:28

## 2021-01-01 RX ADMIN — SODIUM CHLORIDE 100 ML/HR: 0.9 INJECTION, SOLUTION INTRAVENOUS at 07:10

## 2021-01-01 RX ADMIN — MIDAZOLAM HYDROCHLORIDE 60 MG/HR: 5 INJECTION, SOLUTION INTRAMUSCULAR; INTRAVENOUS at 07:27

## 2021-01-01 RX ADMIN — SODIUM CHLORIDE 200 MG: 9 INJECTION, SOLUTION INTRAVENOUS at 11:42

## 2021-01-01 RX ADMIN — FUROSEMIDE 40 MG: 10 INJECTION, SOLUTION INTRAVENOUS at 11:06

## 2021-01-01 RX ADMIN — ALBUMIN (HUMAN) 25 G: 0.25 INJECTION, SOLUTION INTRAVENOUS at 03:10

## 2021-01-01 RX ADMIN — SODIUM CHLORIDE 200 MG: 9 INJECTION, SOLUTION INTRAVENOUS at 21:21

## 2021-01-01 RX ADMIN — DIAZEPAM 10 MG: 10 TABLET ORAL at 20:03

## 2021-01-01 RX ADMIN — Medication 1 PATCH: at 08:35

## 2021-01-01 RX ADMIN — AMPICILLIN SODIUM AND SULBACTAM SODIUM 3 G: 100; 50 INJECTION, POWDER, FOR SOLUTION INTRAMUSCULAR; INTRAVENOUS at 16:59

## 2021-01-01 RX ADMIN — FUROSEMIDE 40 MG: 10 INJECTION, SOLUTION INTRAVENOUS at 10:12

## 2021-01-01 RX ADMIN — FOLIC ACID 1 MG: 1 TABLET ORAL at 08:35

## 2021-01-01 RX ADMIN — MIDAZOLAM HYDROCHLORIDE 60 MG/HR: 5 INJECTION, SOLUTION INTRAMUSCULAR; INTRAVENOUS at 15:38

## 2021-01-01 RX ADMIN — MIDAZOLAM HYDROCHLORIDE 60 MG/HR: 5 INJECTION, SOLUTION INTRAMUSCULAR; INTRAVENOUS at 07:06

## 2021-01-01 RX ADMIN — LEVALBUTEROL HYDROCHLORIDE 1.25 MG: 1.25 SOLUTION, CONCENTRATE RESPIRATORY (INHALATION) at 07:17

## 2021-01-01 RX ADMIN — PROPOFOL 50 MCG/KG/MIN: 10 INJECTION, EMULSION INTRAVENOUS at 16:23

## 2021-01-01 RX ADMIN — CHLORHEXIDINE GLUCONATE 0.12% ORAL RINSE 15 ML: 1.2 LIQUID ORAL at 20:02

## 2021-01-01 RX ADMIN — POTASSIUM CHLORIDE 20 MEQ: 14.9 INJECTION, SOLUTION INTRAVENOUS at 15:56

## 2021-01-01 RX ADMIN — DIAZEPAM 10 MG: 10 TABLET ORAL at 04:00

## 2021-01-01 RX ADMIN — Medication 20 MG: at 05:46

## 2021-01-01 RX ADMIN — MIDAZOLAM HYDROCHLORIDE 60 MG/HR: 5 INJECTION, SOLUTION INTRAMUSCULAR; INTRAVENOUS at 01:59

## 2021-01-01 RX ADMIN — SODIUM CHLORIDE 320 MG: 9 INJECTION, SOLUTION INTRAVENOUS at 19:34

## 2021-01-01 RX ADMIN — MIDAZOLAM HYDROCHLORIDE 50 MG/HR: 5 INJECTION, SOLUTION INTRAMUSCULAR; INTRAVENOUS at 22:50

## 2021-01-01 RX ADMIN — MIDAZOLAM HYDROCHLORIDE 60 MG/HR: 5 INJECTION, SOLUTION INTRAMUSCULAR; INTRAVENOUS at 23:27

## 2021-01-01 RX ADMIN — AMPICILLIN SODIUM AND SULBACTAM SODIUM 3 G: 100; 50 INJECTION, POWDER, FOR SOLUTION INTRAMUSCULAR; INTRAVENOUS at 23:11

## 2021-01-01 RX ADMIN — FUROSEMIDE 10 MG: 10 INJECTION, SOLUTION INTRAMUSCULAR; INTRAVENOUS at 17:26

## 2021-01-01 RX ADMIN — ENOXAPARIN SODIUM 40 MG: 40 INJECTION SUBCUTANEOUS at 11:29

## 2021-01-01 RX ADMIN — POTASSIUM CHLORIDE 20 MEQ: 14.9 INJECTION, SOLUTION INTRAVENOUS at 09:11

## 2021-01-01 RX ADMIN — PROPOFOL 100 MCG/KG/MIN: 10 INJECTION, EMULSION INTRAVENOUS at 17:13

## 2021-01-01 RX ADMIN — NOREPINEPHRINE BITARTRATE 18 MCG/MIN: 1 INJECTION, SOLUTION, CONCENTRATE INTRAVENOUS at 21:30

## 2021-01-01 RX ADMIN — RIFAXIMIN 550 MG: 550 TABLET ORAL at 21:00

## 2021-01-01 RX ADMIN — ALBUMIN (HUMAN) 25 G: 0.25 INJECTION, SOLUTION INTRAVENOUS at 21:06

## 2021-01-01 RX ADMIN — LACTULOSE 30 G: 10 SOLUTION ORAL at 08:47

## 2021-01-01 RX ADMIN — MIDAZOLAM HYDROCHLORIDE 60 MG/HR: 5 INJECTION, SOLUTION INTRAMUSCULAR; INTRAVENOUS at 10:03

## 2021-01-01 RX ADMIN — LEVETIRACETAM 2500 MG: 100 INJECTION, SOLUTION INTRAVENOUS at 22:12

## 2021-01-01 RX ADMIN — TOPIRAMATE 200 MG: 100 TABLET, FILM COATED ORAL at 11:12

## 2021-01-01 RX ADMIN — DEXTROSE 75 ML/HR: 5 SOLUTION INTRAVENOUS at 19:40

## 2021-01-01 RX ADMIN — LACTULOSE 20 G: 20 SOLUTION ORAL at 21:23

## 2021-01-01 RX ADMIN — Medication 1 PATCH: at 08:11

## 2021-01-01 RX ADMIN — Medication 2 MG/KG/HR: at 16:43

## 2021-01-01 RX ADMIN — MIDAZOLAM HYDROCHLORIDE 60 MG/HR: 5 INJECTION, SOLUTION INTRAMUSCULAR; INTRAVENOUS at 08:57

## 2021-01-01 RX ADMIN — DIAZEPAM 10 MG: 10 TABLET ORAL at 20:21

## 2021-01-01 RX ADMIN — POLYETHYLENE GLYCOL 3350 17 G: 17 POWDER, FOR SOLUTION ORAL at 14:15

## 2021-01-01 RX ADMIN — LACOSAMIDE 200 MG: 10 SOLUTION ORAL at 10:12

## 2021-01-01 RX ADMIN — POTASSIUM CHLORIDE 20 MEQ: 14.9 INJECTION, SOLUTION INTRAVENOUS at 10:15

## 2021-01-01 RX ADMIN — MIDAZOLAM HYDROCHLORIDE 60 MG/HR: 5 INJECTION, SOLUTION INTRAMUSCULAR; INTRAVENOUS at 21:28

## 2021-01-01 RX ADMIN — DIAZEPAM 10 MG: 10 TABLET ORAL at 03:38

## 2021-01-01 RX ADMIN — LACTULOSE 30 G: 10 SOLUTION ORAL at 20:02

## 2021-01-01 RX ADMIN — MIDAZOLAM HYDROCHLORIDE 60 MG/HR: 5 INJECTION, SOLUTION INTRAMUSCULAR; INTRAVENOUS at 09:25

## 2021-01-01 RX ADMIN — SODIUM CHLORIDE 250 ML: 0.9 INJECTION, SOLUTION INTRAVENOUS at 23:22

## 2021-01-01 RX ADMIN — Medication 2 MG/KG/HR: at 18:30

## 2021-01-01 RX ADMIN — LACTULOSE 30 G: 20 SOLUTION ORAL at 21:58

## 2021-01-01 RX ADMIN — DIAZEPAM 5 MG: 5 TABLET ORAL at 20:47

## 2021-01-01 RX ADMIN — RIFAXIMIN 550 MG: 550 TABLET ORAL at 08:34

## 2021-01-01 RX ADMIN — SODIUM CHLORIDE 2000 ML: 0.9 INJECTION, SOLUTION INTRAVENOUS at 10:41

## 2021-01-01 RX ADMIN — Medication 1 PATCH: at 09:28

## 2021-01-01 RX ADMIN — LACTULOSE 20 G: 20 SOLUTION ORAL at 16:53

## 2021-01-01 RX ADMIN — LEVALBUTEROL HYDROCHLORIDE 1.25 MG: 1.25 SOLUTION, CONCENTRATE RESPIRATORY (INHALATION) at 20:19

## 2021-01-01 RX ADMIN — POLYETHYLENE GLYCOL 3350 17 G: 17 POWDER, FOR SOLUTION ORAL at 08:05

## 2021-01-01 RX ADMIN — RIFAXIMIN 550 MG: 550 TABLET ORAL at 08:59

## 2021-01-01 RX ADMIN — DIAZEPAM 10 MG: 10 TABLET ORAL at 04:31

## 2021-01-01 RX ADMIN — CEFTRIAXONE SODIUM 1000 MG: 10 INJECTION, POWDER, FOR SOLUTION INTRAVENOUS at 10:39

## 2021-01-01 RX ADMIN — LORAZEPAM 2 MG: 2 INJECTION INTRAMUSCULAR; INTRAVENOUS at 06:03

## 2021-01-01 RX ADMIN — MIDAZOLAM HYDROCHLORIDE 60 MG/HR: 5 INJECTION, SOLUTION INTRAMUSCULAR; INTRAVENOUS at 12:12

## 2021-01-01 RX ADMIN — MIDAZOLAM HYDROCHLORIDE 40 MG/HR: 5 INJECTION, SOLUTION INTRAMUSCULAR; INTRAVENOUS at 23:15

## 2021-01-01 RX ADMIN — RIFAXIMIN 550 MG: 550 TABLET ORAL at 20:20

## 2021-01-01 RX ADMIN — NOREPINEPHRINE BITARTRATE 18 MCG/MIN: 1 INJECTION, SOLUTION, CONCENTRATE INTRAVENOUS at 06:17

## 2021-01-01 RX ADMIN — DEXTROSE 100 ML/HR: 5 SOLUTION INTRAVENOUS at 14:21

## 2021-01-01 RX ADMIN — AMPICILLIN SODIUM AND SULBACTAM SODIUM 3 G: 100; 50 INJECTION, POWDER, FOR SOLUTION INTRAMUSCULAR; INTRAVENOUS at 18:06

## 2021-01-01 RX ADMIN — LEVETIRACETAM 2500 MG: 100 INJECTION, SOLUTION INTRAVENOUS at 08:00

## 2021-01-01 RX ADMIN — LEVETIRACETAM 2500 MG: 100 INJECTION, SOLUTION INTRAVENOUS at 21:32

## 2021-01-01 RX ADMIN — LACTULOSE 30 G: 20 SOLUTION ORAL at 09:17

## 2021-01-01 RX ADMIN — MIDAZOLAM HYDROCHLORIDE 60 MG/HR: 5 INJECTION, SOLUTION INTRAMUSCULAR; INTRAVENOUS at 06:30

## 2021-01-01 RX ADMIN — IPRATROPIUM BROMIDE 0.5 MG: 0.5 SOLUTION RESPIRATORY (INHALATION) at 19:05

## 2021-01-01 RX ADMIN — AMPICILLIN SODIUM AND SULBACTAM SODIUM 3 G: 100; 50 INJECTION, POWDER, FOR SOLUTION INTRAMUSCULAR; INTRAVENOUS at 17:34

## 2021-01-01 RX ADMIN — PANTOPRAZOLE SODIUM 40 MG: 40 INJECTION, POWDER, FOR SOLUTION INTRAVENOUS at 08:09

## 2021-01-01 RX ADMIN — KETAMINE HYDROCHLORIDE 2 MG/KG/HR: 50 INJECTION, SOLUTION INTRAMUSCULAR; INTRAVENOUS at 13:40

## 2021-01-01 RX ADMIN — MIDAZOLAM HYDROCHLORIDE 60 MG/HR: 5 INJECTION, SOLUTION INTRAMUSCULAR; INTRAVENOUS at 23:41

## 2021-01-01 RX ADMIN — DEXTROSE 75 ML/HR: 5 SOLUTION INTRAVENOUS at 03:04

## 2021-01-01 RX ADMIN — THIAMINE HYDROCHLORIDE: 100 INJECTION, SOLUTION INTRAMUSCULAR; INTRAVENOUS at 08:57

## 2021-01-01 RX ADMIN — INSULIN LISPRO 2 UNITS: 100 INJECTION, SOLUTION INTRAVENOUS; SUBCUTANEOUS at 05:59

## 2021-01-01 RX ADMIN — ALBUMIN (HUMAN) 25 G: 0.25 INJECTION, SOLUTION INTRAVENOUS at 09:24

## 2021-01-01 RX ADMIN — CHLORHEXIDINE GLUCONATE 0.12% ORAL RINSE 15 ML: 1.2 LIQUID ORAL at 21:00

## 2021-01-01 RX ADMIN — SODIUM CHLORIDE, SODIUM GLUCONATE, SODIUM ACETATE, POTASSIUM CHLORIDE, MAGNESIUM CHLORIDE, SODIUM PHOSPHATE, DIBASIC, AND POTASSIUM PHOSPHATE 500 ML: .53; .5; .37; .037; .03; .012; .00082 INJECTION, SOLUTION INTRAVENOUS at 19:32

## 2021-01-01 RX ADMIN — RIFAXIMIN 550 MG: 550 TABLET ORAL at 21:52

## 2021-01-01 RX ADMIN — NICOTINE 1 PATCH: 14 PATCH, EXTENDED RELEASE TRANSDERMAL at 13:24

## 2021-01-01 RX ADMIN — PROPOFOL 60 MCG/KG/MIN: 10 INJECTION, EMULSION INTRAVENOUS at 23:02

## 2021-01-01 RX ADMIN — SODIUM CHLORIDE 400 MG: 9 INJECTION, SOLUTION INTRAVENOUS at 12:28

## 2021-01-01 RX ADMIN — Medication 2 MG/KG/HR: at 08:13

## 2021-01-01 RX ADMIN — DEXTROSE 125 ML/HR: 5 SOLUTION INTRAVENOUS at 02:18

## 2021-01-01 RX ADMIN — PANTOPRAZOLE SODIUM 40 MG: 40 INJECTION, POWDER, FOR SOLUTION INTRAVENOUS at 08:05

## 2021-01-01 RX ADMIN — SODIUM CHLORIDE 250 MG: 9 INJECTION, SOLUTION INTRAVENOUS at 23:01

## 2021-01-01 RX ADMIN — DEXTROSE 50 ML/HR: 5 SOLUTION INTRAVENOUS at 06:38

## 2021-01-01 RX ADMIN — LEVETIRACETAM 2500 MG: 100 INJECTION, SOLUTION INTRAVENOUS at 20:30

## 2021-01-01 RX ADMIN — MIDAZOLAM HYDROCHLORIDE 60 MG/HR: 5 INJECTION, SOLUTION INTRAMUSCULAR; INTRAVENOUS at 00:09

## 2021-01-01 RX ADMIN — SODIUM CHLORIDE 300 MG: 9 INJECTION, SOLUTION INTRAVENOUS at 10:20

## 2021-01-01 RX ADMIN — HYDROCORTISONE SODIUM SUCCINATE 100 MG: 100 INJECTION, POWDER, FOR SOLUTION INTRAMUSCULAR; INTRAVENOUS at 05:57

## 2021-01-01 RX ADMIN — LACTULOSE 30 G: 10 SOLUTION ORAL at 17:35

## 2021-01-01 RX ADMIN — SODIUM CHLORIDE 100 ML/HR: 0.9 INJECTION, SOLUTION INTRAVENOUS at 15:25

## 2021-01-01 RX ADMIN — VASOPRESSIN 0.04 UNITS/MIN: 20 INJECTION INTRAVENOUS at 10:29

## 2021-01-01 RX ADMIN — ALBUMIN (HUMAN) 50 G: 0.25 INJECTION, SOLUTION INTRAVENOUS at 13:24

## 2021-01-01 RX ADMIN — NICOTINE 1 PATCH: 14 PATCH, EXTENDED RELEASE TRANSDERMAL at 12:23

## 2021-01-01 RX ADMIN — DIAZEPAM 10 MG: 10 TABLET ORAL at 03:39

## 2021-01-01 RX ADMIN — Medication 1 PATCH: at 12:44

## 2021-01-01 RX ADMIN — NOREPINEPHRINE BITARTRATE 17 MCG/MIN: 1 INJECTION, SOLUTION, CONCENTRATE INTRAVENOUS at 10:27

## 2021-01-01 RX ADMIN — LORAZEPAM 10 MG: 2 INJECTION INTRAMUSCULAR; INTRAVENOUS at 17:25

## 2021-01-01 RX ADMIN — Medication 2 MG/KG/HR: at 02:51

## 2021-01-01 RX ADMIN — SODIUM CHLORIDE 200 MG: 9 INJECTION, SOLUTION INTRAVENOUS at 22:00

## 2021-01-01 RX ADMIN — Medication 2 MG/KG/HR: at 12:08

## 2021-01-01 RX ADMIN — Medication 2 MG/KG/HR: at 19:26

## 2021-01-01 RX ADMIN — LACTULOSE 30 G: 20 SOLUTION ORAL at 09:59

## 2021-01-01 RX ADMIN — PROPOFOL 100 MCG/KG/MIN: 10 INJECTION, EMULSION INTRAVENOUS at 11:53

## 2021-01-01 RX ADMIN — DIAZEPAM 5 MG: 5 TABLET ORAL at 16:54

## 2021-01-01 RX ADMIN — LACTULOSE 30 G: 20 SOLUTION ORAL at 08:41

## 2021-01-01 RX ADMIN — FENTANYL CITRATE 50 MCG: 50 INJECTION INTRAMUSCULAR; INTRAVENOUS at 17:53

## 2021-01-01 RX ADMIN — MIDAZOLAM HYDROCHLORIDE 40 MG/HR: 5 INJECTION, SOLUTION INTRAMUSCULAR; INTRAVENOUS at 00:45

## 2021-01-01 RX ADMIN — RIFAXIMIN 550 MG: 550 TABLET ORAL at 20:47

## 2021-01-01 RX ADMIN — POTASSIUM CHLORIDE 20 MEQ: 14.9 INJECTION, SOLUTION INTRAVENOUS at 11:40

## 2021-01-01 RX ADMIN — Medication 1.5 MG/KG/HR: at 15:36

## 2021-01-01 RX ADMIN — RIFAXIMIN 550 MG: 550 TABLET ORAL at 08:05

## 2021-01-01 RX ADMIN — POTASSIUM CHLORIDE 40 MEQ: 1500 TABLET, EXTENDED RELEASE ORAL at 12:14

## 2021-01-01 RX ADMIN — NOREPINEPHRINE BITARTRATE 17 MCG/MIN: 1 INJECTION, SOLUTION, CONCENTRATE INTRAVENOUS at 04:35

## 2021-01-01 RX ADMIN — MIDAZOLAM HYDROCHLORIDE 60 MG/HR: 5 INJECTION, SOLUTION INTRAMUSCULAR; INTRAVENOUS at 04:34

## 2021-01-01 RX ADMIN — ALBUMIN (HUMAN) 25 G: 0.25 INJECTION, SOLUTION INTRAVENOUS at 21:23

## 2021-01-01 RX ADMIN — TOPIRAMATE 200 MG: 100 TABLET, FILM COATED ORAL at 20:20

## 2021-01-01 RX ADMIN — HYDROCORTISONE SODIUM SUCCINATE 100 MG: 100 INJECTION, POWDER, FOR SOLUTION INTRAMUSCULAR; INTRAVENOUS at 14:45

## 2021-01-01 RX ADMIN — Medication 2 MG/KG/HR: at 04:07

## 2021-01-01 RX ADMIN — LACTULOSE 30 G: 10 SOLUTION ORAL at 20:23

## 2021-01-01 RX ADMIN — NOREPINEPHRINE BITARTRATE 8 MCG/MIN: 1 INJECTION, SOLUTION, CONCENTRATE INTRAVENOUS at 04:10

## 2021-01-01 RX ADMIN — LACTULOSE 30 G: 20 SOLUTION ORAL at 15:14

## 2021-01-01 RX ADMIN — Medication 2 MG/KG/HR: at 11:16

## 2021-01-01 RX ADMIN — SODIUM CHLORIDE 100 ML/HR: 0.9 INJECTION, SOLUTION INTRAVENOUS at 20:12

## 2021-01-01 RX ADMIN — ENOXAPARIN SODIUM 40 MG: 40 INJECTION SUBCUTANEOUS at 08:23

## 2021-01-01 RX ADMIN — RIFAXIMIN 550 MG: 550 TABLET ORAL at 10:12

## 2021-01-01 RX ADMIN — FUROSEMIDE 40 MG: 10 INJECTION, SOLUTION INTRAVENOUS at 12:16

## 2021-01-01 RX ADMIN — NOREPINEPHRINE BITARTRATE 10 MCG/MIN: 1 INJECTION, SOLUTION, CONCENTRATE INTRAVENOUS at 04:06

## 2021-01-01 RX ADMIN — VASOPRESSIN 0.03 UNITS/MIN: 20 INJECTION INTRAVENOUS at 09:46

## 2021-01-01 RX ADMIN — MIDAZOLAM HYDROCHLORIDE 60 MG/HR: 5 INJECTION, SOLUTION INTRAMUSCULAR; INTRAVENOUS at 14:21

## 2021-01-01 RX ADMIN — LORAZEPAM 0.5 MG: 2 INJECTION INTRAMUSCULAR; INTRAVENOUS at 17:05

## 2021-01-01 RX ADMIN — MIDAZOLAM HYDROCHLORIDE 60 MG/HR: 5 INJECTION, SOLUTION INTRAMUSCULAR; INTRAVENOUS at 10:14

## 2021-01-01 RX ADMIN — IPRATROPIUM BROMIDE 0.5 MG: 0.5 SOLUTION RESPIRATORY (INHALATION) at 07:15

## 2021-01-01 RX ADMIN — MIDAZOLAM HYDROCHLORIDE 60 MG/HR: 5 INJECTION, SOLUTION INTRAMUSCULAR; INTRAVENOUS at 20:20

## 2021-01-01 RX ADMIN — MIDAZOLAM HYDROCHLORIDE 60 MG/HR: 5 INJECTION, SOLUTION INTRAMUSCULAR; INTRAVENOUS at 16:00

## 2021-01-01 RX ADMIN — INSULIN LISPRO 2 UNITS: 100 INJECTION, SOLUTION INTRAVENOUS; SUBCUTANEOUS at 12:22

## 2021-01-01 RX ADMIN — ACYCLOVIR SODIUM 650 MG: 50 INJECTION, SOLUTION INTRAVENOUS at 12:43

## 2021-01-01 RX ADMIN — PHYTONADIONE 5 MG: 5 TABLET ORAL at 09:27

## 2021-01-01 RX ADMIN — AMPICILLIN SODIUM AND SULBACTAM SODIUM 3 G: 100; 50 INJECTION, POWDER, FOR SOLUTION INTRAMUSCULAR; INTRAVENOUS at 03:39

## 2021-01-01 RX ADMIN — LACTULOSE 30 G: 20 SOLUTION ORAL at 09:29

## 2021-01-01 RX ADMIN — Medication 1 PATCH: at 08:23

## 2021-01-01 RX ADMIN — MIDAZOLAM HYDROCHLORIDE 60 MG/HR: 5 INJECTION, SOLUTION INTRAMUSCULAR; INTRAVENOUS at 19:28

## 2021-01-01 RX ADMIN — CHLORHEXIDINE GLUCONATE 0.12% ORAL RINSE 15 ML: 1.2 LIQUID ORAL at 08:46

## 2021-01-01 RX ADMIN — AMPICILLIN SODIUM AND SULBACTAM SODIUM 3 G: 100; 50 INJECTION, POWDER, FOR SOLUTION INTRAMUSCULAR; INTRAVENOUS at 21:52

## 2021-01-01 RX ADMIN — VANCOMYCIN HYDROCHLORIDE 750 MG: 750 INJECTION, SOLUTION INTRAVENOUS at 15:45

## 2021-01-01 RX ADMIN — Medication 2 MG/KG/HR: at 06:38

## 2021-01-01 RX ADMIN — RIFAXIMIN 550 MG: 550 TABLET ORAL at 08:23

## 2021-01-01 RX ADMIN — LACTULOSE 30 G: 10 SOLUTION ORAL at 21:36

## 2021-01-01 RX ADMIN — ACYCLOVIR SODIUM 650 MG: 50 INJECTION, SOLUTION INTRAVENOUS at 12:27

## 2021-01-01 RX ADMIN — RIFAXIMIN 550 MG: 550 TABLET ORAL at 10:07

## 2021-01-01 RX ADMIN — MIDAZOLAM HYDROCHLORIDE 2 MG: 1 INJECTION, SOLUTION INTRAMUSCULAR; INTRAVENOUS at 18:20

## 2021-01-01 RX ADMIN — MIDAZOLAM HYDROCHLORIDE 60 MG/HR: 5 INJECTION, SOLUTION INTRAMUSCULAR; INTRAVENOUS at 19:35

## 2021-01-01 RX ADMIN — ALBUMIN (HUMAN) 25 G: 0.25 INJECTION, SOLUTION INTRAVENOUS at 07:51

## 2021-01-01 RX ADMIN — SODIUM CHLORIDE 100 ML/HR: 0.9 INJECTION, SOLUTION INTRAVENOUS at 13:27

## 2021-01-01 RX ADMIN — RIFAXIMIN 550 MG: 550 TABLET ORAL at 12:43

## 2021-01-01 RX ADMIN — DIAZEPAM 10 MG: 10 TABLET ORAL at 03:45

## 2021-01-01 RX ADMIN — FENTANYL CITRATE: 50 INJECTION INTRAMUSCULAR; INTRAVENOUS at 18:21

## 2021-01-01 RX ADMIN — THIAMINE HCL TAB 100 MG 100 MG: 100 TAB at 08:47

## 2021-01-01 RX ADMIN — CHOLECALCIFEROL TAB 10 MCG (400 UNIT) 400 UNITS: 10 TAB at 09:58

## 2021-01-01 RX ADMIN — FUROSEMIDE 40 MG: 10 INJECTION, SOLUTION INTRAVENOUS at 17:40

## 2021-01-01 RX ADMIN — FOLIC ACID 1 MG: 1 TABLET ORAL at 08:23

## 2021-01-01 RX ADMIN — TOPIRAMATE 200 MG: 100 TABLET, FILM COATED ORAL at 10:15

## 2021-01-01 RX ADMIN — LORAZEPAM 0.5 MG: 2 INJECTION INTRAMUSCULAR; INTRAVENOUS at 15:23

## 2021-01-01 RX ADMIN — LACTULOSE 30 G: 20 SOLUTION ORAL at 08:59

## 2021-01-01 RX ADMIN — LACTULOSE 30 G: 10 SOLUTION ORAL at 15:01

## 2021-01-01 RX ADMIN — POTASSIUM CHLORIDE 20 MEQ: 14.9 INJECTION, SOLUTION INTRAVENOUS at 10:43

## 2021-01-01 RX ADMIN — NOREPINEPHRINE BITARTRATE 18 MCG/MIN: 1 INJECTION, SOLUTION, CONCENTRATE INTRAVENOUS at 02:00

## 2021-01-01 RX ADMIN — FUROSEMIDE 40 MG: 10 INJECTION, SOLUTION INTRAVENOUS at 12:22

## 2021-01-01 RX ADMIN — AMPICILLIN SODIUM AND SULBACTAM SODIUM 3 G: 100; 50 INJECTION, POWDER, FOR SOLUTION INTRAMUSCULAR; INTRAVENOUS at 23:57

## 2021-01-01 RX ADMIN — PHYTONADIONE 5 MG: 5 TABLET ORAL at 11:13

## 2021-01-01 RX ADMIN — PROPOFOL 60 MCG/KG/MIN: 10 INJECTION, EMULSION INTRAVENOUS at 20:10

## 2021-01-01 RX ADMIN — FOLIC ACID 1 MG: 5 INJECTION, SOLUTION INTRAMUSCULAR; INTRAVENOUS; SUBCUTANEOUS at 08:00

## 2021-01-01 RX ADMIN — MIDAZOLAM HYDROCHLORIDE 30 MG/HR: 5 INJECTION, SOLUTION INTRAMUSCULAR; INTRAVENOUS at 07:52

## 2021-01-01 RX ADMIN — CEFAZOLIN SODIUM 2000 MG: 2 SOLUTION INTRAVENOUS at 21:09

## 2021-01-01 RX ADMIN — MIDAZOLAM HYDROCHLORIDE 40 MG/HR: 5 INJECTION, SOLUTION INTRAMUSCULAR; INTRAVENOUS at 04:10

## 2021-01-01 RX ADMIN — MIDAZOLAM HYDROCHLORIDE 60 MG/HR: 5 INJECTION, SOLUTION INTRAMUSCULAR; INTRAVENOUS at 05:05

## 2021-01-01 RX ADMIN — CHLORHEXIDINE GLUCONATE 0.12% ORAL RINSE 15 ML: 1.2 LIQUID ORAL at 09:26

## 2021-01-01 RX ADMIN — INSULIN LISPRO 1 UNITS: 100 INJECTION, SOLUTION INTRAVENOUS; SUBCUTANEOUS at 12:30

## 2021-01-01 RX ADMIN — NOREPINEPHRINE BITARTRATE 16 MCG/MIN: 1 INJECTION, SOLUTION, CONCENTRATE INTRAVENOUS at 14:50

## 2021-01-01 RX ADMIN — ACYCLOVIR SODIUM 650 MG: 50 INJECTION, SOLUTION INTRAVENOUS at 20:32

## 2021-01-01 RX ADMIN — ALBUMIN (HUMAN) 25 G: 0.25 INJECTION, SOLUTION INTRAVENOUS at 15:25

## 2021-01-01 RX ADMIN — MIDAZOLAM HYDROCHLORIDE 60 MG/HR: 5 INJECTION, SOLUTION INTRAMUSCULAR; INTRAVENOUS at 12:44

## 2021-01-01 RX ADMIN — LACTULOSE 30 G: 20 SOLUTION ORAL at 12:33

## 2021-01-01 RX ADMIN — VANCOMYCIN HYDROCHLORIDE 750 MG: 750 INJECTION, SOLUTION INTRAVENOUS at 14:54

## 2021-01-01 RX ADMIN — POTASSIUM CHLORIDE 40 MEQ: 1500 TABLET, EXTENDED RELEASE ORAL at 10:07

## 2021-01-01 RX ADMIN — POTASSIUM CHLORIDE 40 MEQ: 29.8 INJECTION, SOLUTION INTRAVENOUS at 17:40

## 2021-01-01 RX ADMIN — LEVETIRACETAM 2500 MG: 100 INJECTION, SOLUTION INTRAVENOUS at 08:10

## 2021-01-01 RX ADMIN — FUROSEMIDE 60 MG: 10 INJECTION, SOLUTION INTRAMUSCULAR; INTRAVENOUS at 19:06

## 2021-01-01 RX ADMIN — Medication 1 PATCH: at 08:09

## 2021-01-01 RX ADMIN — LACTULOSE 30 G: 20 SOLUTION ORAL at 20:12

## 2021-01-01 RX ADMIN — AMPICILLIN SODIUM AND SULBACTAM SODIUM 3 G: 100; 50 INJECTION, POWDER, FOR SOLUTION INTRAMUSCULAR; INTRAVENOUS at 22:02

## 2021-01-01 RX ADMIN — LACTULOSE 30 G: 10 SOLUTION ORAL at 08:22

## 2021-01-01 RX ADMIN — NOREPINEPHRINE BITARTRATE 9 MCG/MIN: 1 INJECTION, SOLUTION, CONCENTRATE INTRAVENOUS at 19:37

## 2021-01-01 RX ADMIN — MIDAZOLAM HYDROCHLORIDE 40 MG/HR: 5 INJECTION, SOLUTION INTRAMUSCULAR; INTRAVENOUS at 00:36

## 2021-01-01 RX ADMIN — RIFAXIMIN 550 MG: 550 TABLET ORAL at 09:23

## 2021-01-01 RX ADMIN — SODIUM CHLORIDE 200 MG: 9 INJECTION, SOLUTION INTRAVENOUS at 23:57

## 2021-01-01 RX ADMIN — LACTULOSE 30 G: 10 SOLUTION ORAL at 12:42

## 2021-01-01 RX ADMIN — PROPOFOL 100 MCG/KG/MIN: 10 INJECTION, EMULSION INTRAVENOUS at 14:51

## 2021-01-01 RX ADMIN — LORAZEPAM 8 MG: 2 INJECTION INTRAMUSCULAR; INTRAVENOUS at 15:48

## 2021-01-01 RX ADMIN — LACTULOSE 30 G: 20 SOLUTION ORAL at 13:24

## 2021-01-01 RX ADMIN — NOREPINEPHRINE BITARTRATE 10 MCG/MIN: 1 INJECTION, SOLUTION, CONCENTRATE INTRAVENOUS at 11:00

## 2021-01-01 RX ADMIN — CHOLECALCIFEROL TAB 10 MCG (400 UNIT) 400 UNITS: 10 TAB at 12:14

## 2021-01-01 RX ADMIN — MIDAZOLAM HYDROCHLORIDE 60 MG/HR: 5 INJECTION, SOLUTION INTRAMUSCULAR; INTRAVENOUS at 10:46

## 2021-01-01 RX ADMIN — RIFAXIMIN 550 MG: 550 TABLET ORAL at 20:12

## 2021-01-01 RX ADMIN — MIDAZOLAM HYDROCHLORIDE 20 MG/HR: 5 INJECTION, SOLUTION INTRAMUSCULAR; INTRAVENOUS at 19:56

## 2021-01-01 RX ADMIN — MIDAZOLAM HYDROCHLORIDE 60 MG/HR: 5 INJECTION, SOLUTION INTRAMUSCULAR; INTRAVENOUS at 08:15

## 2021-01-01 RX ADMIN — NOREPINEPHRINE BITARTRATE 16 MCG/MIN: 1 INJECTION, SOLUTION, CONCENTRATE INTRAVENOUS at 14:13

## 2021-01-01 RX ADMIN — MIDAZOLAM HYDROCHLORIDE 60 MG/HR: 5 INJECTION, SOLUTION INTRAMUSCULAR; INTRAVENOUS at 22:00

## 2021-01-01 RX ADMIN — MIDAZOLAM HYDROCHLORIDE 60 MG/HR: 5 INJECTION, SOLUTION INTRAMUSCULAR; INTRAVENOUS at 18:50

## 2021-01-01 RX ADMIN — DIAZEPAM 10 MG: 10 TABLET ORAL at 08:35

## 2021-01-01 RX ADMIN — Medication 20 MG: at 05:10

## 2021-01-01 RX ADMIN — FENTANYL CITRATE 50 MCG: 50 INJECTION INTRAMUSCULAR; INTRAVENOUS at 00:48

## 2021-01-01 RX ADMIN — MIDAZOLAM 4 MG/HR: 5 INJECTION INTRAMUSCULAR; INTRAVENOUS at 17:13

## 2021-01-01 RX ADMIN — LORAZEPAM 2 MG: 2 INJECTION INTRAMUSCULAR; INTRAVENOUS at 17:37

## 2021-01-01 RX ADMIN — DIAZEPAM 10 MG: 10 TABLET ORAL at 14:59

## 2021-01-01 RX ADMIN — SODIUM CHLORIDE 250 ML: 0.9 INJECTION, SOLUTION INTRAVENOUS at 11:46

## 2021-01-01 RX ADMIN — MIDAZOLAM HYDROCHLORIDE 60 MG/HR: 5 INJECTION, SOLUTION INTRAMUSCULAR; INTRAVENOUS at 01:10

## 2021-01-01 RX ADMIN — ACYCLOVIR SODIUM 650 MG: 50 INJECTION, SOLUTION INTRAVENOUS at 12:12

## 2021-01-01 RX ADMIN — FUROSEMIDE 40 MG: 10 INJECTION, SOLUTION INTRAVENOUS at 15:56

## 2021-01-01 RX ADMIN — PROPOFOL 80 MCG/KG/MIN: 10 INJECTION, EMULSION INTRAVENOUS at 04:19

## 2021-01-01 RX ADMIN — CHLORHEXIDINE GLUCONATE 0.12% ORAL RINSE 15 ML: 1.2 LIQUID ORAL at 09:27

## 2021-01-01 RX ADMIN — DEXTROSE 75 ML/HR: 5 SOLUTION INTRAVENOUS at 12:43

## 2021-01-01 RX ADMIN — LIDOCAINE 5% 1 PATCH: 700 PATCH TOPICAL at 09:24

## 2021-01-01 RX ADMIN — HEPARIN SODIUM 5000 UNITS: 5000 INJECTION INTRAVENOUS; SUBCUTANEOUS at 05:57

## 2021-01-01 RX ADMIN — LEVETIRACETAM 750 MG: 100 INJECTION, SOLUTION INTRAVENOUS at 09:32

## 2021-01-01 RX ADMIN — ACYCLOVIR SODIUM 650 MG: 50 INJECTION, SOLUTION INTRAVENOUS at 03:40

## 2021-01-01 RX ADMIN — MIDAZOLAM HYDROCHLORIDE 40 MG/HR: 5 INJECTION, SOLUTION INTRAMUSCULAR; INTRAVENOUS at 04:18

## 2021-01-01 RX ADMIN — PREDNISOLONE SODIUM PHOSPHATE 40 MG: 15 SOLUTION ORAL at 22:06

## 2021-01-01 RX ADMIN — METOCLOPRAMIDE HYDROCHLORIDE 10 MG: 5 INJECTION INTRAMUSCULAR; INTRAVENOUS at 14:12

## 2021-01-01 RX ADMIN — LACTULOSE 20 G: 20 SOLUTION ORAL at 19:45

## 2021-01-01 RX ADMIN — METOCLOPRAMIDE HYDROCHLORIDE 10 MG: 5 INJECTION INTRAMUSCULAR; INTRAVENOUS at 17:36

## 2021-01-01 RX ADMIN — MIDAZOLAM HYDROCHLORIDE 60 MG/HR: 5 INJECTION, SOLUTION INTRAMUSCULAR; INTRAVENOUS at 18:26

## 2021-01-01 RX ADMIN — PHENOBARBITAL SODIUM 667 MG: 65 INJECTION INTRAMUSCULAR at 01:45

## 2021-01-01 RX ADMIN — FUROSEMIDE 40 MG: 10 INJECTION, SOLUTION INTRAVENOUS at 15:59

## 2021-01-01 RX ADMIN — SODIUM CHLORIDE, SODIUM GLUCONATE, SODIUM ACETATE, POTASSIUM CHLORIDE, MAGNESIUM CHLORIDE, SODIUM PHOSPHATE, DIBASIC, AND POTASSIUM PHOSPHATE 500 ML: .53; .5; .37; .037; .03; .012; .00082 INJECTION, SOLUTION INTRAVENOUS at 03:41

## 2021-01-01 RX ADMIN — CHLORHEXIDINE GLUCONATE 0.12% ORAL RINSE 15 ML: 1.2 LIQUID ORAL at 21:33

## 2021-01-01 RX ADMIN — KETAMINE HYDROCHLORIDE 2 MG/KG/HR: 50 INJECTION, SOLUTION INTRAMUSCULAR; INTRAVENOUS at 11:36

## 2021-01-01 RX ADMIN — KETAMINE HYDROCHLORIDE 1 MG/KG/HR: 50 INJECTION, SOLUTION INTRAMUSCULAR; INTRAVENOUS at 08:57

## 2021-01-01 RX ADMIN — CEFTRIAXONE SODIUM 1000 MG: 10 INJECTION, POWDER, FOR SOLUTION INTRAVENOUS at 12:17

## 2021-01-01 RX ADMIN — Medication 2 MG/KG/HR: at 07:00

## 2021-01-01 RX ADMIN — LEVETIRACETAM 2000 MG: 100 INJECTION, SOLUTION INTRAVENOUS at 19:36

## 2021-01-01 RX ADMIN — LACTULOSE 30 G: 20 SOLUTION ORAL at 12:42

## 2021-01-01 RX ADMIN — HYDROCORTISONE SODIUM SUCCINATE 100 MG: 100 INJECTION, POWDER, FOR SOLUTION INTRAMUSCULAR; INTRAVENOUS at 14:12

## 2021-01-01 RX ADMIN — LACOSAMIDE 200 MG: 10 SOLUTION ORAL at 20:20

## 2021-01-01 RX ADMIN — MIDAZOLAM HYDROCHLORIDE 60 MG/HR: 5 INJECTION, SOLUTION INTRAMUSCULAR; INTRAVENOUS at 16:55

## 2021-01-01 RX ADMIN — THIAMINE HYDROCHLORIDE: 100 INJECTION, SOLUTION INTRAMUSCULAR; INTRAVENOUS at 09:02

## 2021-01-01 RX ADMIN — Medication 2 MG/KG/HR: at 15:43

## 2021-01-01 RX ADMIN — INSULIN LISPRO 1 UNITS: 100 INJECTION, SOLUTION INTRAVENOUS; SUBCUTANEOUS at 17:40

## 2021-01-01 RX ADMIN — RIFAXIMIN 550 MG: 550 TABLET ORAL at 08:47

## 2021-01-01 RX ADMIN — DIAZEPAM 5 MG: 5 TABLET ORAL at 08:47

## 2021-01-01 RX ADMIN — Medication 70 MG: at 20:57

## 2021-01-01 RX ADMIN — ACETAMINOPHEN 650 MG: 325 TABLET, COATED ORAL at 21:34

## 2021-01-01 RX ADMIN — DEXTROSE 150 ML/HR: 5 SOLUTION INTRAVENOUS at 04:08

## 2021-01-01 RX ADMIN — POTASSIUM CHLORIDE 20 MEQ: 14.9 INJECTION, SOLUTION INTRAVENOUS at 12:17

## 2021-01-01 RX ADMIN — FUROSEMIDE 60 MG: 10 INJECTION, SOLUTION INTRAMUSCULAR; INTRAVENOUS at 13:55

## 2021-01-01 RX ADMIN — LEVETIRACETAM 2500 MG: 100 INJECTION, SOLUTION INTRAVENOUS at 09:34

## 2021-01-01 RX ADMIN — AMPICILLIN SODIUM AND SULBACTAM SODIUM 3 G: 100; 50 INJECTION, POWDER, FOR SOLUTION INTRAMUSCULAR; INTRAVENOUS at 10:51

## 2021-01-01 RX ADMIN — TOPIRAMATE 200 MG: 100 TABLET, FILM COATED ORAL at 20:05

## 2021-01-01 RX ADMIN — FOLIC ACID 1 MG: 1 TABLET ORAL at 12:52

## 2021-01-01 RX ADMIN — DEXTROSE, SODIUM CHLORIDE, AND POTASSIUM CHLORIDE 75 ML/HR: 5; .45; .15 INJECTION INTRAVENOUS at 22:06

## 2021-01-01 RX ADMIN — MIDAZOLAM HYDROCHLORIDE 60 MG/HR: 5 INJECTION, SOLUTION INTRAMUSCULAR; INTRAVENOUS at 05:47

## 2021-01-01 RX ADMIN — MIDAZOLAM HYDROCHLORIDE 60 MG/HR: 5 INJECTION, SOLUTION INTRAMUSCULAR; INTRAVENOUS at 08:51

## 2021-01-01 RX ADMIN — LACTULOSE 30 G: 20 SOLUTION ORAL at 17:09

## 2021-01-01 RX ADMIN — NOREPINEPHRINE BITARTRATE 9 MCG/MIN: 1 INJECTION, SOLUTION, CONCENTRATE INTRAVENOUS at 03:48

## 2021-01-01 RX ADMIN — SODIUM CHLORIDE, SODIUM GLUCONATE, SODIUM ACETATE, POTASSIUM CHLORIDE, MAGNESIUM CHLORIDE, SODIUM PHOSPHATE, DIBASIC, AND POTASSIUM PHOSPHATE 100 ML/HR: .53; .5; .37; .037; .03; .012; .00082 INJECTION, SOLUTION INTRAVENOUS at 00:58

## 2021-01-01 RX ADMIN — SULFAMETHOXAZOLE AND TRIMETHOPRIM 2 TABLET: 800; 160 TABLET ORAL at 12:42

## 2021-01-01 RX ADMIN — ACYCLOVIR SODIUM 650 MG: 50 INJECTION, SOLUTION INTRAVENOUS at 21:10

## 2021-01-01 RX ADMIN — SODIUM CHLORIDE 75 ML/HR: 0.9 INJECTION, SOLUTION INTRAVENOUS at 14:19

## 2021-01-01 RX ADMIN — Medication 20 MG: at 06:34

## 2021-01-01 RX ADMIN — NOREPINEPHRINE BITARTRATE 8 MCG/MIN: 1 INJECTION, SOLUTION, CONCENTRATE INTRAVENOUS at 14:49

## 2021-01-01 RX ADMIN — MIDAZOLAM HYDROCHLORIDE 60 MG/HR: 5 INJECTION, SOLUTION INTRAMUSCULAR; INTRAVENOUS at 03:14

## 2021-01-01 RX ADMIN — POTASSIUM PHOSPHATE, MONOBASIC AND POTASSIUM PHOSPHATE, DIBASIC 21 MMOL: 224; 236 INJECTION, SOLUTION, CONCENTRATE INTRAVENOUS at 12:50

## 2021-01-01 RX ADMIN — DIAZEPAM 10 MG: 10 TABLET ORAL at 10:12

## 2021-01-01 RX ADMIN — FUROSEMIDE 10 MG: 10 INJECTION, SOLUTION INTRAMUSCULAR; INTRAVENOUS at 12:14

## 2021-01-01 RX ADMIN — Medication 2 MG/KG/HR: at 20:56

## 2021-01-01 RX ADMIN — Medication 1 PATCH: at 09:26

## 2021-01-01 RX ADMIN — FUROSEMIDE 40 MG: 10 INJECTION, SOLUTION INTRAVENOUS at 09:27

## 2021-01-01 RX ADMIN — LACTULOSE 30 G: 20 SOLUTION ORAL at 18:07

## 2021-01-01 RX ADMIN — CHLORHEXIDINE GLUCONATE 0.12% ORAL RINSE 15 ML: 1.2 LIQUID ORAL at 22:12

## 2021-01-01 RX ADMIN — NOREPINEPHRINE BITARTRATE 8 MCG/MIN: 1 INJECTION, SOLUTION, CONCENTRATE INTRAVENOUS at 19:48

## 2021-01-01 RX ADMIN — MIDAZOLAM HYDROCHLORIDE 60 MG/HR: 5 INJECTION, SOLUTION INTRAMUSCULAR; INTRAVENOUS at 16:33

## 2021-01-01 RX ADMIN — GADOBUTROL 6 ML: 604.72 INJECTION INTRAVENOUS at 16:20

## 2021-01-01 RX ADMIN — CHOLECALCIFEROL TAB 10 MCG (400 UNIT) 400 UNITS: 10 TAB at 08:22

## 2021-01-01 RX ADMIN — CHOLECALCIFEROL TAB 10 MCG (400 UNIT) 400 UNITS: 10 TAB at 09:17

## 2021-01-01 RX ADMIN — METOCLOPRAMIDE 10 MG: 5 INJECTION, SOLUTION INTRAMUSCULAR; INTRAVENOUS at 08:23

## 2021-01-01 RX ADMIN — CHLORHEXIDINE GLUCONATE 0.12% ORAL RINSE 15 ML: 1.2 LIQUID ORAL at 09:18

## 2021-01-01 RX ADMIN — LACTULOSE 30 G: 20 SOLUTION ORAL at 09:56

## 2021-01-01 RX ADMIN — LACTULOSE 30 G: 10 SOLUTION ORAL at 08:34

## 2021-01-01 RX ADMIN — MIDAZOLAM HYDROCHLORIDE 60 MG/HR: 5 INJECTION, SOLUTION INTRAMUSCULAR; INTRAVENOUS at 02:50

## 2021-01-01 RX ADMIN — Medication 2 MG/KG/HR: at 06:09

## 2021-01-01 RX ADMIN — LACTULOSE 30 G: 10 SOLUTION ORAL at 16:34

## 2021-01-01 RX ADMIN — CHOLECALCIFEROL TAB 10 MCG (400 UNIT) 400 UNITS: 10 TAB at 12:52

## 2021-01-01 RX ADMIN — HYDROCORTISONE SODIUM SUCCINATE 100 MG: 100 INJECTION, POWDER, FOR SOLUTION INTRAMUSCULAR; INTRAVENOUS at 21:36

## 2021-01-01 RX ADMIN — PROPOFOL 100 MCG/KG/MIN: 10 INJECTION, EMULSION INTRAVENOUS at 05:30

## 2021-01-01 RX ADMIN — HYDROCORTISONE SODIUM SUCCINATE 100 MG: 100 INJECTION, POWDER, FOR SOLUTION INTRAMUSCULAR; INTRAVENOUS at 06:11

## 2021-01-01 RX ADMIN — MAGNESIUM SULFATE HEPTAHYDRATE 2 G: 40 INJECTION, SOLUTION INTRAVENOUS at 17:57

## 2021-01-01 RX ADMIN — PHENOBARBITAL SODIUM 167 MG: 65 INJECTION INTRAMUSCULAR at 00:11

## 2021-01-01 RX ADMIN — Medication 20 MG: at 05:37

## 2021-01-01 RX ADMIN — MIDAZOLAM HYDROCHLORIDE 60 MG/HR: 5 INJECTION, SOLUTION INTRAMUSCULAR; INTRAVENOUS at 02:47

## 2021-01-01 RX ADMIN — CHLORHEXIDINE GLUCONATE 0.12% ORAL RINSE 15 ML: 1.2 LIQUID ORAL at 08:23

## 2021-01-01 RX ADMIN — PROPOFOL 60 MCG/KG/MIN: 10 INJECTION, EMULSION INTRAVENOUS at 03:34

## 2021-01-01 RX ADMIN — VANCOMYCIN HYDROCHLORIDE 750 MG: 750 INJECTION, SOLUTION INTRAVENOUS at 04:56

## 2021-01-01 RX ADMIN — LACTULOSE 30 G: 10 SOLUTION ORAL at 09:26

## 2021-01-01 RX ADMIN — THIAMINE HCL TAB 100 MG 100 MG: 100 TAB at 12:43

## 2021-01-01 RX ADMIN — ACETAMINOPHEN 650 MG: 325 TABLET, COATED ORAL at 15:11

## 2021-01-01 RX ADMIN — LACTULOSE 30 G: 10 SOLUTION ORAL at 16:54

## 2021-01-01 RX ADMIN — DIAZEPAM 10 MG: 10 TABLET ORAL at 21:23

## 2021-01-01 RX ADMIN — Medication 0.5 MG/KG/HR: at 02:50

## 2021-01-01 RX ADMIN — PROPOFOL 90 MCG/KG/MIN: 10 INJECTION, EMULSION INTRAVENOUS at 12:44

## 2021-01-01 RX ADMIN — TOPIRAMATE 200 MG: 100 TABLET, FILM COATED ORAL at 21:36

## 2021-01-01 RX ADMIN — PROPOFOL 50 MCG/KG/MIN: 10 INJECTION, EMULSION INTRAVENOUS at 09:35

## 2021-01-01 RX ADMIN — LACTULOSE 30 G: 20 SOLUTION ORAL at 17:55

## 2021-01-01 RX ADMIN — RIFAXIMIN 550 MG: 550 TABLET ORAL at 22:59

## 2021-01-01 RX ADMIN — NOREPINEPHRINE BITARTRATE 10 MCG/MIN: 1 INJECTION, SOLUTION, CONCENTRATE INTRAVENOUS at 07:45

## 2021-01-01 RX ADMIN — NOREPINEPHRINE BITARTRATE 7 MCG/MIN: 1 INJECTION, SOLUTION, CONCENTRATE INTRAVENOUS at 18:27

## 2021-01-01 RX ADMIN — MIDAZOLAM HYDROCHLORIDE 60 MG/HR: 5 INJECTION, SOLUTION INTRAMUSCULAR; INTRAVENOUS at 14:10

## 2021-01-01 RX ADMIN — GADOBUTROL 7 ML: 604.72 INJECTION INTRAVENOUS at 13:00

## 2021-01-01 RX ADMIN — PROPOFOL 100 MCG/KG/MIN: 10 INJECTION, EMULSION INTRAVENOUS at 08:14

## 2021-01-01 RX ADMIN — INSULIN LISPRO 2 UNITS: 100 INJECTION, SOLUTION INTRAVENOUS; SUBCUTANEOUS at 17:38

## 2021-01-01 RX ADMIN — Medication 2 MG/KG/HR: at 00:37

## 2021-01-01 RX ADMIN — THIAMINE HYDROCHLORIDE 100 MG: 100 INJECTION, SOLUTION INTRAMUSCULAR; INTRAVENOUS at 10:20

## 2021-01-01 RX ADMIN — CHOLECALCIFEROL TAB 10 MCG (400 UNIT) 400 UNITS: 10 TAB at 09:56

## 2021-01-01 RX ADMIN — HYDROCORTISONE SODIUM SUCCINATE 100 MG: 100 INJECTION, POWDER, FOR SOLUTION INTRAMUSCULAR; INTRAVENOUS at 05:52

## 2021-01-01 RX ADMIN — AMIODARONE HYDROCHLORIDE 0.5 MG/MIN: 50 INJECTION, SOLUTION INTRAVENOUS at 16:14

## 2021-01-01 RX ADMIN — FOLIC ACID 1 MG: 1 TABLET ORAL at 09:18

## 2021-01-01 RX ADMIN — Medication 2 MG/KG/HR: at 05:13

## 2021-01-01 RX ADMIN — SODIUM CHLORIDE, SODIUM GLUCONATE, SODIUM ACETATE, POTASSIUM CHLORIDE, MAGNESIUM CHLORIDE, SODIUM PHOSPHATE, DIBASIC, AND POTASSIUM PHOSPHATE 100 ML/HR: .53; .5; .37; .037; .03; .012; .00082 INJECTION, SOLUTION INTRAVENOUS at 16:59

## 2021-01-01 RX ADMIN — MIDAZOLAM HYDROCHLORIDE 40 MG/HR: 5 INJECTION, SOLUTION INTRAMUSCULAR; INTRAVENOUS at 07:11

## 2021-01-01 RX ADMIN — Medication 2 MG/KG/HR: at 14:25

## 2021-01-01 RX ADMIN — CHLORHEXIDINE GLUCONATE 0.12% ORAL RINSE 15 ML: 1.2 LIQUID ORAL at 12:43

## 2021-01-01 RX ADMIN — MIDAZOLAM HYDROCHLORIDE 60 MG/HR: 5 INJECTION, SOLUTION INTRAMUSCULAR; INTRAVENOUS at 14:05

## 2021-01-01 RX ADMIN — LACTULOSE 30 G: 20 SOLUTION ORAL at 12:16

## 2021-01-01 RX ADMIN — Medication 20 MG: at 12:52

## 2021-01-01 RX ADMIN — PROPOFOL 50 MCG/KG/MIN: 10 INJECTION, EMULSION INTRAVENOUS at 00:28

## 2021-01-01 RX ADMIN — SODIUM CHLORIDE 250 MG: 9 INJECTION, SOLUTION INTRAVENOUS at 12:30

## 2021-01-01 RX ADMIN — METOCLOPRAMIDE 10 MG: 5 INJECTION, SOLUTION INTRAMUSCULAR; INTRAVENOUS at 20:20

## 2021-01-01 RX ADMIN — MIDAZOLAM 20 MG: 1 INJECTION INTRAMUSCULAR; INTRAVENOUS at 09:52

## 2021-01-01 RX ADMIN — NICOTINE 1 PATCH: 14 PATCH, EXTENDED RELEASE TRANSDERMAL at 12:42

## 2021-01-01 RX ADMIN — IPRATROPIUM BROMIDE 0.5 MG: 0.5 SOLUTION RESPIRATORY (INHALATION) at 07:17

## 2021-01-01 RX ADMIN — AMPICILLIN SODIUM AND SULBACTAM SODIUM 3 G: 100; 50 INJECTION, POWDER, FOR SOLUTION INTRAMUSCULAR; INTRAVENOUS at 17:40

## 2021-01-01 RX ADMIN — LACTULOSE 30 G: 10 SOLUTION ORAL at 20:03

## 2021-01-01 RX ADMIN — LORAZEPAM 1 MG: 2 INJECTION INTRAMUSCULAR; INTRAVENOUS at 16:36

## 2021-01-01 RX ADMIN — PROPOFOL 80 MCG/KG/MIN: 10 INJECTION, EMULSION INTRAVENOUS at 16:01

## 2021-01-01 RX ADMIN — MIDAZOLAM HYDROCHLORIDE 60 MG/HR: 5 INJECTION, SOLUTION INTRAMUSCULAR; INTRAVENOUS at 20:32

## 2021-01-01 RX ADMIN — FOLIC ACID 1 MG: 5 INJECTION, SOLUTION INTRAMUSCULAR; INTRAVENOUS; SUBCUTANEOUS at 08:44

## 2021-01-01 RX ADMIN — ALBUMIN (HUMAN) 12.5 G: 12.5 INJECTION, SOLUTION INTRAVENOUS at 08:29

## 2021-01-01 RX ADMIN — LORAZEPAM 10 MG: 2 INJECTION INTRAMUSCULAR; INTRAVENOUS at 18:00

## 2021-01-01 RX ADMIN — DIAZEPAM 10 MG: 10 TABLET ORAL at 08:23

## 2021-01-01 RX ADMIN — Medication 1 PATCH: at 08:24

## 2021-01-01 RX ADMIN — NOREPINEPHRINE BITARTRATE 16 MCG/MIN: 1 INJECTION, SOLUTION, CONCENTRATE INTRAVENOUS at 22:41

## 2021-01-01 RX ADMIN — NOREPINEPHRINE BITARTRATE 20 MCG/MIN: 1 INJECTION, SOLUTION, CONCENTRATE INTRAVENOUS at 09:56

## 2021-01-01 RX ADMIN — VANCOMYCIN HYDROCHLORIDE 750 MG: 750 INJECTION, SOLUTION INTRAVENOUS at 16:19

## 2021-01-01 RX ADMIN — ALBUMIN (HUMAN) 25 G: 0.25 INJECTION, SOLUTION INTRAVENOUS at 20:12

## 2021-01-01 RX ADMIN — RIFAXIMIN 550 MG: 550 TABLET ORAL at 08:27

## 2021-01-01 RX ADMIN — PANTOPRAZOLE SODIUM 40 MG: 40 INJECTION, POWDER, FOR SOLUTION INTRAVENOUS at 09:27

## 2021-01-01 RX ADMIN — MIDAZOLAM HYDROCHLORIDE 50 MG/HR: 5 INJECTION, SOLUTION INTRAMUSCULAR; INTRAVENOUS at 18:41

## 2021-01-01 RX ADMIN — Medication 2 MG/KG/HR: at 09:28

## 2021-01-01 RX ADMIN — FOLIC ACID 1 MG: 1 TABLET ORAL at 08:47

## 2021-01-01 RX ADMIN — SODIUM CHLORIDE 200 MG: 9 INJECTION, SOLUTION INTRAVENOUS at 22:58

## 2021-01-01 RX ADMIN — FUROSEMIDE 40 MG: 10 INJECTION, SOLUTION INTRAVENOUS at 05:57

## 2021-01-01 RX ADMIN — THIAMINE HCL TAB 100 MG 100 MG: 100 TAB at 09:18

## 2021-01-01 RX ADMIN — LACTULOSE 30 G: 20 SOLUTION ORAL at 09:10

## 2021-01-01 RX ADMIN — POTASSIUM CHLORIDE 40 MEQ: 29.8 INJECTION, SOLUTION INTRAVENOUS at 12:21

## 2021-01-01 RX ADMIN — PANTOPRAZOLE SODIUM 40 MG: 40 INJECTION, POWDER, FOR SOLUTION INTRAVENOUS at 10:11

## 2021-01-01 RX ADMIN — LIDOCAINE 5% 1 PATCH: 700 PATCH TOPICAL at 08:41

## 2021-01-01 RX ADMIN — ALBUMIN (HUMAN) 25 G: 0.25 INJECTION, SOLUTION INTRAVENOUS at 08:08

## 2021-01-01 RX ADMIN — COSYNTROPIN 0.25 MG: 0.25 INJECTION, POWDER, LYOPHILIZED, FOR SOLUTION INTRAMUSCULAR; INTRAVENOUS at 17:39

## 2021-01-01 RX ADMIN — SODIUM CHLORIDE 75 ML/HR: 0.9 INJECTION, SOLUTION INTRAVENOUS at 09:20

## 2021-01-01 RX ADMIN — FUROSEMIDE 60 MG: 10 INJECTION, SOLUTION INTRAMUSCULAR; INTRAVENOUS at 17:36

## 2021-01-01 RX ADMIN — HEPARIN SODIUM 5000 UNITS: 5000 INJECTION INTRAVENOUS; SUBCUTANEOUS at 14:46

## 2021-01-01 RX ADMIN — MIDAZOLAM HYDROCHLORIDE 60 MG/HR: 5 INJECTION, SOLUTION INTRAMUSCULAR; INTRAVENOUS at 03:47

## 2021-01-01 RX ADMIN — NOREPINEPHRINE BITARTRATE 5 MCG/MIN: 1 INJECTION, SOLUTION, CONCENTRATE INTRAVENOUS at 06:07

## 2021-01-01 RX ADMIN — PROPOFOL 100 MCG/KG/MIN: 10 INJECTION, EMULSION INTRAVENOUS at 03:05

## 2021-01-01 RX ADMIN — POTASSIUM CHLORIDE 40 MEQ: 20 SOLUTION ORAL at 17:03

## 2021-01-01 RX ADMIN — LACOSAMIDE 200 MG: 10 SOLUTION ORAL at 21:36

## 2021-01-01 RX ADMIN — LEVETIRACETAM 1500 MG: 100 INJECTION, SOLUTION INTRAVENOUS at 16:20

## 2021-01-01 RX ADMIN — DIAZEPAM 10 MG: 10 TABLET ORAL at 21:36

## 2021-01-01 RX ADMIN — CHOLECALCIFEROL TAB 10 MCG (400 UNIT) 400 UNITS: 10 TAB at 11:13

## 2021-01-01 RX ADMIN — LACTULOSE 30 G: 20 SOLUTION ORAL at 22:55

## 2021-01-01 RX ADMIN — ACETAMINOPHEN 650 MG: 325 TABLET, COATED ORAL at 09:24

## 2021-01-01 RX ADMIN — Medication 20 MG: at 06:38

## 2021-01-01 RX ADMIN — NICOTINE 1 PATCH: 14 PATCH, EXTENDED RELEASE TRANSDERMAL at 10:00

## 2021-01-01 RX ADMIN — DEXTROSE 150 ML/HR: 5 SOLUTION INTRAVENOUS at 23:58

## 2021-01-01 RX ADMIN — AMPICILLIN SODIUM AND SULBACTAM SODIUM 3 G: 100; 50 INJECTION, POWDER, FOR SOLUTION INTRAMUSCULAR; INTRAVENOUS at 03:54

## 2021-01-01 RX ADMIN — LORAZEPAM 5 MG: 2 INJECTION INTRAMUSCULAR; INTRAVENOUS at 15:04

## 2021-01-01 RX ADMIN — RIFAXIMIN 550 MG: 550 TABLET ORAL at 09:18

## 2021-01-01 RX ADMIN — NOREPINEPHRINE BITARTRATE 8 MCG/MIN: 1 INJECTION, SOLUTION, CONCENTRATE INTRAVENOUS at 02:50

## 2021-01-01 RX ADMIN — CHLORHEXIDINE GLUCONATE 0.12% ORAL RINSE 15 ML: 1.2 LIQUID ORAL at 08:05

## 2021-01-01 RX ADMIN — DEXTROSE 50 ML/HR: 5 SOLUTION INTRAVENOUS at 03:21

## 2021-01-01 RX ADMIN — AMPICILLIN SODIUM AND SULBACTAM SODIUM 3 G: 100; 50 INJECTION, POWDER, FOR SOLUTION INTRAMUSCULAR; INTRAVENOUS at 16:34

## 2021-01-01 RX ADMIN — CEFTRIAXONE SODIUM 1000 MG: 10 INJECTION, POWDER, FOR SOLUTION INTRAVENOUS at 09:59

## 2021-01-01 RX ADMIN — LEVETIRACETAM 1500 MG: 100 INJECTION, SOLUTION INTRAVENOUS at 08:22

## 2021-01-01 RX ADMIN — PROPOFOL 100 MCG/KG/MIN: 10 INJECTION, EMULSION INTRAVENOUS at 19:58

## 2021-01-01 RX ADMIN — VECURONIUM BROMIDE 10 MG: 1 INJECTION, POWDER, LYOPHILIZED, FOR SOLUTION INTRAVENOUS at 19:33

## 2021-01-01 RX ADMIN — MIDAZOLAM HYDROCHLORIDE 60 MG/HR: 5 INJECTION, SOLUTION INTRAMUSCULAR; INTRAVENOUS at 21:24

## 2021-01-01 RX ADMIN — VASOPRESSIN 0.04 UNITS/MIN: 20 INJECTION INTRAVENOUS at 01:16

## 2021-01-01 RX ADMIN — THIAMINE HCL TAB 100 MG 100 MG: 100 TAB at 08:35

## 2021-01-01 RX ADMIN — CEFTRIAXONE SODIUM 1000 MG: 10 INJECTION, POWDER, FOR SOLUTION INTRAVENOUS at 10:43

## 2021-01-01 RX ADMIN — MIDAZOLAM HYDROCHLORIDE 60 MG/HR: 5 INJECTION, SOLUTION INTRAMUSCULAR; INTRAVENOUS at 10:52

## 2021-01-01 RX ADMIN — LORAZEPAM 1 MG: 2 INJECTION INTRAMUSCULAR; INTRAVENOUS at 17:00

## 2021-01-01 RX ADMIN — Medication 2 MG/KG/HR: at 22:59

## 2021-01-01 RX ADMIN — KETAMINE HYDROCHLORIDE 1 MG/KG/HR: 50 INJECTION, SOLUTION INTRAMUSCULAR; INTRAVENOUS at 01:02

## 2021-01-01 RX ADMIN — POTASSIUM CHLORIDE 40 MEQ: 20 SOLUTION ORAL at 10:01

## 2021-01-01 RX ADMIN — LACTULOSE 30 G: 20 SOLUTION ORAL at 22:59

## 2021-01-01 RX ADMIN — NOREPINEPHRINE BITARTRATE 7 MCG/MIN: 1 INJECTION, SOLUTION, CONCENTRATE INTRAVENOUS at 02:19

## 2021-01-01 RX ADMIN — SODIUM CHLORIDE 100 MG: 9 INJECTION, SOLUTION INTRAVENOUS at 10:14

## 2021-01-01 RX ADMIN — MIDAZOLAM HYDROCHLORIDE 60 MG/HR: 5 INJECTION, SOLUTION INTRAMUSCULAR; INTRAVENOUS at 12:10

## 2021-01-01 RX ADMIN — HYDROCORTISONE SODIUM SUCCINATE 100 MG: 100 INJECTION, POWDER, FOR SOLUTION INTRAMUSCULAR; INTRAVENOUS at 22:09

## 2021-01-01 RX ADMIN — CHLORHEXIDINE GLUCONATE 0.12% ORAL RINSE 15 ML: 1.2 LIQUID ORAL at 08:09

## 2021-01-01 RX ADMIN — DIAZEPAM 10 MG: 10 TABLET ORAL at 15:10

## 2021-01-01 RX ADMIN — AMPICILLIN SODIUM AND SULBACTAM SODIUM 3 G: 100; 50 INJECTION, POWDER, FOR SOLUTION INTRAMUSCULAR; INTRAVENOUS at 03:50

## 2021-01-01 RX ADMIN — LORAZEPAM 2 MG: 2 INJECTION INTRAMUSCULAR; INTRAVENOUS at 22:20

## 2021-01-01 RX ADMIN — AMPICILLIN SODIUM AND SULBACTAM SODIUM 3 G: 100; 50 INJECTION, POWDER, FOR SOLUTION INTRAMUSCULAR; INTRAVENOUS at 23:30

## 2021-01-01 RX ADMIN — METOCLOPRAMIDE HYDROCHLORIDE 10 MG: 5 INJECTION INTRAMUSCULAR; INTRAVENOUS at 05:52

## 2021-01-01 RX ADMIN — MIDAZOLAM HYDROCHLORIDE 60 MG/HR: 5 INJECTION, SOLUTION INTRAMUSCULAR; INTRAVENOUS at 12:29

## 2021-01-01 RX ADMIN — FOLIC ACID 1 MG: 5 INJECTION, SOLUTION INTRAMUSCULAR; INTRAVENOUS; SUBCUTANEOUS at 13:00

## 2021-01-01 RX ADMIN — CHLORHEXIDINE GLUCONATE 0.12% ORAL RINSE 15 ML: 1.2 LIQUID ORAL at 21:23

## 2021-01-01 RX ADMIN — MIDAZOLAM HYDROCHLORIDE 60 MG/HR: 5 INJECTION, SOLUTION INTRAMUSCULAR; INTRAVENOUS at 18:05

## 2021-01-01 RX ADMIN — MIDAZOLAM HYDROCHLORIDE 60 MG/HR: 5 INJECTION, SOLUTION INTRAMUSCULAR; INTRAVENOUS at 14:40

## 2021-01-01 RX ADMIN — LACTULOSE 30 G: 20 SOLUTION ORAL at 19:21

## 2021-01-01 RX ADMIN — NICOTINE 1 PATCH: 14 PATCH, EXTENDED RELEASE TRANSDERMAL at 09:31

## 2021-01-01 RX ADMIN — Medication 20 MG: at 06:06

## 2021-01-01 RX ADMIN — LORAZEPAM 2 MG: 2 INJECTION INTRAMUSCULAR at 17:37

## 2021-01-01 RX ADMIN — NOREPINEPHRINE BITARTRATE 4 MG: 1 INJECTION, SOLUTION, CONCENTRATE INTRAVENOUS at 11:00

## 2021-01-01 RX ADMIN — MAGNESIUM SULFATE HEPTAHYDRATE 2 G: 40 INJECTION, SOLUTION INTRAVENOUS at 12:25

## 2021-01-01 RX ADMIN — INSULIN LISPRO 1 UNITS: 100 INJECTION, SOLUTION INTRAVENOUS; SUBCUTANEOUS at 12:46

## 2021-01-01 RX ADMIN — ACYCLOVIR SODIUM 650 MG: 50 INJECTION, SOLUTION INTRAVENOUS at 04:53

## 2021-01-01 RX ADMIN — Medication 1 PATCH: at 09:18

## 2021-01-01 RX ADMIN — DEXTROSE 50 ML/HR: 5 SOLUTION INTRAVENOUS at 11:28

## 2021-01-01 RX ADMIN — PROPOFOL 80 MCG/KG/MIN: 10 INJECTION, EMULSION INTRAVENOUS at 07:12

## 2021-01-01 RX ADMIN — NOREPINEPHRINE BITARTRATE 16 MCG/MIN: 1 INJECTION, SOLUTION, CONCENTRATE INTRAVENOUS at 03:23

## 2021-01-01 RX ADMIN — Medication 2 MG/KG/HR: at 13:44

## 2021-01-01 RX ADMIN — NOREPINEPHRINE BITARTRATE 15 MCG/MIN: 1 INJECTION, SOLUTION, CONCENTRATE INTRAVENOUS at 22:03

## 2021-01-01 RX ADMIN — KETAMINE HYDROCHLORIDE 1 MG/KG/HR: 50 INJECTION, SOLUTION INTRAMUSCULAR; INTRAVENOUS at 05:05

## 2021-01-01 RX ADMIN — MIDAZOLAM HYDROCHLORIDE 60 MG/HR: 5 INJECTION, SOLUTION INTRAMUSCULAR; INTRAVENOUS at 22:20

## 2021-01-01 RX ADMIN — POTASSIUM CHLORIDE 20 MEQ: 14.9 INJECTION, SOLUTION INTRAVENOUS at 13:42

## 2021-01-01 RX ADMIN — POTASSIUM CHLORIDE 40 MEQ: 29.8 INJECTION, SOLUTION INTRAVENOUS at 02:23

## 2021-01-01 RX ADMIN — Medication 2 MG/KG/HR: at 23:41

## 2021-01-01 RX ADMIN — SODIUM CHLORIDE, SODIUM GLUCONATE, SODIUM ACETATE, POTASSIUM CHLORIDE, MAGNESIUM CHLORIDE, SODIUM PHOSPHATE, DIBASIC, AND POTASSIUM PHOSPHATE 100 ML/HR: .53; .5; .37; .037; .03; .012; .00082 INJECTION, SOLUTION INTRAVENOUS at 18:18

## 2021-01-01 RX ADMIN — DEXTROSE 150 ML/HR: 5 SOLUTION INTRAVENOUS at 22:37

## 2021-01-01 RX ADMIN — LACOSAMIDE 200 MG: 10 SOLUTION ORAL at 08:26

## 2021-01-01 RX ADMIN — PROPOFOL 60 MCG/KG/MIN: 10 INJECTION, EMULSION INTRAVENOUS at 11:12

## 2021-01-01 RX ADMIN — NOREPINEPHRINE BITARTRATE 9 MCG/MIN: 1 INJECTION, SOLUTION, CONCENTRATE INTRAVENOUS at 12:16

## 2021-01-01 RX ADMIN — LEVETIRACETAM 2500 MG: 100 INJECTION, SOLUTION INTRAVENOUS at 09:18

## 2021-01-01 RX ADMIN — DIAZEPAM 10 MG: 10 TABLET ORAL at 09:26

## 2021-01-01 RX ADMIN — GADOBUTROL 8 ML: 604.72 INJECTION INTRAVENOUS at 15:50

## 2021-01-01 RX ADMIN — THIAMINE HYDROCHLORIDE 100 MG: 100 INJECTION, SOLUTION INTRAMUSCULAR; INTRAVENOUS at 08:40

## 2021-01-01 RX ADMIN — MAGNESIUM SULFATE HEPTAHYDRATE 2 G: 40 INJECTION, SOLUTION INTRAVENOUS at 08:56

## 2021-01-01 RX ADMIN — ALBUMIN (HUMAN) 25 G: 0.25 INJECTION, SOLUTION INTRAVENOUS at 13:30

## 2021-01-01 RX ADMIN — ALBUMIN (HUMAN) 25 G: 0.25 INJECTION, SOLUTION INTRAVENOUS at 03:32

## 2021-01-01 RX ADMIN — ALBUMIN (HUMAN) 25 G: 0.25 INJECTION, SOLUTION INTRAVENOUS at 02:56

## 2021-01-01 RX ADMIN — PROPOFOL 30 MCG/KG/MIN: 10 INJECTION, EMULSION INTRAVENOUS at 15:27

## 2021-01-01 RX ADMIN — ALBUMIN (HUMAN) 25 G: 0.25 INJECTION, SOLUTION INTRAVENOUS at 15:11

## 2021-01-01 RX ADMIN — DIAZEPAM 5 MG: 5 TABLET ORAL at 03:40

## 2021-01-01 RX ADMIN — LEVETIRACETAM 2500 MG: 100 SOLUTION ORAL at 19:33

## 2021-01-01 RX ADMIN — MIDAZOLAM HYDROCHLORIDE 60 MG/HR: 5 INJECTION, SOLUTION INTRAMUSCULAR; INTRAVENOUS at 01:55

## 2021-01-01 RX ADMIN — MIDAZOLAM HYDROCHLORIDE 60 MG/HR: 5 INJECTION, SOLUTION INTRAMUSCULAR; INTRAVENOUS at 11:57

## 2021-01-01 RX ADMIN — LACTULOSE 30 G: 20 SOLUTION ORAL at 12:15

## 2021-01-01 RX ADMIN — LACTULOSE 30 G: 20 SOLUTION ORAL at 17:38

## 2021-01-01 RX ADMIN — MIDAZOLAM HYDROCHLORIDE 60 MG/HR: 5 INJECTION, SOLUTION INTRAMUSCULAR; INTRAVENOUS at 19:53

## 2021-01-01 RX ADMIN — DEXTROSE 150 ML/HR: 5 SOLUTION INTRAVENOUS at 17:38

## 2021-01-01 RX ADMIN — FENTANYL CITRATE 50 MCG: 50 INJECTION INTRAMUSCULAR; INTRAVENOUS at 19:14

## 2021-01-01 RX ADMIN — MIDAZOLAM HYDROCHLORIDE 40 MG/HR: 5 INJECTION, SOLUTION INTRAMUSCULAR; INTRAVENOUS at 02:09

## 2021-01-01 RX ADMIN — CHLORHEXIDINE GLUCONATE 0.12% ORAL RINSE 15 ML: 1.2 LIQUID ORAL at 09:20

## 2021-01-01 RX ADMIN — MIDAZOLAM 10 MG: 1 INJECTION INTRAMUSCULAR; INTRAVENOUS at 19:50

## 2021-01-01 RX ADMIN — SENNOSIDES 8.8 MG: 8.8 SYRUP ORAL at 08:05

## 2021-01-01 RX ADMIN — THIAMINE HCL TAB 100 MG 100 MG: 100 TAB at 09:26

## 2021-01-01 RX ADMIN — DIAZEPAM 10 MG: 10 TABLET ORAL at 09:18

## 2021-01-01 RX ADMIN — ALBUMIN (HUMAN) 25 G: 0.25 INJECTION, SOLUTION INTRAVENOUS at 07:56

## 2021-01-01 RX ADMIN — POTASSIUM CHLORIDE 40 MEQ: 1500 TABLET, EXTENDED RELEASE ORAL at 09:56

## 2021-01-01 RX ADMIN — MIDAZOLAM HYDROCHLORIDE 50 MG/HR: 5 INJECTION, SOLUTION INTRAMUSCULAR; INTRAVENOUS at 16:25

## 2021-01-01 RX ADMIN — MIDAZOLAM HYDROCHLORIDE 60 MG/HR: 5 INJECTION, SOLUTION INTRAMUSCULAR; INTRAVENOUS at 08:30

## 2021-01-01 RX ADMIN — MIDAZOLAM HYDROCHLORIDE 20 MG/HR: 5 INJECTION, SOLUTION INTRAMUSCULAR; INTRAVENOUS at 17:00

## 2021-01-01 RX ADMIN — Medication 70 MG: at 23:24

## 2021-01-01 RX ADMIN — PROPOFOL 100 MCG/KG/MIN: 10 INJECTION, EMULSION INTRAVENOUS at 09:58

## 2021-01-01 RX ADMIN — LEVETIRACETAM 1500 MG: 100 INJECTION, SOLUTION INTRAVENOUS at 21:04

## 2021-01-01 RX ADMIN — DEXTROSE 50 ML/HR: 5 SOLUTION INTRAVENOUS at 16:28

## 2021-01-01 RX ADMIN — ALBUMIN (HUMAN) 25 G: 0.25 INJECTION, SOLUTION INTRAVENOUS at 21:45

## 2021-01-01 RX ADMIN — MIDAZOLAM HYDROCHLORIDE 30 MG/HR: 5 INJECTION, SOLUTION INTRAMUSCULAR; INTRAVENOUS at 11:32

## 2021-01-01 RX ADMIN — DIAZEPAM 10 MG: 10 TABLET ORAL at 15:33

## 2021-01-01 RX ADMIN — METOCLOPRAMIDE HYDROCHLORIDE 10 MG: 5 INJECTION INTRAMUSCULAR; INTRAVENOUS at 00:06

## 2021-01-01 RX ADMIN — LEVALBUTEROL HYDROCHLORIDE 1.25 MG: 1.25 SOLUTION, CONCENTRATE RESPIRATORY (INHALATION) at 13:27

## 2021-01-01 RX ADMIN — DEXTROSE 150 ML/HR: 5 SOLUTION INTRAVENOUS at 19:33

## 2021-01-01 RX ADMIN — NOREPINEPHRINE BITARTRATE 16 MCG/MIN: 1 INJECTION, SOLUTION, CONCENTRATE INTRAVENOUS at 06:31

## 2021-02-15 NOTE — PROGRESS NOTES
Assessment:  1  Chronic pain syndrome    2  Lumbar spondylosis    3  Degeneration of intervertebral disc of lumbar region        Plan:  Lisa Rodriguez is a 64 y o  female who was last seen 7/17/2020 presents for a follow up office visit in regards to chronic pain syndrome secondary to lumbar spondylosis, and lumbar degenerative disc disease  The patient states she will reschedule another appointment with Dr Viv Joiner for evaluation  At this time, she continues to express no interest in steroid injections, or in completing the x-ray  The patient stated she would follow-up with our office after she sees Dr Viv Joiner  The patient was ordered gabapentin on 08/04/2020 with 1 refill  The patient was instructed to call for additional refills if needed prior to next scheduled office visit  My impressions and treatment recommendations were discussed in detail with the patient who verbalized understanding and had no further questions  Discharge instructions were provided  I personally saw and examined the patient and I agree with the above discussed plan of care  No orders of the defined types were placed in this encounter  No orders of the defined types were placed in this encounter  History of Present Illness:  Lisa Rodriguez is a 64 y o  female who was last seen 7/17/2020 presents for a follow up office visit in regards to chronic pain syndrome secondary to lumbar spondylosis, and lumbar degenerative disc disease  The patients current symptoms include Leg Pain; Back Pain; and Shoulder Pain  The patient currently reports ongoing low back pain and bilateral leg pain that is worse since the last office visit  The patient describes her pain as constant, worse in the morning and at night, and as burning, sharp, cramping, pressure-like pain with numbness and pins and needles  The patient reports bilateral leg weakness, but denies bowel or bladder dysfunction    The patient currently rates her pain as 10/10 on numeric rating scale  At the last office visit, Dr Jarret Guthrie discussed epidural steroid injections with the patient  The patient was apprehensive about injections, and a bending lumbar x-ray was ordered to rule out instability  The patient was also given a referral to see Dr Lois Membreno for surgical evaluation  The patient reports she was very upset that she was not informed that Dr Jarret Guthrie was leaving [facility] and canceled her appointment with Dr Lois Membreno and refused to get the x-ray  The patient states that the only reason she came to her appointment today was because she was told she could not have refills on gabapentin unless she was seen by the nurse practitioner  Current pain medications include:  Gabapentin 600 mg at bedtime, and baclofen 20 mg daily  The patient reports this pain medication regimen provides minimal to no relief of her low back pain symptoms  I have personally reviewed and/or updated the patient's past medical history, past surgical history, family history, social history, current medications, allergies, and vital signs today  Review of Systems   Constitutional: Negative for fever and unexpected weight change  HENT: Negative for trouble swallowing  Eyes: Negative for visual disturbance  Respiratory: Negative for shortness of breath and wheezing  Cardiovascular: Negative for chest pain and palpitations  Gastrointestinal: Negative for constipation, diarrhea, nausea and vomiting  Endocrine: Negative for cold intolerance, heat intolerance and polydipsia  Genitourinary: Negative for difficulty urinating and frequency  Musculoskeletal: Positive for back pain, gait problem and joint swelling  Negative for arthralgias and myalgias  BLE & BUE   Skin: Negative for rash  Neurological: Positive for weakness  Negative for dizziness, seizures, syncope and headaches  Hematological: Does not bruise/bleed easily     Psychiatric/Behavioral: Negative for dysphoric mood    All other systems reviewed and are negative  Patient Active Problem List   Diagnosis    Degeneration of intervertebral disc of lumbar region    Lumbar spondylosis    Mass of left lobe of liver    Chronic pain syndrome       Past Medical History:   Diagnosis Date    Arthritis     Cirrhosis ([address] )     Extremity pain     BLE    Hepatitis C     Joint pain     Bilateral Shoulders    Liver disease     Low back pain        Past Surgical History:   Procedure Laterality Date    CT GUIDED AND MONITORING PARENCHYMAL TISSUE ABLATION  7/28/2020    ECTOPIC PREGNANCY SURGERY      MOUTH SURGERY      TONSILLECTOMY         Family History   Problem Relation Age of Onset    Alcohol abuse Mother     Heart disease Father        Social History     Occupational History    Not on file   Tobacco Use    Smoking status: Current Every Day Smoker     Packs/day: 0 50    Smokeless tobacco: Never Used   Substance and Sexual Activity    Alcohol use: Not Currently     Frequency: Monthly or less     Drinks per session: 1 or 2    Drug use: Yes     Frequency: 7 0 times per week     Types: Marijuana     Comment: everyday    Sexual activity: Not on file       Current Outpatient Medications on File Prior to Visit   Medication Sig    baclofen 10 mg tablet Take 10 mg by mouth    cholecalciferol (VITAMIN D3) 1,000 units tablet     furosemide (LASIX) 40 mg tablet Take 40 mg by mouth    gabapentin (NEURONTIN) 300 mg capsule Take 1 capsule (300 mg total) by mouth 2 (two) times a day    spironolactone (ALDACTONE) 100 mg tablet TAKE 1 & 1/2 TABLETS BY MOUTH DAILY    Thiamine HCl (VITAMIN B-1) 100 MG TABS Take by mouth     No current facility-administered medications on file prior to visit          No Known Allergies    Physical Exam:    /76   Pulse 88   Temp 98 4 °F (36 9 °C) (Temporal)   Resp 18   Ht 5' 8" (1 727 m)   Wt 78 5 kg (173 lb)   BMI 26 30 kg/m²     Constitutional:normal, well developed, well nourished, alert, in no distress and non-toxic and no overt pain behavior   and overweight  Eyes:anicteric  HEENT:grossly intact  Neck:supple, symmetric, trachea midline and no masses   Pulmonary:even and unlabored  Cardiovascular:No edema or pitting edema present  Skin:Normal without rashes or lesions and well hydrated  Psychiatric:Mood and affect appropriate  Neurologic:Cranial Nerves II-XII grossly intact  Musculoskeletal:normal    Imaging No

## 2021-03-04 NOTE — PROGRESS NOTES
Surgical Oncology Follow Up       [facility]  [facility] [place]  [address]  [facility] [place] [address]    Sandee Joya  1960  [id number]  [facility]  [facility]  [address]  [facility] [place] [address]    Diagnoses and all orders for this visit:    Hepatocellular carcinoma ([address] )        No chief complaint on file  No follow-ups on file  Oncology History    No history exists  Staging: [address]   Treatment history:   Percutaneous liver ablation, July 2020  Current treatment:  observation  Disease status:   Questionable residual enhancement    History of Present Illness:  59-year-old female returns in follow-up of her [address]  Her MRI from January 28, 2021 reveals the treated [address]  is decreasing in size, and now measures 2 1 x 2 7 cm  Anterior and inferior to the treated lesion, this area has actually increased in size measuring 4 4 x 3 6 cm  There was thrombosis of the portal vein as well as dilatation of biliary system  I personally reviewed the films  The patient states she is feeling well, but she also states she is occasionally dizzy  She had to be transported here in a wheelchair  She denies any jaundice  Denies weight loss  Review of Systems  Complete ROS Surg Onc:   Complete ROS Surg Onc:   Constitutional: The patient   Has fatigue  Eyes: No complaints of visual problems, no scleral icterus  ENT: no complaints of ear pain, no hoarseness, no difficulty swallowing,  no tinnitus and no new masses in head, oral cavity, or neck  Cardiovascular: No complaints of chest pain, no palpitations, no ankle edema  Respiratory: No complaints of shortness of breath, no cough  Gastrointestinal: No complaints of jaundice, no bloody stools, no pale stools  Genitourinary: No complaints of dysuria, no hematuria, no nocturia, no frequent urination, no urethral discharge  Musculoskeletal: No complaints of weakness, paralysis, joint stiffness or arthralgias  Integumentary: No complaints of rash, no new lesions  Neurological: No complaints of convulsions, no seizures, no dizziness  Hematologic/Lymphatic: No complaints of easy bruising  Endocrine:  No hot or cold intolerance  No polydipsia, polyphagia, or polyuria  Allergy/immunology:  No environmental allergies  No food allergies  Not immunocompromised  Skin:  No pallor or rash  No wound          Patient Active Problem List   Diagnosis    Degeneration of intervertebral disc of lumbar region    Lumbar spondylosis    Mass of left lobe of liver    Chronic pain syndrome    Hepatocellular carcinoma (HCC)     Past Medical History:   Diagnosis Date    Arthritis     [facility] )     Extremity pain     BLE    Hepatitis C     Joint pain     Bilateral Shoulders    Liver disease     Low back pain      Past Surgical History:   Procedure Laterality Date    CT GUIDED AND MONITORING PARENCHYMAL TISSUE ABLATION  7/28/2020    ECTOPIC PREGNANCY SURGERY      MOUTH SURGERY      TONSILLECTOMY       Family History   Problem Relation Age of Onset    Alcohol abuse Mother     Heart disease Father      Social History     Socioeconomic History    Marital status:      Spouse name: Not on file    Number of children: Not on file    Years of education: Not on file    Highest education level: Not on file   Occupational History    Not on file   Social Needs    Financial resource strain: Not on file    Food insecurity     Worry: Not on file     Inability: Not on file    Transportation needs     Medical: Not on file     Non-medical: Not on file   Tobacco Use    Smoking status: Current Every Day Smoker     Packs/day: 0 50    Smokeless tobacco: Never Used   Substance and Sexual Activity    Alcohol use: Not Currently     Frequency: Monthly or less     Drinks per session: 1 or 2    Drug use: Yes     Frequency: 7 0 times per week     Types: Marijuana     Comment: everyday    Sexual activity: Not on file   Lifestyle    Physical activity     Days per week: Not on file     Minutes per session: Not on file    Stress: Not on file   Relationships    Social connections     Talks on phone: Not on file     Gets together: Not on file     Attends Sabianism service: Not on file     Active member of club or organization: Not on file     Attends meetings of clubs or organizations: Not on file     Relationship status: Not on file    Intimate partner violence     Fear of current or ex partner: Not on file     Emotionally abused: Not on file     Physically abused: Not on file     Forced sexual activity: Not on file   Other Topics Concern    Not on file   Social History Narrative    Not on file       Current Outpatient Medications:     baclofen 10 mg tablet, Take 10 mg by mouth, Disp: , Rfl:     cholecalciferol (VITAMIN D3) 1,000 units tablet, , Disp: , Rfl:     furosemide (LASIX) 40 mg tablet, Take 40 mg by mouth, Disp: , Rfl:     gabapentin (NEURONTIN) 300 mg capsule, Take 1 capsule (300 mg total) by mouth 2 (two) times a day, Disp: 60 capsule, Rfl: 1    spironolactone (ALDACTONE) 100 mg tablet, TAKE 1 & 1/2 TABLETS BY MOUTH DAILY, Disp: , Rfl:   No Known Allergies  Vitals:    03/04/21 1242   BP: 136/90   Pulse: 91   Resp: 18   Temp: 98 6 °F (37 °C)       Physical Exam  Constitutional: General appearance: The Patient is well-developed and well-nourished who appears the stated age, but appears somewhat ill  Patient is pleasant and talkative  HEENT:  Normocephalic  Sclerae are anicteric  Mucous membranes are moist  Neck is supple without adenopathy  No JVD  Chest: The lungs are clear to auscultation  Cardiac: Heart is regular rate  Abdomen: Abdomen is soft, non-tender, non-distended and without masses  Extremities: There is no clubbing or cyanosis  There is no edema  Symmetric    Neuro: Grossly nonfocal  Gait is normal      Lymphatic: No evidence of cervical adenopathy bilaterally  No evidence of axillary adenopathy bilaterally  Skin: Warm, anicteric  Psych:  Patient is pleasant and talkative  Breasts:        Pathology:  [unfilled]    Labs:   Ref Range & Units 1/26/21 11:40 AM   AFP TUMOR MARKER 0 5 - 8 ng/mL 5 8          Imaging  No results found  I reviewed the above laboratory and imaging data  Discussion/Summary: 70-year-old female with hepatitis C status post percutaneous ablation a LIRADS 5 lesion  Her AFP level is normal    Her MRI findings may all be postprocedural   we will present her case at upper GI working group for recommendations  It is unclear if this will require short-term follow-up for any further intervention  Of concern is her appearance as well as her dizziness  I have recommended that she go to the ER for evaluation  She has declined  She states that she is working very hard to pack as she is moving to [place]  Consequently, I will call her with the results of upper GI working group recommendations  If she is going to have treatment here we will schedule this  Otherwise she will be encouraged to have treatment and elkin  Once again just based on her appearance, we have recommended that she go to the emergency room and she declined multiple times  She declined assistance in obtaining a ride home as well  She is agreeable to this  All her questions were answered

## 2021-03-11 PROBLEM — F10.10 ALCOHOL ABUSE: Status: ACTIVE | Noted: 2021-01-01

## 2021-03-11 PROBLEM — N17.9 AKI (ACUTE KIDNEY INJURY) (HCC): Status: ACTIVE | Noted: 2021-01-01

## 2021-03-11 PROBLEM — R17 SERUM TOTAL BILIRUBIN ELEVATED: Status: ACTIVE | Noted: 2021-01-01

## 2021-03-11 PROBLEM — E87.5 HYPERKALEMIA: Status: ACTIVE | Noted: 2021-01-01

## 2021-03-11 PROBLEM — G93.40 ACUTE ENCEPHALOPATHY: Status: ACTIVE | Noted: 2021-01-01

## 2021-03-11 PROBLEM — K74.60 CIRRHOSIS (HCC): Status: ACTIVE | Noted: 2021-01-01

## 2021-03-11 NOTE — PROGRESS NOTES
RECTAL/GI MULTIDISCIPLINARY CASE REVIEW    DATE: 3/11/21      PRESENTING DOCTOR: Dr Greg Carrasco      DIAGNOSIS: Hepatocellular Carcinoma      Jaylan Devi was presented at the Rectal/GI Multidisciplinary Conference today  PHYSICIAN RECOMMENDED PLAN:    -MRI in 6 months, along with CMP  -Follow up with Dr Greg Carrasco in 6 months after MRI    -Attempt to outreach patient to see if she was agreeable, phone rings, no option to leave message, will attempt outreach again, will await to schedule until can discuss with patient    Team agreed to plan      NCCN guidelines were readily available for review at this discussion

## 2021-03-11 NOTE — PROGRESS NOTES
Call to [facility] to inform her of the recommendations of the working group this morning and ask if she is willing to have me schedule the appointments recommended, the phone rings and then asked to enter a code, there is no way to leave a voicemail message, will try to contact her again at another time, will not schedule the appointments until I speak with her

## 2021-03-11 NOTE — Clinical Note
Case was discussed with SHEFALI and the patient's admission status was agreed to be Admission Status: inpatient status to the service of

## 2021-03-12 NOTE — ASSESSMENT & PLAN NOTE
Likely hepatic encephalopathy from worsening liver dysfunction alcohol abuse  Currently alert but not answering questions appropriately  Will place on lactulose

## 2021-03-12 NOTE — ED PROVIDER NOTES
History  Chief Complaint   Patient presents with    Alcohol Intoxication     PT FRIENDS CALLED EMS AND REPORTED THAT THE PT HAS BEEN ON A RAMIREZ FOR THE PAST WEEK OR SO AND IS HIGHLY INTOXICATED        History provided by:  Patient   used: No    Altered Mental Status  Presenting symptoms: disorientation    Severity:  Moderate  Most recent episode: Today  Episode history:  Unable to specify  Timing:  Constant  Progression:  Waxing and waning  Chronicity:  Recurrent  Context: alcohol use        Prior to Admission Medications   Prescriptions Last Dose Informant Patient Reported? Taking?   baclofen 10 mg tablet Past Week at Unknown time Self Yes Yes   Sig: Take 10 mg by mouth   cholecalciferol (VITAMIN D3) 1,000 units tablet  Self Yes No   furosemide (LASIX) 40 mg tablet Past Week at Unknown time Self Yes Yes   Sig: Take 40 mg by mouth   gabapentin (NEURONTIN) 300 mg capsule Past Week at Unknown time Self No Yes   Sig: Take 1 capsule (300 mg total) by mouth 2 (two) times a day   spironolactone (ALDACTONE) 100 mg tablet Past Week at Unknown time Self Yes Yes   Sig: TAKE 1 & 1/2 TABLETS BY MOUTH DAILY      Facility-Administered Medications: None       Past Medical History:   Diagnosis Date    Arthritis     Cirrhosis (HCC)     Extremity pain     BLE    Hepatitis C     Joint pain     Bilateral Shoulders    Liver disease     Low back pain        Past Surgical History:   Procedure Laterality Date    CT GUIDED AND MONITORING PARENCHYMAL TISSUE ABLATION  7/28/2020    ECTOPIC PREGNANCY SURGERY      MOUTH SURGERY      TONSILLECTOMY         Family History   Problem Relation Age of Onset    Alcohol abuse Mother     Heart disease Father      I have reviewed and agree with the history as documented      E-Cigarette/Vaping    E-Cigarette Use Never User      E-Cigarette/Vaping Substances    Nicotine No     THC No     CBD No     Flavoring No     Other No     Unknown No      Social History Tobacco Use    Smoking status: Current Every Day Smoker     Packs/day: 0 50    Smokeless tobacco: Never Used   Substance Use Topics    Alcohol use: Not Currently     Frequency: Monthly or less     Drinks per session: 1 or 2    Drug use: Yes     Frequency: 7 0 times per week     Types: Marijuana     Comment: everyday       Review of Systems   Unable to perform ROS: Mental status change       Physical Exam  Physical Exam  Vitals signs and nursing note reviewed  Constitutional:       General: She is not in acute distress  Appearance: She is well-developed  She is ill-appearing  She is not toxic-appearing  HENT:      Head: Normocephalic and atraumatic  Mouth/Throat:      Mouth: Mucous membranes are moist       Pharynx: Oropharynx is clear  Eyes:      General: Scleral icterus present  Conjunctiva/sclera: Conjunctivae normal    Neck:      Musculoskeletal: Normal range of motion and neck supple  Cardiovascular:      Rate and Rhythm: Normal rate and regular rhythm  Heart sounds: No murmur  Pulmonary:      Effort: Pulmonary effort is normal  No respiratory distress  Breath sounds: Normal breath sounds  Abdominal:      Palpations: Abdomen is soft  Tenderness: There is no abdominal tenderness  There is no guarding or rebound  Musculoskeletal: Normal range of motion  Skin:     General: Skin is warm and dry  Capillary Refill: Capillary refill takes less than 2 seconds  Neurological:      General: No focal deficit present  Mental Status: She is alert  Comments: Alert and conversive but confused, oriented to self only    Speech is tangential and nonsensical          Vital Signs  ED Triage Vitals   Temperature Pulse Respirations Blood Pressure SpO2   03/11/21 1657 03/11/21 1655 03/11/21 1655 03/11/21 1655 03/11/21 1655   97 9 °F (36 6 °C) 84 20 114/54 99 %      Temp Source Heart Rate Source Patient Position - Orthostatic VS BP Location FiO2 (%)   03/11/21 1657 03/11/21 1655 03/11/21 1655 03/11/21 1655 --   Oral Monitor Lying Right arm       Pain Score       --                  Vitals:    03/11/21 1655 03/11/21 1930   BP: 114/54 93/53   Pulse: 84 77   Patient Position - Orthostatic VS: Lying Lying         Visual Acuity  Visual Acuity      Most Recent Value   L Pupil Size (mm)  3   R Pupil Size (mm)  3          ED Medications  Medications   albumin human (FLEXBUMIN) 25 % injection 25 g (25 g Intravenous New Bag 7/99/12 8779)   folic acid 1 mg, thiamine (VITAMIN B1) 100 mg in sodium chloride 0 9 % 100 mL IV piggyback (has no administration in time range)   lactulose oral solution 20 g (has no administration in time range)   nicotine (NICODERM CQ) 14 mg/24hr TD 24 hr patch 1 patch (has no administration in time range)   acetaminophen (TYLENOL) tablet 650 mg (has no administration in time range)   prednisoLONE (ORAPRED) oral solution 40 mg (has no administration in time range)   insulin regular (HumuLIN R,NovoLIN R) injection 10 Units (10 Units Intravenous Given 3/11/21 2146)   dextrose 50 % IV solution 25 mL (25 mL Intravenous Given 3/11/21 2146)       Diagnostic Studies  Results Reviewed     Procedure Component Value Units Date/Time    Ammonia [[id number]]  (Abnormal) Collected: 03/11/21 1819    Lab Status: Final result Specimen: Blood from Arm, Left Updated: 03/11/21 1908     Ammonia 81 umol/L     Ethanol [[id number]]  (Normal) Collected: 03/11/21 1819    Lab Status: Final result Specimen: Blood from Arm, Left Updated: 03/11/21 1907     Ethanol Lvl <3 mg/dL     Basic metabolic panel [[id number]]  (Abnormal) Collected: 03/11/21 1819    Lab Status: Final result Specimen: Blood from Arm, Left Updated: 03/11/21 1905     Sodium 131 mmol/L      Potassium 5 7 mmol/L      Chloride 91 mmol/L      CO2 26 mmol/L      ANION GAP 14 mmol/L      BUN 73 mg/dL      Creatinine 4 05 mg/dL      Glucose 67 mg/dL      Calcium 9 2 mg/dL      eGFR 11 ml/min/1 73sq m     Narrative:      Consolidated [employer] guidelines for Chronic Kidney Disease (CKD):     Stage 1 with normal or high GFR (GFR > 90 mL/min/1 73 square meters)    Stage 2 Mild CKD (GFR = 60-89 mL/min/1 73 square meters)    Stage 3A Moderate CKD (GFR = 45-59 mL/min/1 73 square meters)    Stage 3B Moderate CKD (GFR = 30-44 mL/min/1 73 square meters)    Stage 4 Severe CKD (GFR = 15-29 mL/min/1 73 square meters)    Stage 5 End Stage CKD (GFR <15 mL/min/1 73 square meters)  Note: GFR calculation is accurate only with a steady state creatinine    Hepatic function panel [[id number]]  (Abnormal) Collected: 03/11/21 1819    Lab Status: Final result Specimen: Blood from Arm, Left Updated: 03/11/21 1905     Total Bilirubin 12 79 mg/dL      Bilirubin, Direct 8 99 mg/dL      Alkaline Phosphatase 84 U/L       U/L      ALT 80 U/L      Total Protein 6 7 g/dL      Albumin 2 3 g/dL     Magnesium [[id number]]  (Abnormal) Collected: 03/11/21 1819    Lab Status: Final result Specimen: Blood from Arm, Left Updated: 03/11/21 1905     Magnesium 3 1 mg/dL     Salicylate level [[id number]]  (Abnormal) Collected: 03/11/21 1819    Lab Status: Final result Specimen: Blood from Arm, Left Updated: 68/16/23 3458     Salicylate Lvl <3 mg/dL     Acetaminophen level-If concentration is detectable, please discuss with medical  on call   [[id number]]  (Abnormal) Collected: 03/11/21 1819    Lab Status: Final result Specimen: Blood from Arm, Left Updated: 03/11/21 1905     Acetaminophen Level <2 0 ug/mL     Troponin I [[id number]]  (Abnormal) Collected: 03/11/21 1819    Lab Status: Final result Specimen: Blood from Arm, Left Updated: 03/11/21 1855     Troponin I 0 09 ng/mL     APTT [[id number]]  (Normal) Collected: 03/11/21 1819    Lab Status: Final result Specimen: Blood from Arm, Left Updated: 03/11/21 1848     PTT 33 seconds     Protime-INR [[id number]]  (Abnormal) Collected: 03/11/21 1819    Lab Status: Final result Specimen: Blood from Arm, Left Updated: 03/11/21 1848     Protime 22 3 seconds      INR 2 07    CBC and differential [[id number]]  (Abnormal) Collected: 03/11/21 1819    Lab Status: Final result Specimen: Blood from Arm, Left Updated: 03/11/21 1829     WBC 10 48 Thousand/uL      RBC 2 21 Million/uL      Hemoglobin 8 8 g/dL      Hematocrit 25 9 %       fL      MCH 39 8 pg      MCHC 34 0 g/dL      RDW 19 7 %      MPV 10 5 fL      Platelets 89 Thousands/uL      nRBC 0 /100 WBCs      Neutrophils Relative 78 %      Immat GRANS % 1 %      Lymphocytes Relative 9 %      Monocytes Relative 12 %      Eosinophils Relative 0 %      Basophils Relative 0 %      Neutrophils Absolute 8 13 Thousands/µL      Immature Grans Absolute 0 07 Thousand/uL      Lymphocytes Absolute 0 98 Thousands/µL      Monocytes Absolute 1 28 Thousand/µL      Eosinophils Absolute 0 01 Thousand/µL      Basophils Absolute 0 01 Thousands/µL     Narrative:       No Clots                 CT head without contrast   Final Result by Romy Stanton MD (03/11 1746)   No acute intracranial abnormality  Workstation performed: [id number]      US right upper quadrant with liver dopplers    (Results Pending)              Procedures  Procedures         ED Course                                           MDM  Number of Diagnoses or Management Options  Acute kidney injury [facility]): new and requires workup  Hepatic encephalopathy [facility]): new and requires workup  Diagnosis management comments: 53-year-old female presented for parent alteration in mental status  Apparently the patient's friends called EMS thinking that she was intoxicated by alcohol  Patient herself was confused and unable to contribute a useful history of present illness  She was alert but oriented only to self, and while she was conversive, her speech was tangential and made little sense    She did perseverate about a gentleman named "Miriamsurjit Youngman" who "went on vacation" and "did not make the bed "    Patient appears to have altered mental status secondary to hepatic encephalopathy and hyperammonemia  Also with relatively severe acute kidney injury with creatinine of 4 0 (normal one month ago) will require admission, p o  Lactulose to start  Fortunately CT scan of the brain is negative for acute pathology  Patient also with mildly elevated troponin  He denied any chest pain or shortness of breath  This is likely secondary to acute kidney injury  Amount and/or Complexity of Data Reviewed  Clinical lab tests: reviewed and ordered  Tests in the radiology section of CPT®: reviewed and ordered  Review and summarize past medical records: yes  Discuss the patient with other providers: yes  Independent visualization of images, tracings, or specimens: yes        Disposition  Final diagnoses:   Hepatic encephalopathy ([facility] )   Acute kidney injury ([facility] )     Time reflects when diagnosis was documented in both MDM as applicable and the Disposition within this note     Time User Action Codes Description Comment    3/11/2021  7:25 PM Ra Galindo Add [K72 90] Hepatic encephalopathy ([facility] )     3/11/2021  8:30 PM Mahesh Mora Add [N17 9] DARRYL (acute kidney injury) ([facility] )     3/11/2021  9:43 PM Ra Galindo Add [N17 9] Acute kidney injury [facility])       ED Disposition     ED Disposition Condition Date/Time Comment    Admit Stable Thu Mar 11, 2021  7:25 PM Case was discussed with SHEFALI and the patient's admission status was agreed to be Admission Status: inpatient status to the service of Dr Nielsen Hidden          Follow-up Information    None         Current Discharge Medication List      CONTINUE these medications which have NOT CHANGED    Details   baclofen 10 mg tablet Take 10 mg by mouth      furosemide (LASIX) 40 mg tablet Take 40 mg by mouth      gabapentin (NEURONTIN) 300 mg capsule Take 1 capsule (300 mg total) by mouth 2 (two) times a day  Qty: 60 capsule, Refills: 1    Comments: May take 2 tabs PO at night  Associated Diagnoses: Degeneration of intervertebral disc of lumbar region; Lumbar radiculopathy      spironolactone (ALDACTONE) 100 mg tablet TAKE 1 & 1/2 TABLETS BY MOUTH DAILY      cholecalciferol (VITAMIN D3) 1,000 units tablet            No discharge procedures on file      PDMP Review     None          ED Provider  Electronically Signed by           José Manuel Mccray MD  03/11/21 8065

## 2021-03-12 NOTE — SPEECH THERAPY NOTE
ST consult requested after she failed RN screen for lethargy  Per CRNP she is clear liquids at this time pending GI consult however is alert now and ST consult no longer indicated  Will d/c orders      LUIS M Mead S , [id number] [facility]  Speech Language Pathologist   Available via [address] [place] [address] #[id number]  [place] #[id number]

## 2021-03-12 NOTE — ASSESSMENT & PLAN NOTE
· History of alcohol abuse  · Patient reports not actively drinking at this time    · Alcohol level on admission was noted to be 0 so no acute alcohol intoxication  · Will place on CIWA protocol

## 2021-03-12 NOTE — ASSESSMENT & PLAN NOTE
· Patient with acute elevation of total bilirubin, elevated to 12 79 on admission  · Likely secondary to alcoholic hepatitis   · Given discriminant function score greater than 32 will start on prednisolone  · Trending down to 10 25 today  · US RUQ, Liver Dopplers completed (3/12/21): No discernible flow in the main portal vein  This may be artifactual secondary to slow flow or portal vein thrombosis  Consider follow-up with contrast-enhanced CT or MRI if it would alter patient management  Hepatic cirrhosis  Cholelithiasis and gallbladder sludge without evidence of cholecystitis  · Hepatitis panel completed; Hepatitis C Ab high reactive  · GI consult  · Patient needs MRI; however, at this time is currently refusing  Discussed need for, risks of not getting it  Patient verbalizes understanding  Wants to follow up with her "Liver Doctor" and is moving to [place]

## 2021-03-12 NOTE — PLAN OF CARE
Problem: PAIN - ADULT  Goal: Verbalizes/displays adequate comfort level or baseline comfort level  Description: Interventions:  - Encourage patient to monitor pain and request assistance  - Assess pain using appropriate pain scale  - Administer analgesics based on type and severity of pain and evaluate response  - Implement non-pharmacological measures as appropriate and evaluate response  - Consider cultural and social influences on pain and pain management  - Notify physician/advanced practitioner if interventions unsuccessful or patient reports new pain  Outcome: Progressing     Problem: INFECTION - ADULT  Goal: Absence or prevention of progression during hospitalization  Description: INTERVENTIONS:  - Assess and monitor for signs and symptoms of infection  - Monitor lab/diagnostic results  - Monitor all insertion sites, i e  indwelling lines, tubes, and drains  - Monitor endotracheal if appropriate and nasal secretions for changes in amount and color  - [place] appropriate cooling/warming therapies per order  - Administer medications as ordered  - Instruct and encourage patient and family to use good hand hygiene technique  - Identify and instruct in appropriate isolation precautions for identified infection/condition  Outcome: Progressing  Goal: Absence of fever/infection during neutropenic period  Description: INTERVENTIONS:  - Monitor WBC    Outcome: Progressing     Problem: SAFETY ADULT  Goal: Patient will remain free of falls  Description: INTERVENTIONS:  - Assess patient frequently for physical needs  -  Identify cognitive and physical deficits and behaviors that affect risk of falls    -  [place] fall precautions as indicated by assessment   - Educate patient/family on patient safety including physical limitations  - Instruct patient to call for assistance with activity based on assessment  - Modify environment to reduce risk of injury  - Consider OT/PT consult to assist with strengthening/mobility  Outcome: Progressing  Goal: Maintain or return to baseline ADL function  Description: INTERVENTIONS:  -  Assess patient's ability to carry out ADLs; assess patient's baseline for ADL function and identify physical deficits which impact ability to perform ADLs (bathing, care of mouth/teeth, toileting, grooming, dressing, etc )  - Assess/evaluate cause of self-care deficits   - Assess range of motion  - Assess patient's mobility; develop plan if impaired  - Assess patient's need for assistive devices and provide as appropriate  - Encourage maximum independence but intervene and supervise when necessary  - Involve family in performance of ADLs  - Assess for home care needs following discharge   - Consider OT consult to assist with ADL evaluation and planning for discharge  - Provide patient education as appropriate  Outcome: Progressing  Goal: Maintain or return mobility status to optimal level  Description: INTERVENTIONS:  - Assess patient's baseline mobility status (ambulation, transfers, stairs, etc )    - Identify cognitive and physical deficits and behaviors that affect mobility  - Identify mobility aids required to assist with transfers and/or ambulation (gait belt, sit-to-stand, lift, walker, cane, etc )  - [place] fall precautions as indicated by assessment  - Record patient progress and toleration of activity level on Mobility SBAR; progress patient to next Phase/Stage  - Instruct patient to call for assistance with activity based on assessment  - Consider rehabilitation consult to assist with strengthening/weightbearing, etc   Outcome: Progressing     Problem: DISCHARGE PLANNING  Goal: Discharge to home or other facility with appropriate resources  Description: INTERVENTIONS:  - Identify barriers to discharge w/patient and caregiver  - Arrange for needed discharge resources and transportation as appropriate  - Identify discharge learning needs (meds, wound care, etc )  - Arrange for interpretive services to assist at discharge as needed  - Refer to Case Management Department for coordinating discharge planning if the patient needs post-hospital services based on physician/advanced practitioner order or complex needs related to functional status, cognitive ability, or social support system  Outcome: Progressing     Problem: Knowledge Deficit  Goal: Patient/family/caregiver demonstrates understanding of disease process, treatment plan, medications, and discharge instructions  Description: Complete learning assessment and assess knowledge base    Interventions:  - Provide teaching at level of understanding  - Provide teaching via preferred learning methods  Outcome: Progressing

## 2021-03-12 NOTE — ASSESSMENT & PLAN NOTE
· Presented with a potassium of 5 7  · Likely secondary to worsening kidney function  · Patient was given IV Insulin  · Repeat potassium 4 8 today  · Monitor

## 2021-03-12 NOTE — PROGRESS NOTES
[address] [facility]     Progress Note - Andrea Coyle 1960, 64 y o  female MRN: [id number]  Unit/Bed#: -01 Encounter: [id number]  Primary Care Provider: Miriam Miranda DO   Date and time admitted to hospital: 3/11/2021  4:48 PM    * Serum total bilirubin elevated  Assessment & Plan  · Patient with acute elevation of total bilirubin, elevated to 12 79 on admission  · Likely secondary to alcoholic hepatitis   · Given discriminant function score greater than 32 will start on prednisolone  · Trending down to 10 25 today  · US RUQ, Liver Dopplers completed (3/12/21): No discernible flow in the main portal vein  This may be artifactual secondary to slow flow or portal vein thrombosis  Consider follow-up with contrast-enhanced CT or MRI if it would alter patient management  Hepatic cirrhosis  Cholelithiasis and gallbladder sludge without evidence of cholecystitis  · Hepatitis panel completed; Hepatitis C Ab high reactive  · GI consult  · Patient needs MRI; however, at this time is currently refusing  Discussed need for, risks of not getting it  Patient verbalizes understanding  Wants to follow up with her "Liver Doctor" and is moving to [place]  Alcohol abuse  Assessment & Plan  · History of alcohol abuse  · Patient reports not actively drinking at this time  · Alcohol level on admission was noted to be 0 so no acute alcohol intoxication  · Will place on CIWA protocol    Hyperkalemia  Assessment & Plan  · Presented with a potassium of 5 7  · Likely secondary to worsening kidney function  · Patient was given IV Insulin  · Repeat potassium 4 8 today  · Monitor  DARRYL (acute kidney injury) ([facility] [address] )  Assessment & Plan  · Baseline kidney function approximately 0 8  · Presented with a creatinine of 4  · Likely secondary volume depletion versus hepatorenal  · Trending down today, 10 25 today    · Continue scheduled albumin  · Hold home diuretics  · Nephrology consultation    Cirrhosis [facility])  Assessment & Plan  · Secondary to alcohol abuse  · With poor outpatient follow-up  · Bilirubin noted to be approximately 13 on admission, was noted to be 3 a few months ago  · Bilirubin trending down today to 10  · GI Consult  · Plan as outlined above    Acute encephalopathy  Assessment & Plan  · Likely hepatic encephalopathy from worsening liver dysfunction alcohol abuse  · On admission, wasn't very cooperative; improving now  She is now AOx4  · Will place on lactulose    Hepatocellular carcinoma ([facility] [address] )  Assessment & Plan  · Follows outpatient with Oncology      VTE Pharmacologic Prophylaxis:   Pharmacologic: Heparin  Mechanical VTE Prophylaxis in Place: Yes    Patient Centered Rounds: I have performed bedside rounds with nursing staff today  Discussions with Specialists or Other Care Team Provider: GI, Case Management    Education and Discussions with Family / Patient: Patient    Time Spent for Care: 20 minutes  More than 50% of total time spent on counseling and coordination of care as described above  Current Length of Stay: 1 day(s)    Current Patient Status: Inpatient   Certification Statement: The patient will continue to require additional inpatient hospital stay due to ongoing treatment in setting of elevated bilirubin, need for more testing  Discharge Plan: Not medically clear  Code Status: Level 1 - Full Code      Subjective:   Patient resting in bed at this time with nurse at bedside  Patient denies any current abdominal pain, nausea/vomiting  Reports that someone told her that when her mentation cleared up, she could go home  Discussed need for continued hospitalization  Patient aware that she needs to be seen by Nephrology and GI  Discussed right upper quadrant ultrasound results with her and need for MRI  Patient reports that she will follow-up with her liver doctor and does not want MRI while she is here    Patient reports that she will be moving to [place] and sees no reason for an MRI here  Patient verbalized understanding of risks of not getting further testing  Patient also aware that if she goes home at this time without being fully treated that she will likely return to the hospital with mental status changes  Objective:     Vitals:   Temp (24hrs), Av °F (36 7 °C), Min:97 9 °F (36 6 °C), Max:98 2 °F (36 8 °C)    Temp:  [97 9 °F (36 6 °C)-98 2 °F (36 8 °C)] 97 9 °F (36 6 °C)  HR:  [] 77  Resp:  [14-20] 18  BP: ()/(44-62) 104/61  SpO2:  [91 %-99 %] 97 %  Body mass index is 21 19 kg/m²  Input and Output Summary (last 24 hours): Intake/Output Summary (Last 24 hours) at 3/12/2021 1131  Last data filed at 3/12/2021 1029  Gross per 24 hour   Intake 0 ml   Output 0 ml   Net 0 ml       Physical Exam:     Physical Exam  Constitutional:       General: She is not in acute distress  Appearance: She is normal weight  She is not ill-appearing  Comments: Disheveled woman resting in bed, no acute distress  Eyes:      General: Scleral icterus present  Cardiovascular:      Rate and Rhythm: Normal rate  Pulses: Normal pulses  Heart sounds: Normal heart sounds  Pulmonary:      Effort: Pulmonary effort is normal       Breath sounds: Normal breath sounds  Abdominal:      General: Bowel sounds are normal       Palpations: Abdomen is soft  Musculoskeletal: Normal range of motion  Skin:     General: Skin is warm and dry  Capillary Refill: Capillary refill takes less than 2 seconds  Coloration: Skin is jaundiced  Findings: Bruising present  Comments: Scabbing noted to all four extremities  Large scabbed area on left knee s/p fall at home  Neurological:      Mental Status: She is alert and oriented to person, place, and time     Psychiatric:         Mood and Affect: Mood normal            Additional Data:     Labs:    Results from last 7 days   Lab Units 21  0528   WBC Thousand/uL 6 07   HEMOGLOBIN g/dL 7 5* HEMATOCRIT % 21 7*   PLATELETS Thousands/uL 56*   NEUTROS PCT % 86*   LYMPHS PCT % 5*   MONOS PCT % 8   EOS PCT % 0     Results from last 7 days   Lab Units 03/12/21  0227   SODIUM mmol/L 129*   POTASSIUM mmol/L 4 8   CHLORIDE mmol/L 93*   CO2 mmol/L 25   BUN mg/dL 80*   CREATININE mg/dL 3 83*   ANION GAP mmol/L 11   CALCIUM mg/dL 9 0   ALBUMIN g/dL 2 3*   TOTAL BILIRUBIN mg/dL 10 25*   ALK PHOS U/L 74   ALT U/L 69   AST U/L 255*   GLUCOSE RANDOM mg/dL 82     Results from last 7 days   Lab Units 03/12/21  0227   INR  2 34*     Results from last 7 days   Lab Units 03/12/21  0545 03/12/21  0210 03/12/21  0057 03/12/21  0026 03/11/21  2358 03/11/21  2327 03/11/21  2305 03/11/21  2230 03/11/21  2138   POC GLUCOSE mg/dl 100 92 90 86 76 88 33* 78 84               * I Have Reviewed All Lab Data Listed Above  * Additional Pertinent Lab Tests Reviewed: All Labs Within Last 24 Hours Reviewed    Imaging:    Imaging Reports Reviewed Today Include: No new imaging to review  Imaging Personally Reviewed by Myself Includes:  None    Recent Cultures (last 7 days):           Last 24 Hours Medication List:   Current Facility-Administered Medications   Medication Dose Route Frequency Provider Last Rate    acetaminophen  650 mg Oral Q6H PRN Mahesh Mora MD      albumin human  25 g Intravenous Q6H Mahesh Mora MD      folic acid 1 mg, thiamine 100 mg in 0 9% sodium chloride 100 mL IVPB   Intravenous Daily Mahesh Mora MD      lactulose  20 g Oral TID Galileo Rachel MD      nicotine  1 patch Transdermal Daily Mahesh Mora MD      prednisoLONE  40 mg Oral Daily Mahesh Mora MD      sodium chloride  100 mL/hr Intravenous Continuous SILVANO Melo          Today, Patient Was Seen By: SILVANO Melo    ** Please Note: Dictation voice to text software may have been used in the creation of this document   **

## 2021-03-12 NOTE — ASSESSMENT & PLAN NOTE
Presented with a potassium of 5 7  Likely secondary to worsening kidney function  Will provide IV insulin  Repeat BMP

## 2021-03-12 NOTE — UTILIZATION REVIEW
Initial Clinical Review    Admission: Date/Time/Statement:   Admission Orders (From admission, onward)     Ordered        03/11/21 1926  Inpatient Admission  Once                   Orders Placed This Encounter   Procedures    Inpatient Admission     Standing Status:   Standing     Number of Occurrences:   1     Order Specific Question:   Level of Care     Answer:   Med Surg [16]     Order Specific Question:   Estimated length of stay     Answer:   More than 2 Midnights     Order Specific Question:   Certification     Answer:   I certify that inpatient services are medically necessary for this patient for a duration of greater than two midnights  See H&P and MD Progress Notes for additional information about the patient's course of treatment  ED Arrival Information     Expected Arrival Acuity Means of Arrival Escorted By Service Admission Type    - 3/11/2021 16:48 Urgent Ambulance [address] Urgent    Arrival Complaint    -        Chief Complaint   Patient presents with    Alcohol Intoxication     PT FRIENDS CALLED EMS AND REPORTED THAT THE PT HAS BEEN ON A RAMIREZ FOR THE PAST WEEK OR SO AND IS HIGHLY INTOXICATED      Assessment/Plan: 65 yo female to ED from home w/ confusion   Hx of significant alcoholic cirrhosis complicated by [address]    Bilirubin noted to be 13  Creat 4, K 5 7  admitted IP status w/ elevated bili , plan for GI consult , check acute hepatitis panel and get US to r/o portal vein thrombus  Hx of alcohol abuse place on CIWA  K 5 7 likely worsening kidney function , give IV insulin and repeat BMP   DARRYL creat 4, baseline 0 8, likely volume depletion vs hepatorenal , plan to hole diuretics and nephrology consult   Acute encephalopathy place on lactulose  3/12 IM Note   Serum total bili elevated likely sec to alcoholic hepatitis   Pt need MRI , pt refusing   Creat trending down , hold home diuretics   Encephalopathy improving , place on lactulose   GI and nephrology following   PE : scleral icterus , jaundiced     ED Triage Vitals   Temperature Pulse Respirations Blood Pressure SpO2   03/11/21 1657 03/11/21 1655 03/11/21 1655 03/11/21 1655 03/11/21 1655   97 9 °F (36 6 °C) 84 20 114/54 99 %      Temp Source Heart Rate Source Patient Position - Orthostatic VS BP Location FiO2 (%)   03/11/21 1657 03/11/21 1655 03/11/21 1655 03/11/21 1655 --   Oral Monitor Lying Right arm       Pain Score       03/12/21 0133       No Pain          Wt Readings from Last 1 Encounters:   03/12/21 63 2 kg (139 lb 5 3 oz)     Additional Vital Signs:   03/12/21 07:52:50  98 °F (36 7 °C)  77  18  112/62  79  94 %  --  --   03/12/21 05:32:35  --  78  --  100/55  70  94 %  --  --   03/12/21 03:33:26  --  90  --  108/57  74  93 %  --  --   03/12/21 02:58:20  98 2 °F (36 8 °C)  78  18  96/53  67  95 %  --  --   03/12/21 0223  --  80  --  89/54Abnormal   --  --  --  --   03/12/21 01:25:01  --  85  --  96/51  66  96 %  --  --   03/12/21 00:26:18  --  83  --  90/47Abnormal   61  94 %  --  --   03/12/21 0000  --  82  --  95/50  --  --  --  --   03/11/21 23:59:51  --  82  --  95/50  65  97 %  --  --   03/11/21 23:49:27  --  79  --  88/48Abnormal   61  97 %  --  --   03/11/21 23:29:11  --  79  --  95/53  67  98 %  --  --   03/11/21 22:38:30  --  124Abnormal   --  87/44Abnormal   58  98 %  --  --   03/11/21 22:31:10  98 1 °F (36 7 °C)  80  18  86/46Abnormal   59  97 %  --  --   03/11/21 22:03:59  --  80  --  94/47Abnormal   63  98 %  --  --   03/11/21 2200  --  --  --  94/47Abnormal   --  --  --  --   03/11/21 1930  --  77  14  93/53  67  91 %  None (Room air)  Lying   03/11/21 1657  97 9 °F (36 6 °C)  --  --  --  --  --  --         Pertinent Labs/Diagnostic Test Results:   3/12 RUQ US No discernible flow in the main portal vein  This may be artifactual secondary to slow flow or portal vein thrombosis  Consider follow-up with contrast-enhanced CT or MRI if it would alter patient management    Hepatic cirrhosis  Cholelithiasis and gallbladder sludge without evidence of cholecystitis  3/11 CT head No acute intracranial abnormality    3/11 EKG Sinus rhythm with Premature supraventricular complexesOtherwise normal   Results from last 7 days   Lab Units 03/12/21  0528 03/11/21  1819   WBC Thousand/uL 6 07 10 48*   HEMOGLOBIN g/dL 7 5* 8 8*   HEMATOCRIT % 21 7* 25 9*   PLATELETS Thousands/uL 56* 89*   NEUTROS ABS Thousands/µL 5 22 8 13*     Results from last 7 days   Lab Units 03/12/21 0227 03/11/21  1819   SODIUM mmol/L 129* 131*   POTASSIUM mmol/L 4 8 5 7*   CHLORIDE mmol/L 93* 91*   CO2 mmol/L 25 26   ANION GAP mmol/L 11 14*   BUN mg/dL 80* 73*   CREATININE mg/dL 3 83* 4 05*   EGFR ml/min/1 73sq m 12 11   CALCIUM mg/dL 9 0 9 2   MAGNESIUM mg/dL  --  3 1*     Results from last 7 days   Lab Units 03/12/21 0227 03/11/21  1819   AST U/L 255* 293*   ALT U/L 69 80*   ALK PHOS U/L 74 84   TOTAL PROTEIN g/dL 6 2* 6 7   ALBUMIN g/dL 2 3* 2 3*   TOTAL BILIRUBIN mg/dL 10 25* 12 79*   BILIRUBIN DIRECT mg/dL  --  8 99*   AMMONIA umol/L  --  81*     Results from last 7 days   Lab Units 03/12/21  0545 03/12/21  0210 03/12/21  0057 03/12/21  0026 03/11/21  2358 03/11/21  2327 03/11/21  2305 03/11/21  2230 03/11/21  2138   POC GLUCOSE mg/dl 100 92 90 86 76 88 33* 78 84     Results from last 7 days   Lab Units 03/12/21 0227 03/11/21  1819   GLUCOSE RANDOM mg/dL 82 67       Results from last 7 days   Lab Units 03/12/21 0222 03/11/21  2145 03/11/21  1819   TROPONIN I ng/mL 0 07* 0 08* 0 09*     Results from last 7 days   Lab Units 03/12/21 0227 03/11/21  1819   PROTIME seconds 24 5* 22 3*   INR  2 34* 2 07*   PTT seconds  --  33     Results from last 7 days   Lab Units 03/11/21  1819   ETHANOL LVL mg/dL <3   ACETAMINOPHEN LVL ug/mL <1 4*   SALICYLATE LVL mg/dL <3*     ED Treatment:   Medication Administration from 03/11/2021 1648 to 03/11/2021 2002       Date/Time Order Dose Route Action Action by Comments     03/11/2021 6545 lactulose oral solution 20 g 20 g Oral Given Tamara Cartagena RN         Past Medical History:   Diagnosis Date    Arthritis     Cirrhosis ([facility] )     Extremity pain     BLE    Hepatitis C     Joint pain     Bilateral Shoulders    Liver disease     Low back pain      Present on Admission:   Hepatocellular carcinoma ([facility] )      Admitting Diagnosis: Hepatic encephalopathy ([place] [address] ) [K72 90]  Alcohol intoxication ([place] [address] ) [F10 929]  Age/Sex: 64 y o  female  Admission Orders:  Scheduled Medications:  albumin human, 25 g, Intravenous, B7S  folic acid 1 mg, thiamine 100 mg in 0 9% sodium chloride 100 mL IVPB, , Intravenous, Daily  lactulose, 20 g, Oral, TID  nicotine, 1 patch, Transdermal, Daily  prednisoLONE, 40 mg, Oral, Daily      Continuous IV Infusions:     PRN Meds:  acetaminophen, 650 mg, Oral, Q6H PRN    speech eval   Clear liq diet   CIWA      IP CONSULT TO GASTROENTEROLOGY  IP CONSULT TO NEPHROLOGY  IP CONSULT TO CASE MANAGEMENT    Network Utilization Review Department  ATTENTION: Please call with any questions or concerns to [phone] and carefully listen to the prompts so that you are directed to the right person  All voicemails are confidential   Rani Keys all requests for admission clinical reviews, approved or denied determinations and any other requests to dedicated fax number below belonging to the campus where the patient is receiving treatment   List of dedicated fax numbers for the Facilities:  [address] DENIALS (Administrative/Medical Necessity) [phone]   [address] (Maternity/NICU/Pediatrics) [phone]   [address] [place] [address] [facility] Anabella Agudelo 1768 (Aba Rater) [address] [address] [place]  M[place]  41  [phone]   [address] [facility] [place] [address] [phone]   [place] [address] [phone]

## 2021-03-12 NOTE — ASSESSMENT & PLAN NOTE
Secondary to alcohol abuse  With poor outpatient follow-up  Bilirubin noted to be approximately 13, was noted to be 3 a few months ago  Plan as outlined above

## 2021-03-12 NOTE — NURSING NOTE
Patient given insulin and dextrose to correct elevated potassium  An hour and a half after administration, patients sugar dropped to 33  Patient was lethargic but could be woken up and hold a conversation  Since patient is NPO, dextrose was administered  Patients blood sugar has since improved and remained steady  SLIM has been made aware  Patient is resting in bed with no further complaints

## 2021-03-12 NOTE — ASSESSMENT & PLAN NOTE
· Likely hepatic encephalopathy from worsening liver dysfunction alcohol abuse  · On admission, wasn't very cooperative; improving now  She is now AOx4    · Will place on lactulose

## 2021-03-12 NOTE — H&P
[address] [facility]  H&P- Abril Meehan 1960, 64 y o  female MRN: [id number]  Unit/Bed#: -01 Encounter: [id number]  Primary Care Provider: Micheal Joyce DO   Date and time admitted to hospital: 3/11/2021  4:48 PM    * Serum total bilirubin elevated  Assessment & Plan  Patient with acute elevation of total bilirubin  Likely secondary to alcoholic hepatitis   Given discriminant function score greater than 32 will start on prednisolone  Will rule out portal vein thrombus with right upper quadrant ultrasound with Doppler  Check acute hepatitis panel  GI consult    Alcohol abuse  Assessment & Plan  History of alcohol abuse  Alcohol level on admission was noted to be 0 so no acute alcohol intoxication  Will place on CIWA protocol    Hyperkalemia  Assessment & Plan  Presented with a potassium of 5 7  Likely secondary to worsening kidney function  Will provide IV insulin  Repeat BMP    DARRYL (acute kidney injury) ([facility] [address] )  Assessment & Plan  Baseline kidney function approximately 0 8  Presented with a creatinine of 4  Likely secondary volume depletion versus hepatorenal  Will provide scheduled albumin  Hold home diuretics  Nephrology consultation    Cirrhosis [facility])  Assessment & Plan  Secondary to alcohol abuse  With poor outpatient follow-up  Bilirubin noted to be approximately 13, was noted to be 3 a few months ago  Plan as outlined above    Acute encephalopathy  Assessment & Plan  Likely hepatic encephalopathy from worsening liver dysfunction alcohol abuse  Currently alert but not answering questions appropriately  Will place on lactulose    Hepatocellular carcinoma ([facility] )  Assessment & Plan  Follows outpatient with Oncology    VTE Prophylaxis: Heparin  / sequential compression device   Code Status: full code  POLST: There is no POLST form on file for this patient (pre-hospital)  Discussion with family: pt    Anticipated Length of Stay:  Patient will be admitted on an Inpatient basis with an anticipated length of stay of  > 2 midnights  Justification for Hospital Stay: Acute encephalopathy     Total Time for Visit, including Counseling / Coordination of Care: 1 hour  Greater than 50% of this total time spent on direct patient counseling and coordination of care  Chief Complaint:   confusion    History of Present Illness:    Margo Huertas is a 64 y o  female who presents with with past medical history significant alcoholic cirrhosis complicated by [address]  who presented with confusion  She currently is unable to answer any questions appropriately  Review of Systems:    Review of Systems   Unable to perform ROS: Mental status change       Past Medical and Surgical History:     Past Medical History:   Diagnosis Date    Arthritis     Cirrhosis ([address] )     Extremity pain     BLE    Hepatitis C     Joint pain     Bilateral Shoulders    Liver disease     Low back pain        Past Surgical History:   Procedure Laterality Date    CT GUIDED AND MONITORING PARENCHYMAL TISSUE ABLATION  7/28/2020    ECTOPIC PREGNANCY SURGERY      MOUTH SURGERY      TONSILLECTOMY         Meds/Allergies:    Prior to Admission medications    Medication Sig Start Date End Date Taking? Authorizing Provider   baclofen 10 mg tablet Take 10 mg by mouth 5/7/19  Yes Historical Provider, MD   furosemide (LASIX) 40 mg tablet Take 40 mg by mouth 5/20/19  Yes Historical Provider, MD   gabapentin (NEURONTIN) 300 mg capsule Take 1 capsule (300 mg total) by mouth 2 (two) times a day 8/4/20  Yes Meredith Meehan,    spironolactone (ALDACTONE) 100 mg tablet TAKE 1 & 1/2 TABLETS BY MOUTH DAILY 5/20/19  Yes Historical Provider, MD   cholecalciferol (VITAMIN D3) 1,000 units tablet     Historical Provider, MD     I have reviewed home medications with patient personally      Allergies: No Known Allergies    Social History:     Marital Status:      Patient Pre-hospital Living Situation: home  Patient Pre-hospital Level of Mobility: independent  Patient Pre-hospital Diet Restrictions: none  Substance Use History:   Social History     Substance and Sexual Activity   Alcohol Use Not Currently    Frequency: Monthly or less    Drinks per session: 1 or 2     Social History     Tobacco Use   Smoking Status Current Every Day Smoker    Packs/day: 0 50   Smokeless Tobacco Never Used     Social History     Substance and Sexual Activity   Drug Use Yes    Frequency: 7 0 times per week    Types: Marijuana    Comment: everyday       Family History:    Family History   Problem Relation Age of Onset    Alcohol abuse Mother     Heart disease Father        Physical Exam:     Vitals:   Blood Pressure: 93/53 (03/11/21 1930)  Pulse: 77 (03/11/21 1930)  Temperature: 97 9 °F (36 6 °C) (03/11/21 1657)  Temp Source: Oral (03/11/21 1930)  Respirations: 14 (03/11/21 1930)  SpO2: 91 % (03/11/21 1930)    Physical Exam  Constitutional:       General: She is not in acute distress  Appearance: She is well-developed  She is not diaphoretic  HENT:      Head: Normocephalic and atraumatic  Nose: Nose normal       Mouth/Throat:      Pharynx: No oropharyngeal exudate  Eyes:      General: No scleral icterus  Right eye: No discharge  Left eye: No discharge  Conjunctiva/sclera: Conjunctivae normal    Neck:      Musculoskeletal: Normal range of motion and neck supple  Thyroid: No thyromegaly  Vascular: No JVD  Cardiovascular:      Rate and Rhythm: Normal rate and regular rhythm  Heart sounds: Normal heart sounds  No murmur  No friction rub  No gallop  Pulmonary:      Effort: Pulmonary effort is normal  No respiratory distress  Breath sounds: Normal breath sounds  No wheezing or rales  Chest:      Chest wall: No tenderness  Abdominal:      General: Bowel sounds are normal  There is no distension  Palpations: Abdomen is soft  Tenderness: There is no abdominal tenderness  There is no guarding or rebound  Musculoskeletal: Normal range of motion  General: No tenderness or deformity  Skin:     General: Skin is warm and dry  Findings: No erythema or rash  Neurological:      Mental Status: She is alert and oriented to person, place, and time  Cranial Nerves: No cranial nerve deficit  Sensory: No sensory deficit  Motor: No abnormal muscle tone  Coordination: Coordination normal              Additional Data:     Lab Results: I have personally reviewed pertinent reports  Results from last 7 days   Lab Units 03/11/21  1819   WBC Thousand/uL 10 48*   HEMOGLOBIN g/dL 8 8*   HEMATOCRIT % 25 9*   PLATELETS Thousands/uL 89*   NEUTROS PCT % 78*   LYMPHS PCT % 9*   MONOS PCT % 12   EOS PCT % 0     Results from last 7 days   Lab Units 03/11/21  1819   SODIUM mmol/L 131*   POTASSIUM mmol/L 5 7*   CHLORIDE mmol/L 91*   CO2 mmol/L 26   BUN mg/dL 73*   CREATININE mg/dL 4 05*   ANION GAP mmol/L 14*   CALCIUM mg/dL 9 2   ALBUMIN g/dL 2 3*   TOTAL BILIRUBIN mg/dL 12 79*   ALK PHOS U/L 84   ALT U/L 80*   AST U/L 293*   GLUCOSE RANDOM mg/dL 67     Results from last 7 days   Lab Units 03/11/21  1819   INR  2 07*                   Imaging: I have personally reviewed pertinent reports  CT head without contrast   Final Result by Neal Kline MD (03/11 1746)   No acute intracranial abnormality  Workstation performed: [id number]          EKG, Pathology, and Other Studies Reviewed on Admission:   · EKG: reviewed    Allscripts / Epic Records Reviewed: Yes     ** Please Note: This note has been constructed using a voice recognition system   **

## 2021-03-12 NOTE — ASSESSMENT & PLAN NOTE
Baseline kidney function approximately 0 8  Presented with a creatinine of 4  Likely secondary volume depletion versus hepatorenal  Will provide scheduled albumin  Hold home diuretics  Nephrology consultation

## 2021-03-12 NOTE — UTILIZATION REVIEW
Notification of Inpatient Admission/Inpatient Authorization Request   This is a Notification of Inpatient Admission for [address]  Be advised that this patient was admitted to our facility under Inpatient Status  Contact [facility] at [phone] for additional admission information  [facility] DEPT DEDICATED Armani Moeller [phone]  Patient Name:   Emelyn James   YOB: 1960       [address]   [address]:   [address]   Tax ID: [id number]  NPI: [id number] Attending Provider/NPI:  Phone:  Address: Carol Muir [[id number]]  [phone]  Same as Facility   Place of Service Code: 24     [facility]   Start Date: 3/11/21 1926 Discharge Date & Time: No discharge date for patient encounter  Type of Admission: Inpatient Status Discharge Disposition   (if discharged): Final discharge disposition not confirmed   Patient Diagnoses: Hepatic encephalopathy ([facility] [address] ) [K72 90]  Alcohol intoxication ([facility] [address] ) [F10 929]     Orders: Admission Orders (From admission, onward)     Ordered        03/11/21 1926  Inpatient Admission  Once                    Assigned Utilization Review Contact: [facility]  Utilization   Network Utilization Review Department  Phone: [phone]; Fax [phone]  Email: [email or web address]   ATTENTION PAYERS: Please call the assigned Utilization  directly with any questions or concerns ALL voicemails in the department are confidential  Send all requests for admission clinical reviews, approved or denied determinations and any other requests to dedicated fax number belonging to the campus where the patient is receiving treatment

## 2021-03-12 NOTE — PLAN OF CARE
Problem: PAIN - ADULT  Goal: Verbalizes/displays adequate comfort level or baseline comfort level  Description: Interventions:  - Encourage patient to monitor pain and request assistance  - Assess pain using appropriate pain scale  - Administer analgesics based on type and severity of pain and evaluate response  - Implement non-pharmacological measures as appropriate and evaluate response  - Consider cultural and social influences on pain and pain management  - Notify physician/advanced practitioner if interventions unsuccessful or patient reports new pain  Outcome: Progressing     Problem: INFECTION - ADULT  Goal: Absence or prevention of progression during hospitalization  Description: INTERVENTIONS:  - Assess and monitor for signs and symptoms of infection  - Monitor lab/diagnostic results  - Monitor all insertion sites, i e  indwelling lines, tubes, and drains  - Monitor endotracheal if appropriate and nasal secretions for changes in amount and color  - Riley appropriate cooling/warming therapies per order  - Administer medications as ordered  - Instruct and encourage patient and family to use good hand hygiene technique  - Identify and instruct in appropriate isolation precautions for identified infection/condition  Outcome: Progressing  Goal: Absence of fever/infection during neutropenic period  Description: INTERVENTIONS:  - Monitor WBC    Outcome: Progressing     Problem: SAFETY ADULT  Goal: Patient will remain free of falls  Description: INTERVENTIONS:  - Assess patient frequently for physical needs  -  Identify cognitive and physical deficits and behaviors that affect risk of falls    -  Riley fall precautions as indicated by assessment   - Educate patient/family on patient safety including physical limitations  - Instruct patient to call for assistance with activity based on assessment  - Modify environment to reduce risk of injury  - Consider OT/PT consult to assist with strengthening/mobility  Outcome: Progressing  Goal: Maintain or return to baseline ADL function  Description: INTERVENTIONS:  -  Assess patient's ability to carry out ADLs; assess patient's baseline for ADL function and identify physical deficits which impact ability to perform ADLs (bathing, care of mouth/teeth, toileting, grooming, dressing, etc )  - Assess/evaluate cause of self-care deficits   - Assess range of motion  - Assess patient's mobility; develop plan if impaired  - Assess patient's need for assistive devices and provide as appropriate  - Encourage maximum independence but intervene and supervise when necessary  - Involve family in performance of ADLs  - Assess for home care needs following discharge   - Consider OT consult to assist with ADL evaluation and planning for discharge  - Provide patient education as appropriate  Outcome: Progressing  Goal: Maintain or return mobility status to optimal level  Description: INTERVENTIONS:  - Assess patient's baseline mobility status (ambulation, transfers, stairs, etc )    - Identify cognitive and physical deficits and behaviors that affect mobility  - Identify mobility aids required to assist with transfers and/or ambulation (gait belt, sit-to-stand, lift, walker, cane, etc )  - Powell fall precautions as indicated by assessment  - Record patient progress and toleration of activity level on Mobility SBAR; progress patient to next Phase/Stage  - Instruct patient to call for assistance with activity based on assessment  - Consider rehabilitation consult to assist with strengthening/weightbearing, etc   Outcome: Progressing     Problem: DISCHARGE PLANNING  Goal: Discharge to home or other facility with appropriate resources  Description: INTERVENTIONS:  - Identify barriers to discharge w/patient and caregiver  - Arrange for needed discharge resources and transportation as appropriate  - Identify discharge learning needs (meds, wound care, etc )  - Arrange for interpretive services to assist at discharge as needed  - Refer to Case Management Department for coordinating discharge planning if the patient needs post-hospital services based on physician/advanced practitioner order or complex needs related to functional status, cognitive ability, or social support system  Outcome: Progressing     Problem: Knowledge Deficit  Goal: Patient/family/caregiver demonstrates understanding of disease process, treatment plan, medications, and discharge instructions  Description: Complete learning assessment and assess knowledge base  Interventions:  - Provide teaching at level of understanding  - Provide teaching via preferred learning methods  Outcome: Progressing     Problem: Potential for Falls  Goal: Patient will remain free of falls  Description: INTERVENTIONS:  - Assess patient frequently for physical needs  -  Identify cognitive and physical deficits and behaviors that affect risk of falls    -  Termo fall precautions as indicated by assessment   - Educate patient/family on patient safety including physical limitations  - Instruct patient to call for assistance with activity based on assessment  - Modify environment to reduce risk of injury  - Consider OT/PT consult to assist with strengthening/mobility  Outcome: Progressing     Problem: Prexisting or High Potential for Compromised Skin Integrity  Goal: Skin integrity is maintained or improved  Description: INTERVENTIONS:  - Identify patients at risk for skin breakdown  - Assess and monitor skin integrity  - Assess and monitor nutrition and hydration status  - Monitor labs   - Assess for incontinence   - Turn and reposition patient  - Assist with mobility/ambulation  - Relieve pressure over bony prominences  - Avoid friction and shearing  - Provide appropriate hygiene as needed including keeping skin clean and dry  - Evaluate need for skin moisturizer/barrier cream  - Collaborate with interdisciplinary team   - Patient/family teaching  - Consider wound care consult   Outcome: Progressing     Problem: Nutrition/Hydration-ADULT  Goal: Nutrient/Hydration intake appropriate for improving, restoring or maintaining nutritional needs  Description: Monitor and assess patient's nutrition/hydration status for malnutrition  Collaborate with interdisciplinary team and initiate plan and interventions as ordered  Monitor patient's weight and dietary intake as ordered or per policy  Utilize nutrition screening tool and intervene as necessary  Determine patient's food preferences and provide high-protein, high-caloric foods as appropriate       INTERVENTIONS:  - Monitor oral intake, urinary output, labs, and treatment plans  - Assess nutrition and hydration status and recommend course of action  - Evaluate amount of meals eaten  - Assist patient with eating if necessary   - Allow adequate time for meals  - Recommend/ encourage appropriate diets, oral nutritional supplements, and vitamin/mineral supplements  - Order, calculate, and assess calorie counts as needed  - Recommend, monitor, and adjust tube feedings and TPN/PPN based on assessed needs  - Assess need for intravenous fluids  - Provide specific nutrition/hydration education as appropriate  - Include patient/family/caregiver in decisions related to nutrition  Outcome: Progressing

## 2021-03-12 NOTE — CASE MANAGEMENT
LOS: 1    PT IS NOT A BUNDLE OR A 30 DAY RA  PT'S UNPLANNED RA SCORE IS GREEN-17  CM s/w rn to discuss mental status given H&P indicated encephalopathy  Per rn pt is lethargic but oriented  CM met with pt at bedside  Pt lives [place] with her boyfriend  Pt lives in 1 story that has 1 rell  Pt denies dme and completes adl's independently  Pt denies hx of rehab, hhc, mh, and sa  Pt uses [employer] and pcp is Charan ayoub  Pt reports preferred hcp is [facility]  Pt is disabled and drives  CM reviewed discharge planning process including the following: identifying help at home, patient preference for discharge planning needs, pharmacy preference, and availability of treatment team to discuss questions or concerns patient and/or family may have regarding understanding medications and recognizing signs and symptoms once discharged  CM also encouraged patient to follow up with all recommended appointments after discharge  Patient advised of importance for patient and family to participate in managing patients medical well being  Pt was ipta, needs tbd  Boyfriend or friend will transport  CM Dept will follow pt through dc

## 2021-03-12 NOTE — CONSULTS
Consultation - Nephrology   Sasha Cordova 64 y o  female MRN: [id number]  Unit/Bed#: -01 Encounter: [id number]    Referring PHYSICIAN: Mahesh Mora     REASON FOR THE CONSULTATION:  Acute kidney injury    DATE OF CONSULTATION:  March 12, 2021    ADMISSION DIAGNOSIS: Serum total bilirubin elevated     CHIEF COMPLAINT      patient came to emergency room with confusion and admitted with acute kidney injury and encephalopathy    HPI      patient still seems to quite confused no    Not sure why she is in hospital   She does seems to have slowed speech     Denies any acute complaint except abdominal discomfort     patient does history of cirrhosis of liver which was complicated with HCTZ    On admission patient was found to very high bilirubin    PAST MEDICAL HISTORY     Past Medical History:   Diagnosis Date    Arthritis     Cirrhosis ([address] )     Extremity pain     BLE    Hepatitis C     Joint pain     Bilateral Shoulders    Liver disease     Low back pain        PAST SURGICAL HISTORY     Past Surgical History:   Procedure Laterality Date    CT GUIDED AND MONITORING PARENCHYMAL TISSUE ABLATION  7/28/2020    ECTOPIC PREGNANCY SURGERY      MOUTH SURGERY      TONSILLECTOMY         ALLERGIES     No Known Allergies    SOCIAL HISTORY     Social History     Substance and Sexual Activity   Alcohol Use Not Currently    Frequency: Monthly or less    Drinks per session: 1 or 2     Social History     Substance and Sexual Activity   Drug Use Yes    Frequency: 7 0 times per week    Types: Marijuana    Comment: everyday     Social History     Tobacco Use   Smoking Status Current Every Day Smoker    Packs/day: 0 50   Smokeless Tobacco Never Used       FAMILY HISTORY     Family History   Problem Relation Age of Onset    Alcohol abuse Mother     Heart disease Father        CURRENT MEDICATIONS       Current Facility-Administered Medications:     acetaminophen (TYLENOL) tablet 650 mg, 650 mg, Oral, Q6H PRN, Mahesh Yaneth Ospina MD    albumin human (FLEXBUMIN) 25 % injection 25 g, 25 g, Intravenous, Q6H, Mahesh Mora MD, 25 g at 05/03/83 5270    folic acid 1 mg, thiamine (VITAMIN B1) 100 mg in sodium chloride 0 9 % 100 mL IV piggyback, , Intravenous, Daily, Mahesh Mora MD, New Bag at 03/12/21 1052    heparin (porcine) subcutaneous injection 5,000 Units, 5,000 Units, Subcutaneous, Q8H [facility], SILVANO Nina, 5,000 Units at 03/12/21 1446    lactulose oral solution 20 g, 20 g, Oral, TID, Mahesh Mora MD, 20 g at 03/12/21 1653    nicotine (NICODERM CQ) 14 mg/24hr TD 24 hr patch 1 patch, 1 patch, Transdermal, Daily, Mahesh Mora MD    rifaximin (XIFAXAN) tablet 550 mg, 550 mg, Oral, Q12H [facility], Polina Roberts PA-C    sodium chloride 0 9 % infusion, 100 mL/hr, Intravenous, Continuous, SILVANO Mercedes, Last Rate: 100 mL/hr at 03/12/21 1327, 100 mL/hr at 03/12/21 1327    REVIEW OF SYSTEMS     Review of Systems   Constitutional: Positive for appetite change and fatigue  Negative for activity change  HENT: Negative for congestion and ear discharge  Eyes: Negative for photophobia and pain  Respiratory: Negative for apnea, choking and shortness of breath  Cardiovascular: Positive for leg swelling  Negative for chest pain and palpitations  Gastrointestinal: Positive for nausea  Negative for abdominal distention and blood in stool  Endocrine: Negative for heat intolerance and polyphagia  Genitourinary: Negative for flank pain and urgency  Musculoskeletal: Negative for neck pain and neck stiffness  Skin: Negative for color change and wound  Allergic/Immunologic: Negative for food allergies and immunocompromised state  Neurological: Negative for seizures and facial asymmetry  Hematological: Negative for adenopathy  Does not bruise/bleed easily  Psychiatric/Behavioral: Positive for confusion  Negative for self-injury and suicidal ideas         LAB RESULTS        Results from last 7 days   Lab Units 03/12/21  0528 03/12/21  0227 03/11/21  1819   WBC Thousand/uL 6 07  --  10 48*   HEMOGLOBIN g/dL 7 5*  --  8 8*   HEMATOCRIT % 21 7*  --  25 9*   PLATELETS Thousands/uL 56*  --  89*   POTASSIUM mmol/L  --  4 8 5 7*   CHLORIDE mmol/L  --  93* 91*   CO2 mmol/L  --  25 26   BUN mg/dL  --  80* 73*   CREATININE mg/dL  --  3 83* 4 05*   EGFR ml/min/1 73sq m  --  12 11   CALCIUM mg/dL  --  9 0 9 2   MAGNESIUM mg/dL  --   --  3 1*       I have personally reviewed the old medical records and patient's previously known baseline creatinine level is ~ 0 8    RADIOLOGY RESULTS     No results found for this or any previous visit  No results found for this or any previous visit  No results found for this or any previous visit  No results found for this or any previous visit  No results found for this or any previous visit  No results found for this or any previous visit  OBJECTIVE     Current Weight: Weight - Scale: 63 2 kg (139 lb 5 3 oz)  Vitals:    03/12/21 1521   BP: 121/56   Pulse: 80   Resp: 19   Temp: 98 4 °F (36 9 °C)   SpO2: 96%       Intake/Output Summary (Last 24 hours) at 3/12/2021 1842  Last data filed at 3/12/2021 1428  Gross per 24 hour   Intake 240 ml   Output 0 ml   Net 240 ml       PHYSICAL EXAMINATION     Physical Exam  Constitutional:       General: She is not in acute distress  Appearance: She is well-developed  She is ill-appearing  HENT:      Head: Normocephalic and atraumatic  Mouth/Throat:      Mouth: Mucous membranes are dry  Eyes:      General: No scleral icterus  Conjunctiva/sclera: Conjunctivae normal    Neck:      Musculoskeletal: Normal range of motion and neck supple  Vascular: No JVD  Cardiovascular:      Rate and Rhythm: Normal rate  Heart sounds: Normal heart sounds  Pulmonary:      Effort: Pulmonary effort is normal  No respiratory distress  Breath sounds: Normal breath sounds  No wheezing     Abdominal:      General: Bowel sounds are normal  Palpations: Abdomen is soft  Tenderness: There is no abdominal tenderness  Musculoskeletal: Normal range of motion  Skin:     General: Skin is warm  Coloration: Skin is jaundiced  Findings: No rash  Neurological:      Mental Status: She is alert and oriented to person, place, and time  Psychiatric:         Behavior: Behavior normal           PLAN / RECOMMENDATIONS       acute kidney injury:  Volume depletion versus hepatorenal   As it is improving with hydration I think is volume depletion  Will continue hydration as well as albumin  Will check urinary to lytes to assess volume status at this point     Encephalopathy:  Likely to be metabolic with hepatitic encephalopathy along with uremia contributing     Cirrhosis of liver: Being monitor closely by GI    High bilirubin:  Patient does history of [address]  At present being worked up by GI    Will continue to monitor    Thank you for the consultation to participate in patient's care  I have personally discussed my plan with the referring physician  Francisco J Enriquez MD  Nephrology  3/12/2021        Portions of the record may have been created with voice recognition software  Occasional wrong word or "sound a like" substitutions may have occurred due to the inherent limitations of voice recognition software  Read the chart carefully and recognize, using context, where substitutions have occurred

## 2021-03-12 NOTE — NURSING NOTE
Patient has ambulated twice today to the bathroom with x1-2 assistance as she is unsteady  She refused sitting in the chair and insists on using toilet instead of bedside commode

## 2021-03-12 NOTE — ASSESSMENT & PLAN NOTE
· Secondary to alcohol abuse  · With poor outpatient follow-up  · Bilirubin noted to be approximately 13 on admission, was noted to be 3 a few months ago  · Bilirubin trending down today to 10    · GI Consult  · Plan as outlined above

## 2021-03-12 NOTE — ASSESSMENT & PLAN NOTE
Patient with acute elevation of total bilirubin  Likely secondary to alcoholic hepatitis   Given discriminant function score greater than 32 will start on prednisolone  Will rule out portal vein thrombus with right upper quadrant ultrasound with Doppler  Check acute hepatitis panel  GI consult

## 2021-03-12 NOTE — CONSULTS
Consultation - [facility]  Carina Brambila 64 y o  female MRN: [id number]  Unit/Bed#: -01 Encounter: [id number]         Reason for Consult / Principal Problem:  Cirrhosis, history of [address]  now with elevated bilirubin and creatinine    HPI:  Steven is a 61-year-old female with history of alcohol/HCV cirrhosis untreated complicated by [address]  treated by previous percutaneous ablation  Patient was instructed to come to the emergency room after seeing Dr Karoline Lanza in the office for follow-up as there was concern for recurrence of [address]  Because the patient was fatigued and complaining of dizziness, he referred her to the emergency room  On admission, LFTs as follows:  , ALT 80, alkaline phosphatase 84 and total bilirubin 12 79  At baseline, her bilirubin has been in the 3s usually  Patient's renal function elevated at 4 05  INR 2 07  She had a right upper quadrant ultrasound revealing no discernible flow in the main portal vein that could be artifactual secondary to slow flow or secondary to a portal vein thrombus that is not being visualized  The remainder of the liver appears to be unremarkable, except for known cirrhosis  Patient was seen examined at the bedside  She reports that she does not want an MRI, will be refusing  She reports that she is getting ready to move to [place], and does not want to stay in the hospital much longer  EGD and colonoscopy was last done in 2019  EGD without evidence of varices at that time  She was recommended a repeat in 2020  Colonoscopy with 2 polyps removed, sigmoid diverticulosis  She was due for 5 year recall  Review of Systems:    CONSTITUTIONAL: Denies any fever, chills, or rigors  Good appetite, and no recent weight loss  HEENT: No earache or tinnitus  Denies hearing loss or visual disturbances  CARDIOVASCULAR: No chest pain or palpitations     RESPIRATORY: Denies any cough, hemoptysis, shortness of breath or dyspnea on exertion  GASTROINTESTINAL: As noted in the History of Present Illness  GENITOURINARY: No problems with urination  Denies any hematuria or dysuria  NEUROLOGIC: No dizziness or vertigo, denies headaches  MUSCULOSKELETAL: Denies any muscle or joint pain  SKIN: Denies skin rashes or itching  ENDOCRINE: Denies excessive thirst  Denies intolerance to heat or cold  PSYCHOSOCIAL: Denies depression or anxiety  Denies any recent memory loss         Historical Information   Past Medical History:   Diagnosis Date    Arthritis     Cirrhosis ([address] )     Extremity pain     BLE    Hepatitis C     Joint pain     Bilateral Shoulders    Liver disease     Low back pain      Past Surgical History:   Procedure Laterality Date    CT GUIDED AND MONITORING PARENCHYMAL TISSUE ABLATION  7/28/2020    ECTOPIC PREGNANCY SURGERY      MOUTH SURGERY      TONSILLECTOMY       Social History   Social History     Substance and Sexual Activity   Alcohol Use Not Currently    Frequency: Monthly or less    Drinks per session: 1 or 2     Social History     Substance and Sexual Activity   Drug Use Yes    Frequency: 7 0 times per week    Types: Marijuana    Comment: everyday     Social History     Tobacco Use   Smoking Status Current Every Day Smoker    Packs/day: 0 50   Smokeless Tobacco Never Used     Family History   Problem Relation Age of Onset    Alcohol abuse Mother     Heart disease Father         Meds/Allergies     Current Facility-Administered Medications   Medication Dose Route Frequency    acetaminophen (TYLENOL) tablet 650 mg  650 mg Oral Q6H PRN    albumin human (FLEXBUMIN) 25 % injection 25 g  25 g Intravenous A6T    folic acid 1 mg, thiamine (VITAMIN B1) 100 mg in sodium chloride 0 9 % 100 mL IV piggyback   Intravenous Daily    heparin (porcine) subcutaneous injection 5,000 Units  5,000 Units Subcutaneous Q8H [facility]    lactulose oral solution 20 g  20 g Oral TID    nicotine (NICODERM CQ) 14 mg/24hr TD 24 hr patch 1 patch  1 patch Transdermal Daily    prednisoLONE (ORAPRED) oral solution 40 mg  40 mg Oral Daily    sodium chloride 0 9 % infusion  100 mL/hr Intravenous Continuous       No Known Allergies      Objective     Blood pressure 104/61, pulse 77, temperature 97 9 °F (36 6 °C), resp  rate 18, height 5' 8" (1 727 m), weight 63 2 kg (139 lb 5 3 oz), SpO2 97 %  Intake/Output Summary (Last 24 hours) at 3/12/2021 1455  Last data filed at 3/12/2021 1428  Gross per 24 hour   Intake 240 ml   Output 0 ml   Net 240 ml         PHYSICAL EXAM:      General Appearance:   Alert and oriented x 3  Cooperative, and in no respiratory distress  Positive asterixis  HEENT:   Normocepalic, atraumatic, scleral icterus noted      Neck:  Supple, symmetrical, trachea midline   Lungs:   Clear to auscultation bilaterally; no rales, rhonchi or wheezing; respirations unlabored    Heart[de-identified]   S1 and S2 normal; regular rate and rhythm; no murmur, rub, or gallop  Abdomen:   Soft, non-tender, non-distended; normal bowel sounds; no masses, no organomegaly    Genitalia:   Deferred    Rectal:   Deferred    Extremities:  No cyanosis, clubbing or edema    Pulses:  2+ and symmetric all extremities    Skin:  Profoundly jaundiced    Otherwise normal, texture, turgor normal, no rashes or lesions    Lymph nodes:  No palpable cervical or supraclavicular lymphadenopathy        Lab Results:   Results from last 7 days   Lab Units 03/12/21  0528   WBC Thousand/uL 6 07   HEMOGLOBIN g/dL 7 5*   HEMATOCRIT % 21 7*   PLATELETS Thousands/uL 56*   NEUTROS PCT % 86*   LYMPHS PCT % 5*   MONOS PCT % 8   EOS PCT % 0     Results from last 7 days   Lab Units 03/12/21  0227   POTASSIUM mmol/L 4 8   CHLORIDE mmol/L 93*   CO2 mmol/L 25   BUN mg/dL 80*   CREATININE mg/dL 3 83*   CALCIUM mg/dL 9 0   ALK PHOS U/L 74   ALT U/L 69   AST U/L 255*     Results from last 7 days   Lab Units 03/12/21  0227   INR  2 34*           Imaging Studies: I have personally reviewed pertinent imaging studies  Ct Head Without Contrast    Result Date: 3/11/2021  Impression: No acute intracranial abnormality  Workstation performed: [id number]    Us Right Upper Quadrant With Liver Dopplers    Result Date: 3/12/2021  Impression: No discernible flow in the main portal vein  This may be artifactual secondary to slow flow or portal vein thrombosis  Consider follow-up with contrast-enhanced CT or MRI if it would alter patient management  Hepatic cirrhosis  Cholelithiasis and gallbladder sludge without evidence of cholecystitis  The study was marked in [facility] for immediate notification  Workstation performed: [id number]      ASSESSMENT and PLAN:      1) Hepatitis-C/alcoholic cirrhosis complicated by recent [address]  status post ablation now here with elevated creatinine and bilirubin - MELD 39  Discriminant function score over 30  She had treatment for [address]  in July 2020 as ordered by Dr Thanh Soto  Her last MRCP was in January revealing continued decrease in size of liver lesion measuring 2 3 x 2 3 cm likely representing granulation tissue  The main portal vein appeared to be patent at that time  She has a repeat right upper quadrant ultrasound with Doppler showing possibility of portal vein thrombus given abnormality of portal vein flow  However, not definite  No ascites on imaging  Patient has not had treatment for hepatitis-C  She had a positive viral load and genotype 3 back in 2019  This would put her at increased risk for [address]  - Ideally, would want patient to repeat MRCP, however she is declining  She cannot have CT triple phase readily secondary to her DARRYL  - Repeat AFP  - Continue monitor CMP and INR daily  - Hold off on prednisolone for now  - Lactulose and Xifaxan BID  - Will follow closely  - Continue monitor mental status  - Check HCV RNA markus    2) DARRYL - Concern for HRS  Nephrology consult  The patient was seen and examined by Dr Brandy Agrawal, all key medical decisions were made with Dr Brandy Agrawal    Thank you for allowing us to participate in the care of this pleasant patient  We will follow up with you closely

## 2021-03-12 NOTE — ASSESSMENT & PLAN NOTE
· Baseline kidney function approximately 0 8  · Presented with a creatinine of 4  · Likely secondary volume depletion versus hepatorenal  · Trending down today, 10 25 today    · Continue scheduled albumin  · Hold home diuretics  · Nephrology consultation

## 2021-03-13 PROBLEM — D64.9 ANEMIA: Status: ACTIVE | Noted: 2021-01-01

## 2021-03-13 NOTE — PROGRESS NOTES
NEPHROLOGY PROGRESS NOTE    Patient: Andrea oCyle               Sex: female          DOA: 3/11/2021  4:48 PM   YOB: 1960        Age:  64 y o         LOS:  LOS: 2 days       HPI     Patient with cirrhosis of liver admitted with encephalopathy and acute kidney injury    SUBJECTIVE     Feeling better  Still feeling weak and tired    Still has generalized achiness    No chest pain no palpitation    CURRENT MEDICATIONS       Current Facility-Administered Medications:     acetaminophen (TYLENOL) tablet 650 mg, 650 mg, Oral, Q6H PRN, Mahesh Mora MD    albumin human (FLEXBUMIN) 25 % injection 25 g, 25 g, Intravenous, Q6H, Mahesh Mora MD, 25 g at 38/13/82 2647    folic acid 1 mg, thiamine (VITAMIN B1) 100 mg in sodium chloride 0 9 % 100 mL IV piggyback, , Intravenous, Daily, Sonya Beltran MD, New Bag at 03/13/21 0857    lactulose oral solution 20 g, 20 g, Oral, TID, Mahesh Mora MD, 20 g at 03/13/21 0827    nicotine (NICODERM CQ) 14 mg/24hr TD 24 hr patch 1 patch, 1 patch, Transdermal, Daily, Mahesh Mora MD    rifaximin (XIFAXAN) tablet 550 mg, 550 mg, Oral, Q12H [facility], Polina Roberts PA-C, 550 mg at 03/13/21 0827    sodium chloride 0 9 % infusion, 100 mL/hr, Intravenous, Continuous, SILVANO Villanueva, Last Rate: 100 mL/hr at 03/12/21 1327, 100 mL/hr at 03/12/21 1327    OBJECTIVE     Current Weight: Weight - Scale: 62 3 kg (137 lb 5 6 oz)  Vitals:    03/13/21 0748   BP: 93/50   Pulse: 77   Resp: 18   Temp: 97 8 °F (36 6 °C)   SpO2: 96%       Intake/Output Summary (Last 24 hours) at 3/13/2021 1046  Last data filed at 3/13/2021 0837  Gross per 24 hour   Intake 1030 ml   Output 0 ml   Net 1030 ml       PHYSICAL EXAMINATION     Physical Exam  Constitutional:       General: She is not in acute distress  Appearance: She is well-developed  HENT:      Head: Normocephalic  Eyes:      General: No scleral icterus       Conjunctiva/sclera: Conjunctivae normal    Neck: Musculoskeletal: Neck supple  Vascular: No JVD  Cardiovascular:      Rate and Rhythm: Normal rate  Heart sounds: Normal heart sounds  Pulmonary:      Effort: Pulmonary effort is normal       Breath sounds: No wheezing  Abdominal:      Palpations: Abdomen is soft  Tenderness: There is no abdominal tenderness  Musculoskeletal: Normal range of motion  Skin:     General: Skin is warm  Coloration: Skin is jaundiced  Findings: No rash  Neurological:      Mental Status: She is alert and oriented to person, place, and time  Psychiatric:         Behavior: Behavior normal           LAB RESULTS     Results from last 7 days   Lab Units 03/13/21  0536 03/12/21  0528 03/12/21  0227 03/11/21  1819   WBC Thousand/uL 4 89 6 07  --  10 48*   HEMOGLOBIN g/dL 6 8* 7 5*  --  8 8*   HEMATOCRIT % 19 8* 21 7*  --  25 9*   PLATELETS Thousands/uL 48* 56*  --  89*   POTASSIUM mmol/L 3 9  --  4 8 5 7*   CHLORIDE mmol/L 93*  --  93* 91*   CO2 mmol/L 25  --  25 26   BUN mg/dL 74*  --  80* 73*   CREATININE mg/dL 2 30*  --  3 83* 4 05*   EGFR ml/min/1 73sq m 23  --  12 11   CALCIUM mg/dL 8 8  --  9 0 9 2   MAGNESIUM mg/dL 3 2*  --   --  3 1*       RADIOLOGY RESULTS      No results found for this or any previous visit  No results found for this or any previous visit  No results found for this or any previous visit  No results found for this or any previous visit  No results found for this or any previous visit  No results found for this or any previous visit  PLAN / RECOMMENDATIONS      Acute kidney injury:  Improving with hydration  Urinary study support volume depletion so I will continue IV fluid unlikely to be hepatorenal as it is improving    Hepatic encephalopathy:  Much better now    Will continue to monitor    Sincere Vela MD  Nephrology  3/13/2021        Portions of the record may have been created with voice recognition software   Occasional wrong word or "sound a like" substitutions may have occurred due to the inherent limitations of voice recognition software  Read the chart carefully and recognize, using context, where substitutions have occurred

## 2021-03-13 NOTE — ASSESSMENT & PLAN NOTE
· Presented with a potassium of 5 7  · Likely secondary to worsening kidney function  · Patient was given IV Insulin x1 on 03/11  · Repeat potassium 3 9 today  · Monitor

## 2021-03-13 NOTE — ASSESSMENT & PLAN NOTE
· History of alcohol abuse  · Patient reports not actively drinking at this time    · Alcohol level on admission was noted to be 0 so no acute alcohol intoxication  · Continue CIWA protocol if remains negative for another 24 hours consider discontinuing

## 2021-03-13 NOTE — PLAN OF CARE
Problem: PAIN - ADULT  Goal: Verbalizes/displays adequate comfort level or baseline comfort level  Description: Interventions:  - Encourage patient to monitor pain and request assistance  - Assess pain using appropriate pain scale  - Administer analgesics based on type and severity of pain and evaluate response  - Implement non-pharmacological measures as appropriate and evaluate response  - Consider cultural and social influences on pain and pain management  - Notify physician/advanced practitioner if interventions unsuccessful or patient reports new pain  Outcome: Progressing     Problem: INFECTION - ADULT  Goal: Absence or prevention of progression during hospitalization  Description: INTERVENTIONS:  - Assess and monitor for signs and symptoms of infection  - Monitor lab/diagnostic results  - Monitor all insertion sites, i e  indwelling lines, tubes, and drains  - Monitor endotracheal if appropriate and nasal secretions for changes in amount and color  - [place] appropriate cooling/warming therapies per order  - Administer medications as ordered  - Instruct and encourage patient and family to use good hand hygiene technique  - Identify and instruct in appropriate isolation precautions for identified infection/condition  Outcome: Progressing  Goal: Absence of fever/infection during neutropenic period  Description: INTERVENTIONS:  - Monitor WBC    Outcome: Progressing     Problem: SAFETY ADULT  Goal: Patient will remain free of falls  Description: INTERVENTIONS:  - Assess patient frequently for physical needs  -  Identify cognitive and physical deficits and behaviors that affect risk of falls    -  [place] fall precautions as indicated by assessment   - Educate patient/family on patient safety including physical limitations  - Instruct patient to call for assistance with activity based on assessment  - Modify environment to reduce risk of injury  - Consider OT/PT consult to assist with strengthening/mobility  Outcome: Progressing  Goal: Maintain or return to baseline ADL function  Description: INTERVENTIONS:  -  Assess patient's ability to carry out ADLs; assess patient's baseline for ADL function and identify physical deficits which impact ability to perform ADLs (bathing, care of mouth/teeth, toileting, grooming, dressing, etc )  - Assess/evaluate cause of self-care deficits   - Assess range of motion  - Assess patient's mobility; develop plan if impaired  - Assess patient's need for assistive devices and provide as appropriate  - Encourage maximum independence but intervene and supervise when necessary  - Involve family in performance of ADLs  - Assess for home care needs following discharge   - Consider OT consult to assist with ADL evaluation and planning for discharge  - Provide patient education as appropriate  Outcome: Progressing  Goal: Maintain or return mobility status to optimal level  Description: INTERVENTIONS:  - Assess patient's baseline mobility status (ambulation, transfers, stairs, etc )    - Identify cognitive and physical deficits and behaviors that affect mobility  - Identify mobility aids required to assist with transfers and/or ambulation (gait belt, sit-to-stand, lift, walker, cane, etc )  - [place] fall precautions as indicated by assessment  - Record patient progress and toleration of activity level on Mobility SBAR; progress patient to next Phase/Stage  - Instruct patient to call for assistance with activity based on assessment  - Consider rehabilitation consult to assist with strengthening/weightbearing, etc   Outcome: Progressing     Problem: DISCHARGE PLANNING  Goal: Discharge to home or other facility with appropriate resources  Description: INTERVENTIONS:  - Identify barriers to discharge w/patient and caregiver  - Arrange for needed discharge resources and transportation as appropriate  - Identify discharge learning needs (meds, wound care, etc )  - Arrange for interpretive services to assist at discharge as needed  - Refer to Case Management Department for coordinating discharge planning if the patient needs post-hospital services based on physician/advanced practitioner order or complex needs related to functional status, cognitive ability, or social support system  Outcome: Progressing     Problem: Knowledge Deficit  Goal: Patient/family/caregiver demonstrates understanding of disease process, treatment plan, medications, and discharge instructions  Description: Complete learning assessment and assess knowledge base  Interventions:  - Provide teaching at level of understanding  - Provide teaching via preferred learning methods  Outcome: Progressing     Problem: Potential for Falls  Goal: Patient will remain free of falls  Description: INTERVENTIONS:  - Assess patient frequently for physical needs  -  Identify cognitive and physical deficits and behaviors that affect risk of falls    -  [place] fall precautions as indicated by assessment   - Educate patient/family on patient safety including physical limitations  - Instruct patient to call for assistance with activity based on assessment  - Modify environment to reduce risk of injury  - Consider OT/PT consult to assist with strengthening/mobility  Outcome: Progressing     Problem: Prexisting or High Potential for Compromised Skin Integrity  Goal: Skin integrity is maintained or improved  Description: INTERVENTIONS:  - Identify patients at risk for skin breakdown  - Assess and monitor skin integrity  - Assess and monitor nutrition and hydration status  - Monitor labs   - Assess for incontinence   - Turn and reposition patient  - Assist with mobility/ambulation  - Relieve pressure over bony prominences  - Avoid friction and shearing  - Provide appropriate hygiene as needed including keeping skin clean and dry  - Evaluate need for skin moisturizer/barrier cream  - Collaborate with interdisciplinary team   - Patient/family teaching  - Consider wound care consult   Outcome: Progressing     Problem: Nutrition/Hydration-ADULT  Goal: Nutrient/Hydration intake appropriate for improving, restoring or maintaining nutritional needs  Description: Monitor and assess patient's nutrition/hydration status for malnutrition  Collaborate with interdisciplinary team and initiate plan and interventions as ordered  Monitor patient's weight and dietary intake as ordered or per policy  Utilize nutrition screening tool and intervene as necessary  Determine patient's food preferences and provide high-protein, high-caloric foods as appropriate       INTERVENTIONS:  - Monitor oral intake, urinary output, labs, and treatment plans  - Assess nutrition and hydration status and recommend course of action  - Evaluate amount of meals eaten  - Assist patient with eating if necessary   - Allow adequate time for meals  - Recommend/ encourage appropriate diets, oral nutritional supplements, and vitamin/mineral supplements  - Order, calculate, and assess calorie counts as needed  - Recommend, monitor, and adjust tube feedings and TPN/PPN based on assessed needs  - Assess need for intravenous fluids  - Provide specific nutrition/hydration education as appropriate  - Include patient/family/caregiver in decisions related to nutrition  Outcome: Progressing

## 2021-03-13 NOTE — ASSESSMENT & PLAN NOTE
· Patient with acute elevation of total bilirubin, elevated to 12 79 on admission  · Likely secondary to alcoholic hepatitis   · Given discriminant function score greater than 32 has been started on on prednisolone x2 days however this is since been disease continued and patient has been started on rifaximin  · Trending down to 6 41 today  · US RUQ, Liver Dopplers completed (3/12/21): No discernible flow in the main portal vein  This may be artifactual secondary to slow flow or portal vein thrombosis  Consider follow-up with contrast-enhanced CT or MRI if it would alter patient management  Hepatic cirrhosis  Cholelithiasis and gallbladder sludge without evidence of cholecystitis  · Hepatitis panel completed; Hepatitis C Ab high reactive  · GI consult  · Patient needs MRI; however, at this time is currently refusing  Discussed need for, risks of not getting it  Patient verbalizes understanding  Wants to follow up with her "[facility]" and is moving to [place]

## 2021-03-13 NOTE — ASSESSMENT & PLAN NOTE
· Present on admission 8 8 suspected likely in setting liver cirrhosis  · No active signs of bleeding  · Hemoglobin this morning 6 8 repeat H/H 6 5  · Patient agreeable to 2 transfusion risk and benefits were explained  · Patient denies previous transfusion will pre treat prior with Benadryl  · Will order an transfuse 1 unit PRBC repeat H&H post transfusion

## 2021-03-13 NOTE — ASSESSMENT & PLAN NOTE
· Baseline kidney function approximately 0 8  · Presented with a creatinine of 4  · Creatinine slowly improving currently at 2 32  · Likely secondary volume depletion versus hepatorenal  · Trending down today, 6 41 today    · Continue scheduled albumin  · Hold home diuretics  · Nephrology consult and following

## 2021-03-13 NOTE — PLAN OF CARE
Problem: PAIN - ADULT  Goal: Verbalizes/displays adequate comfort level or baseline comfort level  Description: Interventions:  - Encourage patient to monitor pain and request assistance  - Assess pain using appropriate pain scale  - Administer analgesics based on type and severity of pain and evaluate response  - Implement non-pharmacological measures as appropriate and evaluate response  - Consider cultural and social influences on pain and pain management  - Notify physician/advanced practitioner if interventions unsuccessful or patient reports new pain  Outcome: Progressing     Problem: INFECTION - ADULT  Goal: Absence or prevention of progression during hospitalization  Description: INTERVENTIONS:  - Assess and monitor for signs and symptoms of infection  - Monitor lab/diagnostic results  - Monitor all insertion sites, i e  indwelling lines, tubes, and drains  - Monitor endotracheal if appropriate and nasal secretions for changes in amount and color  - [place] appropriate cooling/warming therapies per order  - Administer medications as ordered  - Instruct and encourage patient and family to use good hand hygiene technique  - Identify and instruct in appropriate isolation precautions for identified infection/condition  Outcome: Progressing  Goal: Absence of fever/infection during neutropenic period  Description: INTERVENTIONS:  - Monitor WBC    Outcome: Progressing     Problem: SAFETY ADULT  Goal: Patient will remain free of falls  Description: INTERVENTIONS:  - Assess patient frequently for physical needs  -  Identify cognitive and physical deficits and behaviors that affect risk of falls    -  [place] fall precautions as indicated by assessment   - Educate patient/family on patient safety including physical limitations  - Instruct patient to call for assistance with activity based on assessment  - Modify environment to reduce risk of injury  - Consider OT/PT consult to assist with strengthening/mobility  Outcome: Progressing  Goal: Maintain or return to baseline ADL function  Description: INTERVENTIONS:  -  Assess patient's ability to carry out ADLs; assess patient's baseline for ADL function and identify physical deficits which impact ability to perform ADLs (bathing, care of mouth/teeth, toileting, grooming, dressing, etc )  - Assess/evaluate cause of self-care deficits   - Assess range of motion  - Assess patient's mobility; develop plan if impaired  - Assess patient's need for assistive devices and provide as appropriate  - Encourage maximum independence but intervene and supervise when necessary  - Involve family in performance of ADLs  - Assess for home care needs following discharge   - Consider OT consult to assist with ADL evaluation and planning for discharge  - Provide patient education as appropriate  Outcome: Progressing  Goal: Maintain or return mobility status to optimal level  Description: INTERVENTIONS:  - Assess patient's baseline mobility status (ambulation, transfers, stairs, etc )    - Identify cognitive and physical deficits and behaviors that affect mobility  - Identify mobility aids required to assist with transfers and/or ambulation (gait belt, sit-to-stand, lift, walker, cane, etc )  - [place] fall precautions as indicated by assessment  - Record patient progress and toleration of activity level on Mobility SBAR; progress patient to next Phase/Stage  - Instruct patient to call for assistance with activity based on assessment  - Consider rehabilitation consult to assist with strengthening/weightbearing, etc   Outcome: Progressing     Problem: DISCHARGE PLANNING  Goal: Discharge to home or other facility with appropriate resources  Description: INTERVENTIONS:  - Identify barriers to discharge w/patient and caregiver  - Arrange for needed discharge resources and transportation as appropriate  - Identify discharge learning needs (meds, wound care, etc )  - Arrange for interpretive services to assist at discharge as needed  - Refer to Case Management Department for coordinating discharge planning if the patient needs post-hospital services based on physician/advanced practitioner order or complex needs related to functional status, cognitive ability, or social support system  Outcome: Progressing     Problem: Knowledge Deficit  Goal: Patient/family/caregiver demonstrates understanding of disease process, treatment plan, medications, and discharge instructions  Description: Complete learning assessment and assess knowledge base  Interventions:  - Provide teaching at level of understanding  - Provide teaching via preferred learning methods  Outcome: Progressing     Problem: Potential for Falls  Goal: Patient will remain free of falls  Description: INTERVENTIONS:  - Assess patient frequently for physical needs  -  Identify cognitive and physical deficits and behaviors that affect risk of falls    -  [place] fall precautions as indicated by assessment   - Educate patient/family on patient safety including physical limitations  - Instruct patient to call for assistance with activity based on assessment  - Modify environment to reduce risk of injury  - Consider OT/PT consult to assist with strengthening/mobility  Outcome: Progressing     Problem: Prexisting or High Potential for Compromised Skin Integrity  Goal: Skin integrity is maintained or improved  Description: INTERVENTIONS:  - Identify patients at risk for skin breakdown  - Assess and monitor skin integrity  - Assess and monitor nutrition and hydration status  - Monitor labs   - Assess for incontinence   - Turn and reposition patient  - Assist with mobility/ambulation  - Relieve pressure over bony prominences  - Avoid friction and shearing  - Provide appropriate hygiene as needed including keeping skin clean and dry  - Evaluate need for skin moisturizer/barrier cream  - Collaborate with interdisciplinary team   - Patient/family teaching  - Consider wound care consult   Outcome: Progressing     Problem: Nutrition/Hydration-ADULT  Goal: Nutrient/Hydration intake appropriate for improving, restoring or maintaining nutritional needs  Description: Monitor and assess patient's nutrition/hydration status for malnutrition  Collaborate with interdisciplinary team and initiate plan and interventions as ordered  Monitor patient's weight and dietary intake as ordered or per policy  Utilize nutrition screening tool and intervene as necessary  Determine patient's food preferences and provide high-protein, high-caloric foods as appropriate       INTERVENTIONS:  - Monitor oral intake, urinary output, labs, and treatment plans  - Assess nutrition and hydration status and recommend course of action  - Evaluate amount of meals eaten  - Assist patient with eating if necessary   - Allow adequate time for meals  - Recommend/ encourage appropriate diets, oral nutritional supplements, and vitamin/mineral supplements  - Order, calculate, and assess calorie counts as needed  - Recommend, monitor, and adjust tube feedings and TPN/PPN based on assessed needs  - Assess need for intravenous fluids  - Provide specific nutrition/hydration education as appropriate  - Include patient/family/caregiver in decisions related to nutrition  Outcome: Progressing

## 2021-03-13 NOTE — ASSESSMENT & PLAN NOTE
· Likely hepatic encephalopathy from worsening liver dysfunction alcohol abuse  · On admission, wasn't very cooperative; improving now  She is now AOx4    · Continue lactulose

## 2021-03-13 NOTE — PROGRESS NOTES
Progress Note - Cierra Huerta 64 y o  female MRN: [id number]    Unit/Bed#: -01 Encounter: [id number]        Subjective:     Reports that she is feeling tired today, otherwise no other complaints  ROS: As noted in the HPI, otherwise all others negative  Objective:     Vitals: Blood pressure 93/50, pulse 77, temperature 97 8 °F (36 6 °C), resp  rate 18, height 5' 8" (1 727 m), weight 62 3 kg (137 lb 5 6 oz), SpO2 96 %  ,Body mass index is 20 88 kg/m²  Intake/Output Summary (Last 24 hours) at 3/13/2021 0947  Last data filed at 3/13/2021 0837  Gross per 24 hour   Intake 1030 ml   Output 0 ml   Net 1030 ml       Physical Exam:     General Appearance: Alert and oriented x 3  In no respiratory distress  Positive asterixis  Lungs: Clear to auscultation bilaterally, no rales or rhonchi  Heart: Regular rate and rhythm, S1, S2 normal, no murmur, click, rub or gallop  Abdomen: Soft, non-tender, non-distended; bowel sounds normal; no masses or no organomegaly  Extremities: No cyanosis, edema    Invasive Devices     Peripheral Intravenous Line            Peripheral IV 03/12/21 Distal;Left;Ventral (anterior) Forearm less than 1 day                Lab Results:  Results from last 7 days   Lab Units 03/13/21  0536   WBC Thousand/uL 4 89   HEMOGLOBIN g/dL 6 8*   HEMATOCRIT % 19 8*   PLATELETS Thousands/uL 48*   NEUTROS PCT % 73   LYMPHS PCT % 14   MONOS PCT % 12   EOS PCT % 0     Results from last 7 days   Lab Units 03/13/21  0536   POTASSIUM mmol/L 3 9   CHLORIDE mmol/L 93*   CO2 mmol/L 25   BUN mg/dL 74*   CREATININE mg/dL 2 30*   CALCIUM mg/dL 8 8   ALK PHOS U/L 61   ALT U/L 55   AST U/L 131*     Results from last 7 days   Lab Units 03/13/21  0844   INR  2 68*           Imaging Studies: I have personally reviewed pertinent imaging studies  Ct Head Without Contrast    Result Date: 3/11/2021  Impression: No acute intracranial abnormality   Workstation performed: [id number]    Us Right Upper Quadrant With Liver Dopplers    Result Date: 3/12/2021  Impression: No discernible flow in the main portal vein  This may be artifactual secondary to slow flow or portal vein thrombosis  Consider follow-up with contrast-enhanced CT or MRI if it would alter patient management  Hepatic cirrhosis  Cholelithiasis and gallbladder sludge without evidence of cholecystitis  The study was marked in [facility] for immediate notification  Workstation performed: [id number]        Assessment and Plan:     1) Hepatitis-C cirrhosis complicated by recent [address]  status post ablation now here with hepatic encephalopathy, elevated creatinine and bilirubin - MELD 39 on admission, now improved to 34 as creatinine is improving  She had treatment for [address]  in July 2020 as ordered by Dr Brisa Pyle  Her last MRCP was in January revealing continued decrease in size of liver lesion measuring 2 3 x 2 3 cm likely representing granulation tissue  The main portal vein appeared to be patent at that time  She has a repeat right upper quadrant ultrasound with Doppler showing possibility of portal vein thrombus given abnormality of portal vein flow  However, not definite  No ascites on imaging  Patient has not had treatment for hepatitis-C  She had a positive viral load and genotype 3 back in 2019  This would put her at increased risk for [address]  - Ideally, would want patient to repeat MRCP, however she is declining  She cannot have CT triple phase readily secondary to her DARRYL  - Repeat AFP pending  - Continue monitor CMP and INR daily  - Lactulose with a goal of 3-4 bowel movements daily and Xifaxan BID  - Blood culture, chest x-ray and UA  - Continue monitor mental status  - Follow-up HCV RNA markus     2) DARRYL - Concern for HRS  Appreciate Nephrology recommendations    Fortunately, creatinine is improving from 4 yesterday to 2 3 this morning

## 2021-03-13 NOTE — ASSESSMENT & PLAN NOTE
· Secondary to alcohol abuse  · With poor outpatient follow-up  · Bilirubin noted to be approximately 13 on admission, was noted to be 3 a few months ago  · Bilirubin trending as mentioned above  · GI Consulted and following  · Plan as outlined above

## 2021-03-13 NOTE — PROGRESS NOTES
[address] [facility]  Progress Note - Eden Johnson 1960, 64 y o  female MRN: [id number]  Unit/Bed#: -01 Encounter: [id number]  Primary Care Provider: Nona Mcculloguh DO   Date and time admitted to hospital: 3/11/2021  4:48 PM    * Serum total bilirubin elevated  Assessment & Plan  · Patient with acute elevation of total bilirubin, elevated to 12 79 on admission  · Likely secondary to alcoholic hepatitis   · Given discriminant function score greater than 32 has been started on on prednisolone x2 days however this is since been disease continued and patient has been started on rifaximin  · Trending down to 6 41 today  · US RUQ, Liver Dopplers completed (3/12/21): No discernible flow in the main portal vein  This may be artifactual secondary to slow flow or portal vein thrombosis  Consider follow-up with contrast-enhanced CT or MRI if it would alter patient management  Hepatic cirrhosis  Cholelithiasis and gallbladder sludge without evidence of cholecystitis  · Hepatitis panel completed; Hepatitis C Ab high reactive  · GI consult  · Patient needs MRI; however, at this time is currently refusing  Discussed need for, risks of not getting it  Patient verbalizes understanding  Wants to follow up with her "Liver Doctor" and is moving to [place]  Acute encephalopathy  Assessment & Plan  · Likely hepatic encephalopathy from worsening liver dysfunction alcohol abuse  · On admission, wasn't very cooperative; improving now  She is now AOx4  · Continue lactulose    DARRYL (acute kidney injury) ([facility] [address] )  Assessment & Plan  · Baseline kidney function approximately 0 8  · Presented with a creatinine of 4  · Creatinine slowly improving currently at 2 32  · Likely secondary volume depletion versus hepatorenal  · Trending down today, 6 41 today    · Continue scheduled albumin  · Hold home diuretics  · Nephrology consult and following    Anemia  Assessment & Plan  · Present on admission 8 8 suspected likely in setting liver cirrhosis  · No active signs of bleeding  · Hemoglobin this morning 6 8 repeat H/H 6 5  · Patient agreeable to 2 transfusion risk and benefits were explained  · Patient denies previous transfusion will pre treat prior with Benadryl  · Will order an transfuse 1 unit PRBC repeat H&H post transfusion    Alcohol abuse  Assessment & Plan  · History of alcohol abuse  · Patient reports not actively drinking at this time  · Alcohol level on admission was noted to be 0 so no acute alcohol intoxication  · Continue CIWA protocol if remains negative for another 24 hours consider discontinuing    Hyperkalemia  Assessment & Plan  · Presented with a potassium of 5 7  · Likely secondary to worsening kidney function  · Patient was given IV Insulin x1 on 03/11  · Repeat potassium 3 9 today  · Monitor  Cirrhosis ([facility] [address] )  Assessment & Plan  · Secondary to alcohol abuse  · With poor outpatient follow-up  · Bilirubin noted to be approximately 13 on admission, was noted to be 3 a few months ago  · Bilirubin trending as mentioned above  · GI Consulted and following  · Plan as outlined above    Hepatocellular carcinoma ([facility] [address] )  Assessment & Plan  · Follows outpatient with Oncology        VTE Pharmacologic Prophylaxis:   Pharmacologic: Pharmacologic VTE Prophylaxis contraindicated due to Anemia  Mechanical VTE Prophylaxis in Place: Yes    Patient Centered Rounds: I have performed bedside rounds with nursing staff today  Discussions with Specialists or Other Care Team Provider:  GI and nephrology notes reviewed    Education and Discussions with Family / Patient:  Patient    Time Spent for Care: 35 minutes  More than 50% of total time spent on counseling and coordination of care as described above      Current Length of Stay: 2 day(s)    Current Patient Status: Inpatient   Certification Statement: The patient will continue to require additional inpatient hospital stay due to Ongoing treatment in setting of liver cirrhosis/DARRYL     Discharge Plan:  Not medically cleared    Code Status: Level 1 - Full Code      Subjective:   Patient reports significant fatigue however states I want to go home patient educated on current lab findings and concern why she may be feeling tired other than being in the hospital were explained patient question if she has had a blood transfusion in her lifetime patient denies had 1 patient with educated on current findings of anemia and her blood level she is open and accepting of a blood transfusion at this time states that this would be 1 of the reasons to keep her in the hospital currently patient states understanding and willing to proceed  Objective:     Vitals:   Temp (24hrs), Av °F (36 7 °C), Min:97 4 °F (36 3 °C), Max:98 4 °F (36 9 °C)    Temp:  [97 4 °F (36 3 °C)-98 4 °F (36 9 °C)] 97 8 °F (36 6 °C)  HR:  [72-81] 77  Resp:  [16-19] 18  BP: ()/(50-56) 93/50  SpO2:  [93 %-96 %] 96 %  Body mass index is 20 88 kg/m²  Input and Output Summary (last 24 hours): Intake/Output Summary (Last 24 hours) at 3/13/2021 1245  Last data filed at 3/13/2021 0837  Gross per 24 hour   Intake 1030 ml   Output 0 ml   Net 1030 ml       Physical Exam:     Physical Exam  HENT:      Head: Normocephalic and atraumatic  Cardiovascular:      Rate and Rhythm: Normal rate and regular rhythm  Pulmonary:      Effort: Pulmonary effort is normal       Breath sounds: Normal breath sounds  Abdominal:      General: Bowel sounds are normal       Palpations: Abdomen is soft  Skin:     Coloration: Skin is pale  Neurological:      Mental Status: She is oriented to person, place, and time  She is lethargic  Psychiatric:         Mood and Affect: Mood normal          Behavior: Behavior normal  Behavior is cooperative             Additional Data:     Labs:    Results from last 7 days   Lab Units 21  1124 21  0536   WBC Thousand/uL  --  4 89   HEMOGLOBIN g/dL 6 5* 6 8*   HEMATOCRIT % 19 5* 19 8*   PLATELETS Thousands/uL  --  48*   NEUTROS PCT %  --  73   LYMPHS PCT %  --  14   MONOS PCT %  --  12   EOS PCT %  --  0     Results from last 7 days   Lab Units 03/13/21  0536   SODIUM mmol/L 129*   POTASSIUM mmol/L 3 9   CHLORIDE mmol/L 93*   CO2 mmol/L 25   BUN mg/dL 74*   CREATININE mg/dL 2 30*   ANION GAP mmol/L 11   CALCIUM mg/dL 8 8   ALBUMIN g/dL 3 3*   TOTAL BILIRUBIN mg/dL 6 41*   ALK PHOS U/L 61   ALT U/L 55   AST U/L 131*   GLUCOSE RANDOM mg/dL 108     Results from last 7 days   Lab Units 03/13/21  0844   INR  2 68*     Results from last 7 days   Lab Units 03/12/21  0545 03/12/21  0210 03/12/21  0057 03/12/21  0026 03/11/21  2358 03/11/21  2327 03/11/21  2305 03/11/21  2230 03/11/21  2138   POC GLUCOSE mg/dl 100 92 90 86 76 88 33* 78 84                   * I Have Reviewed All Lab Data Listed Above  * Additional Pertinent Lab Tests Reviewed: [address] Admission Reviewed    Imaging:    Imaging Reports Reviewed Today Include:  As mentioned above      Recent Cultures (last 7 days):           Last 24 Hours Medication List:   Current Facility-Administered Medications   Medication Dose Route Frequency Provider Last Rate    acetaminophen  650 mg Oral Q6H PRN Mahesh Mora MD      albumin human  25 g Intravenous Q6H Mahesh Mora MD      diphenhydrAMINE  25 mg Oral Once SILVANO Vasquez      folic acid 1 mg, thiamine 100 mg in 0 9% sodium chloride 100 mL IVPB   Intravenous Daily Mahesh Mora MD      lactulose  30 g Oral TID Sherrie Jensen PA-C      nicotine  1 patch Transdermal Daily Mahesh Mora MD      rifaximin  550 mg Oral Q12H [address] Sherrie Jensen PA-C      sodium chloride  100 mL/hr Intravenous Continuous SILVANO Pat 100 mL/hr (03/12/21 1327)        Today, Patient Was Seen By: SILVANO Vasquez    ** Please Note: Dictation voice to text software may have been used in the creation of this document   **

## 2021-03-14 NOTE — PLAN OF CARE
Problem: PAIN - ADULT  Goal: Verbalizes/displays adequate comfort level or baseline comfort level  Description: Interventions:  - Encourage patient to monitor pain and request assistance  - Assess pain using appropriate pain scale  - Administer analgesics based on type and severity of pain and evaluate response  - Implement non-pharmacological measures as appropriate and evaluate response  - Consider cultural and social influences on pain and pain management  - Notify physician/advanced practitioner if interventions unsuccessful or patient reports new pain  Outcome: Progressing     Problem: INFECTION - ADULT  Goal: Absence or prevention of progression during hospitalization  Description: INTERVENTIONS:  - Assess and monitor for signs and symptoms of infection  - Monitor lab/diagnostic results  - Monitor all insertion sites, i e  indwelling lines, tubes, and drains  - Monitor endotracheal if appropriate and nasal secretions for changes in amount and color  - [place] appropriate cooling/warming therapies per order  - Administer medications as ordered  - Instruct and encourage patient and family to use good hand hygiene technique  - Identify and instruct in appropriate isolation precautions for identified infection/condition  Outcome: Progressing  Goal: Absence of fever/infection during neutropenic period  Description: INTERVENTIONS:  - Monitor WBC    Outcome: Progressing     Problem: SAFETY ADULT  Goal: Patient will remain free of falls  Description: INTERVENTIONS:  - Assess patient frequently for physical needs  -  Identify cognitive and physical deficits and behaviors that affect risk of falls    -  [place] fall precautions as indicated by assessment   - Educate patient/family on patient safety including physical limitations  - Instruct patient to call for assistance with activity based on assessment  - Modify environment to reduce risk of injury  - Consider OT/PT consult to assist with strengthening/mobility  Outcome: Progressing  Goal: Maintain or return to baseline ADL function  Description: INTERVENTIONS:  -  Assess patient's ability to carry out ADLs; assess patient's baseline for ADL function and identify physical deficits which impact ability to perform ADLs (bathing, care of mouth/teeth, toileting, grooming, dressing, etc )  - Assess/evaluate cause of self-care deficits   - Assess range of motion  - Assess patient's mobility; develop plan if impaired  - Assess patient's need for assistive devices and provide as appropriate  - Encourage maximum independence but intervene and supervise when necessary  - Involve family in performance of ADLs  - Assess for home care needs following discharge   - Consider OT consult to assist with ADL evaluation and planning for discharge  - Provide patient education as appropriate  Outcome: Progressing  Goal: Maintain or return mobility status to optimal level  Description: INTERVENTIONS:  - Assess patient's baseline mobility status (ambulation, transfers, stairs, etc )    - Identify cognitive and physical deficits and behaviors that affect mobility  - Identify mobility aids required to assist with transfers and/or ambulation (gait belt, sit-to-stand, lift, walker, cane, etc )  - [place] fall precautions as indicated by assessment  - Record patient progress and toleration of activity level on Mobility SBAR; progress patient to next Phase/Stage  - Instruct patient to call for assistance with activity based on assessment  - Consider rehabilitation consult to assist with strengthening/weightbearing, etc   Outcome: Progressing     Problem: DISCHARGE PLANNING  Goal: Discharge to home or other facility with appropriate resources  Description: INTERVENTIONS:  - Identify barriers to discharge w/patient and caregiver  - Arrange for needed discharge resources and transportation as appropriate  - Identify discharge learning needs (meds, wound care, etc )  - Arrange for interpretive services to assist at discharge as needed  - Refer to Case Management Department for coordinating discharge planning if the patient needs post-hospital services based on physician/advanced practitioner order or complex needs related to functional status, cognitive ability, or social support system  Outcome: Progressing     Problem: Knowledge Deficit  Goal: Patient/family/caregiver demonstrates understanding of disease process, treatment plan, medications, and discharge instructions  Description: Complete learning assessment and assess knowledge base  Interventions:  - Provide teaching at level of understanding  - Provide teaching via preferred learning methods  Outcome: Progressing     Problem: Potential for Falls  Goal: Patient will remain free of falls  Description: INTERVENTIONS:  - Assess patient frequently for physical needs  -  Identify cognitive and physical deficits and behaviors that affect risk of falls    -  [place] fall precautions as indicated by assessment   - Educate patient/family on patient safety including physical limitations  - Instruct patient to call for assistance with activity based on assessment  - Modify environment to reduce risk of injury  - Consider OT/PT consult to assist with strengthening/mobility  Outcome: Progressing     Problem: Prexisting or High Potential for Compromised Skin Integrity  Goal: Skin integrity is maintained or improved  Description: INTERVENTIONS:  - Identify patients at risk for skin breakdown  - Assess and monitor skin integrity  - Assess and monitor nutrition and hydration status  - Monitor labs   - Assess for incontinence   - Turn and reposition patient  - Assist with mobility/ambulation  - Relieve pressure over bony prominences  - Avoid friction and shearing  - Provide appropriate hygiene as needed including keeping skin clean and dry  - Evaluate need for skin moisturizer/barrier cream  - Collaborate with interdisciplinary team   - Patient/family teaching  - Consider wound care consult   Outcome: Progressing     Problem: Nutrition/Hydration-ADULT  Goal: Nutrient/Hydration intake appropriate for improving, restoring or maintaining nutritional needs  Description: Monitor and assess patient's nutrition/hydration status for malnutrition  Collaborate with interdisciplinary team and initiate plan and interventions as ordered  Monitor patient's weight and dietary intake as ordered or per policy  Utilize nutrition screening tool and intervene as necessary  Determine patient's food preferences and provide high-protein, high-caloric foods as appropriate       INTERVENTIONS:  - Monitor oral intake, urinary output, labs, and treatment plans  - Assess nutrition and hydration status and recommend course of action  - Evaluate amount of meals eaten  - Assist patient with eating if necessary   - Allow adequate time for meals  - Recommend/ encourage appropriate diets, oral nutritional supplements, and vitamin/mineral supplements  - Order, calculate, and assess calorie counts as needed  - Recommend, monitor, and adjust tube feedings and TPN/PPN based on assessed needs  - Assess need for intravenous fluids  - Provide specific nutrition/hydration education as appropriate  - Include patient/family/caregiver in decisions related to nutrition  Outcome: Progressing

## 2021-03-14 NOTE — ASSESSMENT & PLAN NOTE
· Patient with acute elevation of total bilirubin, elevated to 12 79 on admission  · Likely secondary to alcoholic hepatitis   · Given discriminant function score greater than 32 has been started on on prednisolone x2 days however this has since been disease continued and patient has been started on rifaximin on 3/12  · Trending down to 6 39 today  · US RUQ, Liver Dopplers completed (3/12/21): No discernible flow in the main portal vein  This may be artifactual secondary to slow flow or portal vein thrombosis  Consider follow-up with contrast-enhanced CT or MRI if it would alter patient management  Hepatic cirrhosis  Cholelithiasis and gallbladder sludge without evidence of cholecystitis  · Hepatitis panel completed; Hepatitis C Ab high reactive  · GI consulted and following  · Patient needs MRI; however initially refusing  After education on anemia overall well-being patient is now agreeable to MRI also accepting of a 1 time dose of medication to relax her prior to see imaging study will give lorazepam 0 5 mg once prior to MRI

## 2021-03-14 NOTE — PROGRESS NOTES
[address] [facility]  Progress Note - Abril Meehan 1960, 64 y o  female MRN: [id number]  Unit/Bed#: -01 Encounter: [id number]  Primary Care Provider: Micheal Joyce DO   Date and time admitted to hospital: 3/11/2021  4:48 PM    * Serum total bilirubin elevated  Assessment & Plan  · Patient with acute elevation of total bilirubin, elevated to 12 79 on admission  · Likely secondary to alcoholic hepatitis   · Given discriminant function score greater than 32 has been started on on prednisolone x2 days however this has since been disease continued and patient has been started on rifaximin on 3/12  · Trending down to 6 39 today  · US RUQ, Liver Dopplers completed (3/12/21): No discernible flow in the main portal vein  This may be artifactual secondary to slow flow or portal vein thrombosis  Consider follow-up with contrast-enhanced CT or MRI if it would alter patient management  Hepatic cirrhosis  Cholelithiasis and gallbladder sludge without evidence of cholecystitis  · Hepatitis panel completed; Hepatitis C Ab high reactive  · GI consulted and following  · Patient needs MRI; however initially refusing  After education on anemia overall well-being patient is now agreeable to MRI also accepting of a 1 time dose of medication to relax her prior to see imaging study will give lorazepam 0 5 mg once prior to MRI  Acute encephalopathy  Assessment & Plan  · Likely hepatic encephalopathy from worsening liver dysfunction alcohol abuse  · On admission, wasn't very cooperative; improving now  She is now AOx4  · Continue lactulose    DARRYL (acute kidney injury) ([facility] [address] )  Assessment & Plan  · Baseline kidney function approximately 0 8  · Presented with a creatinine of 4  · Creatinine slowly improving currently at 2 32  · Likely secondary volume depletion versus hepatorenal  · Trending down today, 6 39 today    · Continue scheduled albumin  · Hold home diuretics  · Nephrology consult and following    Anemia  Assessment & Plan  · Present on admission 8 8 suspected likely in setting liver cirrhosis  · No active signs of bleeding  · Hemoglobin this morning 6 8 repeat H/H 6 5  · Patient status post transfusion hemoglobin initially improved 8 2 this morning at 7 6 repeat H&H this afternoon transfuse for hemoglobin less than 7  · Also obtain PT INR    Alcohol abuse  Assessment & Plan  · History of alcohol abuse  · Patient reports not actively drinking at this time  · Alcohol level on admission was noted to be 0 so no acute alcohol intoxication  · Continue CIWA protocol if remains negative for another 24 hours consider discontinuing    Hyperkalemia  Assessment & Plan  · Presented with a potassium of 5 7  · Likely secondary to worsening kidney function  · Patient was given IV Insulin x1 on 03/11  · Repeat potassium 3 7 today  · Monitor  Cir[facility] ([facility] )  Assessment & Plan  · Secondary to alcohol abuse  · With poor outpatient follow-up  · Bilirubin noted to be approximately 13 on admission, was noted to be 3 a few months ago  · Bilirubin trending as mentioned above  · GI Consulted and following  · Plan as outlined above    Hepatocellular carcinoma ([facility] [address] )  Assessment & Plan  · Follows outpatient with Oncology      VTE Pharmacologic Prophylaxis:   Pharmacologic: Pharmacologic VTE Prophylaxis contraindicated due to Anemia thrombocytopenia  Mechanical VTE Prophylaxis in Place: Yes    Patient Centered Rounds: I have performed bedside rounds with nursing staff today  Discussions with Specialists or Other Care Team Provider:  Gastroenterology    Education and Discussions with Family / Patient:  Patient declined family call does not have close relationship  Time Spent for Care: 30 minutes  More than 50% of total time spent on counseling and coordination of care as described above      Current Length of Stay: 3 day(s)    Current Patient Status: Inpatient   Certification Statement: The patient will continue to require additional inpatient hospital stay due to Ongoing treatment in setting of cirrhosis and anemia    Discharge Plan:  Not medically cleared    Code Status: Level 1 - Full Code      Subjective:   Patient more alert today pleasant made aware of dropping hemoglobin and concerns from GI and hospitalist service who are both present bedside during the examination  After given verbalization concern in risk of mortality were explained patient is agreeable to MRI  Patient offered time dose of Ativan prior to the MRI which patient would appreciate  Patient has no other concerns at this time    Objective:     Vitals:   Temp (24hrs), Av 9 °F (36 6 °C), Min:97 5 °F (36 4 °C), Max:98 5 °F (36 9 °C)    Temp:  [97 5 °F (36 4 °C)-98 5 °F (36 9 °C)] 97 5 °F (36 4 °C)  HR:  [68-83] 76  Resp:  [16-17] 17  BP: ()/(55-88) 109/60  SpO2:  [89 %-96 %] 96 %  Body mass index is 21 86 kg/m²  Input and Output Summary (last 24 hours): Intake/Output Summary (Last 24 hours) at 3/14/2021 0942  Last data filed at 3/14/2021 0751  Gross per 24 hour   Intake 4265 ml   Output 500 ml   Net 3765 ml       Physical Exam:     Physical Exam  HENT:      Head: Normocephalic  Nose: Nose normal       Mouth/Throat:      Mouth: Mucous membranes are moist    Eyes:      Pupils: Pupils are equal, round, and reactive to light  Cardiovascular:      Rate and Rhythm: Normal rate and regular rhythm  Pulmonary:      Effort: Pulmonary effort is normal       Breath sounds: Normal breath sounds  Abdominal:      General: Bowel sounds are normal    Musculoskeletal: Normal range of motion  Skin:     General: Skin is warm and dry  Neurological:      Mental Status: She is alert        Comments: Patient currently alert oriented x3 forgetful at times   Psychiatric:         Mood and Affect: Mood normal          Behavior: Behavior normal            Additional Data:     Labs:    Results from last 7 days   Lab Units 21  0521   WBC Thousand/uL 5 33   HEMOGLOBIN g/dL 7 6*   HEMATOCRIT % 22 5*   PLATELETS Thousands/uL 33*   NEUTROS PCT % 79*   LYMPHS PCT % 9*   MONOS PCT % 10   EOS PCT % 0     Results from last 7 days   Lab Units 03/14/21  0521   SODIUM mmol/L 131*   POTASSIUM mmol/L 3 7   CHLORIDE mmol/L 96*   CO2 mmol/L 25   BUN mg/dL 64*   CREATININE mg/dL 1 82*   ANION GAP mmol/L 10   CALCIUM mg/dL 8 8   ALBUMIN g/dL 3 6   TOTAL BILIRUBIN mg/dL 6 39*   ALK PHOS U/L 60   ALT U/L 48   AST U/L 88*   GLUCOSE RANDOM mg/dL 109     Results from last 7 days   Lab Units 03/13/21  0844   INR  2 68*     Results from last 7 days   Lab Units 03/12/21  0545 03/12/21  0210 03/12/21  0057 03/12/21  0026 03/11/21  2358 03/11/21  2327 03/11/21  2305 03/11/21  2230 03/11/21  2138   POC GLUCOSE mg/dl 100 92 90 86 76 88 33* 78 84                   * I Have Reviewed All Lab Data Listed Above  * Additional Pertinent Lab Tests Reviewed: All Labs Within Last 24 Hours Reviewed    Imaging:    Imaging Reports Reviewed Today Include:  As mentioned above further MRI pending      Recent Cultures (last 7 days):     Results from last 7 days   Lab Units 03/13/21  1123   BLOOD CULTURE  Received in Microbiology Lab  Culture in Progress  Received in Microbiology Lab  Culture in Progress         Last 24 Hours Medication List:   Current Facility-Administered Medications   Medication Dose Route Frequency Provider Last Rate    acetaminophen  650 mg Oral Q6H PRN Mahesh Mora MD      albumin human  25 g Intravenous Q6H Mahesh Mora MD      diphenhydrAMINE  25 mg Oral Once PRN SILVANO Alves      folic acid 1 mg, thiamine 100 mg in 0 9% sodium chloride 100 mL IVPB   Intravenous Daily Mahesh Mora MD      lactulose  30 g Oral TID Elsa Yen PA-C      LORazepam  0 5 mg Intravenous Once SILVANO Alves      nicotine  1 patch Transdermal Daily Mahesh Mora MD      rifaximin  550 mg Oral Q12H [address] Elsa Yen PA-C      sodium chloride  100 mL/hr Intravenous Continuous SILVANO Hazel 100 mL/hr (03/14/21 0710)        Today, Patient Was Seen By: SILVANO Brooks    ** Please Note: Dictation voice to text software may have been used in the creation of this document   **

## 2021-03-14 NOTE — ASSESSMENT & PLAN NOTE
· Present on admission 8 8 suspected likely in setting liver cirrhosis  · No active signs of bleeding  · Hemoglobin this morning 6 8 repeat H/H 6 5  · Patient status post transfusion hemoglobin initially improved 8 2 this morning at 7 6 repeat H&H this afternoon transfuse for hemoglobin less than 7  · Also obtain PT INR

## 2021-03-14 NOTE — PROGRESS NOTES
NEPHROLOGY PROGRESS NOTE    Patient: Radha Rodgers               Sex: female          DOA: 3/11/2021  4:48 PM   YOB: 1960        Age:  64 y o         LOS:  LOS: 3 days       HPI     Patient with cirrhosis of liver and as she see came with acute kidney failure    SUBJECTIVE     Patient seems to doing better    More awake and alert    Still has abdominal discomfort for no other acute complaint    No nausea no vomiting    CURRENT MEDICATIONS       Current Facility-Administered Medications:     acetaminophen (TYLENOL) tablet 650 mg, 650 mg, Oral, Q6H PRN, Mahesh Mora MD, 650 mg at 03/14/21 0044    albumin human (FLEXBUMIN) 25 % injection 25 g, 25 g, Intravenous, Q6H, Mahesh Mora MD, 25 g at 03/14/21 0751    aluminum-magnesium hydroxide-simethicone (MYLANTA) oral suspension 30 mL, 30 mL, Oral, Q4H PRN, SILVANO Burton    diphenhydrAMINE (BENADRYL) tablet 25 mg, 25 mg, Oral, Once PRN, SILVANO Burton    folic acid 1 mg, thiamine (VITAMIN B1) 100 mg in sodium chloride 0 9 % 100 mL IV piggyback, , Intravenous, Daily, Dell Hopper MD, New Bag at 03/14/21 0902    lactulose oral solution 30 g, 30 g, Oral, TID, Polina Roberts PA-C, 30 g at 03/14/21 0859    lidocaine (LIDODERM) 5 % patch 1 patch, 1 patch, Topical, Daily, SILVANO Burton    LORazepam (ATIVAN) injection 0 5 mg, 0 5 mg, Intravenous, Once, SILVANO Burton    nicotine (NICODERM CQ) 14 mg/24hr TD 24 hr patch 1 patch, 1 patch, Transdermal, Daily, Mahesh Mora MD    rifaximin (XIFAXAN) tablet 550 mg, 550 mg, Oral, Q12H [address], Polina Roberts PA-C, 550 mg at 03/14/21 0859    sodium chloride 0 9 % infusion, 100 mL/hr, Intravenous, Continuous, SILVANO Kincaid, Last Rate: 100 mL/hr at 03/14/21 0710, 100 mL/hr at 03/14/21 0710    OBJECTIVE     Current Weight: Weight - Scale: 65 2 kg (143 lb 11 8 oz)  Vitals:    03/14/21 1058   BP: 96/53   Pulse: 73   Resp: 17   Temp: 97 7 °F (36 5 °C)   SpO2: 94% Intake/Output Summary (Last 24 hours) at 3/14/2021 1206  Last data filed at 3/14/2021 1058  Gross per 24 hour   Intake 4505 ml   Output 600 ml   Net 3905 ml       PHYSICAL EXAMINATION     Physical Exam  Constitutional:       General: She is not in acute distress  Appearance: She is well-developed  HENT:      Head: Normocephalic  Eyes:      General: No scleral icterus  Conjunctiva/sclera: Conjunctivae normal    Neck:      Musculoskeletal: Neck supple  Vascular: No JVD  Cardiovascular:      Rate and Rhythm: Normal rate  Heart sounds: Normal heart sounds  Pulmonary:      Effort: Pulmonary effort is normal       Breath sounds: No wheezing  Abdominal:      Palpations: Abdomen is soft  Tenderness: There is no abdominal tenderness  Musculoskeletal: Normal range of motion  Skin:     General: Skin is warm  Findings: No rash  Neurological:      Mental Status: She is alert and oriented to person, place, and time  Psychiatric:         Behavior: Behavior normal           LAB RESULTS     Results from last 7 days   Lab Units 03/14/21  0521 03/14/21  0052 03/13/21  1124 03/13/21  0536 03/12/21  0528 03/12/21  0227 03/11/21  1819   WBC Thousand/uL 5 33  --   --  4 89 6 07  --  10 48*   HEMOGLOBIN g/dL 7 6* 8 2* 6 5* 6 8* 7 5*  --  8 8*   HEMATOCRIT % 22 5* 24 8* 19 5* 19 8* 21 7*  --  25 9*   PLATELETS Thousands/uL 33*  --   --  48* 56*  --  89*   POTASSIUM mmol/L 3 7  --   --  3 9  --  4 8 5 7*   CHLORIDE mmol/L 96*  --   --  93*  --  93* 91*   CO2 mmol/L 25  --   --  25  --  25 26   BUN mg/dL 64*  --   --  74*  --  80* 73*   CREATININE mg/dL 1 82*  --   --  2 30*  --  3 83* 4 05*   EGFR ml/min/1 73sq m 30  --   --  23  --  12 11   CALCIUM mg/dL 8 8  --   --  8 8  --  9 0 9 2   MAGNESIUM mg/dL  --   --   --  3 2*  --   --  3 1*       RADIOLOGY RESULTS      No results found for this or any previous visit  No results found for this or any previous visit    No results found for this or any previous visit  No results found for this or any previous visit  No results found for this or any previous visit  No results found for this or any previous visit  PLAN / RECOMMENDATIONS      Acute kidney injury:  Likely secondary to volume depletion based on urinary study and improvement in kidney function with hydration  Unlikely due to hepatorenal   Will continue IV hydration at this point    Cirrhosis of liver:  Being monitored by GI    Altered mental status:  Seems to doing better    Hyponatremia:  Also improving with hydration likely because of volume depletion    Rina Francisco MD  Nephrology  3/14/2021        Portions of the record may have been created with voice recognition software  Occasional wrong word or "sound a like" substitutions may have occurred due to the inherent limitations of voice recognition software  Read the chart carefully and recognize, using context, where substitutions have occurred

## 2021-03-14 NOTE — ASSESSMENT & PLAN NOTE
· Baseline kidney function approximately 0 8  · Presented with a creatinine of 4  · Creatinine slowly improving currently at 2 32  · Likely secondary volume depletion versus hepatorenal  · Trending down today, 6 39 today    · Continue scheduled albumin  · Hold home diuretics  · Nephrology consult and following

## 2021-03-14 NOTE — ASSESSMENT & PLAN NOTE
· Presented with a potassium of 5 7  · Likely secondary to worsening kidney function  · Patient was given IV Insulin x1 on 03/11  · Repeat potassium 3 7 today  · Monitor

## 2021-03-15 NOTE — ASSESSMENT & PLAN NOTE
· Present on admission 8 8 suspected likely in setting liver cirrhosis  · No active signs of bleeding  · Hemoglobin this morning 6 8 repeat H/H 6 5  · Patient status post transfusion hemoglobin initially improved 8 2 trended down today to 7 3  · Transfuse for hemoglobin less than 7 or symptomatic  · Thrombocytopenia currently at 29 no active signs of bleeding  · MRI pending

## 2021-03-15 NOTE — UTILIZATION REVIEW
Continued Stay Review    Date:3/15                         Current Patient Class: IP  Current Level of Care: MS    HPI:60 y o  female initially admitted on 3/11 for elevated bilirubin     Assessment/Plan: Sitting up in chair, withdrawn and does not make eye contact but is pleasant and cooperative  Aware of pending MRI/MRCP this morning  Bilirubin remains stable at 6 47  Hepatitis C Ab high reactive   GI following   MRI pending   Cont lactulose, albumin and GI and nephrology following   3/15 GI Note   HCV/ETOH Cirrhosis, HCC status post ablation  worsening bilirubin as well as kidney function and encephalopathy  MRI/MRCP to r/o recurrence HCC   Ascites non on imaging   Cont lactulose and rifaxamin   DARRYL nephrology following   MRI/MRCP pending  Varices: None on EGD from 2019  She will need another EGD for variceal screening  3/15 Nephrology Note   DARRYL improving w/ hydration   reduce IVF , possible volume overload status / encephalopathy better w / lactulose  Cirrhosis stable  hyponatremia low but stable        Pertinent Labs/Diagnostic Results:   3/15 MRI abd - pending     Results from last 7 days   Lab Units 03/15/21  0504 03/14/21  1314 03/14/21  0521 03/14/21  0052 03/13/21  1124 03/13/21  0536   WBC Thousand/uL 3 86*  --  5 33  --   --  4 89   HEMOGLOBIN g/dL 7 3* 8 4* 7 6* 8 2* 6 5* 6 8*   HEMATOCRIT % 21 8* 25 6* 22 5* 24 8* 19 5* 19 8*   PLATELETS Thousands/uL 29*  --  33*  --   --  48*   NEUTROS ABS Thousands/µL 2 89  --  4 23  --   --  3 57     Results from last 7 days   Lab Units 03/15/21  0504 03/14/21  0521 03/13/21  0536 03/12/21  0227 03/11/21  1819   SODIUM mmol/L 131* 131* 129* 129* 131*   POTASSIUM mmol/L 3 5 3 7 3 9 4 8 5 7*   CHLORIDE mmol/L 97* 96* 93* 93* 91*   CO2 mmol/L 23 25 25 25 26   ANION GAP mmol/L 11 10 11 11 14*   BUN mg/dL 51* 64* 74* 80* 73*   CREATININE mg/dL 1 41* 1 82* 2 30* 3 83* 4 05*   EGFR ml/min/1 73sq m 41 30 23 12 11   CALCIUM mg/dL 9 3 8 8 8 8 9 0 9 2   MAGNESIUM mg/dL --   --  3 2*  --  3 1*     Results from last 7 days   Lab Units 03/15/21  0504 03/14/21  0521 03/13/21  0536 03/12/21  0227 03/11/21  1819   AST U/L 54* 88* 131* 255* 293*   ALT U/L 40 48 55 69 80*   ALK PHOS U/L 58 60 61 74 84   TOTAL PROTEIN g/dL 6 3* 6 5 6 5 6 2* 6 7   ALBUMIN g/dL 3 8 3 6 3 3* 2 3* 2 3*   TOTAL BILIRUBIN mg/dL 6 47* 6 39* 6 41* 10 25* 12 79*   BILIRUBIN DIRECT mg/dL  --   --   --   --  8 99*   AMMONIA umol/L  --   --   --   --  81*     Results from last 7 days   Lab Units 03/12/21  0545 03/12/21  0210 03/12/21  0057 03/12/21  0026 03/11/21  2358 03/11/21  2327 03/11/21  2305 03/11/21  2230 03/11/21  2138   POC GLUCOSE mg/dl 100 92 90 86 76 88 33* 78 84     Results from last 7 days   Lab Units 03/15/21  0504 03/14/21  0521 03/13/21  0536 03/12/21  0227 03/11/21  1819   GLUCOSE RANDOM mg/dL 100 109 108 82 67     Results from last 7 days   Lab Units 03/12/21  0222 03/11/21  2145 03/11/21  1819   TROPONIN I ng/mL 0 07* 0 08* 0 09*     Results from last 7 days   Lab Units 03/15/21  0504 03/14/21  1314 03/13/21  0844  03/11/21  1819   PROTIME seconds 30 3* 26 1* 27 3*   < > 22 3*   INR  2 86* 2 53* 2 68*   < > 2 07*   PTT seconds  --   --   --   --  33    < > = values in this interval not displayed         Results from last 7 days   Lab Units 03/11/21  2145   HEP B S AG  Non-reactive   HEP C AB  High Reactive*   HEP B C IGM  Non-reactive     Results from last 7 days   Lab Units 03/13/21  1850 03/13/21  1849 03/12/21  1129   CLARITY UA   --  Slightly Cloudy Slightly Cloudy   COLOR UA   --  Yellow Yellow   SPEC GRAV UA   --  1 020 1 025   PH UA   --  5 5 5 5   GLUCOSE UA mg/dl  --  Negative Negative   KETONES UA mg/dl  --  Negative Trace*   BLOOD UA   --  Negative Trace-Intact*   PROTEIN UA mg/dl  --  Negative Negative   NITRITE UA   --  Negative Negative   BILIRUBIN UA   --  Negative Moderate*   UROBILINOGEN UA E U /dl  --  2 0* 2 0*   LEUKOCYTES UA   --  Trace* Trace*   WBC UA /hpf  --  20-30* 4-10* RBC UA /hpf  --  None Seen 0-1*   BACTERIA UA /hpf  --  Innumerable* Innumerable*   EPITHELIAL CELLS WET PREP /hpf  --  Innumerable* Occasional   SODIUM UR  <5 <5  --      Results from last 7 days   Lab Units 03/11/21  1819   ETHANOL LVL mg/dL <3   ACETAMINOPHEN LVL ug/mL <4 0*   SALICYLATE LVL mg/dL <3*       Results from last 7 days   Lab Units 03/13/21  1123   BLOOD CULTURE  No Growth at 24 hrs  No Growth at 24 hrs  Vital Signs:  03/15/21 07:29:12  97 7 °F (36 5 °C)  78  21  115/66  82  93 %  --  --   03/15/21 03:03:13  --  74  18  110/62  78  94 %             Medications:   Scheduled Medications:  albumin human, 25 g, Intravenous, V6U  folic acid 1 mg, thiamine 100 mg in 0 9% sodium chloride 100 mL IVPB, , Intravenous, Daily  lactulose, 30 g, Oral, TID  lidocaine, 1 patch, Topical, Daily  LORazepam, 0 5 mg, Intravenous, Once  nicotine, 1 patch, Transdermal, Daily  rifaximin, 550 mg, Oral, Q12H [address]      Continuous IV Infusions:  sodium chloride, 50 mL/hr, Intravenous, Continuous      PRN Meds:  acetaminophen, 650 mg, Oral, Q6H PRN  aluminum-magnesium hydroxide-simethicone, 30 mL, Oral, Q4H PRN  diphenhydrAMINE, 25 mg, Oral, Once PRN        Discharge Plan: D     Network Utilization Review Department  ATTENTION: Please call with any questions or concerns to [phone] and carefully listen to the prompts so that you are directed to the right person  All voicemails are confidential   Sai Palencia all requests for admission clinical reviews, approved or denied determinations and any other requests to dedicated fax number below belonging to the campus where the patient is receiving treatment   List of dedicated fax numbers for the Facilities:  [address] DENIALS (Administrative/Medical Necessity) [phone]   1000  [facility] (Maternity/NICU/Pediatrics) [phone] [address] [phone]   [address] [phone]     [address] [place] [address] ([place]) [address] [place] [address] [address] [phone]   [place] [id number] [phone]

## 2021-03-15 NOTE — CASE MANAGEMENT
Per [facility] awaiting mri today  CM confirmed it will be done today as she is on the schedule  Pt was ipta and plans to return home  CM Dept to follow pt through dc

## 2021-03-15 NOTE — ASSESSMENT & PLAN NOTE
· Likely hepatic encephalopathy from worsening liver dysfunction alcohol abuse  · On admission, wasn't very cooperative; improving now  She remains AOx4    · Continue lactulose

## 2021-03-15 NOTE — ASSESSMENT & PLAN NOTE
· Patient with acute elevation of total bilirubin, elevated to 12 79 on admission  · Likely secondary to alcoholic hepatitis   · Given discriminant function score greater than 32 has been started on on prednisolone x2 days however this has since been disease continued and patient has been started on rifaximin on 3/12  · Bilirubin remained stable currently at 6 47  · US RUQ, Liver Dopplers completed (3/12/21): No discernible flow in the main portal vein  This may be artifactual secondary to slow flow or portal vein thrombosis  Consider follow-up with contrast-enhanced CT or MRI if it would alter patient management  Hepatic cirrhosis  Cholelithiasis and gallbladder sludge without evidence of cholecystitis  · Hepatitis panel completed; Hepatitis C Ab high reactive  · GI consulted and following  · Patient needs MRI; however initially refusing  After education on risk benefits patient is now agreeable to MRI also accepting of a 1 time dose of medication to relax her prior to see imaging study will give lorazepam 0 5 mg once prior to MRI    · MRI pending

## 2021-03-15 NOTE — PROGRESS NOTES
NEPHROLOGY PROGRESS NOTE    Patient: Susanne Kim               Sex: female          DOA: 3/11/2021  4:48 PM   YOB: 1960        Age:  64 y o         LOS:  LOS: 4 days       HPI     Patient with acute kidney injury along with cirrhosis of liver admitted with altered mental status    SUBJECTIVE     Feeling better now  Denies any shortness of breath    Does have some leg swelling    No chest pain no palpitation or shortness of breath    CURRENT MEDICATIONS       Current Facility-Administered Medications:     acetaminophen (TYLENOL) tablet 650 mg, 650 mg, Oral, Q6H PRN, Mahesh Mora MD, 650 mg at 03/15/21 0924    albumin human (FLEXBUMIN) 25 % injection 25 g, 25 g, Intravenous, Q6H, Mahesh Mora MD, 25 g at 03/15/21 0831    aluminum-magnesium hydroxide-simethicone (MYLANTA) oral suspension 30 mL, 30 mL, Oral, Q4H PRN, SILVANO Rogers    diphenhydrAMINE (BENADRYL) tablet 25 mg, 25 mg, Oral, Once PRN, SILVANO Rogers    folic acid 1 mg, thiamine (VITAMIN B1) 100 mg in sodium chloride 0 9 % 100 mL IV piggyback, , Intravenous, Daily, Mahesh Mora MD, New Bag at 03/15/21 1036    lactulose oral solution 30 g, 30 g, Oral, TID, Polina Roberts PA-C, 30 g at 03/15/21 0929    lidocaine (LIDODERM) 5 % patch 1 patch, 1 patch, Topical, Daily, SILVANO Rogers, 1 patch at 03/15/21 0924    LORazepam (ATIVAN) injection 0 5 mg, 0 5 mg, Intravenous, Once, SILVANO Rogers    nicotine (NICODERM CQ) 14 mg/24hr TD 24 hr patch 1 patch, 1 patch, Transdermal, Daily, Mahesh Mora MD    rifaximin (XIFAXAN) tablet 550 mg, 550 mg, Oral, Q12H [facility], Polina Roberts PA-C, 550 mg at 03/15/21 0923    sodium chloride 0 9 % infusion, 50 mL/hr, Intravenous, Continuous, Angela Rosales MD, Last Rate: 50 mL/hr at 03/15/21 1037, 50 mL/hr at 03/15/21 1037    OBJECTIVE     Current Weight: Weight - Scale: 68 7 kg (151 lb 7 3 oz)  Vitals:    03/15/21 0729   BP: 115/66   Pulse: 78   Resp: 21   Temp: 97 7 °F (36 5 °C)   SpO2: 93%       Intake/Output Summary (Last 24 hours) at 3/15/2021 1146  Last data filed at 3/15/2021 1037  Gross per 24 hour   Intake 3281 ml   Output 850 ml   Net 2431 ml       PHYSICAL EXAMINATION     Physical Exam  Constitutional:       General: She is not in acute distress  Appearance: She is well-developed  HENT:      Head: Normocephalic  Eyes:      General: No scleral icterus  Conjunctiva/sclera: Conjunctivae normal    Neck:      Musculoskeletal: Neck supple  Vascular: No JVD  Cardiovascular:      Rate and Rhythm: Normal rate  Heart sounds: Normal heart sounds  Pulmonary:      Effort: Pulmonary effort is normal       Breath sounds: No wheezing  Abdominal:      Palpations: Abdomen is soft  Tenderness: There is no abdominal tenderness  Musculoskeletal: Normal range of motion  Skin:     General: Skin is warm  Findings: No rash  Neurological:      Mental Status: She is alert and oriented to person, place, and time     Psychiatric:         Behavior: Behavior normal           LAB RESULTS     Results from last 7 days   Lab Units 03/15/21  0504 03/14/21  1314 03/14/21  0521 03/14/21  0052 03/13/21  1124 03/13/21  0536 03/12/21  0528 03/12/21  0227 03/11/21  1819   WBC Thousand/uL 3 86*  --  5 33  --   --  4 89 6 07  --  10 48*   HEMOGLOBIN g/dL 7 3* 8 4* 7 6* 8 2* 6 5* 6 8* 7 5*  --  8 8*   HEMATOCRIT % 21 8* 25 6* 22 5* 24 8* 19 5* 19 8* 21 7*  --  25 9*   PLATELETS Thousands/uL 29*  --  33*  --   --  48* 56*  --  89*   POTASSIUM mmol/L 3 5  --  3 7  --   --  3 9  --  4 8 5 7*   CHLORIDE mmol/L 97*  --  96*  --   --  93*  --  93* 91*   CO2 mmol/L 23  --  25  --   --  25  --  25 26   BUN mg/dL 51*  --  64*  --   --  74*  --  80* 73*   CREATININE mg/dL 1 41*  --  1 82*  --   --  2 30*  --  3 83* 4 05*   EGFR ml/min/1 73sq m 41  --  30  --   --  23  --  12 11   CALCIUM mg/dL 9 3  --  8 8  --   --  8 8  --  9 0 9 2   MAGNESIUM mg/dL  --   --   --   --   --  3 2*  -- --  3 1*       RADIOLOGY RESULTS      Results for orders placed during the hospital encounter of 03/11/21   XR chest portable    Narrative CHEST   INDICATION:   Patent encephalopathy, rule out pneumonia  COMPARISON:  None  EXAM PERFORMED/VIEWS:  XR CHEST PORTABLE  FINDINGS:  Cardiomediastinal silhouette appears unremarkable  Left basilar opacity partially securing the left hemidiaphragm  Otherwise clear lung fields  No congestive failure  No pneumothorax  Osseous structures appear within normal limits for patient age  Impression Left basilar opacity partially obscuring the left hemidiaphragm  Workstation performed: [id number]     No results found for this or any previous visit  No results found for this or any previous visit  No results found for this or any previous visit  No results found for this or any previous visit  No results found for this or any previous visit  PLAN / RECOMMENDATIONS      Acute kidney injury:  Improving with hydration  I will reduce IV fluid at this point in view of possible volume overload status    Herpetic encephalopathy:  Much better with help of lactulose    Cirrhosis of liver:  Seems to be stable    Hyponatremia:  Still low but overall stable  Will continue normal saline as part of the treatment    Nehemiah Restrepo MD  Nephrology  3/15/2021        Portions of the record may have been created with voice recognition software  Occasional wrong word or "sound a like" substitutions may have occurred due to the inherent limitations of voice recognition software  Read the chart carefully and recognize, using context, where substitutions have occurred

## 2021-03-15 NOTE — ASSESSMENT & PLAN NOTE
· History of alcohol abuse  · Patient reports not actively drinking at this time    · Alcohol level on admission was noted to be 0 so no acute alcohol intoxication  · Patient showed no signs of withdrawal discontinue CIWA protocol

## 2021-03-15 NOTE — ASSESSMENT & PLAN NOTE
· Baseline kidney function approximately 0 8  · Presented with a creatinine of 4  · Creatinine slowly improving currently at 2 32  · Likely secondary volume depletion versus hepatorenal  · Bilirubin remained stable today at 6 47  · Continue scheduled albumin  · Hold home diuretics  · Nephrology consult and following

## 2021-03-15 NOTE — ASSESSMENT & PLAN NOTE
· Presented with a potassium of 5 7  · Likely secondary to worsening kidney function  · Patient was given IV Insulin x1 on 03/11  · Repeat potassium 3 5 today    · Continue to monitor may need to treat for hypokalemia

## 2021-03-15 NOTE — PROGRESS NOTES
Progress Note  Sasha Cordova 64 y o  female MRN: [id number]  Unit/Bed#: -01 Encounter: [id number]    Subjective:  Patient was sitting up in the chair now lying in bed with no other complaints besides fatigue and mild abdominal discomfort  Objective:     Vitals:   Vitals:    03/15/21 0729   BP: 115/66   Pulse: 78   Resp: 21   Temp: 97 7 °F (36 5 °C)   SpO2: 93%   ,Body mass index is 23 03 kg/m²  Intake/Output Summary (Last 24 hours) at 3/15/2021 0955  Last data filed at 3/15/2021 9037  Gross per 24 hour   Intake 2400 ml   Output 950 ml   Net 1450 ml       Physical Exam:     General Appearance: Alert, appears stated age and cooperative  Lungs: Clear to auscultation bilaterally, no rales or rhonchi, no labored breathing/accessory muscle use  Heart: Regular rate and rhythm, S1, S2 normal, no murmur, click, rub or gallop  Abdomen: Soft, non-tender, non-distended; bowel sounds normal; no masses or no organomegaly  Extremities: No cyanosis, edema    Invasive Devices     Peripheral Intravenous Line            Peripheral IV 03/12/21 Distal;Left;Ventral (anterior) Forearm 2 days                Lab Results:  No results displayed because visit has over 200 results  Imaging Studies:   I have personally reviewed pertinent imaging studies  Xr Chest Portable    Result Date: 3/14/2021  Narrative: CHEST INDICATION:   Patent encephalopathy, rule out pneumonia  COMPARISON:  None EXAM PERFORMED/VIEWS:  XR CHEST PORTABLE FINDINGS: Cardiomediastinal silhouette appears unremarkable  Left basilar opacity partially securing the left hemidiaphragm  Otherwise clear lung fields  No congestive failure  No pneumothorax  Osseous structures appear within normal limits for patient age  Impression: Left basilar opacity partially obscuring the left hemidiaphragm  Workstation performed: [id number]    Ct Head Without Contrast    Result Date: 3/11/2021  Narrative: CT BRAIN - WITHOUT CONTRAST INDICATION:   ams  COMPARISON:  None  TECHNIQUE:  CT examination of the brain was performed  In addition to axial images, sagittal and coronal 2D reformatted images were created and submitted for interpretation  Radiation dose length product (DLP) for this visit:  770 24 mGy-cm   This examination, like all CT scans performed in the [facility], was performed utilizing techniques to minimize radiation dose exposure, including the use of iterative  reconstruction and automated exposure control  IMAGE QUALITY:  Diagnostic  FINDINGS: PARENCHYMA:  No intracranial mass, mass effect or midline shift  No CT signs of acute infarction  No acute parenchymal hemorrhage  VENTRICLES AND EXTRA-AXIAL SPACES:  Normal for the patient's age  VISUALIZED ORBITS AND PARANASAL SINUSES:  Unremarkable  CALVARIUM AND EXTRACRANIAL SOFT TISSUES:  Normal     Impression: No acute intracranial abnormality  Workstation performed: [id number]    Us Right Upper Quadrant With Liver Dopplers    Result Date: 3/12/2021  Narrative: RIGHT UPPER QUADRANT ULTRASOUND WITH LIVER DOPPLER INDICATION:     r/o portal vein thrombosis  COMPARISON:  MRI abdomen 1/28/2021 TECHNIQUE:   Real-time ultrasound of the right upper quadrant was performed with a curvilinear transducer with both volumetric sweeps and still imaging techniques  FINDINGS: PANCREAS:  Visualized portions of the pancreas are within normal limits  AORTA AND IVC:  Visualized portions are normal for patient age  LIVER: Size:  Within normal range  The liver measures 17 cm in the midclavicular line  Contour:  Surface contour is nodular  Parenchyma: There is diffuse coarsened heterogeneous echotexture suggesting underlying cirrhotic changes  Known left hepatic post ablation tumor not clearly visualized  LIVER DOPPLER: No color flow in the main portal vein with abnormal spectral waveforms in the left and right portal veins  Potentially artifactual waveform in the main portal vein   Normal directional flow in the hepatic veins  Main hepatic artery appears normal size, patent with normal spectral waveform  BILIARY: The gallbladder is normal in caliber  Gallbladder wall upper limits of normal at 3 mm  No pericholecystic fluid  There are multiple calculi and sludge present  No sonographic Santoyo sign  No intrahepatic biliary dilatation  CBD measures 8 mm  This tapers normally towards the pancreas  No choledocholithiasis  KIDNEY: Right kidney measures 11 2 x 5 8 cm  Within normal limits  ASCITES:   None  Impression: No discernible flow in the main portal vein  This may be artifactual secondary to slow flow or portal vein thrombosis  Consider follow-up with contrast-enhanced CT or MRI if it would alter patient management  Hepatic cirrhosis  Cholelithiasis and gallbladder sludge without evidence of cholecystitis  The study was marked in [facility] for immediate notification  Workstation performed: [id number]        Assessment & Plan  HCV/ETOH Cirrhosis  HCC status post ablation  -Patient initially admitted with worsening bilirubin as well as kidney function and encephalopathy  -MRI/MRCP to rule out recurrence of HCC is ordered   -Patient's previous imaging has been reviewed  Patient does follow with Saira Floyd  -Ascites: None on imaging  -HE: Improved since admission  Continue Lactulose and Rifaxamin  Titrate to 2-3 BM's a day  -HCC: status post ablation  Recent AFP 2 9  MRI/MRCP pending   -Varices: None on EGD from 2019  She will need another EGD for variceal screening  Patients PLT count this am is 29,000  Will tentatively plan for during this admission  -HCV RNA pending  DARRYL  -Improved since admission  Appreciated Nephrology input  Patient will be seen and examined by Dr Kemi Sanchez PA-C  3/15/2021,9:55 AM

## 2021-03-15 NOTE — PROGRESS NOTES
[facility] Progress Note Naeem Sarkar 1960, 64 y o  female MRN: [id number]  Unit/Bed#: -01 Encounter: [id number]  Primary Care Provider: Nicky Sykes DO   Date and time admitted to hospital: 3/11/2021  4:48 PM    * Serum total bilirubin elevated  Assessment & Plan  · Patient with acute elevation of total bilirubin, elevated to 12 79 on admission  · Likely secondary to alcoholic hepatitis   · Given discriminant function score greater than 32 has been started on on prednisolone x2 days however this has since been disease continued and patient has been started on rifaximin on 3/12  · Bilirubin remained stable currently at 6 47  · US RUQ, Liver Dopplers completed (3/12/21): No discernible flow in the main portal vein  This may be artifactual secondary to slow flow or portal vein thrombosis  Consider follow-up with contrast-enhanced CT or MRI if it would alter patient management  Hepatic cirrhosis  Cholelithiasis and gallbladder sludge without evidence of cholecystitis  · Hepatitis panel completed; Hepatitis C Ab high reactive  · GI consulted and following  · Patient needs MRI; however initially refusing  After education on risk benefits patient is now agreeable to MRI also accepting of a 1 time dose of medication to relax her prior to see imaging study will give lorazepam 0 5 mg once prior to MRI  · MRI pending    Acute encephalopathy  Assessment & Plan  · Likely hepatic encephalopathy from worsening liver dysfunction alcohol abuse  · On admission, wasn't very cooperative; improving now  She remains AOx4    · Continue lactulose    DARRYL (acute kidney injury) ([facility] [address] )  Assessment & Plan  · Baseline kidney function approximately 0 8  · Presented with a creatinine of 4  · Creatinine slowly improving currently at 2 32  · Likely secondary volume depletion versus hepatorenal  · Bilirubin remained stable today at 6 47  · Continue scheduled albumin  · Hold home diuretics  · Nephrology consult and following    Anemia  Assessment & Plan  · Present on admission 8 8 suspected likely in setting liver cirrhosis  · No active signs of bleeding  · Hemoglobin this morning 6 8 repeat H/H 6 5  · Patient status post transfusion hemoglobin initially improved 8 2 trended down today to 7 3  · Transfuse for hemoglobin less than 7 or symptomatic  · Thrombocytopenia currently at 29 no active signs of bleeding  · MRI pending    Alcohol abuse  Assessment & Plan  · History of alcohol abuse  · Patient reports not actively drinking at this time  · Alcohol level on admission was noted to be 0 so no acute alcohol intoxication  · Patient showed no signs of withdrawal discontinue CIWA protocol    Hyperkalemia  Assessment & Plan  · Presented with a potassium of 5 7  · Likely secondary to worsening kidney function  · Patient was given IV Insulin x1 on 03/11  · Repeat potassium 3 5 today  · Continue to monitor may need to treat for hypokalemia    [facility] ([facility] )  Assessment & Plan  · Secondary to alcohol abuse  · With poor outpatient follow-up  · Bilirubin noted to be approximately 13 on admission, was noted to be 3 a few months ago  · Bilirubin trending as mentioned above  · GI Consulted and following  · Plan as outlined above    Hepatocellular carcinoma ([facility] )  Assessment & Plan  · Follows outpatient with Oncology      VTE Pharmacologic Prophylaxis:   Pharmacologic: Pharmacologic VTE Prophylaxis contraindicated due to hypercoagulable state  Mechanical VTE Prophylaxis in Place: No    Patient Centered Rounds: I have performed bedside rounds with nursing staff today  Discussions with Specialists or Other Care Team Provider: GI, nephrology    Education and Discussions with Family / Patient: Nephrology    Time Spent for Care: 15 minutes  More than 50% of total time spent on counseling and coordination of care as described above      Current Length of Stay: 4 day(s)    Current Patient Status: Inpatient Certification Statement: The patient will continue to require additional inpatient hospital stay due to acute hepatitis    Discharge Plan: Not medically ready    Code Status: Level 1 - Full Code      Subjective:   Sitting up in chair, withdrawn and does not make eye contact but is pleasant and cooperative  Aware of pending MRI/MRCP this morning  Denies dizziness, shortness of breath, chest pain, and abdominal pain  Objective:     Vitals:   Temp (24hrs), Av 6 °F (36 4 °C), Min:97 2 °F (36 2 °C), Max:97 7 °F (36 5 °C)    Temp:  [97 2 °F (36 2 °C)-97 7 °F (36 5 °C)] 97 7 °F (36 5 °C)  HR:  [73-91] 78  Resp:  [17-21] 21  BP: ()/(53-66) 115/66  SpO2:  [87 %-96 %] 93 %  Body mass index is 23 03 kg/m²  Input and Output Summary (last 24 hours): Intake/Output Summary (Last 24 hours) at 3/15/2021 1055  Last data filed at 3/15/2021 1037  Gross per 24 hour   Intake 3281 ml   Output 950 ml   Net 2331 ml       Physical Exam:     Physical Exam  Vitals signs and nursing note reviewed  Constitutional:       Appearance: Normal appearance  HENT:      Head: Normocephalic  Eyes:      Extraocular Movements: Extraocular movements intact  Pupils: Pupils are equal, round, and reactive to light  Cardiovascular:      Rate and Rhythm: Normal rate and regular rhythm  Pulses: Normal pulses  Heart sounds: Normal heart sounds  Pulmonary:      Effort: Pulmonary effort is normal       Breath sounds: Normal breath sounds  Comments: Mildly labored but patient says this is her baseline    Abdominal:      General: There is distension  Palpations: Abdomen is soft  Musculoskeletal: Normal range of motion  Skin:     General: Skin is warm and dry  Capillary Refill: Capillary refill takes 2 to 3 seconds  Coloration: Skin is jaundiced  Findings: Bruising present  Neurological:      General: No focal deficit present        Mental Status: She is alert and oriented to person, place, and time  Psychiatric:         Attention and Perception: Attention normal          Mood and Affect: Affect is flat  Behavior: Behavior is withdrawn  Behavior is cooperative  Additional Data:     Labs:    Results from last 7 days   Lab Units 03/15/21  0504   WBC Thousand/uL 3 86*   HEMOGLOBIN g/dL 7 3*   HEMATOCRIT % 21 8*   PLATELETS Thousands/uL 29*   NEUTROS PCT % 75   LYMPHS PCT % 13*   MONOS PCT % 9   EOS PCT % 2     Results from last 7 days   Lab Units 03/15/21  0504   SODIUM mmol/L 131*   POTASSIUM mmol/L 3 5   CHLORIDE mmol/L 97*   CO2 mmol/L 23   BUN mg/dL 51*   CREATININE mg/dL 1 41*   ANION GAP mmol/L 11   CALCIUM mg/dL 9 3   ALBUMIN g/dL 3 8   TOTAL BILIRUBIN mg/dL 6 47*   ALK PHOS U/L 58   ALT U/L 40   AST U/L 54*   GLUCOSE RANDOM mg/dL 100     Results from last 7 days   Lab Units 03/15/21  0504   INR  2 86*     Results from last 7 days   Lab Units 03/12/21  0545 03/12/21  0210 03/12/21  0057 03/12/21  0026 03/11/21  2358 03/11/21  2327 03/11/21  2305 03/11/21  2230 03/11/21  2138   POC GLUCOSE mg/dl 100 92 90 86 76 88 33* 78 84                   * I Have Reviewed All Lab Data Listed Above  * Additional Pertinent Lab Tests Reviewed: All Labs Within Last 24 Hours Reviewed    Imaging:    Imaging Reports Reviewed Today Include: No new imaging to review  Imaging Personally Reviewed by Myself Includes:  No new imaging to review    Recent Cultures (last 7 days):     Results from last 7 days   Lab Units 03/13/21  1123   BLOOD CULTURE  No Growth at 24 hrs  No Growth at 24 hrs         Last 24 Hours Medication List:   Current Facility-Administered Medications   Medication Dose Route Frequency Provider Last Rate    acetaminophen  650 mg Oral Q6H PRN Mahesh Mora MD      albumin human  25 g Intravenous Q6H Mahesh Mora MD      aluminum-magnesium hydroxide-simethicone  30 mL Oral Q4H PRN Alberto Josr, CRNP      diphenhydrAMINE  25 mg Oral Once PRN Alberto Josr, CRNP      folic acid 1 mg, thiamine 100 mg in 0 9% sodium chloride 100 mL IVPB   Intravenous Daily Mahesh Mora MD      lactulose  30 g Oral TID Luther SHIRLEY Parks      lidocaine  1 patch Topical Daily SILVANO Brooks      LORazepam  0 5 mg Intravenous Once SILVANO Brooks      nicotine  1 patch Transdermal Daily Mahesh Mora MD      rifaximin  550 mg Oral Q12H [address] Luther SHIRLEY Parks      sodium chloride  50 mL/hr Intravenous Continuous Nick Hancock MD 50 mL/hr (03/15/21 1037)        Today, Patient Was Seen By: SILVANO Brooks    ** Please Note: Dictation voice to text software may have been used in the creation of this document   **

## 2021-03-16 NOTE — ASSESSMENT & PLAN NOTE
· Baseline kidney function approximately 0 8  · Presented with a creatinine of 4  · Creatinine slowly improving currently at 1 21  · Likely secondary volume depletion versus hepatorenal  · Continue scheduled albumin  · Hold home diuretics  · Nephrology consult and following

## 2021-03-16 NOTE — PLAN OF CARE
Problem: PAIN - ADULT  Goal: Verbalizes/displays adequate comfort level or baseline comfort level  Description: Interventions:  - Encourage patient to monitor pain and request assistance  - Assess pain using appropriate pain scale  - Administer analgesics based on type and severity of pain and evaluate response  - Implement non-pharmacological measures as appropriate and evaluate response  - Consider cultural and social influences on pain and pain management  - Notify physician/advanced practitioner if interventions unsuccessful or patient reports new pain  Outcome: Progressing     Problem: INFECTION - ADULT  Goal: Absence or prevention of progression during hospitalization  Description: INTERVENTIONS:  - Assess and monitor for signs and symptoms of infection  - Monitor lab/diagnostic results  - Monitor all insertion sites, i e  indwelling lines, tubes, and drains  - Monitor endotracheal if appropriate and nasal secretions for changes in amount and color  - [place] appropriate cooling/warming therapies per order  - Administer medications as ordered  - Instruct and encourage patient and family to use good hand hygiene technique  - Identify and instruct in appropriate isolation precautions for identified infection/condition  Outcome: Progressing  Goal: Absence of fever/infection during neutropenic period  Description: INTERVENTIONS:  - Monitor WBC    Outcome: Progressing     Problem: SAFETY ADULT  Goal: Patient will remain free of falls  Description: INTERVENTIONS:  - Assess patient frequently for physical needs  -  Identify cognitive and physical deficits and behaviors that affect risk of falls    -  [place] fall precautions as indicated by assessment   - Educate patient/family on patient safety including physical limitations  - Instruct patient to call for assistance with activity based on assessment  - Modify environment to reduce risk of injury  - Consider OT/PT consult to assist with strengthening/mobility  Outcome: Progressing  Goal: Maintain or return to baseline ADL function  Description: INTERVENTIONS:  -  Assess patient's ability to carry out ADLs; assess patient's baseline for ADL function and identify physical deficits which impact ability to perform ADLs (bathing, care of mouth/teeth, toileting, grooming, dressing, etc )  - Assess/evaluate cause of self-care deficits   - Assess range of motion  - Assess patient's mobility; develop plan if impaired  - Assess patient's need for assistive devices and provide as appropriate  - Encourage maximum independence but intervene and supervise when necessary  - Involve family in performance of ADLs  - Assess for home care needs following discharge   - Consider OT consult to assist with ADL evaluation and planning for discharge  - Provide patient education as appropriate  Outcome: Progressing  Goal: Maintain or return mobility status to optimal level  Description: INTERVENTIONS:  - Assess patient's baseline mobility status (ambulation, transfers, stairs, etc )    - Identify cognitive and physical deficits and behaviors that affect mobility  - Identify mobility aids required to assist with transfers and/or ambulation (gait belt, sit-to-stand, lift, walker, cane, etc )  - [place] fall precautions as indicated by assessment  - Record patient progress and toleration of activity level on Mobility SBAR; progress patient to next Phase/Stage  - Instruct patient to call for assistance with activity based on assessment  - Consider rehabilitation consult to assist with strengthening/weightbearing, etc   Outcome: Progressing     Problem: DISCHARGE PLANNING  Goal: Discharge to home or other facility with appropriate resources  Description: INTERVENTIONS:  - Identify barriers to discharge w/patient and caregiver  - Arrange for needed discharge resources and transportation as appropriate  - Identify discharge learning needs (meds, wound care, etc )  - Arrange for interpretive services to assist at discharge as needed  - Refer to Case Management Department for coordinating discharge planning if the patient needs post-hospital services based on physician/advanced practitioner order or complex needs related to functional status, cognitive ability, or social support system  Outcome: Progressing     Problem: Knowledge Deficit  Goal: Patient/family/caregiver demonstrates understanding of disease process, treatment plan, medications, and discharge instructions  Description: Complete learning assessment and assess knowledge base  Interventions:  - Provide teaching at level of understanding  - Provide teaching via preferred learning methods  Outcome: Progressing     Problem: Potential for Falls  Goal: Patient will remain free of falls  Description: INTERVENTIONS:  - Assess patient frequently for physical needs  -  Identify cognitive and physical deficits and behaviors that affect risk of falls    -  [place] fall precautions as indicated by assessment   - Educate patient/family on patient safety including physical limitations  - Instruct patient to call for assistance with activity based on assessment  - Modify environment to reduce risk of injury  - Consider OT/PT consult to assist with strengthening/mobility  Outcome: Progressing     Problem: Prexisting or High Potential for Compromised Skin Integrity  Goal: Skin integrity is maintained or improved  Description: INTERVENTIONS:  - Identify patients at risk for skin breakdown  - Assess and monitor skin integrity  - Assess and monitor nutrition and hydration status  - Monitor labs   - Assess for incontinence   - Turn and reposition patient  - Assist with mobility/ambulation  - Relieve pressure over bony prominences  - Avoid friction and shearing  - Provide appropriate hygiene as needed including keeping skin clean and dry  - Evaluate need for skin moisturizer/barrier cream  - Collaborate with interdisciplinary team   - Patient/family teaching  - Consider wound care consult   Outcome: Progressing     Problem: Nutrition/Hydration-ADULT  Goal: Nutrient/Hydration intake appropriate for improving, restoring or maintaining nutritional needs  Description: Monitor and assess patient's nutrition/hydration status for malnutrition  Collaborate with interdisciplinary team and initiate plan and interventions as ordered  Monitor patient's weight and dietary intake as ordered or per policy  Utilize nutrition screening tool and intervene as necessary  Determine patient's food preferences and provide high-protein, high-caloric foods as appropriate       INTERVENTIONS:  - Monitor oral intake, urinary output, labs, and treatment plans  - Assess nutrition and hydration status and recommend course of action  - Evaluate amount of meals eaten  - Assist patient with eating if necessary   - Allow adequate time for meals  - Recommend/ encourage appropriate diets, oral nutritional supplements, and vitamin/mineral supplements  - Order, calculate, and assess calorie counts as needed  - Recommend, monitor, and adjust tube feedings and TPN/PPN based on assessed needs  - Assess need for intravenous fluids  - Provide specific nutrition/hydration education as appropriate  - Include patient/family/caregiver in decisions related to nutrition  Outcome: Progressing

## 2021-03-16 NOTE — PLAN OF CARE
Problem: PAIN - ADULT  Goal: Verbalizes/displays adequate comfort level or baseline comfort level  Description: Interventions:  - Encourage patient to monitor pain and request assistance  - Assess pain using appropriate pain scale  - Administer analgesics based on type and severity of pain and evaluate response  - Implement non-pharmacological measures as appropriate and evaluate response  - Consider cultural and social influences on pain and pain management  - Notify physician/advanced practitioner if interventions unsuccessful or patient reports new pain  3/16/2021 1504 by Fabio Johnson RN  Outcome: Adequate for Discharge  3/16/2021 1008 by Fabio Johnson RN  Outcome: Progressing     Problem: INFECTION - ADULT  Goal: Absence or prevention of progression during hospitalization  Description: INTERVENTIONS:  - Assess and monitor for signs and symptoms of infection  - Monitor lab/diagnostic results  - Monitor all insertion sites, i e  indwelling lines, tubes, and drains  - Monitor endotracheal if appropriate and nasal secretions for changes in amount and color  - [place] appropriate cooling/warming therapies per order  - Administer medications as ordered  - Instruct and encourage patient and family to use good hand hygiene technique  - Identify and instruct in appropriate isolation precautions for identified infection/condition  3/16/2021 1504 by Fabio Johnson RN  Outcome: Adequate for Discharge  3/16/2021 1008 by Fabio Johnson RN  Outcome: Progressing  Goal: Absence of fever/infection during neutropenic period  Description: INTERVENTIONS:  - Monitor WBC    3/16/2021 1504 by Fabio Johnson RN  Outcome: Adequate for Discharge  3/16/2021 1008 by Fabio Johnson RN  Outcome: Progressing     Problem: SAFETY ADULT  Goal: Patient will remain free of falls  Description: INTERVENTIONS:  - Assess patient frequently for physical needs  -  Identify cognitive and physical deficits and behaviors that affect risk of falls    -  [place] fall precautions as indicated by assessment   - Educate patient/family on patient safety including physical limitations  - Instruct patient to call for assistance with activity based on assessment  - Modify environment to reduce risk of injury  - Consider OT/PT consult to assist with strengthening/mobility  3/16/2021 1504 by Samir Brooks RN  Outcome: Adequate for Discharge  3/16/2021 1008 by Samir Brooks RN  Outcome: Progressing  Goal: Maintain or return to baseline ADL function  Description: INTERVENTIONS:  -  Assess patient's ability to carry out ADLs; assess patient's baseline for ADL function and identify physical deficits which impact ability to perform ADLs (bathing, care of mouth/teeth, toileting, grooming, dressing, etc )  - Assess/evaluate cause of self-care deficits   - Assess range of motion  - Assess patient's mobility; develop plan if impaired  - Assess patient's need for assistive devices and provide as appropriate  - Encourage maximum independence but intervene and supervise when necessary  - Involve family in performance of ADLs  - Assess for home care needs following discharge   - Consider OT consult to assist with ADL evaluation and planning for discharge  - Provide patient education as appropriate  3/16/2021 1504 by Samir Brooks RN  Outcome: Adequate for Discharge  3/16/2021 1008 by Samir Brooks RN  Outcome: Progressing  Goal: Maintain or return mobility status to optimal level  Description: INTERVENTIONS:  - Assess patient's baseline mobility status (ambulation, transfers, stairs, etc )    - Identify cognitive and physical deficits and behaviors that affect mobility  - Identify mobility aids required to assist with transfers and/or ambulation (gait belt, sit-to-stand, lift, walker, cane, etc )  - [place] fall precautions as indicated by assessment  - Record patient progress and toleration of activity level on Mobility SBAR; progress patient to next Phase/Stage  - Instruct patient to call for assistance with activity based on assessment  - Consider rehabilitation consult to assist with strengthening/weightbearing, etc   3/16/2021 1504 by Sanford Bell RN  Outcome: Adequate for Discharge  3/16/2021 1008 by Sanford Bell RN  Outcome: Progressing     Problem: DISCHARGE PLANNING  Goal: Discharge to home or other facility with appropriate resources  Description: INTERVENTIONS:  - Identify barriers to discharge w/patient and caregiver  - Arrange for needed discharge resources and transportation as appropriate  - Identify discharge learning needs (meds, wound care, etc )  - Arrange for interpretive services to assist at discharge as needed  - Refer to Case Management Department for coordinating discharge planning if the patient needs post-hospital services based on physician/advanced practitioner order or complex needs related to functional status, cognitive ability, or social support system  3/16/2021 1504 by Sanford Bell RN  Outcome: Adequate for Discharge  3/16/2021 1008 by Sanford Bell RN  Outcome: Progressing     Problem: Knowledge Deficit  Goal: Patient/family/caregiver demonstrates understanding of disease process, treatment plan, medications, and discharge instructions  Description: Complete learning assessment and assess knowledge base  Interventions:  - Provide teaching at level of understanding  - Provide teaching via preferred learning methods  3/16/2021 1504 by Sanford Bell RN  Outcome: Adequate for Discharge  3/16/2021 1008 by Sanford Bell RN  Outcome: Progressing     Problem: Potential for Falls  Goal: Patient will remain free of falls  Description: INTERVENTIONS:  - Assess patient frequently for physical needs  -  Identify cognitive and physical deficits and behaviors that affect risk of falls    -  [place] fall precautions as indicated by assessment   - Educate patient/family on patient safety including physical limitations  - Instruct patient to call for assistance with activity based on assessment  - Modify environment to reduce risk of injury  - Consider OT/PT consult to assist with strengthening/mobility  3/16/2021 1504 by Kim Jones RN  Outcome: Adequate for Discharge  3/16/2021 1008 by Kim Jones RN  Outcome: Progressing     Problem: Prexisting or High Potential for Compromised Skin Integrity  Goal: Skin integrity is maintained or improved  Description: INTERVENTIONS:  - Identify patients at risk for skin breakdown  - Assess and monitor skin integrity  - Assess and monitor nutrition and hydration status  - Monitor labs   - Assess for incontinence   - Turn and reposition patient  - Assist with mobility/ambulation  - Relieve pressure over bony prominences  - Avoid friction and shearing  - Provide appropriate hygiene as needed including keeping skin clean and dry  - Evaluate need for skin moisturizer/barrier cream  - Collaborate with interdisciplinary team   - Patient/family teaching  - Consider wound care consult   3/16/2021 1504 by Kim Jones RN  Outcome: Adequate for Discharge  3/16/2021 1008 by Kim Jones RN  Outcome: Progressing     Problem: Nutrition/Hydration-ADULT  Goal: Nutrient/Hydration intake appropriate for improving, restoring or maintaining nutritional needs  Description: Monitor and assess patient's nutrition/hydration status for malnutrition  Collaborate with interdisciplinary team and initiate plan and interventions as ordered  Monitor patient's weight and dietary intake as ordered or per policy  Utilize nutrition screening tool and intervene as necessary  Determine patient's food preferences and provide high-protein, high-caloric foods as appropriate       INTERVENTIONS:  - Monitor oral intake, urinary output, labs, and treatment plans  - Assess nutrition and hydration status and recommend course of action  - Evaluate amount of meals eaten  - Assist patient with eating if necessary   - Allow adequate time for meals  - Recommend/ encourage appropriate diets, oral nutritional supplements, and vitamin/mineral supplements  - Order, calculate, and assess calorie counts as needed  - Recommend, monitor, and adjust tube feedings and TPN/PPN based on assessed needs  - Assess need for intravenous fluids  - Provide specific nutrition/hydration education as appropriate  - Include patient/family/caregiver in decisions related to nutrition  3/16/2021 1504 by Darian Sharma RN  Outcome: Adequate for Discharge  3/16/2021 1008 by Darian Sharma RN  Outcome: Progressing

## 2021-03-16 NOTE — OCCUPATIONAL THERAPY NOTE
Occupational Therapy Evaluation Note        Patient Name: Lear Riedel  LRWDT'S Date: 3/16/2021        03/16/21 1045   OT Last Visit   OT Visit Date 03/16/21   Note Type   Note type Evaluation   Restrictions/Precautions   Weight Bearing Precautions Per Order No   Braces or Orthoses Other (Comment)  (none reported)   Other Precautions Multiple lines; Chair Alarm; Bed Alarm; Fall Risk;Pain  (back safety for joint protection)   Pain Assessment   Pain Assessment Tool 0-10   Pain Score 8   Pain Location/Orientation Location: Back   Pain Onset/Description Frequency: Intermittent   Hospital Pain Intervention(s)   (RN made aware)   Multiple Pain Sites No   Home Living   Type of [address] One level;Performs ADLs on one level;Stairs to enter with rails  (1 AGUEDA)   Bathroom Shower/Tub Tub/shower unit   Bathroom Toilet Standard   Bathroom Equipment Grab bars in shower  (suction cup grab bar)   [address] Other (Comment)  (no AD used at baseline)   Prior Function   Level of [place] Independent with ADLs and functional mobility   Lives With Significant other  (Boyfriend)   Receives Help From Family; Manoj  (per pt)   IADLs Independent  (per pt)   Falls in the last 6 months 1 to 4  (2 falls per pt)   Vocational On disability   Comments pt drives at baseline; pt reports taking care of pets at home   Lifestyle   Autonomy Per patient report, she lives with her significant other in a one-story home with 1 AGUEDA  At baseline, patient reports that she is independent in both ADLs and IADLs and does not use AD for functional mobility  Patient reports that she is on permanent disability and drives at baseline  Reciprocal Relationships Questionable social support   Patient lives with her significant other   Service to Others On disability   Intrinsic Gratification Patient reports that she enjoys cooking and grooming her dogs   Psychosocial Psychosocial (WDL) WDL   ADL   Eating Assistance 5  Supervision/Setup   Grooming Assistance 5  Supervision/Setup   UB Bathing Assistance 4  Minimal Assistance   LB Bathing Assistance 4  Minimal Assistance   UB Dressing Assistance 4  Minimal Assistance   LB Dressing Assistance 3  Moderate Assistance   Toileting Assistance  4  Minimal Assistance   Functional Assistance 4  Minimal Assistance   Additional Comments ADL assist levels based on pt's functional performance during OT evaluation   Bed Mobility   Rolling L 5  Supervision  (Log roll technique for back safety)   Additional items Assist x 1; Increased time required;Verbal cues   Sit to Supine 5  Supervision   Additional items Assist x 1;HOB elevated; Increased time required;Verbal cues   Additional Comments Patient received OOB in bathroom upon OT arrival; at end of session: pt returned lying supine in bed w/ all needs within reach and bed alarm activated   Transfers   Sit to Stand 4  Minimal assistance  (CGA)   Additional items Assist x 1; Increased time required;Verbal cues;Armrests   Stand to Sit 4  Minimal assistance  (CGA)   Additional items Assist x 1; Increased time required;Verbal cues   Toilet transfer 4  Minimal assistance  (CGA)   Additional items Assist x 1; Increased time required;Verbal cues; Commode;Armrests   Additional Comments Performed functional transfers with RW     Functional Mobility   Functional Mobility 4  Minimal assistance  (CGA)   Additional Comments x1; ambulation from bathroom with no LOB or SOB   Additional items Rolling walker   Balance   Static Sitting Good   Dynamic Sitting Fair +   Static Standing Fair   Dynamic Standing Fair -   Ambulatory Fair -   Activity Tolerance   Activity Tolerance Patient limited by fatigue;Patient limited by pain   Medical Staff Made Aware PT Alberteen Krishan; Razia Ruiz made aware of session outcomes   Nurse Made Aware FRANCINE Barahona confirmed pt appropriate for therapy   RUE Assessment   RUE Assessment X  (AROM grossly WFL; strength deficits noted w/ formal assess )   RUE Strength   R Shoulder Flexion 3/5   R Elbow Extension 3/5   LUE Assessment   LUE Assessment X  (AROM grossly WFL; strength deficits noted w/ formal assess )   LUE Strength   L Shoulder Flexion 3/5   L Elbow Extension 3/5   Hand Function   Gross Motor Coordination Impaired   Fine Motor Coordination Functional   Sensation   Light Touch No apparent deficits  (BUEs)   Vision-Basic Assessment   Current Vision Wears glasses only for reading   Cognition   Overall Cognitive Status [facility]   Arousal/Participation Alert; Responsive; Cooperative   Attention Within functional limits   Orientation Level Oriented to person;Oriented to place   Memory Within functional limits   Following Commands Follows multistep commands with increased time or repetition   Comments Patient agreeable to OT evaluation   Assessment   Limitation Decreased ADL status; Decreased UE strength;Decreased endurance;Decreased self-care trans;Decreased high-level ADLs   Prognosis Good   Assessment Patient is a 64 y o  female seen for OT evaluation s/p admit to [address] on 3/11/2021 w/Serum total bilirubin elevated  Commorbidities affecting patient's functional performance at time of assessment include: acute encephalopathy, DARRYL, anemia, alcohol abuse, hyperkalemia, cirrhosis, and hepatocellular carcinoma  Patient  has a past medical history of Arthritis, Cirrhosis ([address] ), Extremity pain, Hepatitis C, Joint pain, Liver disease, and Low back pain  Orders placed for OT evaluation and treatment  Performed at least two patient identifiers during session including name and wristband  Prior to admission, patient was living with her significant other in a one-story home with 1 AGUEDA  At baseline, patient reports that she is independent in both ADLs and IADLs and does not use AD for functional mobility   Personal factors affecting patient at time of initial evaluation include: difficulty performing ADLs and difficulty performing IADLs  Upon evaluation, patient requires minimal  assist for UB ADLs, minimal  and moderate assist for LB ADLs, transfers and functional ambulation in room and bathroom with contact guard assist, with the use of Rolling Walker  Patient is alert, oriented to name, and oriented to place,  Occupational performance is affected by the following deficits: decreased muscle strength, impaired gross motor coordination, dynamic sit/ stand balance deficit with poor standing tolerance time for self care and functional mobility, decreased activity tolerance, increased pain and postural control and postural alignment deficit, requiring external assistance to complete transitional movements, decreased endurance, and decreased functional mobility  Therapist completed  extensive additional review of medical records and additional review of physical, cognitive or psychosocial history, clinical examination identifying 5 or more performance deficits, clinical decision making of a high complexity , consistent with a high complexity level evaluation  Patient to benefit from continued Occupational Therapy treatment while in the hospital to address deficits as defined above and maximize level of functional independence with ADLs and functional mobility  Occupational Performance areas to address include: grooming , bathing/ shower, dressing, toilet hygiene, transfer to all surfaces, functional mobility, community mobility, health maintenance, IADLS: Household maintenance, IADLs: safety procedures, IADLs: meal prep/ clean up, Leisure Participation and Home management  From OT standpoint, recommendation at time of d/c would be Home with family support and Home OT  Goals   Patient Goals "to move to [place]"   Plan   Treatment Interventions ADL retraining;Functional transfer training;UE strengthening/ROM; Endurance training;Patient/family training; Compensatory technique education; Energy conservation; Activityengagement Goal Expiration Date 03/23/21   OT Treatment Day 0   OT Frequency 2-3x/wk   Recommendation   OT Discharge Recommendation Home with skilled therapy  (Skilled home OT and home w/ increased social support)   Additional Comments  The patient's raw score on the AM-PAC Daily Activity inpatient short form is 20, standardized score is 42 03, greater than 39 4  Patients at this level are likely to benefit from discharge to home  Please refer to the recommendation of the Occupational Therapist for safe discharge planning  AM-PAC Daily Activity Inpatient   Lower Body Dressing 3   Bathing 3   Toileting 3   Upper Body Dressing 3   Grooming 4   Eating 4   Daily Activity Raw Score 20   Daily Activity Standardized Score (Calc for Raw Score >=11) 42 03   AM-Located within [facility] Applied Cognition Inpatient   Following a Speech/Presentation 4   Understanding Ordinary Conversation 4   Taking Medications 4   Remembering Where Things Are Placed or Put Away 4   Remembering List of 4-5 Errands 4   Taking Care of Complicated Tasks 3   Applied Cognition Raw Score 23   Applied Cognition Standardized Score 53 08   Barthel Index   Feeding 10   Bathing 0   Grooming Score 0   Dressing Score 5   Bladder Score 10   Bowels Score 10   Toilet Use Score 5   Transfers (Bed/Chair) Score 10   Mobility (Level Surface) Score 0   Stairs Score 0   Barthel Index Score 50   Modified Monmouth Scale   Modified Monmouth Scale 3     Occupational Therapy Goals to be completed in 7-14 Days:    1 - Patient will verbalize and demonstrate use of energy conservation/ deep breathing technique and work simplification skills during functional activity with no verbal cues  2 - Patient will verbalize and demonstrate good body mechanics and joint protection techniques during  ADLs/ IADLs with no verbal cues  3 - Patient will increase OOB/ sitting tolerance to 2-4 hours per day for increased participation in self care and leisure tasks with no s/s of exertion      4 - Patient will increase standing tolerance time to 5 minutes with unilateral UE support to complete sink level ADLs@ supervision/setup level  5 - Patient will increase sitting tolerance at edge of bed to 30 minutes to complete UB ADLs @ set up assist level  6 - Patient will transfer bed to Chair / toilet at Mod I assist level with AD as indicated  7 - Patient will complete UB ADLs with set up assist with use of compensatory/adaptive techniques as indicated  8 - Patient will complete LB ADLs with supervision assist with the use of adaptive equipment as indicated  9 - Patient will complete toileting hygiene with set up assist/ supervision for thoroughness  8 - Patient/ Family will demonstrate competency with UE Home Exercise Program       11- Patient will demonstrate fair+ static and dynamic standing balance with transitional movements and ADLs 100% of the time      Nasrin Méndez OTR/L

## 2021-03-16 NOTE — PLAN OF CARE
Problem: OCCUPATIONAL THERAPY ADULT  Goal: Performs self-care activities at highest level of function for planned discharge setting  See evaluation for individualized goals  Description: Treatment Interventions: ADL retraining, Functional transfer training, UE strengthening/ROM, Endurance training, Patient/family training, Compensatory technique education, Energy conservation, Activityengagement          See flowsheet documentation for full assessment, interventions and recommendations  Note: Limitation: Decreased ADL status, Decreased UE strength, Decreased endurance, Decreased self-care trans, Decreased high-level ADLs  Prognosis: Good  Assessment: Patient is a 64 y o  female seen for OT evaluation s/p admit to [id number] [place] [address] on 3/11/2021 w/Serum total bilirubin elevated  Commorbidities affecting patient's functional performance at time of assessment include: acute encephalopathy, DARRYL, anemia, alcohol abuse, hyperkalemia, cirrhosis, and hepatocellular carcinoma  Patient  has a past medical history of Arthritis, Cirrhosis ([facility] [address] ), Extremity pain, Hepatitis C, Joint pain, Liver disease, and Low back pain  Orders placed for OT evaluation and treatment  Performed at least two patient identifiers during session including name and wristband  Prior to admission, patient was living with her significant other in a one-story home with 1 AGUEDA  At baseline, patient reports that she is independent in both ADLs and IADLs and does not use AD for functional mobility  Personal factors affecting patient at time of initial evaluation include: difficulty performing ADLs and difficulty performing IADLs  Upon evaluation, patient requires minimal  assist for UB ADLs, minimal  and moderate assist for LB ADLs, transfers and functional ambulation in room and bathroom with contact guard assist, with the use of Rolling Walker  Patient is alert, oriented to name, and oriented to place,   Occupational performance is affected by the following deficits: decreased muscle strength, impaired gross motor coordination, dynamic sit/ stand balance deficit with poor standing tolerance time for self care and functional mobility, decreased activity tolerance, increased pain and postural control and postural alignment deficit, requiring external assistance to complete transitional movements, decreased endurance, and decreased functional mobility  Therapist completed  extensive additional review of medical records and additional review of physical, cognitive or psychosocial history, clinical examination identifying 5 or more performance deficits, clinical decision making of a high complexity , consistent with a high complexity level evaluation  Patient to benefit from continued Occupational Therapy treatment while in the hospital to address deficits as defined above and maximize level of functional independence with ADLs and functional mobility  Occupational Performance areas to address include: grooming , bathing/ shower, dressing, toilet hygiene, transfer to all surfaces, functional mobility, community mobility, health maintenance, IADLS: Household maintenance, IADLs: safety procedures, IADLs: meal prep/ clean up, Leisure Participation and Home management  From OT standpoint, recommendation at time of d/c would be Home with family support and Home OT         OT Discharge Recommendation: Home with skilled therapy(Skilled home OT and home w/ increased social support)

## 2021-03-16 NOTE — PROGRESS NOTES
NEPHROLOGY PROGRESS NOTE    Patient: Cierra Huerta               Sex: female          DOA: 3/11/2021  4:48 PM   YOB: 1960        Age:  64 y o         LOS:  LOS: 5 days       HPI     Patient admitted acute kidney injury and altered mental status    SUBJECTIVE     Feeling better    Awake and alert    Denies any complaint    No chest pain no palpitation or shortness of breath    CURRENT MEDICATIONS       Current Facility-Administered Medications:     acetaminophen (TYLENOL) tablet 650 mg, 650 mg, Oral, Q6H PRN, Mahesh Mora MD, 650 mg at 03/15/21 0924    albumin human (FLEXBUMIN) 25 % injection 25 g, 25 g, Intravenous, Q6H, Mahesh Mora MD, 25 g at 03/16/21 0756    aluminum-magnesium hydroxide-simethicone (MYLANTA) oral suspension 30 mL, 30 mL, Oral, Q4H PRN, Murl Pickles, CRNP    diphenhydrAMINE (BENADRYL) tablet 25 mg, 25 mg, Oral, Once PRN, Murl Pickles, CRNP    folic acid 1 mg, thiamine (VITAMIN B1) 100 mg in sodium chloride 0 9 % 100 mL IV piggyback, , Intravenous, Daily, Ashley Cannon MD, New Bag at 03/16/21 0842    lactulose oral solution 30 g, 30 g, Oral, TID, Polina Roberts PA-C, 30 g at 03/16/21 0841    lidocaine (LIDODERM) 5 % patch 1 patch, 1 patch, Topical, Daily, Murl Pickles, CRNP, 1 patch at 03/16/21 0841    nicotine (NICODERM CQ) 14 mg/24hr TD 24 hr patch 1 patch, 1 patch, Transdermal, Daily, Mahesh Mora MD    rifaximin (XIFAXAN) tablet 550 mg, 550 mg, Oral, Q12H [address], Polina Roberts PA-C, 550 mg at 03/16/21 0841    sodium chloride 0 9 % infusion, 50 mL/hr, Intravenous, Continuous, Anuj Delgadillo MD, Last Rate: 50 mL/hr at 03/15/21 2305, 50 mL/hr at 03/15/21 2305    Current Outpatient Medications:     baclofen 10 mg tablet, Take 10 mg by mouth, Disp: , Rfl:     cholecalciferol (VITAMIN D3) 1,000 units tablet, , Disp: , Rfl:     furosemide (LASIX) 40 mg tablet, Take 40 mg by mouth, Disp: , Rfl:     gabapentin (NEURONTIN) 300 mg capsule, Take 1 capsule (300 mg total) by mouth 2 (two) times a day, Disp: 60 capsule, Rfl: 1    spironolactone (ALDACTONE) 100 mg tablet, TAKE 1 & 1/2 TABLETS BY MOUTH DAILY, Disp: , Rfl:     lactulose 20 g/30 mL, Take 45 mL (30 g total) by mouth 3 (three) times a day To achieve 3 bowel movements a day, Disp: 4050 mL, Rfl: 0    OBJECTIVE     Current Weight: Weight - Scale: 68 7 kg (151 lb 7 3 oz)  Vitals:    03/16/21 0712   BP: 99/65   Pulse: 80   Resp: 20   Temp: 97 7 °F (36 5 °C)   SpO2: 91%       Intake/Output Summary (Last 24 hours) at 3/16/2021 1523  Last data filed at 3/16/2021 1156  Gross per 24 hour   Intake 1583 33 ml   Output 302 ml   Net 1281 33 ml       PHYSICAL EXAMINATION     Physical Exam  Constitutional:       General: She is not in acute distress  Appearance: She is well-developed  HENT:      Head: Normocephalic  Eyes:      General: No scleral icterus  Conjunctiva/sclera: Conjunctivae normal    Neck:      Musculoskeletal: Neck supple  Vascular: No JVD  Cardiovascular:      Rate and Rhythm: Normal rate  Heart sounds: Normal heart sounds  Pulmonary:      Effort: Pulmonary effort is normal       Breath sounds: No wheezing  Abdominal:      Palpations: Abdomen is soft  Tenderness: There is no abdominal tenderness  Musculoskeletal: Normal range of motion  Skin:     General: Skin is warm  Findings: No rash  Neurological:      Mental Status: She is alert and oriented to person, place, and time     Psychiatric:         Behavior: Behavior normal           LAB RESULTS     Results from last 7 days   Lab Units 03/16/21  0611 03/15/21  1639 03/15/21  0504 03/14/21  1314 03/14/21  0521 03/14/21  0052 03/13/21  1124 03/13/21  0536 03/12/21  0528 03/12/21  0227 03/11/21  1819   WBC Thousand/uL 3 57*  --  3 86*  --  5 33  --   --  4 89 6 07  --  10 48*   HEMOGLOBIN g/dL 7 7* 7 9* 7 3* 8 4* 7 6* 8 2* 6 5* 6 8* 7 5*  --  8 8*   HEMATOCRIT % 23 7* 24 3* 21 8* 25 6* 22 5* 24 8* 19 5* 19 8* 21 7*  -- 25 9*   PLATELETS Thousands/uL 24*  --  29*  --  33*  --   --  48* 56*  --  89*   POTASSIUM mmol/L 3 4*  --  3 5  --  3 7  --   --  3 9  --  4 8 5 7*   CHLORIDE mmol/L 100  --  97*  --  96*  --   --  93*  --  93* 91*   CO2 mmol/L 22  --  23  --  25  --   --  25  --  25 26   BUN mg/dL 39*  --  51*  --  64*  --   --  74*  --  80* 73*   CREATININE mg/dL 1 21  --  1 41*  --  1 82*  --   --  2 30*  --  3 83* 4 05*   EGFR ml/min/1 73sq m 49  --  41  --  30  --   --  23  --  12 11   CALCIUM mg/dL 9 2  --  9 3  --  8 8  --   --  8 8  --  9 0 9 2   MAGNESIUM mg/dL 2 8*  --   --   --   --   --   --  3 2*  --   --  3 1*       RADIOLOGY RESULTS      Results for orders placed during the hospital encounter of 03/11/21   XR chest portable    Narrative CHEST   INDICATION:   Patent encephalopathy, rule out pneumonia  COMPARISON:  None  EXAM PERFORMED/VIEWS:  XR CHEST PORTABLE  FINDINGS:  Cardiomediastinal silhouette appears unremarkable  Left basilar opacity partially securing the left hemidiaphragm  Otherwise clear lung fields  No congestive failure  No pneumothorax  Osseous structures appear within normal limits for patient age  Impression Left basilar opacity partially obscuring the left hemidiaphragm  Workstation performed: [id number]     No results found for this or any previous visit  No results found for this or any previous visit  No results found for this or any previous visit  No results found for this or any previous visit  No results found for this or any previous visit  PLAN / RECOMMENDATIONS      Acute kidney injury:  Improved and baseline    Encephalopathy:  Much better with treatment of hepatic encephalopathy    Cirrhosis of liver:  Seems to be stable    Anemia:  Likely because of multiple chronic condition    Hepatocellular carcinoma:  Being closely monitored by Annie Willoughby MD  Nephrology  3/16/2021        Portions of the record may have been created with voice recognition software  Occasional wrong word or "sound a like" substitutions may have occurred due to the inherent limitations of voice recognition software  Read the chart carefully and recognize, using context, where substitutions have occurred

## 2021-03-16 NOTE — PHYSICAL THERAPY NOTE
Physical Therapy Evaluation     Patient's Name: Crispin Rachel    Admitting Diagnosis  Hepatic encephalopathy ([facility] ) [K72 90]  Alcohol intoxication ([place] [address] ) [F10 [id number]]    Problem List  Patient Active Problem List   Diagnosis    Degeneration of intervertebral disc of lumbar region    Lumbar spondylosis    Mass of left lobe of liver    Chronic pain syndrome    [facility]    Acute encephalopathy    Cirrhosis ([facility] )    Serum total bilirubin elevated    DARRYL (acute kidney injury) ([facility] )    Hyperkalemia    Alcohol abuse    Anemia       Past Medical History  Past Medical History:   Diagnosis Date    Arthritis     Cirrhosis ([facility] )     Extremity pain     BLE    Hepatitis C     Joint pain     Bilateral Shoulders    Liver disease     Low back pain        Past Surgical History  Past Surgical History:   Procedure Laterality Date    CT GUIDED AND MONITORING PARENCHYMAL TISSUE ABLATION  7/28/2020    ECTOPIC PREGNANCY SURGERY      MOUTH SURGERY      TONSILLECTOMY          03/16/21 1023   PT Last Visit   PT Visit Date 03/16/21   Note Type   Note type Evaluation   Pain Assessment   Pain Assessment Tool 0-10   Pain Score 8   Pain Location/Orientation Location: Back   Pain Onset/Description Frequency: Intermittent   Hospital Pain Intervention(s)   (RN notified of pt's pain report)   Home Living   Type of [address] [place] [address] One level;Stairs to enter with rails; Performs ADLs on one level  (1 AGUEDA)   Bathroom Shower/Tub Tub/shower unit   [employer] Grab bars in shower  (suction cup grab bar)   [address] Other (Comment)  (no AD used at baseline)   Additional Comments pt reports she is interested in getting a hurri cane   Prior Function   Level of [place] Independent with ADLs and functional mobility   Lives With Significant other  (boyfriend)   Receives Help From Family; Manoj  (per pt)   IADLs Independent  (per pt) Falls in the last 6 months 1 to 4  (2 falls per pt)   Vocational On disability   Comments (+) driving  pt reports having dogs/cats at home  pt enjoys to cook, groom her dogs   Restrictions/Precautions   Weight Bearing Precautions Per Order No   Braces or Orthoses   (none reported)   Other Precautions Multiple lines; Bed Alarm; Chair Alarm; Fall Risk;Pain  (back safety for joint protection)   General   Family/Caregiver Present No   Cognition   Overall Cognitive Status WFL   RUE Assessment   RUE Assessment   (defer to OT eval for comments)   LUE Assessment   LUE Assessment   (defer to OT eval for comments)   RLE Assessment   RLE Assessment   (grossly 3+/5)   LLE Assessment   LLE Assessment   (grossly 3+/5)   Coordination   Movements are Fluid and Coordinated 1   Sensation WFL   Light Touch   RLE Light Touch Grossly intact   LLE Light Touch Grossly intact   Bed Mobility   Sit to Supine 5  Supervision  (log roll technique)   Additional items Assist x 1;HOB elevated; Increased time required;Verbal cues   Additional Comments pt received OOB in bathroom upon arrival   Transfers   Sit to Stand 5  Supervision   Additional items Assist x 1; Armrests; Increased time required;Verbal cues   Stand to Sit 5  Supervision   Additional items Assist x 1; Armrests; Increased time required;Verbal cues   Ambulation/Elevation   Gait pattern Decreased foot clearance; Short stride; Step to  (grossly unsteady; no uncorrected LOB)   Gait Assistance 4  Minimal assist  (CGA)   Additional items Assist x 1;Verbal cues   Assistive Device Rolling walker   Distance 20'   Balance   Static Sitting Good   Dynamic Sitting Fair +   Static Standing Fair   Dynamic Standing Fair -   Ambulatory Fair -   Endurance Deficit   Endurance Deficit Yes   Activity Tolerance   Activity Tolerance Patient limited by pain; Patient limited by fatigue   Medical Staff Made Aware OT Constanza Asif   Nurse Made Aware FRANCINE Degroot verbalized pt appropriate to see, made aware of session outcome/recs   Assessment   Prognosis Good   Problem List Decreased strength;Decreased endurance; Impaired balance;Decreased mobility;Pain   Assessment Pt is 64 y o  female seen for PT evaluation on 3/16/2021 s/p admit to [facility] on 3/11/2021 w/ Serum total bilirubin elevated  PT consulted to assess pt's functional mobility and d/c needs  Order placed for PT eval and tx, w/ ambulate patient order  Performed at least 2 patient identifiers during session: Name and wristband  Comorbidities affecting pt's physical performance at time of assessment include:  has a past medical history of Arthritis, Cirrhosis ([address] ), Extremity pain, Hepatitis C, Joint pain, Liver disease, and Low back pain  PTA, pt was independent w/ all functional mobility w/ no AD/DME, ambulates community distances and elevations, ambulates household distances, has 1 AGUEDA, lives w/ boyfriend in 1 level home and on disability  Personal factors affecting pt at time of IE include: ambulating w/ assistive device, stairs to enter home and positive fall history  Please find objective findings from PT assessment regarding body systems outlined above with impairments and limitations including weakness, impaired balance, decreased endurance, gait deviations, decreased activity tolerance and fall risk, as well as mobility assessment (need for cueing for mobility technique)  The following objective measures performed on IE also reveal limitations: Barthel Index: 50/100 and Modified [place]: 3 (moderate disability)  Pt's clinical presentation is currently unstable/unpredictable seen in pt's presentation of abnormal lab value(s), need for input for task focus and mobility technique and ongoing medical assessment  Pt to benefit from continued PT tx to address deficits as defined above and maximize level of functional independent mobility and consistency   From PT/mobility standpoint, recommendation at time of d/c would be Home PT with family support pending progress in order to facilitate return to PLOF  Barriers to Discharge Inaccessible home environment   Goals   Patient Goals "to move to [place]"   [facility] Expiration Date 03/26/21   Short Term Goal #1 In 7-10 days: Increase bilateral LE strength 1/2 grade to facilitate independent mobility, Perform all bed mobility tasks independently to decrease caregiver burden, Perform all transfers modified independent to improve independence, Ambulate > 150 ft  with least restrictive assistive device modified independent w/o LOB and w/ normalized gait pattern 100% of the time, Navigate 1 stairs modified independent with unilateral handrail to facilitate return to previous living environment, Increase all balance 1/2 grade to decrease risk for falls, Improve Barthel Index score to 65 or greater to facilitate independence and PT provider will perform functional balance assessment to determine fall risk   PT Treatment Day 0   Plan   Treatment/Interventions LE strengthening/ROM; Functional transfer training;Elevations; Therapeutic exercise; Endurance training;Patient/family training;Equipment eval/education;Gait training;Bed mobility;Spoke to nursing;OT   PT Frequency   (3-5x/wk)   Recommendation   PT Discharge Recommendation Home with skilled therapy; Return to previous environment with social support  (home PT)   Equipment Recommended Romana Coyne  (RW)   [address] walker   Change/add to [employer]?  No   AM-PAC Basic Mobility Inpatient   Turning in Bed Without Bedrails 4   Lying on Back to Sitting on Edge of Flat Bed 4   Moving Bed to Chair 3   Standing Up From Chair 3   Walk in Room 3   Climb 3-5 Stairs 3   Basic Mobility Inpatient Raw Score 20   Basic Mobility Standardized Score 43 99   Modified [place] Scale   Modified [place] Scale 3   Barthel Index   Feeding 10   Bathing 0   Grooming Score 0   Dressing Score 5   Bladder Score 10   Bowels Score 10   Toilet Use Score 5   Transfers (Bed/Chair) Score 10   Mobility (Level Surface) Score 0   Stairs Score 0   Barthel Index Score 50           Odette Chew, PT, DPT

## 2021-03-16 NOTE — PLAN OF CARE
Problem: PHYSICAL THERAPY ADULT  Goal: Performs mobility at highest level of function for planned discharge setting  See evaluation for individualized goals  Description: Treatment/Interventions: LE strengthening/ROM, Functional transfer training, Elevations, Therapeutic exercise, Endurance training, Patient/family training, Equipment eval/education, Gait training, Bed mobility, Spoke to nursing, OT  Equipment Recommended: Walker(RW)       See flowsheet documentation for full assessment, interventions and recommendations  Note: Prognosis: Good  Problem List: Decreased strength, Decreased endurance, Impaired balance, Decreased mobility, Pain  Assessment: Pt is 64 y o  female seen for PT evaluation on 3/16/2021 s/p admit to [facility] on 3/11/2021 w/ Serum total bilirubin elevated  PT consulted to assess pt's functional mobility and d/c needs  Order placed for PT eval and tx, w/ ambulate patient order  Performed at least 2 patient identifiers during session: Name and wristband  Comorbidities affecting pt's physical performance at time of assessment include:  has a past medical history of Arthritis, Cirrhosis ([address] ), Extremity pain, Hepatitis C, Joint pain, Liver disease, and Low back pain  PTA, pt was independent w/ all functional mobility w/ no AD/DME, ambulates community distances and elevations, ambulates household distances, has 1 AGUEDA, lives w/ boyfriend in 1 level home and on disability  Personal factors affecting pt at time of IE include: ambulating w/ assistive device, stairs to enter home and positive fall history  Please find objective findings from PT assessment regarding body systems outlined above with impairments and limitations including weakness, impaired balance, decreased endurance, gait deviations, decreased activity tolerance and fall risk, as well as mobility assessment (need for cueing for mobility technique)   The following objective measures performed on IE also reveal limitations: Barthel Index: 50/100 and Modified [place]: 3 (moderate disability)  Pt's clinical presentation is currently unstable/unpredictable seen in pt's presentation of abnormal lab value(s), need for input for task focus and mobility technique and ongoing medical assessment  Pt to benefit from continued PT tx to address deficits as defined above and maximize level of functional independent mobility and consistency  From PT/mobility standpoint, recommendation at time of d/c would be Home PT with family support pending progress in order to facilitate return to PLOF  Barriers to Discharge: Inaccessible home environment     PT Discharge Recommendation: Home with skilled therapy, Return to previous environment with social support(home PT)          See flowsheet documentation for full assessment

## 2021-03-16 NOTE — ASSESSMENT & PLAN NOTE
· Present on admission 8 8 suspected likely in setting liver cirrhosis  · No active signs of bleeding  · Hemoglobin this morning 7 7  · Patient status post transfusion hemoglobin   · Transfuse for hemoglobin less than 7 or symptomatic  · Thrombocytopenia currently at 24 no active signs of bleeding

## 2021-03-16 NOTE — PROGRESS NOTES
Progress Note  Cierra Huerta 64 y o  female MRN: [id number]  Unit/Bed#: -01 Encounter: [id number]    Subjective:  Patient sitting up in bed eating breakfast with no complaints  Patient wishes to get out of the hospital as soon as possible  Patient reports she is having multiple bowel movements  There has been no evidence of bleeding  Patient's MRI is pending  Objective:     Vitals:   Vitals:    03/16/21 0712   BP: 99/65   Pulse: 80   Resp: 20   Temp: 97 7 °F (36 5 °C)   SpO2: 91%   ,Body mass index is 23 03 kg/m²  Intake/Output Summary (Last 24 hours) at 3/16/2021 0957  Last data filed at 3/16/2021 8649  Gross per 24 hour   Intake 2794 33 ml   Output 702 ml   Net 2092 33 ml       Physical Exam:     General Appearance: Alert, appears stated age and cooperative  Lungs: Clear to auscultation bilaterally, no rales or rhonchi, no labored breathing/accessory muscle use  Heart: Regular rate and rhythm, S1, S2 normal, no murmur, click, rub or gallop  Abdomen: Soft, non-tender, non-distended; bowel sounds normal; no masses or no organomegaly  Extremities: No cyanosis, edema    Invasive Devices     Peripheral Intravenous Line            Peripheral IV 03/12/21 Distal;Left;Ventral (anterior) Forearm 3 days                Lab Results:  No results displayed because visit has over 200 results  Imaging Studies:   I have personally reviewed pertinent imaging studies  Xr Chest Portable    Result Date: 3/14/2021  Narrative: CHEST INDICATION:   Patent encephalopathy, rule out pneumonia  COMPARISON:  None EXAM PERFORMED/VIEWS:  XR CHEST PORTABLE FINDINGS: Cardiomediastinal silhouette appears unremarkable  Left basilar opacity partially securing the left hemidiaphragm  Otherwise clear lung fields  No congestive failure  No pneumothorax  Osseous structures appear within normal limits for patient age  Impression: Left basilar opacity partially obscuring the left hemidiaphragm   Workstation performed: [id number]    Ct Head Without Contrast    Result Date: 3/11/2021  Narrative: CT BRAIN - WITHOUT CONTRAST INDICATION:   ams  COMPARISON:  None  TECHNIQUE:  CT examination of the brain was performed  In addition to axial images, sagittal and coronal 2D reformatted images were created and submitted for interpretation  Radiation dose length product (DLP) for this visit:  770 24 mGy-cm   This examination, like all CT scans performed in the [facility], was performed utilizing techniques to minimize radiation dose exposure, including the use of iterative  reconstruction and automated exposure control  IMAGE QUALITY:  Diagnostic  FINDINGS: PARENCHYMA:  No intracranial mass, mass effect or midline shift  No CT signs of acute infarction  No acute parenchymal hemorrhage  VENTRICLES AND EXTRA-AXIAL SPACES:  Normal for the patient's age  VISUALIZED ORBITS AND PARANASAL SINUSES:  Unremarkable  CALVARIUM AND EXTRACRANIAL SOFT TISSUES:  Normal     Impression: No acute intracranial abnormality  Workstation performed: [id number]    Us Right Upper Quadrant With Liver Dopplers    Result Date: 3/12/2021  Narrative: RIGHT UPPER QUADRANT ULTRASOUND WITH LIVER DOPPLER INDICATION:     r/o portal vein thrombosis  COMPARISON:  MRI abdomen 1/28/2021 TECHNIQUE:   Real-time ultrasound of the right upper quadrant was performed with a curvilinear transducer with both volumetric sweeps and still imaging techniques  FINDINGS: PANCREAS:  Visualized portions of the pancreas are within normal limits  AORTA AND IVC:  Visualized portions are normal for patient age  LIVER: Size:  Within normal range  The liver measures 17 cm in the midclavicular line  Contour:  Surface contour is nodular  Parenchyma: There is diffuse coarsened heterogeneous echotexture suggesting underlying cirrhotic changes  Known left hepatic post ablation tumor not clearly visualized   LIVER DOPPLER: No color flow in the main portal vein with abnormal spectral waveforms in the left and right portal veins  Potentially artifactual waveform in the main portal vein  Normal directional flow in the hepatic veins  Main hepatic artery appears normal size, patent with normal spectral waveform  BILIARY: The gallbladder is normal in caliber  Gallbladder wall upper limits of normal at 3 mm  No pericholecystic fluid  There are multiple calculi and sludge present  No sonographic Santoyo sign  No intrahepatic biliary dilatation  CBD measures 8 mm  This tapers normally towards the pancreas  No choledocholithiasis  KIDNEY: Right kidney measures 11 2 x 5 8 cm  Within normal limits  ASCITES:   None  Impression: No discernible flow in the main portal vein  This may be artifactual secondary to slow flow or portal vein thrombosis  Consider follow-up with contrast-enhanced CT or MRI if it would alter patient management  Hepatic cirrhosis  Cholelithiasis and gallbladder sludge without evidence of cholecystitis  The study was marked in [facility] for immediate notification  Workstation performed: [id number]        Assessment & Plan  HCV/ETOH Cirrhosis  HCC status post ablation  -Patient initially admitted with worsening bilirubin as well as kidney function and encephalopathy  -MRI/MRCP to rule out recurrence of HCC is completed  Final results pending  Spoke with MRI this am    -Patient's previous imaging has been reviewed  Patient does follow with Martha Covert  -MELD 27  -Ascites: None on imaging  -HE: Improved since admission  Continue Lactulose and Rifaxamin  Titrate to 2-3 BM's a day  -HCC: status post ablation  Recent AFP 2 9    -Varices: None on EGD from 2019  She will need another EGD for variceal screening  Patients PLT count this am is 24,000  Patient does not want any further testing done while here in [place]  She reports that she is moving to [place] as soon as possible      DARRYL  -Improved since admission  Appreciated Nephrology input      Patient will be seen and examined by Paloma Quiles PA-C  3/16/2021,9:57 AM

## 2021-03-16 NOTE — ASSESSMENT & PLAN NOTE
· Presented with a potassium of 5 7  · Likely secondary to worsening kidney function  · Patient was given IV Insulin x1 on 03/11  · Repeat potassium 3 4 today    · Continue to monitor may need to treat for hypokalemia

## 2021-03-16 NOTE — ASSESSMENT & PLAN NOTE
· Patient with acute elevation of total bilirubin, elevated to 12 79 on admission  · Likely secondary to alcoholic hepatitis   · Given discriminant function score greater than 32 has been started on on prednisolone x2 days however this has since been disease continued and patient has been started on rifaximin on 3/12  · Bilirubin remained stable currently at 6 48  · US RUQ, Liver Dopplers completed (3/12/21): No discernible flow in the main portal vein  This may be artifactual secondary to slow flow or portal vein thrombosis  Consider follow-up with contrast-enhanced CT or MRI if it would alter patient management  Hepatic cirrhosis  Cholelithiasis and gallbladder sludge without evidence of cholecystitis  · Hepatitis panel completed; Hepatitis C Ab high reactive    · GI consulted and following  · MRI obtain inconclusive was not definitive of any type of thrombosis patient declined further workup patient cleared by GI for discharge home outpatient follow-up here or in [place] scripts given for follow-up CBC and platelet and CMP in 1 week

## 2021-03-17 NOTE — DISCHARGE SUMMARY
[address] [facility]  Discharge- Cierra Barrier 1960, 64 y o  female MRN: [id number]  Unit/Bed#: -01 Encounter: [id number]  Primary Care Provider: Olivia Adams DO   Date and time admitted to hospital: 3/11/2021  4:48 PM    * Serum total bilirubin elevated  Assessment & Plan  · Patient with acute elevation of total bilirubin, elevated to 12 79 on admission  · Likely secondary to alcoholic hepatitis   · Given discriminant function score greater than 32 has been started on on prednisolone x2 days however this has since been disease continued and patient has been started on rifaximin on 3/12  · Bilirubin remained stable currently at 6 48  · US RUQ, Liver Dopplers completed (3/12/21): No discernible flow in the main portal vein  This may be artifactual secondary to slow flow or portal vein thrombosis  Consider follow-up with contrast-enhanced CT or MRI if it would alter patient management  Hepatic cirrhosis  Cholelithiasis and gallbladder sludge without evidence of cholecystitis  · Hepatitis panel completed; Hepatitis C Ab high reactive  · GI consulted and following  · MRI obtain inconclusive was not definitive of any type of thrombosis patient declined further workup patient cleared by GI for discharge home outpatient follow-up here or in [place] scripts given for follow-up CBC and platelet and CMP in 1 week    Acute encephalopathy  Assessment & Plan  · Likely hepatic encephalopathy from worsening liver dysfunction alcohol abuse  · On admission, wasn't very cooperative; improving now  She remains AOx4    · Continue lactulose    DARRYL (acute kidney injury) ([facility] [address] )  Assessment & Plan  · Baseline kidney function approximately 0 8  · Presented with a creatinine of 4  · Creatinine slowly improving currently at 1 21  · Likely secondary volume depletion versus hepatorenal  · Continue scheduled albumin  · Hold home diuretics  · Nephrology consult and following    Anemia  Assessment & Plan  · Present on admission 8 8 suspected likely in setting liver cirrhosis  · No active signs of bleeding  · Hemoglobin this morning 7 7  · Patient status post transfusion hemoglobin   · Transfuse for hemoglobin less than 7 or symptomatic  · Thrombocytopenia currently at 24 no active signs of bleeding      Alcohol abuse  Assessment & Plan  · History of alcohol abuse  · Patient reports not actively drinking at this time  · Alcohol level on admission was noted to be 0 so no acute alcohol intoxication  · Patient showed no signs of withdrawal discontinue CIWA protocol    Hyperkalemia  Assessment & Plan  · Presented with a potassium of 5 7  · Likely secondary to worsening kidney function  · Patient was given IV Insulin x1 on 03/11  · Repeat potassium 3 4 today    · Continue to monitor may need to treat for hypokalemia    Cirrhosis ([facility] [address] )  Assessment & Plan  · Secondary to alcohol abuse  · With poor outpatient follow-up  · Bilirubin noted to be approximately 13 on admission, was noted to be 3 a few months ago  · Bilirubin trending as mentioned above  · GI Consulted and following  · Plan as outlined above    Hepatocellular carcinoma [facility])  Assessment & Plan  · Follows outpatient with Oncology        Discharging Physician / Practitioner: SILVANO Molina  PCP: Mir Ruelas DO  Admission Date:   Admission Orders (From admission, onward)     Ordered        03/11/21 1926  Inpatient Admission  Once                   Discharge Date: 03/16/21    Resolved Problems  Date Reviewed: 3/16/2021    None          Consultations During Hospital Stay:  · Nephrology  · Gastroenterology    Procedures Performed:   · None    Significant Findings / Test Results:   · Hepatic encephalopathy resolved  · Acute kidney injury  · Liver cirrhosis  · Anemia chronic multifactorial   · Known history of hepatocellular carcinoma  · Hepatitis C    Incidental Findings:   · Ultrasound 3/12 showing no discernible flow in the main portal vein should artifactual versus portal vein thrombosis consider follow-up CT or MRI  Could not do CT in setting of acute kidney injury nephrology recommended MRI however due to motion artifact unable to get a clear imaging patient wanted no further treatment and wanted to follow-up with her provider in [place]      Test Results Pending at Discharge (will require follow up): · None     Outpatient Tests Requested:  · Per gastroenterology    Complications:  Patient initially refusing MRI in slow improvement from hepatic encephalopathy and anemia  Reason for Admission:  Hepatic encephalopathy    Hospital Course:     Radha Rodgers is a 64 y o  female patient who originally presented to the hospital on 3/11/2021 due to known history of alcoholic cirrhosis complicated by [address]  known history of untreated hepatitis-C who became confused home and was brought to the ED for further evaluation  Being draining force was thought to be hepatic encephalopathy CT of the head was negative patient was admitted started on albumin q 6 hours also given lactulose by GI in slowly patient's encephalopathy improved  Given ultrasound findings patient was recommended for an MRI that she initially refused further she had an acute kidney injury that improved however patient became significantly anemic with a drop in hemoglobin to 6 5 platelets were 29 no active signs of bleeding patient received 1 unit of packed red blood cells in he anemia improved to 7 9  She given unclear etiology of the bleeding again MRI was recommended in-patient was agreeable given the unclear source of her anemia    Patient underwent MRI that gave no conclusive information patient refused further treatment it was overall cleared medically from a nephrology and gastroenterology perspective given the improvement in her renal function and her encephalopathy patient was discharged home with repeat labs in 1 week and outpatient follow-up with GI patient made aware she does not relocated [place] with in the next several weeks she should follow-up with GI here in the area patient stated understanding and was discharged home  Please see above list of diagnoses and related plan for additional information  Condition at Discharge: stable     Discharge Day Visit / Exam:     Subjective:  Patient is stated name date of birth who the current president is that she is in the hospital and the time day patient denies any pain at this point denies headache dizziness lightheadedness and has no generalized complaints other than requesting to be discharged home  Patient has declined when she has green mentation to calling 1 she does not have a relationship with her brother or sister and relies on a friend here in the area  Vitals: Blood Pressure: 99/65 (03/16/21 0712)  Pulse: 80 (03/16/21 0712)  Temperature: 97 7 °F (36 5 °C) (03/16/21 0712)  Temp Source: Oral (03/14/21 0813)  Respirations: 20 (03/16/21 0712)  Height: 5' 8" (172 7 cm) (03/12/21 0925)  Weight - Scale: 68 7 kg (151 lb 7 3 oz) (03/15/21 0600)  SpO2: 91 % (03/16/21 2653)  Exam:   Physical Exam  HENT:      Head: Normocephalic and atraumatic  Mouth/Throat:      Mouth: Mucous membranes are moist    Eyes:      Pupils: Pupils are equal, round, and reactive to light  Cardiovascular:      Rate and Rhythm: Normal rate and regular rhythm  Pulmonary:      Effort: Pulmonary effort is normal       Breath sounds: Normal breath sounds  Abdominal:      General: Bowel sounds are normal       Tenderness: There is no abdominal tenderness  Musculoskeletal: Normal range of motion  Skin:     General: Skin is warm and dry  Neurological:      General: No focal deficit present  Mental Status: She is alert and oriented to person, place, and time  Mental status is at baseline     Psychiatric:         Mood and Affect: Mood normal          Behavior: Behavior normal          Discussion with Family:  Declined/refused    Discharge instructions/Information to patient and family:   See after visit summary for information provided to patient and family  Provisions for Follow-Up Care:  See after visit summary for information related to follow-up care and any pertinent home health orders  Disposition:     Home patient further refused home health services    For Discharges to [facility]:   · Not Applicable to this Patient - Not Applicable to this Patient    Planned Readmission:  None     Discharge Statement:  I spent 40 minutes discharging the patient  This time was spent on the day of discharge  I had direct contact with the patient on the day of discharge  Greater than 50% of the total time was spent examining patient, answering all patient questions, arranging and discussing plan of care with patient as well as directly providing post-discharge instructions  Additional time then spent on discharge activities  Discharge Medications:  See after visit summary for reconciled discharge medications provided to patient and family        ** Please Note: This note has been constructed using a voice recognition system **

## 2021-03-17 NOTE — UTILIZATION REVIEW
Notification of Discharge  This is a Notification of Discharge from our facility [address]  Please be advised that this patient has been discharge from our facility  Below you will find the admission and discharge date and time including the patients disposition  PRESENTATION DATE: 3/11/2021  4:48 PM  OBS ADMISSION DATE:   IP ADMISSION DATE: 3/11/21 1926   DISCHARGE DATE: 3/16/2021  3:05 PM  DISPOSITION: Home/Self Care Home/Self Care   Admission Orders listed below:  Admission Orders (From admission, onward)     Ordered        03/11/21 1926  Inpatient Admission  Once                   Please contact the UR Department if additional information is required to close this patient's authorization/case  [facility] Utilization Review Department  Main: [phone] x carefully listen to the prompts  All voicemails are confidential   [email or web address]  Send all requests for admission clinical reviews, approved or denied determinations and any other requests to dedicated fax number below belonging to the campus where the patient is receiving treatment   List of dedicated fax numbers:  [address] DENIALS (Administrative/Medical Necessity) [phone]   [address] (Maternity/NICU/Pediatrics) [phone] [address] [facility] [phone]   [place] Crum [phone]   Roman Bustamante [phone]   [facility] [address] [place] [address] [phone]   [facility]  [phone]   [address] [facility] [address] [facility]  [address] [address] [address] [facility] [phone]

## 2021-03-22 NOTE — PROGRESS NOTES
Call to [facility] to assist with scheduling her MRI and follow up with Dr Andrei Do in 6 months per the recommendations from the Rectal/GI multidisciplinary case review, when asked to set these appointments up for her, she stated she just had an MRI done, that she was just in the hospital, informed her that this would be for 6 months from now, she stated that she doesn't know that she wants to do this, she will call the office if she decides to schedule

## 2021-03-23 NOTE — TELEPHONE ENCOUNTER
Called pt to infrorm her to do labs prior to 03/24/21 appt  Pt asked to cancel that appt bc not able to walk  Pt will call to reschedule

## 2021-04-03 PROBLEM — E87.2 LACTIC ACIDOSIS: Status: ACTIVE | Noted: 2021-01-01

## 2021-04-03 PROBLEM — D61.818 PANCYTOPENIA (HCC): Status: ACTIVE | Noted: 2021-01-01

## 2021-04-03 PROBLEM — N39.0 UTI (URINARY TRACT INFECTION): Status: ACTIVE | Noted: 2021-01-01

## 2021-04-03 PROBLEM — L03.90 CELLULITIS: Status: ACTIVE | Noted: 2021-01-01

## 2021-04-03 NOTE — ED PROVIDER NOTES
History  Chief Complaint   Patient presents with    Altered Mental Status     Patient presents to ED via EMS with AMS since lastnight  63 yo female with h/o HCC, alcoholic cirrhosis and untreated hepatitis C who presents to the ED for confusion  Onset unclear  Hx from EMS as well as review of last admission from march 11, 2021 for hepatic encephalopathy  Due to pt being confused and nonverbal a complete history is unable to be obtained  Prior to Admission Medications   Prescriptions Last Dose Informant Patient Reported? Taking?   baclofen 10 mg tablet  Self Yes No   Sig: Take 10 mg by mouth   cholecalciferol (VITAMIN D3) 1,000 units tablet  Self Yes No   furosemide (LASIX) 40 mg tablet  Self Yes No   Sig: Take 40 mg by mouth   gabapentin (NEURONTIN) 300 mg capsule  Self No No   Sig: Take 1 capsule (300 mg total) by mouth 2 (two) times a day   lactulose 20 g/30 mL   No No   Sig: Take 45 mL (30 g total) by mouth 3 (three) times a day To achieve 3 bowel movements a day   spironolactone (ALDACTONE) 100 mg tablet  Self Yes No   Sig: TAKE 1 & 1/2 TABLETS BY MOUTH DAILY      Facility-Administered Medications: None       Past Medical History:   Diagnosis Date    Arthritis     Cirrhosis (HCC)     Extremity pain     BLE    Hepatitis C     Joint pain     Bilateral Shoulders    Liver disease     Low back pain        Past Surgical History:   Procedure Laterality Date    CT GUIDED AND MONITORING PARENCHYMAL TISSUE ABLATION  7/28/2020    ECTOPIC PREGNANCY SURGERY      MOUTH SURGERY      TONSILLECTOMY         Family History   Problem Relation Age of Onset    Alcohol abuse Mother     Heart disease Father      I have reviewed and agree with the history as documented      E-Cigarette/Vaping    E-Cigarette Use Never User      E-Cigarette/Vaping Substances    Nicotine No     THC No     CBD No     Flavoring No     Other No     Unknown No      Social History     Tobacco Use    Smoking status: Current Every Day Smoker     Packs/day: 0 50    Smokeless tobacco: Never Used   Substance Use Topics    Alcohol use: Not Currently     Frequency: Monthly or less     Drinks per session: 1 or 2    Drug use: Yes     Frequency: 7 0 times per week     Types: Marijuana     Comment: everyday       Review of Systems   Unable to perform ROS: Mental status change       Physical Exam  Physical Exam  Vitals signs and nursing note reviewed  Constitutional:       General: She is not in acute distress  Appearance: She is ill-appearing  She is not toxic-appearing or diaphoretic  Comments: Chronically ill appearing  Diffuse ecchymosis on trunk and extremities  HENT:      Head: Normocephalic and atraumatic  Eyes:      General: Scleral icterus present  Conjunctiva/sclera: Conjunctivae normal       Pupils: Pupils are equal, round, and reactive to light  Neck:      Musculoskeletal: Normal range of motion and neck supple  No neck rigidity  Vascular: No JVD  Meningeal: Brudzinski's sign and Kernig's sign absent  Cardiovascular:      Rate and Rhythm: Normal rate and regular rhythm  Pulses: Normal pulses  Heart sounds: Normal heart sounds  Pulmonary:      Effort: Pulmonary effort is normal  No respiratory distress  Breath sounds: Normal breath sounds  No stridor  Abdominal:      General: There is distension  Palpations: Abdomen is soft  Tenderness: There is no abdominal tenderness  There is no guarding or rebound  Musculoskeletal: Normal range of motion  Comments: Multiple open and actively draining excoriations on lower extremities  No cellulitis or crepitus  Skin:     General: Skin is warm and dry  Capillary Refill: Capillary refill takes less than 2 seconds  Coloration: Skin is not pale  Findings: No erythema or rash  Neurological:      Mental Status: She is alert  She is disoriented  Motor: No abnormal muscle tone  Comments:  Follows simple commands  HAMMOND  Confused  Nonverbal           Vital Signs  ED Triage Vitals [04/03/21 0903]   Temperature Pulse Respirations Blood Pressure SpO2   (!) 97 3 °F (36 3 °C) 97 17 115/55 95 %      Temp Source Heart Rate Source Patient Position - Orthostatic VS BP Location FiO2 (%)   Rectal Monitor Sitting Right arm --      Pain Score       --           Vitals:    04/03/21 1500 04/03/21 1600 04/03/21 1700 04/03/21 1800   BP: 118/75 126/70 132/79 122/70   Pulse: 87 89 89 94   Patient Position - Orthostatic VS: Lying Sitting Sitting Sitting         Visual Acuity  Visual Acuity      Most Recent Value   L Pupil Size (mm)  3   R Pupil Size (mm)  3   L Pupil Shape  Round   R Pupil Shape  Round          ED Medications  Medications   cholecalciferol (VITAMIN D3) tablet 400 Units (400 Units Oral Given 4/3/21 1420)   lactulose oral solution 30 g (30 g Oral Given 4/3/21 1709)   ceftriaxone (ROCEPHIN) 1 g/50 mL in dextrose IVPB (has no administration in time range)   acetaminophen (TYLENOL) tablet 650 mg (has no administration in time range)   nicotine (NICODERM CQ) 14 mg/24hr TD 24 hr patch 1 patch (1 patch Transdermal Medication Applied 4/3/21 1324)   sodium chloride 0 9 % infusion (75 mL/hr Intravenous New Bag 4/3/21 1419)   ceftriaxone (ROCEPHIN) 1 g/50 mL in dextrose IVPB (0 mg Intravenous Stopped 4/3/21 1113)   sodium chloride 0 9 % bolus 2,000 mL (0 mL Intravenous Stopped 4/3/21 1141)   lactulose retention enema 200 g (200 g Rectal Given 4/3/21 1152)   sodium chloride 0 9 % bolus 250 mL (0 mL Intravenous Stopped 4/3/21 1419)   lactulose retention enema 200 g (200 g Rectal Given 4/3/21 1312)   albumin human (FLEXBUMIN) 25 % injection 50 g (0 g Intravenous Stopped 4/3/21 1629)       Diagnostic Studies  Results Reviewed     Procedure Component Value Units Date/Time    MRSA culture [[id number]] Collected: 04/03/21 1442    Lab Status:  In process Specimen: Nares from Nose Updated: 04/03/21 1445    Blood culture #1 [[id number]] Collected: 04/03/21 0926    Lab Status: Preliminary result Specimen: Blood from Arm, Left Updated: 04/03/21 1401     Blood Culture Received in Microbiology Lab  Culture in Progress  Blood culture #2 [[id number]] Collected: 04/03/21 0921    Lab Status: Preliminary result Specimen: Blood from Arm, Left Updated: 04/03/21 1401     Blood Culture Received in Microbiology Lab  Culture in Progress  Lactic acid 2 Hours [[id number]]  (Abnormal) Collected: 04/03/21 1218    Lab Status: Final result Specimen: Blood from Arm, Right Updated: 04/03/21 1306     LACTIC ACID 5 1 mmol/L     Narrative:      Result may be elevated if tourniquet was used during collection  CBC and differential [[id number]]  (Abnormal) Collected: 04/03/21 0921    Lab Status: Final result Specimen: Blood from Arm, Right Updated: 04/03/21 1103     WBC 8 87 Thousand/uL      RBC 2 12 Million/uL      Hemoglobin 8 2 g/dL      Hematocrit 25 8 %       fL      MCH 38 7 pg      MCHC 31 8 g/dL      RDW 19 2 %      MPV 11 5 fL      Platelets 79 Thousands/uL      nRBC 1 /100 WBCs     Narrative: This is an appended report  These results have been appended to a previously verified report      Manual Differential(PHLEBS Do Not Order) [[id number]]  (Abnormal) Collected: 04/03/21 0921    Lab Status: Final result Specimen: Blood from Arm, Right Updated: 04/03/21 1103     Segmented % 74 %      Bands % 1 %      Lymphocytes % 9 %      Monocytes % 13 %      Eosinophils, % 0 %      Basophils % 2 %      Myelocytes % 1 %      Absolute Neutrophils 6 65 Thousand/uL      Lymphocytes Absolute 0 80 Thousand/uL      Monocytes Absolute 1 15 Thousand/uL      Eosinophils Absolute 0 00 Thousand/uL      Basophils Absolute 0 18 Thousand/uL      Total Counted 100     nRBC 1 /100 WBC      Anisocytosis Present     Polychromasia Present     Platelet Estimate Decreased    Lactic acid, plasma [[id number]]     Lab Status: No result Specimen: Blood     Hepatic function panel [[id number]]  (Abnormal) Collected: 04/03/21 0921    Lab Status: Final result Specimen: Blood from Arm, Right Updated: 04/03/21 1019     Total Bilirubin 16 58 mg/dL      Bilirubin, Direct 8 41 mg/dL      Alkaline Phosphatase 112 U/L       U/L      ALT 40 U/L      Total Protein 6 9 g/dL      Albumin 3 0 g/dL     Lactic acid [[id number]]  (Abnormal) Collected: 04/03/21 0921    Lab Status: Final result Specimen: Blood from Arm, Right Updated: 04/03/21 1011     LACTIC ACID 10 6 mmol/L     Narrative:      Result may be elevated if tourniquet was used during collection  Rapid drug screen, urine [[id number]]  (Abnormal) Collected: 04/03/21 0939    Lab Status: Final result Specimen: Urine, Catheter Updated: 04/03/21 1009     Amph/Meth UR Negative     Barbiturate Ur Negative     Benzodiazepine Urine Negative     Cocaine Urine Negative     Methadone Urine Negative     Opiate Urine Negative     PCP Ur Negative     THC Urine Positive     Oxycodone Urine Negative    Narrative:      Presumptive report  If requested, specimen will be sent to reference lab for confirmation  FOR MEDICAL PURPOSES ONLY  IF CONFIRMATION NEEDED PLEASE CONTACT THE LAB WITHIN 5 DAYS      Drug Screen Cutoff Levels:  AMPHETAMINE/METHAMPHETAMINES  1000 ng/mL  BARBITURATES     200 ng/mL  BENZODIAZEPINES     200 ng/mL  COCAINE      300 ng/mL  METHADONE      300 ng/mL  OPIATES      300 ng/mL  PHENCYCLIDINE     25 ng/mL  THC       50 ng/mL  OXYCODONE      100 ng/mL    Ammonia [[id number]]  (Abnormal) Collected: 04/03/21 0921    Lab Status: Final result Specimen: Blood from Arm, Right Updated: 04/03/21 1009     Ammonia 72 umol/L     Ethanol [[id number]]  (Normal) Collected: 04/03/21 0921    Lab Status: Final result Specimen: Blood from Arm, Right Updated: 04/03/21 1009     Ethanol Lvl <3 mg/dL     Urine Microscopic [[id number]]  (Abnormal) Collected: 04/03/21 0939    Lab Status: Final result Specimen: Urine, Indwelling [place] Catheter Updated: 04/03/21 1006 RBC, UA 1-2 /hpf      WBC, UA 30-50 /hpf      Epithelial Cells Occasional /hpf      Bacteria, UA Innumerable /hpf     Urine culture [[id number]] Collected: 04/03/21 0939    Lab Status: In process Specimen: Urine, Indwelling [place] Catheter Updated: 54/25/76 9950    Salicylate level [[id number]]  (Abnormal) Collected: 04/03/21 0921    Lab Status: Final result Specimen: Blood from Arm, Right Updated: 76/24/79 0439     Salicylate Lvl <3 mg/dL     Acetaminophen level-If concentration is detectable, please discuss with medical  on call   [[id number]]  (Abnormal) Collected: 04/03/21 0921    Lab Status: Final result Specimen: Blood from Arm, Right Updated: 04/03/21 1004     Acetaminophen Level <2 0 ug/mL     Basic metabolic panel [[id number]]  (Abnormal) Collected: 04/03/21 0921    Lab Status: Final result Specimen: Blood from Arm, Right Updated: 04/03/21 1004     Sodium 132 mmol/L      Potassium 5 0 mmol/L      Chloride 90 mmol/L      CO2 22 mmol/L      ANION GAP 20 mmol/L      BUN 33 mg/dL      Creatinine 1 98 mg/dL      Glucose 102 mg/dL      Calcium 8 7 mg/dL      eGFR 27 ml/min/1 73sq m     Narrative:      [place] guidelines for Chronic Kidney Disease (CKD):     Stage 1 with normal or high GFR (GFR > 90 mL/min/1 73 square meters)    Stage 2 Mild CKD (GFR = 60-89 mL/min/1 73 square meters)    Stage 3A Moderate CKD (GFR = 45-59 mL/min/1 73 square meters)    Stage 3B Moderate CKD (GFR = 30-44 mL/min/1 73 square meters)    Stage 4 Severe CKD (GFR = 15-29 mL/min/1 73 square meters)    Stage 5 End Stage CKD (GFR <15 mL/min/1 73 square meters)  Note: GFR calculation is accurate only with a steady state creatinine    Troponin I [[id number]]  (Normal) Collected: 04/03/21 0921    Lab Status: Final result Specimen: Blood from Arm, Right Updated: 04/03/21 0957     Troponin I <0 02 ng/mL     UA w Reflex to Microscopic w Reflex to Culture [[id number]]  (Abnormal) Collected: 04/03/21 7919 Lab Status: Final result Specimen: Urine, Indwelling [place] Catheter Updated: 04/03/21 0955     Color, UA [place]     Clarity, UA Slightly Cloudy     Specific [place], UA 1 020     pH, UA 6 0     Leukocytes, UA Moderate     Nitrite, UA Positive     Protein, UA 30 (1+) mg/dl      Glucose,  (1/10%) mg/dl      Ketones, UA Trace mg/dl      Urobilinogen, UA >=8 0 E U /dl      Bilirubin, UA Large     Blood, UA Trace-Intact    Protime-INR [[id number]]  (Abnormal) Collected: 04/03/21 0921    Lab Status: Final result Specimen: Blood from Arm, Right Updated: 04/03/21 0950     Protime 22 5 seconds      INR 1 95    APTT [[id number]]  (Normal) Collected: 04/03/21 0921    Lab Status: Final result Specimen: Blood from Arm, Right Updated: 04/03/21 0950     PTT 32 seconds                  CT head without contrast   Final Result by Xiomara Moreno MD (04/03 1006)      No acute intracranial abnormality  Workstation performed: [id number]                    Procedures  CriticalCare Time  Performed by: Hannah Haider MD  Authorized by: Hannah Haider MD     Critical care provider statement:     Critical care time (minutes):  35    Critical care time was exclusive of:  Separately billable procedures and treating other patients    Critical care was necessary to treat or prevent imminent or life-threatening deterioration of the following conditions:  CNS failure or compromise  Comments:      Treatment of encephalopathy  ED Course  ED Course as of Apr 03 1822   Sat Apr 03, 2021   1057 Seen by Dr Navya Duarte who recommends ctap, admit to floor  Will follow for sepsis  Initial Sepsis Screening     Row Name 04/03/21 1023                Is the patient's history suggestive of a new or worsening infection?   (!) Yes (Proceed)  -TC        Suspected source of infection  urinary tract infection  -TC        Are two or more of the following signs & symptoms of infection both present and new to the patient? (!) Yes (Proceed)  -TC        Indicate SIRS criteria  Altered mental status; Tachypnea > 20 resp per min  -TC        If the answer is yes to both questions, suspicion of sepsis is present  --        If severe sepsis is present AND tissue hypoperfusion perists in the hour after fluid resuscitation or lactate > 4, the patient meets criteria for SEPTIC SHOCK  --        Are any of the following organ dysfunction criteria present within 6 hours of suspected infection and SIRS criteria that are NOT considered to be chronic conditions? --        Organ dysfunction  --        Date of presentation of severe sepsis  --        Time of presentation of severe sepsis  --        Tissue hypoperfusion persists in the hour after crystalloid fluid administration, evidenced, by either:  * OR * Lactate level is greater to or equal 4 mmol/dL ( ___ mmol/dL in comment field)  -TC        Was hypotension present within one hour of the conclusion of crystalloid fluid administration?  --        Date of presentation of septic shock  04/03/21  -TC        Time of presentation of septic shock  1023  -TC          User Key  (r) = Recorded By, (t) = Taken By, (c) = Cosigned By    [facility] [id number]  Name Provider Type    Tomas Charles MD Physician                        MDM    Disposition  Final diagnoses:    Altered mental status   Hepatic encephalopathy (HCC)   UTI (urinary tract infection)   Hepatocellular carcinoma ([facility] [address] )   Hepatitis C   Alcoholic cirrhosis ([facility] [address] )     Time reflects when diagnosis was documented in both MDM as applicable and the Disposition within this note     Time User Action Codes Description Comment    4/3/2021 11:18 AM Radha Hankins Add [R41 82] Altered mental status     4/3/2021 11:18 AM Radha Hankins Add [K72 90] Hepatic encephalopathy ([facility] [address] )     4/3/2021 11:18 AM Skelton, Libertad Gums Add [N39 0] UTI (urinary tract infection)     4/3/2021 11:18 AM Skelton, Libertad Gums Add [C22 0] Hepatocellular carcinoma ([facility] [address] )     4/3/2021 11:18 AM Skelton, Eligio López Add [B19 20] Hepatitis C     4/3/2021 11:18 AM Costa Eligio Rene Add [L36 37] Alcoholic cirrhosis [facility])       ED Disposition     ED Disposition Condition Date/Time Comment    Admit Stable Sat Apr 3, 2021 11:18 AM Case was discussed with Dr Laurie Foster and the patient's admission status was agreed to be Admission Status: inpatient status to the service of Dr Laurie Foster   Follow-up Information    None         Patient's Medications   Discharge Prescriptions    No medications on file     No discharge procedures on file      PDMP Review     None          ED Provider  Electronically Signed by           Ag Engel MD  04/03/21 5575

## 2021-04-03 NOTE — ASSESSMENT & PLAN NOTE
Erythema and warmth in right lower extremity, she is currently on ceftriaxone for UTI which could potentially cover for this as well  Check MRSA culture given crust lesions in arms

## 2021-04-03 NOTE — ASSESSMENT & PLAN NOTE
Creatinine around 3 weeks ago was 1 2  Currently 1 9    Likely in the setting of sepsis, poor intake, liver disease    Start NS 75cc/h  Albumin 50g x 1    Monitor BMP  Avoid nephrotoxic agents

## 2021-04-03 NOTE — ED NOTES
4 Eyes Skin Assessment     The patient is being assess for   Admission    I agree that 2 RN's have performed a thorough Head to Toe Skin Assessment on the patient. ALL assessment sites listed below have been assessed. Areas assessed by both nurses:   [x]   Head, Face, and Ears   [x]   Shoulders, Back, and Chest, Abdomen  [x]   Arms, Elbows, and Hands   [x]   Coccyx, Sacrum, and Ischium  [x]   Legs, Feet, and Heels     Mohinder Tate Rn completed 4 eye assessment. It is noted that patient has healing surgical scars under chin, on her right chest, and under right breast (per patient from sleep apnea procedure). Patient has a large bruise on her right shoulder, bruise on her nose. Patient has a small stoma on her abdomen, stoma is pink and moist.  Patient's heels are pink and blanchable. **SHARE this note so that the co-signing nurse is able to place an eSignature**    Co-signer eSignature: Electronically signed by Tomer Dixon RN on 7/12/18 at 7:42 PM    Does the Patient have Skin Breakdown?   No          Van Prevention initiated:  NA   Wound Care Orders initiated:  NA      [facility] nurse consulted for Pressure Injury (Stage 3,4, Unstageable, DTI, NWPT, Complex wounds)and New or Established Ostomies:  Yes      Primary Nurse eSignature: Electronically signed by Mary Brito RN on 7/12/18 at 7:10 PM Critical Care at bedside        Natalia Shaver RN  04/03/21 1561

## 2021-04-03 NOTE — H&P
[address] [place] [address]  H&P- Hayde Kothari 1960, 64 y o  female MRN: [id number]  Unit/Bed#: ED 29 Encounter: [id number]  Primary Care Provider: Micky Leon DO   Date and time admitted to hospital: 4/3/2021  9:00 AM    * Acute encephalopathy  Assessment & Plan  Presented with worsening confusion, fogginess, mentions some tremors in hands as well  In the setting of advanced liver disease and HCC, also possible UTI and cellulitis  Likely acute metabolic encephalopathy due to infection associated with acute hepatic encephalopathy    Plan:  Increase dose of lactulose goal at least 3BM/day  Provide IV fluid hydration  Treat cellulitis and UTI with ceftriaxone  Depending on how she responds will consider paracentesis however she is currently not very distended she may not have significant ascites  Monitor CMP, bilirubin    Prognosis guarded, patient states she is a full code  Cellulitis  Assessment & Plan  Erythema and warmth in right lower extremity, she is currently on ceftriaxone for UTI which could potentially cover for this as well  Check MRSA culture given crust lesions in arms      UTI (urinary tract infection)  Assessment & Plan  Urinalysis suggestive of UTI, she mentions mild dysuria, Hamlin catheter placed in the ED    Will treat with ceftriaxone  Obtain urine culture    Pancytopenia ([facility] [address] )  Assessment & Plan  In the setting of cirrhosis and HCC  Monitor Hb, WBC and platelet numbers  Lactic acidosis  Assessment & Plan  Lactic acid noted to be elevated at 10  Despite significantly elevated lactic acid she does not have any other signs of hypoperfusion, this is likely secondary to the delayed hepatic clearance and sepsis  Hepatocellular carcinoma may also be playing a role  Monitor hemodynamics closely  DARRYL (acute kidney injury) ([facility] [address] )  Assessment & Plan  Creatinine around 3 weeks ago was 1 2  Currently 1 9    Likely in the setting of sepsis, poor intake, liver disease    Start NS 75cc/h  Albumin 50g x 1    Monitor BMP  Avoid nephrotoxic agents    Serum total bilirubin elevated  Assessment & Plan  In the setting of HC carcinoma cirrhosis  No signs of cholangitis otherwise  Cirrhosis ([address] )  Assessment & Plan  In the setting of previous alcohol abuse  Currently she denies any current alcohol abuse    Significantly worsening bilirubin, monitor closely,may have to repeat US, may have to involve GI    Hepatocellular carcinoma [facility])  Assessment & Plan  She does have a history of [address]  and previously underwent ablation  Despite treatment her liver function seems to be pre poor  If no significant improvement in encephalopathy may have to discuss palliative care  VTE Prophylaxis: Pharmacologic VTE Prophylaxis contraindicated due to Thrombocytopenia  / sequential compression device   Code Status:  Full code  POLST: POLST form is not discussed and not completed at this time  Discussion with family:  Patient    Anticipated Length of Stay:  Patient will be admitted on an Inpatient basis with an anticipated length of stay at least 2 midnights  Justification for Hospital Stay:  Acute encephalopathy    Total Time for Visit, including Counseling / Coordination of Care: 30 minutes  Greater than 50% of this total time spent on direct patient counseling and coordination of care  Chief Complaint:   Confusion and foggy mind    History of Present Illness:    Saroj Velarde is a 64 y o  female who presents with worsening confusion  The patient does have a history of hepatocellular carcinoma, alcoholic liver cirrhosis, hepatitis-C which is untreated, she seems to leave by herself and she comes to the ED by EMS due to reported worsening confusion  Patient is currently oriented however her speech is significantly slow and she does admit to foggy mind recently  She does have a history of cirrhosis any seems that she has experienced hepatic encephalopathy again    She denies any chest or abdominal pain  She does mention some dysuria  There is also significant skin wounds noted in upper lower extremities, the right lower extremity in particular is warm to touch and has significant erythema  Patient supposed to be on lactulose, Lasix and Aldactone as an outpatient  The degree to which is compliant with this medicines is unclear  On admission her vital signs were normal, her sodium was noted to be on 132, creatinine 1 9, AST noted to be elevated 131, total bilirubin 16, albumin 3, muscle lactic acid 10, hemoglobin 8 2  CT head did not reveal any acute intracranial abnormality  The urinalysis did reveal he does not being positive nitrates, leukocytes, innumerable bacteria  Review of Systems:    Review of Systems   Constitutional: Positive for fatigue  Neurological:        Confusion   All other systems reviewed and are negative  More than 10 systems reviewed and negative except for the above    Past Medical and Surgical History:     Past Medical History:   Diagnosis Date    Arthritis     Cirrhosis ([facility] [address] )     Extremity pain     BLE    Hepatitis C     Joint pain     Bilateral Shoulders    Liver disease     Low back pain        Past Surgical History:   Procedure Laterality Date    CT GUIDED AND MONITORING PARENCHYMAL TISSUE ABLATION  7/28/2020    ECTOPIC PREGNANCY SURGERY      MOUTH SURGERY      TONSILLECTOMY         Meds/Allergies:    Prior to Admission medications    Medication Sig Start Date End Date Taking?  Authorizing Provider   baclofen 10 mg tablet Take 10 mg by mouth 5/7/19   Historical Provider, MD   cholecalciferol (VITAMIN D3) 1,000 units tablet     Historical Provider, MD   furosemide (LASIX) 40 mg tablet Take 40 mg by mouth 5/20/19   Historical Provider, MD   gabapentin (NEURONTIN) 300 mg capsule Take 1 capsule (300 mg total) by mouth 2 (two) times a day 8/4/20   Bambi Kent,    lactulose 20 g/30 mL Take 45 mL (30 g total) by mouth 3 (three) times a day To achieve 3 bowel movements a day 3/16/21   SILVANO Cisneros   spironolactone (ALDACTONE) 100 mg tablet TAKE 1 & 1/2 TABLETS BY MOUTH DAILY 5/20/19   Historical Provider, MD     I have reviewed home medications with patient personally  Allergies: No Known Allergies    Social History:     Marital Status:    Substance Use History:   Social History     Substance and Sexual Activity   Alcohol Use Not Currently    Frequency: Monthly or less    Drinks per session: 1 or 2     Social History     Tobacco Use   Smoking Status Current Every Day Smoker    Packs/day: 0 50   Smokeless Tobacco Never Used     Social History     Substance and Sexual Activity   Drug Use Yes    Frequency: 7 0 times per week    Types: Marijuana    Comment: everyday       Family History:    Family History   Problem Relation Age of Onset    Alcohol abuse Mother     Heart disease Father        Physical Exam:     Vitals:   Blood Pressure: 114/56 (04/03/21 1230)  Pulse: 89 (04/03/21 1230)  Temperature: (!) 97 3 °F (36 3 °C) (04/03/21 0903)  Temp Source: Rectal (04/03/21 0903)  Respirations: 20 (04/03/21 1230)  Height: 5' 8" (172 7 cm) (04/03/21 0903)  SpO2: 97 % (04/03/21 1230)    Physical Exam  Vitals signs and nursing note reviewed  Constitutional:       Appearance: Normal appearance  She is normal weight  Comments: Elderly female in bed, awake, drowsy   HENT:      Head: Normocephalic and atraumatic  Right Ear: External ear normal       Left Ear: External ear normal       Nose: Nose normal  No congestion  Mouth/Throat:      Mouth: Mucous membranes are dry  Pharynx: Oropharynx is clear  No oropharyngeal exudate or posterior oropharyngeal erythema  Eyes:      General: No scleral icterus  Right eye: No discharge  Left eye: No discharge  Extraocular Movements: Extraocular movements intact  Conjunctiva/sclera: Conjunctivae normal       Pupils: Pupils are equal, round, and reactive to light  Neck:      Musculoskeletal: Normal range of motion and neck supple  No neck rigidity or muscular tenderness  Cardiovascular:      Rate and Rhythm: Normal rate and regular rhythm  Pulses: Normal pulses  Heart sounds: Normal heart sounds  No murmur  No friction rub  No gallop  Pulmonary:      Effort: Pulmonary effort is normal  No respiratory distress  Breath sounds: Normal breath sounds  No stridor  No wheezing, rhonchi or rales  Chest:      Chest wall: No tenderness  Abdominal:      General: Abdomen is flat  Bowel sounds are normal  There is no distension  Palpations: Abdomen is soft  There is no mass  Tenderness: There is no abdominal tenderness  There is no guarding or rebound  Musculoskeletal: Normal range of motion  General: No swelling, tenderness, deformity or signs of injury  Skin:     General: Skin is warm and dry  Capillary Refill: Capillary refill takes less than 2 seconds  Coloration: Skin is jaundiced  Skin is not pale  Findings: No bruising, erythema, lesion or rash  Comments: There are crust lesions in upper and lower extremities, there is significant erythema and warmth in anterior aspect of the right lower extremity   Neurological:      General: No focal deficit present  Mental Status: She is alert and oriented to person, place, and time  Mental status is at baseline  Cranial Nerves: No cranial nerve deficit  Sensory: No sensory deficit  Motor: No weakness  Coordination: Coordination normal    Psychiatric:         Mood and Affect: Mood normal          Behavior: Behavior normal          Thought Content: Thought content normal          Judgment: Judgment normal            Additional Data:     Lab Results: I have personally reviewed pertinent reports        Results from last 7 days   Lab Units 04/03/21  0921   WBC Thousand/uL 8 87   HEMOGLOBIN g/dL 8 2*   HEMATOCRIT % 25 8*   PLATELETS Thousands/uL 79*   BANDS PCT % 1   LYMPHO PCT % 9*   MONO PCT % 13*   EOS PCT % 0     Results from last 7 days   Lab Units 04/03/21 0921   SODIUM mmol/L 132*   POTASSIUM mmol/L 5 0   CHLORIDE mmol/L 90*   CO2 mmol/L 22   BUN mg/dL 33*   CREATININE mg/dL 1 98*   ANION GAP mmol/L 20*   CALCIUM mg/dL 8 7   ALBUMIN g/dL 3 0*   TOTAL BILIRUBIN mg/dL 16 58*   ALK PHOS U/L 112   ALT U/L 40   AST U/L 131*   GLUCOSE RANDOM mg/dL 102     Results from last 7 days   Lab Units 04/03/21  0921   INR  1 95*             Results from last 7 days   Lab Units 04/03/21 0921   LACTIC ACID mmol/L 10 6*       Imaging: I have personally reviewed pertinent reports  CT head without contrast   Final Result by Noel Driscoll MD (04/03 1006)      No acute intracranial abnormality  Workstation performed: [id number]               Allscripts / Epic Records Reviewed: Yes     ** Please Note: This note has been constructed using a voice recognition system   **

## 2021-04-03 NOTE — ASSESSMENT & PLAN NOTE
Lactic acid noted to be elevated at 10  Despite significantly elevated lactic acid she does not have any other signs of hypoperfusion, this is likely secondary to the delayed hepatic clearance and sepsis  Hepatocellular carcinoma may also be playing a role  Monitor hemodynamics closely

## 2021-04-03 NOTE — UTILIZATION REVIEW
Notification of Inpatient Admission/Inpatient Authorization Request   This is a Notification of Inpatient Admission for [address]  Be advised that this patient was admitted to our facility under Inpatient Status  Contact Francisco Lloyd at [phone] for additional admission information  [facility] DEPT DEDICATED Srinivas Antunez [phone]  Patient Name:   Laurie Mcardle   YOB: 1960       [address]   [address]:   [address]   Tax ID: [id number]  NPI: [id number] Attending Provider/NPI:  Phone:  Address: [place], [place] [[id number]]  [phone]  Same as SERA/Brenda Rutledge [address]6 of Service Code: 24     Place of Service Name:  [facility]   Start Date: 4/3/21 1118 Discharge Date & Time: No discharge date for patient encounter  Type of Admission: Inpatient Status Discharge Disposition   (if discharged): Home/Self Care   Patient Diagnoses: AMS (altered mental status) [R41 82]     Orders: Admission Orders (From admission, onward)     Ordered        04/03/21 1118  Inpatient Admission  Once                    Assigned Utilization Review Contact: Francisco Lloyd  Utilization   Network Utilization Review Department  Phone: [phone]; Fax [phone]  Email: [email or web address]   ATTENTION PAYERS: Please call the assigned Utilization  directly with any questions or concerns ALL voicemails in the department are confidential  Send all requests for admission clinical reviews, approved or denied determinations and any other requests to dedicated fax number belonging to the campus where the patient is receiving treatment

## 2021-04-03 NOTE — ASSESSMENT & PLAN NOTE
Urinalysis suggestive of UTI, she mentions mild dysuria, Hamlin catheter placed in the ED    Will treat with ceftriaxone  Obtain urine culture

## 2021-04-03 NOTE — ASSESSMENT & PLAN NOTE
Presented with worsening confusion, fogginess, mentions some tremors in hands as well  In the setting of advanced liver disease and HCC, also possible UTI and cellulitis  Likely acute metabolic encephalopathy due to infection associated with acute hepatic encephalopathy    Plan:  Increase dose of lactulose goal at least 3BM/day  Provide IV fluid hydration  Treat cellulitis and UTI with ceftriaxone  Depending on how she responds will consider paracentesis however she is currently not very distended she may not have significant ascites  Monitor CMP, bilirubin    Prognosis guarded, patient states she is a full code

## 2021-04-03 NOTE — ASSESSMENT & PLAN NOTE
In the setting of previous alcohol abuse    Currently she denies any current alcohol abuse    Significantly worsening bilirubin, monitor closely,may have to repeat US, may have to involve GI

## 2021-04-04 NOTE — PROGRESS NOTES
[address] [facility]  Progress Note - Gumaro Addison 1960, 63 y o  female MRN: [id number]  Unit/Bed#: -Jerome Encounter: [id number]  Primary Care Provider: Eleni Restrepo DO   Date and time admitted to hospital: 4/3/2021  9:00 AM    * Acute encephalopathy  Assessment & Plan  Presented with worsening confusion, fogginess, mentions some tremors in hands as well  In the setting of advanced liver disease and HCC, also possible UTI and cellulitis  Likely acute metabolic encephalopathy due to infection associated with acute hepatic encephalopathy    Patient seems slightly more awake today although significantly weak    Plan:  Continue lactulose and titrate for 3 bowel movements per day  Provide IV fluid hydration  Treat cellulitis and UTI with ceftriaxone and vancomycin  Monitor clinically, mental status      Cellulitis  Assessment & Plan  Erythema and warmth in right lower extremity  Check MRSA culture given crust lesions in arms  She also has significant open ulcers which seem to be infected    Continue ceftriaxone, add vancomycin  Check wound culture    Will consider involving ID depending on results above  Pulses present distally  UTI (urinary tract infection)  Assessment & Plan  Urinalysis suggestive of UTI, she mentions dysuria, Hamlin catheter placed in the ED    Continue ceftriaxone  Wait for urine culture    Pancytopenia ([address] )  Assessment & Plan  In the setting of cirrhosis and HCC  Monitor Hb, WBC and platelet numbers  Lactic acidosis  Assessment & Plan  Lactic acid noted to be elevated at 10  Despite significantly elevated lactic acid she does not have any other signs of hypoperfusion, this is likely secondary to the delayed hepatic clearance and sepsis  Hepatocellular carcinoma may also be playing a role  Lactic acid trending down but this will never get completely negative  Monitor hemodynamics closely      DARRYL (acute kidney injury) [facility])  Assessment & Plan  Creatinine around 3 weeks ago was 1 2  Currently 1 9 on admission  Likely in the setting of sepsis, poor intake, liver disease  Received Albumin 50g x 1  Continue IV fluids for today      Monitor BMP  Avoid nephrotoxic agents    Serum total bilirubin elevated  Assessment & Plan  In the setting of HC carcinoma cirrhosis  No signs of cholangitis otherwise  Cirrhosis ([facility] [address] )  Assessment & Plan  In the setting of previous alcohol abuse  Currently she denies any current alcohol abuse    Monitor CMP, if worsening may need further workup    Hepatocellular carcinoma [facility])  Assessment & Plan  She does have a history of [address]  and previously underwent ablation  Despite treatment her liver function seems to be pre poor  If no significant improvement in encephalopathy may have to discuss palliative care  Hypokalemia - replace potassium, recheck, check magnesium    VTE Pharmacologic Prophylaxis:   Pharmacologic: Pharmacologic VTE Prophylaxis contraindicated due to Pancytopenia  Mechanical VTE Prophylaxis in Place: Yes    Patient Centered Rounds: I have performed bedside rounds with nursing staff today  Education and Discussions with Family / Patient:  Patient    Time Spent for Care: 30 minutes  More than 50% of total time spent on counseling and coordination of care as described above  Current Length of Stay: 1 day(s)    Current Patient Status: Inpatient   Certification Statement: The patient will continue to require additional inpatient hospital stay due to Need for IV antibiotics    Discharge Plan:  Once stable    Code Status: Level 1 - Full Code      Subjective:     Patient evaluated this morning  She seems more awake however significantly fatigued, and weak  Potassium noted to be low 2 4 this morning  No other events reported      Objective:     Vitals:   Temp (24hrs), Av 6 °F (36 4 °C), Min:97 5 °F (36 4 °C), Max:97 7 °F (36 5 °C)    Temp:  [97 5 °F (36 4 °C)-97 7 °F (36 5 °C)] 97 6 °F (36 4 °C)  HR:  [85-96] 85  Resp:  [17-20] 18  BP: ()/(46-79) 114/66  SpO2:  [93 %-97 %] 94 %  Body mass index is 21 42 kg/m²  Input and Output Summary (last 24 hours): Intake/Output Summary (Last 24 hours) at 4/4/2021 1335  Last data filed at 4/4/2021 0402  Gross per 24 hour   Intake 980 ml   Output 1075 ml   Net -95 ml       Physical Exam:     Physical Exam  Vitals signs and nursing note reviewed  Constitutional:       Appearance: Normal appearance  She is normal weight  Comments: Elderly female in bed, awake   HENT:      Head: Normocephalic and atraumatic  Right Ear: External ear normal       Left Ear: External ear normal       Nose: Nose normal  No congestion  Mouth/Throat:      Mouth: Mucous membranes are dry  Pharynx: Oropharynx is clear  No oropharyngeal exudate or posterior oropharyngeal erythema  Eyes:      General: No scleral icterus  Right eye: No discharge  Left eye: No discharge  Extraocular Movements: Extraocular movements intact  Conjunctiva/sclera: Conjunctivae normal       Pupils: Pupils are equal, round, and reactive to light  Neck:      Musculoskeletal: Normal range of motion and neck supple  No neck rigidity or muscular tenderness  Cardiovascular:      Rate and Rhythm: Normal rate and regular rhythm  Pulses: Normal pulses  Heart sounds: Normal heart sounds  No murmur  No friction rub  No gallop  Pulmonary:      Effort: Pulmonary effort is normal  No respiratory distress  Breath sounds: Normal breath sounds  No stridor  No wheezing, rhonchi or rales  Chest:      Chest wall: No tenderness  Abdominal:      General: Abdomen is flat  Bowel sounds are normal  There is no distension  Palpations: Abdomen is soft  There is no mass  Tenderness: There is no abdominal tenderness  There is no guarding or rebound  Musculoskeletal: Normal range of motion  General: No swelling, tenderness, deformity or signs of injury  Comments: She does have generalized weakness   Skin:     General: Skin is warm and dry  Capillary Refill: Capillary refill takes less than 2 seconds  Coloration: Skin is jaundiced  Skin is not pale  Findings: No bruising, erythema, lesion or rash  Comments: There are crust lesions in upper and lower extremities, there is significant erythema and warmth in anterior aspect of the right lower extremity    There also some ulcers with some drainage bilaterally   Neurological:      General: No focal deficit present  Mental Status: She is alert and oriented to person, place, and time  Mental status is at baseline  Cranial Nerves: No cranial nerve deficit  Sensory: No sensory deficit  Motor: No weakness  Coordination: Coordination normal    Psychiatric:         Mood and Affect: Mood normal          Behavior: Behavior normal          Thought Content: Thought content normal          Judgment: Judgment normal            Additional Data:     Labs:    Results from last 7 days   Lab Units 04/03/21  0921   WBC Thousand/uL 8 87   HEMOGLOBIN g/dL 8 2*   HEMATOCRIT % 25 8*   PLATELETS Thousands/uL 79*   BANDS PCT % 1   LYMPHO PCT % 9*   MONO PCT % 13*   EOS PCT % 0     Results from last 7 days   Lab Units 04/04/21  0414   SODIUM mmol/L 141   POTASSIUM mmol/L 2 4*   CHLORIDE mmol/L 104   CO2 mmol/L 27   BUN mg/dL 29*   CREATININE mg/dL 1 16   ANION GAP mmol/L 10   CALCIUM mg/dL 8 4   ALBUMIN g/dL 2 7*   TOTAL BILIRUBIN mg/dL 11 29*   ALK PHOS U/L 83   ALT U/L 27   AST U/L 53*   GLUCOSE RANDOM mg/dL 88     Results from last 7 days   Lab Units 04/03/21  0921   INR  1 95*     Results from last 7 days   Lab Units 04/03/21  1947   POC GLUCOSE mg/dl 113         Results from last 7 days   Lab Units 04/04/21  1003 04/04/21  0414 04/04/21  0006 04/03/21  2103   LACTIC ACID mmol/L 2 9* 2 6* 2 5* 3 3*           * I Have Reviewed All Lab Data Listed Above    * Additional Pertinent Lab Tests Reviewed: [address] Admission Reviewed      Recent Cultures (last 7 days):     Results from last 7 days   Lab Units 04/03/21  0926 04/03/21  0921   BLOOD CULTURE  No Growth at 24 hrs  No Growth at 24 hrs  Last 24 Hours Medication List:   Current Facility-Administered Medications   Medication Dose Route Frequency Provider Last Rate    acetaminophen  650 mg Oral Q6H PRN Jose Ramon Mendez MD      cefTRIAXone  1,000 mg Intravenous Q24H Jose Ramon Mendez MD 1,000 mg (04/04/21 1217)    cholecalciferol  400 Units Oral Daily Jose Ramon Mendez MD      lactulose  30 g Oral 4x Daily Jose Ramon Mendez MD      nicotine  1 patch Transdermal Daily Jose Ramon Mendez MD      potassium chloride  20 mEq Intravenous Once Jose Ramon Mendez MD 20 mEq (04/04/21 1217)    sodium chloride  75 mL/hr Intravenous Continuous Jose Ramon Mendez MD 75 mL/hr (04/04/21 0403)    vancomycin  10 mg/kg Intravenous Q12H Jose Ramon Mendez MD          Today, Patient Was Seen By: Jose Ramon Mendez MD    ** Please Note: Dictation voice to text software may have been used in the creation of this document   **

## 2021-04-04 NOTE — ASSESSMENT & PLAN NOTE
Erythema and warmth in right lower extremity  Check MRSA culture given crust lesions in arms  She also has significant open ulcers which seem to be infected    Continue ceftriaxone, add vancomycin  Check wound culture    Will consider involving ID depending on results above  Pulses present distally

## 2021-04-04 NOTE — PHYSICAL THERAPY NOTE
Physical Therapy Cancellation Note    PT order received  Chart review indicates low potassium level - 2 4  At this time, PT evaluation cancelled  PT will follow and evaluate as appropriate       04/04/21 0842   PT Last Visit   PT Visit Date 04/04/21   Note Type   Note type Evaluation   Cancel Reasons Medical status       Beuford Drain, PT, DPT

## 2021-04-04 NOTE — ASSESSMENT & PLAN NOTE
She does have a history of [address]  and previously underwent ablation  Despite treatment her liver function seems to be pre poor  If no significant improvement in encephalopathy may have to discuss palliative care

## 2021-04-04 NOTE — ASSESSMENT & PLAN NOTE
Lactic acid noted to be elevated at 10  Despite significantly elevated lactic acid she does not have any other signs of hypoperfusion, this is likely secondary to the delayed hepatic clearance and sepsis  Hepatocellular carcinoma may also be playing a role  Lactic acid trending down but this will never get completely negative  Monitor hemodynamics closely

## 2021-04-04 NOTE — OCCUPATIONAL THERAPY NOTE
Occupational Therapy Evaluation        Patient Name: Saroj Velarde  NKNFU'L Date: 4/4/2021 04/04/21 0846   OT Last Visit   OT Visit Date 04/04/21   Note Type   Note type Evaluation   Cancel Reasons Medical status       OT orders received, chart review indicates low potassium level - 2 4  OT evaluation cancelled at this time, will continue to follow patient and evaluate as indicated

## 2021-04-04 NOTE — ASSESSMENT & PLAN NOTE
Urinalysis suggestive of UTI, she mentions dysuria, Hamlin catheter placed in the ED    Continue ceftriaxone  Wait for urine culture

## 2021-04-04 NOTE — PLAN OF CARE
Problem: Prexisting or High Potential for Compromised Skin Integrity  Goal: Skin integrity is maintained or improved  Description: INTERVENTIONS:  - Identify patients at risk for skin breakdown  - Assess and monitor skin integrity  - Assess and monitor nutrition and hydration status  - Monitor labs   - Assess for incontinence   - Turn and reposition patient  - Assist with mobility/ambulation  - Relieve pressure over bony prominences  - Avoid friction and shearing  - Provide appropriate hygiene as needed including keeping skin clean and dry  - Evaluate need for skin moisturizer/barrier cream  - Collaborate with interdisciplinary team   - Patient/family teaching  - Consider wound care consult   Outcome: Progressing     Problem: Potential for Falls  Goal: Patient will remain free of falls  Description: INTERVENTIONS:  - Assess patient frequently for physical needs  -  Identify cognitive and physical deficits and behaviors that affect risk of falls    -  Newberg fall precautions as indicated by assessment   - Educate patient/family on patient safety including physical limitations  - Instruct patient to call for assistance with activity based on assessment  - Modify environment to reduce risk of injury  - Consider OT/PT consult to assist with strengthening/mobility  Outcome: Progressing

## 2021-04-04 NOTE — ASSESSMENT & PLAN NOTE
Creatinine around 3 weeks ago was 1 2    Currently 1 9 on admission  Likely in the setting of sepsis, poor intake, liver disease  Received Albumin 50g x 1  Continue IV fluids for today      Monitor BMP  Avoid nephrotoxic agents

## 2021-04-04 NOTE — ASSESSMENT & PLAN NOTE
Presented with worsening confusion, fogginess, mentions some tremors in hands as well  In the setting of advanced liver disease and HCC, also possible UTI and cellulitis    Likely acute metabolic encephalopathy due to infection associated with acute hepatic encephalopathy    Patient seems slightly more awake today although significantly weak    Plan:  Continue lactulose and titrate for 3 bowel movements per day  Provide IV fluid hydration  Treat cellulitis and UTI with ceftriaxone and vancomycin  Monitor clinically, mental status

## 2021-04-04 NOTE — PROGRESS NOTES
Vancomycin Assessment    Drew Hassan is a 63 y o  female who is currently receiving vancomycin 1000 mg IV q12h for       Relevant clinical data and objective history reviewed:  Creatinine   Date Value Ref Range Status   04/04/2021 1 16 0 60 - 1 30 mg/dL Final     Comment:     Specimen Icteric; Results May be Affected   04/03/2021 1 98 (H) 0 60 - 1 30 mg/dL Final     Comment:     Specimen Icteric; Results May be Affected   03/16/2021 1 21 0 60 - 1 30 mg/dL Final     Comment:     Specimen Icteric; Results May be Affected     /66   Pulse 85   Temp 97 6 °F (36 4 °C)   Resp 18   Ht 5' 8" (1 727 m)   Wt 63 9 kg (140 lb 14 oz)   SpO2 94%   BMI 21 42 kg/m²   I/O last 3 completed shifts: In: 980 [I V :980]  Out: 1075 [Urine:1075]  Lab Results   Component Value Date/Time    BUN 29 (H) 04/04/2021 04:14 AM    WBC 8 87 04/03/2021 09:21 AM    HGB 8 2 (L) 04/03/2021 09:21 AM    HCT 25 8 (L) 04/03/2021 09:21 AM     (H) 04/03/2021 09:21 AM    PLT 79 (L) 04/03/2021 09:21 AM     Temp Readings from Last 3 Encounters:   04/04/21 97 6 °F (36 4 °C)   03/16/21 97 7 °F (36 5 °C)   03/04/21 98 6 °F (37 °C) (Temporal)     Vancomycin Days of Therapy: 1    Assessment/Plan  The patient is currently on vancomycin utilizing scheduled dosing based on actual body weight  Baseline risks associated with therapy include: pre-existing renal impairment and concomitant nephrotoxic medications  The patient is currently receiving vancomycin 1000 mg IV q12h and after clinical evaluation will be changed to vancomycin 750 mg IV q12h  Pharmacy will also follow closely for s/sx of nephrotoxicity, infusion reactions, and appropriateness of therapy  BMP and CBC will be ordered per protocol  Plan for trough as patient approaches steady state, prior to the 5th  dose at approximately 4/6/21 at 1330  Due to infection severity, will target a trough of 15-20 (appropriate for most indications)     Pharmacy will continue to follow the patients culture results and clinical progress daily      Graciela Madera, Pharmacist

## 2021-04-05 NOTE — UTILIZATION REVIEW
Initial Clinical Review    Admission: Date/Time/Statement:   Admission Orders (From admission, onward)     Ordered        04/03/21 1118  Inpatient Admission  Once                   Orders Placed This Encounter   Procedures    Inpatient Admission     Standing Status:   Standing     Number of Occurrences:   1     Order Specific Question:   Level of Care     Answer:   Med Surg [16]     Order Specific Question:   Estimated length of stay     Answer:   More than 2 Midnights     Order Specific Question:   Certification     Answer:   I certify that inpatient services are medically necessary for this patient for a duration of greater than two midnights  See H&P and MD Progress Notes for additional information about the patient's course of treatment  ED Arrival Information     Expected Arrival Acuity Means of Arrival Escorted By Service Admission Type    - 4/3/2021 09:00 Emergent Ambulance 9[facility] Medicine Emergency    Arrival Complaint    -        Chief Complaint   Patient presents with    Altered Mental Status     Patient presents to ED via EMS with AMS since lastnight  Assessment/Plan: 65 yo female to ED from home via EMS  w/ worsening confusion  Hx  of hepatocellular carcinoma, alcoholic liver cirrhosis, hepatitis-C which is untreated  On arrival oriented , but speech is slow  Mentions dysuria   Pt is to take lactulose, lasix and aldactone as OP , unclear about compliance  Na 132, creat 1 9, , total bili 16, CT w  No acute abnormalities   Admitted IP status for acute encephalopathy,plan for lactulose TID , IVF , treat cellulitis w/ ceftriaxone ,monitor CMP and bili   Pancytopenia cont to monitor hgb , wbc and plt ct   Cont IVF and albumin bolus x1 for DARRYL   PE : dry mm , jaundiced, crust lesions upper and lower ext   + erythema and warmth anterior aspect RLE     4/4 IM Note   slightly more awake today , significantly weak    cont lactulose and titrate to 3 BMs per day , cont IVF , treat cellulitis w/ abx and monitor mental status   Consider ID consult if no improvement in cellulitis   Cont abx for UTI   Hypokalemia replace K and recheck K and mg       4/5 IM Note   Slowly improving as far as mental status , cont w/ generalized weakness  Cont to monitor and cont lactulose and IV abx   Pulses present distally but reduced , check vascular US , consider vascular consult   Cont w/ jaundice and ext lesions      ED Triage Vitals   Temperature Pulse Respirations Blood Pressure SpO2   04/03/21 0903 04/03/21 0903 04/03/21 0903 04/03/21 0903 04/03/21 0903   (!) 97 3 °F (36 3 °C) 97 17 115/55 95 %      Temp Source Heart Rate Source Patient Position - Orthostatic VS BP Location FiO2 (%)   04/03/21 0903 04/03/21 0903 04/03/21 0903 04/03/21 0903 --   Rectal Monitor Sitting Right arm       Pain Score       04/03/21 2100       No Pain          Wt Readings from Last 1 Encounters:   04/04/21 63 9 kg (140 lb 14 oz)     Additional Vital Signs:  04/05/21 07:45:35  98 3 °F (36 8 °C)  88  17  119/67  84  94 %  --  --   04/04/21 15:24:35  98 2 °F (36 8 °C)  82  18  112/64  80  97 %  --  --   04/04/21 07:33:12  97 6 °F (36 4 °C)  85  18  114/66  82  94 %  --  --   04/03/21 22:16:53  97 5 °F (36 4 °C)  92  17  104/65  78  97 %  --  --   04/03/21 22:16:29  --  94  17  104/65  78  94 %  --  --   04/03/21 19:41:04  97 7 °F (36 5 °C)  96  18  137/72  94  93 %  --  --   04/03/21 1900  --  94  20  123/59  --  96 %  --  Lying   04/03/21 1800  --  94  17  122/70  --  95 %  --  Sitting   04/03/21 1700  --  89  20  132/79  --  94 %  --  Sitting   04/03/21 1600  --  89  19  126/70  --  96 %  --  Sitting   04/03/21 1500  --  87  20  118/75  --  95 %  --  Lying   04/03/21 1400  --  88  20  97/46Abnormal   --  96 %  --  Lying   04/03/21 1230  --  89  20  114/56  80  97 %  None (Room air)  --   04/03/21 1100  --  92  19  128/66  90  94 %  None (Room air)  Lying   04/03/21 1000  --  87  16  118/60  --  95 %  None (Room air)  Lying 04/03/21 0930  --  90  15  122/58  --  93 %  None (Room air         Pertinent Labs/Diagnostic Test Results:   4/3 CT head    No acute intracranial abnormality      Results from last 7 days   Lab Units 04/03/21  0921   WBC Thousand/uL 8 87   HEMOGLOBIN g/dL 8 2*   HEMATOCRIT % 25 8*   PLATELETS Thousands/uL 79*   BANDS PCT % 1     Results from last 7 days   Lab Units 04/04/21  1643 04/04/21  0414 04/03/21  0921   SODIUM mmol/L 145 141 132*   POTASSIUM mmol/L 3 0* 2 4* 5 0   CHLORIDE mmol/L 108 104 90*   CO2 mmol/L 26 27 22   ANION GAP mmol/L 11 10 20*   BUN mg/dL 23 29* 33*   CREATININE mg/dL 1 21 1 16 1 98*   EGFR ml/min/1 73sq m 49 52 27   CALCIUM mg/dL 9 0 8 4 8 7   MAGNESIUM mg/dL 1 9  --   --      Results from last 7 days   Lab Units 04/04/21  0414 04/03/21  0921   AST U/L 53* 131*   ALT U/L 27 40   ALK PHOS U/L 83 112   TOTAL PROTEIN g/dL 5 6* 6 9   ALBUMIN g/dL 2 7* 3 0*   TOTAL BILIRUBIN mg/dL 11 29* 16 58*   BILIRUBIN DIRECT mg/dL  --  8 41*   AMMONIA umol/L  --  72*     Results from last 7 days   Lab Units 04/03/21  1947   POC GLUCOSE mg/dl 113     Results from last 7 days   Lab Units 04/04/21  1643 04/04/21  0414 04/03/21  0921   GLUCOSE RANDOM mg/dL 109 88 102     Results from last 7 days   Lab Units 04/03/21  0921   TROPONIN I ng/mL <0 02     Results from last 7 days   Lab Units 04/03/21  0921   PROTIME seconds 22 5*   INR  1 95*   PTT seconds 32     Results from last 7 days   Lab Units 04/04/21  1003 04/04/21  0414 04/04/21  0006 04/03/21  2103 04/03/21  1218 04/03/21  0921   LACTIC ACID mmol/L 2 9* 2 6* 2 5* 3 3* 5 1* 10 6*     Results from last 7 days   Lab Units 04/03/21  0939   CLARITY UA  Slightly Cloudy   COLOR UA  [place]   SPEC GRAV UA  1 020   PH UA  6 0   GLUCOSE UA mg/dl 100 (1/10%)*   KETONES UA mg/dl Trace*   BLOOD UA  Trace-Intact*   PROTEIN UA mg/dl 30 (1+)*   NITRITE UA  Positive*   BILIRUBIN UA  Large*   UROBILINOGEN UA E U /dl >=8 0*   LEUKOCYTES UA  Moderate*   WBC UA /hpf 30-50* RBC UA /hpf 1-2   BACTERIA UA /hpf Innumerable*   EPITHELIAL CELLS WET PREP /hpf Occasional     Results from last 7 days   Lab Units 04/03/21  0939   AMPH/METH  Negative   BARBITURATE UR  Negative   BENZODIAZEPINE UR  Negative   COCAINE UR  Negative   METHADONE URINE  Negative   OPIATE UR  Negative   PCP UR  Negative   THC UR  Positive*     Results from last 7 days   Lab Units 04/03/21  0921   ETHANOL LVL mg/dL <3   ACETAMINOPHEN LVL ug/mL <5 8*   SALICYLATE LVL mg/dL <3*     Results from last 7 days   Lab Units 04/04/21  1545 04/03/21  0939 04/03/21  0926 04/03/21  0921   BLOOD CULTURE   --   --  No Growth at 24 hrs  No Growth at 24 hrs     GRAM STAIN RESULT  Rare Polys*  1+ Gram positive cocci in pairs*  1+ Gram negative rods*  --   --   --    URINE CULTURE   --  >100,000 cfu/ml Gram Negative Rian Enteric Like*  --   --      Results from last 7 days   Lab Units 04/03/21  0921   TOTAL COUNTED  100     ED Treatment:   Medication Administration from 04/03/2021 0900 to 04/03/2021 1934       Date/Time Order Dose Route Action     04/03/2021 1043 ceftriaxone (ROCEPHIN) 1 g/50 mL in dextrose IVPB 1,000 mg Intravenous New Bag     04/03/2021 1041 sodium chloride 0 9 % bolus 2,000 mL 2,000 mL Intravenous New Bag     04/03/2021 1152 lactulose retention enema 200 g 200 g Rectal Given     04/03/2021 1146 sodium chloride 0 9 % bolus 250 mL 250 mL Intravenous New Bag     04/03/2021 1312 lactulose retention enema 200 g 200 g Rectal Given     04/03/2021 1420 cholecalciferol (VITAMIN D3) tablet 400 Units 400 Units Oral Given     04/03/2021 1709 lactulose oral solution 30 g 30 g Oral Given     04/03/2021 1324 lactulose oral solution 30 g 30 g Oral Given     04/03/2021 1324 nicotine (NICODERM CQ) 14 mg/24hr TD 24 hr patch 1 patch 1 patch Transdermal Medication Applied     04/03/2021 1419 sodium chloride 0 9 % infusion 75 mL/hr Intravenous New Bag     04/03/2021 1324 albumin human (FLEXBUMIN) 25 % injection 50 g 50 g Intravenous New Bag        Past Medical History:   Diagnosis Date    Arthritis     [facility] )     Extremity pain     BLE    Hepatitis C     Joint pain     Bilateral Shoulders    Liver disease     Low back pain      Present on Admission:   Lactic acidosis   DARRYL (acute kidney injury) ([facility] )   Acute encephalopathy   Cirrhosis (HCC)   Serum total bilirubin elevated   Hepatocellular carcinoma (HCC)   Pancytopenia (HCC)   UTI (urinary tract infection)   Cellulitis      Admitting Diagnosis: Hepatic encephalopathy (HCC) [K72 90]  Hepatocellular carcinoma (HCC) [A95 8]  Alcoholic cirrhosis (HCC) [K27 13]  UTI (urinary tract infection) [N39 0]  Altered mental status [R41 82]  Hepatitis C [B19 20]  AMS (altered mental status) [R41 82]  Age/Sex: 63 y o  female  Admission Orders:  Scheduled Medications:  cefTRIAXone, 1,000 mg, Intravenous, Q24H  cholecalciferol, 400 Units, Oral, Daily  lactulose, 30 g, Oral, 4x Daily  magnesium sulfate, 2 g, Intravenous, Once  nicotine, 1 patch, Transdermal, Daily  potassium chloride, 20 mEq, Intravenous, Once  potassium chloride, 20 mEq, Intravenous, Once  vancomycin, 10 mg/kg, Intravenous, Q12H      Continuous IV Infusions:  sodium chloride, 75 mL/hr, Intravenous, Continuous      PRN Meds:  acetaminophen, 650 mg, Oral, Q6H PRN        IP CONSULT TO PHARMACY  IP CONSULT TO CASE MANAGEMENT    Network Utilization Review Department  ATTENTION: Please call with any questions or concerns to [phone] and carefully listen to the prompts so that you are directed to the right person  All voicemails are confidential   Ruth Grand all requests for admission clinical reviews, approved or denied determinations and any other requests to dedicated fax number below belonging to the campus where the patient is receiving treatment   List of dedicated fax numbers for the Facilities:  [address] [place] [address] DENIALS (Administrative/Medical Necessity) [phone]   [address] (Maternity/NICU/Pediatrics) 261 [facility] [address] [place] [address] Dr [phone]   Elijah Agudelo 9427 (  Fede Lieberman "Stacia" 103) [address] [place] [address] [address] [phone]   [place] [address] [address] [phone]

## 2021-04-05 NOTE — PHYSICAL THERAPY NOTE
Physical Therapy Evaluation     Patient's Name: [facility]    Admitting Diagnosis  Hepatic encephalopathy ([facility] ) [K72 90]  Hepatocellular carcinoma ([facility] ) [F01 3]  Alcoholic cirrhosis ([facility] ) [N30 85]  UTI (urinary tract infection) [N39 0]  Altered mental status [R41 82]  Hepatitis C [B19 20]  AMS (altered mental status) [R41 82]    Problem List  Patient Active Problem List   Diagnosis    Degeneration of intervertebral disc of lumbar region    Lumbar spondylosis    Mass of left lobe of liver    Chronic pain syndrome    [facility]atocellular carcinoma ([facility] )    Acute encephalopathy    Cirrhosis ([facility] )    Serum total bilirubin elevated    DARRYL (acute kidney injury) ([facility] )    Hyperkalemia    Alcohol abuse    Anemia    Lactic acidosis    Pancytopenia (HCC)    UTI (urinary tract infection)    Cellulitis       Past Medical History  Past Medical History:   Diagnosis Date    Arthritis     Cirrhosis ([facility] )     Extremity pain     BLE    Hepatitis C     Joint pain     Bilateral Shoulders    Liver disease     Low back pain        Past Surgical History  Past Surgical History:   Procedure Laterality Date    CT GUIDED AND MONITORING PARENCHYMAL TISSUE ABLATION  7/28/2020    ECTOPIC PREGNANCY SURGERY      MOUTH SURGERY      TONSILLECTOMY            04/05/21 1236   PT Last Visit   PT Visit Date 04/05/21   Note Type   Note type Evaluation   Pain Assessment   Pain Assessment Tool Pain Assessment not indicated - pt denies pain   Pain Score No Pain   Home Living   Type of [address] One level;Performs ADLs on one level; Able to live on main level with bedroom/bathroom;Stairs to enter with rails  (1 AGUEDA)   Bathroom Shower/Tub Tub/shower unit   [employer] in [place] [address]   (no AD at baseline per chart)   Prior Function   Level of [place] Independent with ADLs and functional mobility   Lives With Significant other  (boyfriend)   Receives Help From Family; [address] in the last 6 months   (2 falls per chart review)   Vocational On disability   Comments PLOF and social history obtained from pt's chart d/t pt with confusion at this time   Restrictions/Precautions   Weight Bearing Precautions Per Order No   Other Precautions Contact/isolation;Cognitive; Bed Alarm;Multiple lines; Fall Risk   General   Family/Caregiver Present No   Cognition   Overall Cognitive Status Impaired   Arousal/Participation Alert   Orientation Level Oriented to person;Oriented to time;Disoriented to place; Disoriented to situation   Memory Decreased recall of biographical information;Decreased recall of recent events;Decreased recall of precautions   Following Commands Follows one step commands with increased time or repetition   Comments pt agreeable to PT session   RLE Assessment   RLE Assessment X   Strength RLE   RLE Overall Strength 3-/5   LLE Assessment   LLE Assessment X   Strength LLE   LLE Overall Strength 3-/5   Coordination   Movements are Fluid and Coordinated 0   Sensation   (pt unable to report)   Bed Mobility   Rolling R 4  Minimal assistance   Additional items Assist x 1;Bedrails; Increased time required;Verbal cues   Rolling L 4  Minimal assistance   Additional items Assist x 1;Bedrails; Increased time required;Verbal cues   Supine to Sit 3  Moderate assistance   Additional items Assist x 2; Increased time required;Verbal cues;LE management  (pt unable to achieve full upright sitting posture)   Sit to Supine 3  Moderate assistance   Additional items Assist x 2  (pt with poor safety awareness, needing guidance back to bed)   Transfers   Sit to Stand Unable to assess  (d/t safety concerns)   Balance   Static Sitting Poor   Endurance Deficit   Endurance Deficit Yes   Activity Tolerance   Activity Tolerance Patient limited by fatigue;Treatment limited secondary to medical complications (Comment)  (confusion)   Nurse Made Aware Yes, RN Judy Dodge verbalized pt appropriate for PT session   Assessment   Prognosis Fair   Problem List Decreased strength;Decreased endurance; Impaired balance;Decreased mobility; Decreased cognition;Decreased safety awareness   Assessment Pt is 63 y o  female seen for PT evaluation on 4/5/2021 s/p admit to [facility] on 4/3/2021 w/ Acute encephalopathy  PT consulted to assess pt's functional mobility and d/c needs  Order placed for PT eval and tx, w/ up w/ A order  PT performed at least 2 patient identifiers during session: Name and wristband  Comorbidities affecting pt's physical performance at time of assessment include: cellulitis, UTI, pancytopenia, lactic acidosis, DARRYL, serum total bilirubin elevated, cirrhosis, hepatocellular carcinoma  PTA, pt was independent w/ all functional mobility w/ no AD, ambulates household distances, has 1 AGUEDA and lives w/ boyfriend in 1 level home  Personal factors affecting pt at time of IE include: stairs to enter home, unable to perform dynamic tasks in community, positive fall history, decreased initiation and engagement, unable to perform physical activity, limited insight into impairments, inability to perform IADLs and inability to perform ADLs  Please find objective findings from PT assessment regarding body systems outlined above with impairments and limitations including weakness, impaired balance, decreased endurance, decreased activity tolerance, decreased functional mobility tolerance, decreased safety awareness, fall risk, decreased skin integrity and decreased cognition, as well as mobility assessment (need for cueing for mobility technique)  The following objective measures performed on IE also reveal limitations: Barthel Index: 4/833 and AM-PAC 6-Clicks: 3/32   Pt's clinical presentation is currently unstable/unpredictable seen in pt's presentation of abnormal lab value(s), need for input for task focus and mobility technique, ongoing medical assessment and +confusion and decreased mental status  Pt to benefit from continued PT tx to address deficits as defined above and maximize level of functional independent mobility and consistency  From PT/mobility standpoint, recommendation at time of d/c would be IP rehab pending progress in order to facilitate return to PLOF  Barriers to Discharge Other (Comment)  (unknown barriers at this time d/t limited assessment)   Goals   Patient Goals "to not dangle"   [facility] Expiration Date 04/15/21   Short Term Goal #1 In 7-10 days: Increase bilateral LE strength 1/2 grade to facilitate independent mobility, Perform all bed mobility tasks with close S to decrease caregiver burden, Increase all balance 1/2 grade to decrease risk for falls, Tolerate 4 hr OOB to faciliate upright tolerance, Tolerate seated at EOB 10 minutes to facilitate functional task performance, PT provider will perform functional balance assessment to determine fall risk and PT to see and establish goals for transfers, ambulation and elevations when appropriate   PT Treatment Day 0   Plan   Treatment/Interventions LE strengthening/ROM; Therapeutic exercise; Endurance training;Cognitive reorientation;Patient/family training;Equipment eval/education; Bed mobility;Spoke to nursing;Spoke to case management   PT Frequency   (3-5x/wk)   Recommendation   PT Discharge Recommendation Post-Acute Rehabilitation Services   Equipment Recommended   (TBD pending further mobility)   PT - OK to Discharge No   Additional Comments The patient's AM-PAC Basic Mobility Inpatient Short Form Low Function Raw Score 15, Standardized Score is 23 9  A standardized score less 42 9 suggests the patient may benefit from discharge to post-acute rehab services  Please also refer to the recommendation of the Physical Therapist for safe discharge planning     AM-PAC Basic Mobility Inpatient   Turning in Bed Without Bedrails 2   Lying on Back to Sitting on Edge of Flat Bed 2   Moving Bed to Chair 2   Standing Up From Chair 1   Walk in Room 1   Climb 3-5 Stairs 1   Basic Mobility Inpatient Raw Score 9   Turning Head Towards Sound 4   Follow Simple Instructions 2   Low Function Basic Mobility Raw Score 15   Low Function Basic Mobility Standardized Score 23 9   Barthel Index   Feeding 5   Bathing 0   Grooming Score 0   Dressing Score 0   Bladder Score 0   Bowels Score 0   Toilet Use Score 0   Transfers (Bed/Chair) Score 0   Mobility (Level Surface) Score 0   Stairs Score 0   Barthel Index Score 5         [place] Jasmine, PT

## 2021-04-05 NOTE — PROGRESS NOTES
Vancomycin IV Pharmacy-to-Dose Consultation    Reji Mckeon is a 63 y o  female who is currently receiving Vancomycin IV with management by the Pharmacy Consult service  Assessment/Plan:  The patient was reviewed  Renal function is stable and no signs or symptoms of nephrotoxicity and/or infusion reactions were documented in the chart  Based on todays assessment, continue current vancomycin (day # 2) dosing of 750 mg IV q 12 H, with a plan for trough to be drawn at 1330 on 4/6/21  We will continue to follow the patients culture results and clinical progress daily      Champ Boni Pharmacist

## 2021-04-05 NOTE — PROGRESS NOTES
Pt ripped out her olea, and had leakage around her rectal tube  Pt cleaned and Dr Rose Cook notified  He is ok with olea catheter not being replaced  Will continue to monitor

## 2021-04-05 NOTE — ASSESSMENT & PLAN NOTE
Lactic acid noted to be elevated at 10 when she came  Despite significantly elevated lactic acid she did not have any other signs of hypoperfusion, this was likely secondary to the delayed hepatic clearance and sepsis  Hepatocellular carcinoma and tumor turnover may also be playing a role  Monitor hemodynamics closely

## 2021-04-05 NOTE — ASSESSMENT & PLAN NOTE
Creatinine around 3 weeks ago was 1 2    Currently 1 9 on admission  Likely in the setting of sepsis, poor intake, liver disease  Received Albumin 50g x 1  Continue IV fluids for today    Kidney function improving    Monitor BMP  Avoid nephrotoxic agents

## 2021-04-05 NOTE — WOUND OSTOMY CARE
Progress Note - Wound   [place] Manner 63 y o  female MRN: [id number]  Unit/Bed#: -01 Encounter: [id number]      Assessment:   Patient admitted due to acute encephalopathy  History of arthritis, cirrhosis, hepatitis C, hepatocellular carcinoma, and DARRYL  Wound care consulted for multiple wounds  Patient agreeable to assessment  Patient is alert and oriented x  2 with confusion and lethargy (RN and SLIM aware), max assist of 2 to turn for assessment, heels elevated off of bed, wedges for turning and repositioning, olea catheter in place, rectal tube in place with some fecal drainage coming out from around the tube, dependent for care, poor appetite  SLIM made aware of wounds and assessment findings  1  Left anterior pre-tibial-POA- Unknown etiology, suspect vascular component  Two wounds pictured and measured together  Wounds are irregular, approx  60% dry black intact eschar, 30% yellow moist adhered slough, and 10% pink tissue  Moderate amount of tan and serosanguinous drainage  True depth of wound is unknown due to devitalized tissue present  Alena-wound is dry, intact, no redness  2  Left posterior tibial-POA- Unknown etiology, suspect vascular component  Wound is irregular, moist, approx  80% yellow stringy and adhered slough, and 20% pink granular tissue  Moderate amount of tan and serosanguinous drainage  True depth of wound is unknown due to devitalized tissue present  Alena-wound is dry, intact, no redness  3  Pressure injury left heel, DTPI-POA- Wound is irregular, dry, intact, no open aspects, no drainage, dry, black colored skin, with deep purple non-blanchable skin noted from medial ankle to lateral ankle  This wound has the potential to evolve to a full thickness injury, stage 3 or 4  Alena-wound is dry, intact, no redness  4  Left elbow skin tear-POA- Wound appears to be an unroofed scab  Wound is irregular, moist, approx   50% yellow adhered slough and 50% beefy red tissue that blanches  Scant amount of serosanguinous drainage  True depth of wound is unknown due to devitalized tissue present  Alena-wound is dry, intact, blanchable pink, with multiple dry/intact scabbed areas  5  Right pre-tibial -POA- Unknown etiology, suspect vascular component  Multiple scattered wounds pictured and measured together  Wounds are oval, moist, approx  95% yellow adhered slough and 5% pink/beefy red tissue  Moderate amount of tan and serosanguinous drainage  True depth of wound is unknown due to devitalized tissue present  Alena-wound is dry, intact, no redness  6  Right posterior tibial-POA- Unknown etiology, suspect vascular component  Wound is irregular and linear, dry, no drainage, approx  80% yellow adhered slough and 20% pink tissue  True depth of wound is unknown due to devitalized tissue present  Alena-wound is dry, intact, no redness  7  Pressure injury right heel, DTPI-POA- Wound is irregular, dry, intact, no open aspects, no drainage, with deep purple non-blanchable skin noted from medial ankle to lateral ankle  This wound has the potential to evolve to a full thickness injury, stage 3 or 4  Alena-wound is dry, intact, no redness  8  Right wrist skin tear-POA- Wound on assessment is dry, covered with intact and dry scabbing, no open aspects, no drainage  Alena-wound is dry, intact, with multiple scattered areas or dry intact scabbing  9  Pressure injury left buttock, unstageable-POA- Three scattered openings pictured and measured together  Wounds are oval, moist, full-thickness, approx  90% yellow adhered slough and 10% pink granular tissue  Edges of wounds are well adhered and appear macerated, presenting with pale white skin  Scant amount of tan and serosanguinous drainage  True depth of wound is unknown due to devitalized tissue present  Alena-wound is dry, intact, blanchable, no redness      10  Pressure injury right buttock, unstageable-POA- Four scattered openings pictured and measured together  Wounds are irregular ovals, moist, full-thickness, approx  70% yellow adhered slough and 30% pink granular tissue  Edges of wounds are well adhered and appear macerated, presenting with pale white skin  Small amount of tan and bloody drainage  True depth of wound is unknown due to devitalized tissue present  Alena-wound is dry, intact, no redness  11  Left anterior thigh, B/L knees, B/L anterior pre-tibial, B/L anterior foot wounds-POA- Unknown etiology, suspect vascular component  Wounds are scattered, irregular, dry black eschar, with some brown scabbing present, no drainage, no open aspects  Wounds with open aspects have been pictured, measured, and charted on separately in the above charting  12  Right flank with scattered ecchymosis  Skin is dry, intact, no redness  Educated patient on importance of frequent offloading of pressure via turning, repositioning, and weight-shifting  Verbalized understanding of plan of care  No induration, fluctuance, odor, warmth, redness, or purulence noted to the above noted wound  New dressings applied  Patient tolerated fairly well, reports mild pain to the lower extremity wounds  Primary nurse aware of plan of care  See flow sheets for more detailed assessment findings  Will follow along  Skin care plans:  1-Calazime to sacrum and buttocks wounds TID and PRN  2-Hydraguard to bilateral heel DTI, BID and PRN  3-Elevate heels to offload pressure  4-Ehob cushion when out of bed  5-Turn/repoisiton q2h or when medically stable for pressure re-distribution on skin  6-Moisturize skin daily with skin nourishing cream  7-B/L legs: Cleanse lower extremity wound with NSS, pat dry  Apply adaptic, then Maxorb with Silver, cover with ABD pad, wrap with Hever  Change daily and PRN for soilage or dislodgement  8-Allevyn foam to B/L elbow, syed right with w/P, syed left w/T for treatment, peel foam back and check skin integrity q-shift   Change q3d   9- B/L leg and left thigh dry black eschars- Keep dry, paint with betadine daily  10-P-500 ordered, please verify gets called in to [facility]      Wound 04/04/21 Venous Ulcer Pretibial Left; Lower; Anterior (Active)   Wound Image   04/05/21 1042   Wound Description Black;Eschar;Pink;Slough; Yellow 04/05/21 1042   Alena-wound Assessment Dry; Intact 04/05/21 1042   Wound Length (cm) 5 cm 04/05/21 1042   Wound Width (cm) 4 cm 04/05/21 1042   Wound Depth (cm) 0 2 cm 04/05/21 1042   Wound Surface Area (cm^2) 20 cm^2 04/05/21 1042   Wound Volume (cm^3) 4 cm^3 04/05/21 1042   Calculated Wound Volume (cm^3) 4 cm^3 04/05/21 1042   Tunneling 0 cm 04/05/21 1042   Undermining 0 04/05/21 1042   Drainage Amount Moderate 04/05/21 1042   Drainage Description Tan;Serosanguineous 04/05/21 1042   Non-staged Wound Description Full thickness 04/05/21 1042   Treatments Cleansed;Site care 04/05/21 1042   Dressing Vaseline gauze;Calcium Alginate with Silver;ABD;Dry dressing 04/05/21 1042   Wound packed? No 04/05/21 1042   Dressing Changed Changed 04/05/21 1042   Patient Tolerance Tolerated well 04/05/21 1042   Dressing Status Clean;Dry; Intact 04/05/21 1042       Wound 04/04/21 Venous Ulcer Tibial Left;Posterior (Active)   Wound Image   04/05/21 1041   Wound Description Beefy red;Pink;Slough; Yellow 04/05/21 1041   Alena-wound Assessment Dry; Intact 04/05/21 1041   Wound Length (cm) 2 5 cm 04/05/21 1041   Wound Width (cm) 4 cm 04/05/21 1041   Wound Depth (cm) 0 2 cm 04/05/21 1041   Wound Surface Area (cm^2) 10 cm^2 04/05/21 1041   Wound Volume (cm^3) 2 cm^3 04/05/21 1041   Calculated Wound Volume (cm^3) 2 cm^3 04/05/21 1041   Tunneling 0 cm 04/05/21 1041   Undermining 0 04/05/21 1041   Drainage Amount Moderate 04/05/21 1041   Drainage Description Tan;Serosanguineous 04/05/21 1041   Non-staged Wound Description Full thickness 04/05/21 1041   Treatments Cleansed;Site care 04/05/21 1041   Dressing Vaseline gauze;Calcium Alginate;ABD;Dry dressing 04/05/21 1041   Wound packed? No 04/05/21 1041   Dressing Changed Changed 04/05/21 1041   Patient Tolerance Tolerated well 04/05/21 1041   Dressing Status Clean;Dry; Intact 04/05/21 1041       Wound 04/04/21 Heel Left (Active)   Wound Image   04/05/21 1040   Wound Description Black; Light purple 04/05/21 1040   Pressure Injury Stage DTPI 04/05/21 1040   Alena-wound Assessment Dry; Intact 04/05/21 1040   Wound Length (cm) 2 cm 04/05/21 1040   Wound Width (cm) 5 cm 04/05/21 1040   Wound Depth (cm) 0 cm 04/05/21 1040   Wound Surface Area (cm^2) 10 cm^2 04/05/21 1040   Wound Volume (cm^3) 0 cm^3 04/05/21 1040   Calculated Wound Volume (cm^3) 0 cm^3 04/05/21 1040   Tunneling 0 cm 04/05/21 1040   Undermining 0 04/05/21 1040   Drainage Amount None 04/05/21 1040   Non-staged Wound Description Not applicable 20/70/25 8227   Treatments Cleansed;Site care 04/05/21 1040   Dressing Moisture barrier 04/05/21 1040   Wound packed? No 04/05/21 1040   Patient Tolerance Tolerated well 04/05/21 1040       Wound 04/04/21 Pretibial Distal;Right;Medial (Active)   Wound Image   04/05/21 1039   Wound Description Beefy red;Slough; Yellow 04/05/21 1039   Alena-wound Assessment Dry; Intact 04/05/21 1039   Wound Length (cm) 10 1 cm 04/05/21 1039   Wound Width (cm) 4 cm 04/05/21 1039   Wound Depth (cm) 0 2 cm 04/05/21 1039   Wound Surface Area (cm^2) 40 4 cm^2 04/05/21 1039   Wound Volume (cm^3) 8 08 cm^3 04/05/21 1039   Calculated Wound Volume (cm^3) 8 08 cm^3 04/05/21 1039   Tunneling 0 cm 04/05/21 1039   Undermining 0 04/05/21 1039   Drainage Amount Moderate 04/05/21 1039   Drainage Description Tan;Serosanguineous 04/05/21 1039   Non-staged Wound Description Full thickness 04/05/21 1039   Treatments Cleansed;Site care 04/05/21 1039   Dressing Vaseline gauze;Calcium Alginate with Silver;ABD;Dry dressing 04/05/21 1039   Wound packed?  No 04/05/21 1039   Dressing Changed Changed 04/05/21 1039   Patient Tolerance Tolerated well 04/05/21 1039   Dressing Status Clean;Dry; Intact 04/05/21 1039       Wound 04/04/21 Skin Tear Wrist Lateral;Right (Active)   Wound Image   04/05/21 1044   Wound Description Dry; Intact 04/05/21 1039   Alena-wound Assessment Dry; Intact;Fragile 04/05/21 1039       Wound 04/04/21 Elbow Left (Active)   Wound Image   04/05/21 1044   Wound Description Beefy red;Slough; Yellow 04/05/21 1044   Alena-wound Assessment Dry; Intact; Pink 04/05/21 1044   Wound Length (cm) 0 5 cm 04/05/21 1044   Wound Width (cm) 1 cm 04/05/21 1044   Wound Depth (cm) 0 1 cm 04/05/21 1044   Wound Surface Area (cm^2) 0 5 cm^2 04/05/21 1044   Wound Volume (cm^3) 0 05 cm^3 04/05/21 1044   Calculated Wound Volume (cm^3) 0 05 cm^3 04/05/21 1044   Tunneling 0 cm 04/05/21 1044   Undermining 0 04/05/21 1044   Drainage Amount Scant 04/05/21 1044   Drainage Description Serosanguineous 04/05/21 1044   Non-staged Wound Description Partial thickness 04/05/21 1044   Treatments Cleansed;Site care 04/05/21 1044   Dressing Foam, Silicon (eg  Allevyn, etc) 04/05/21 1044   Wound packed? No 04/05/21 1044   Dressing Changed Changed 04/05/21 1044   Patient Tolerance Tolerated well 04/05/21 1044   Dressing Status Clean;Dry; Intact 04/05/21 1044       Wound 04/04/21 Pressure Injury Buttocks (Active)   Wound Image   04/05/21 1047   Wound Description Beefy red;Pink;Slough; Yellow 04/05/21 1047   Pressure Injury Stage U 04/05/21 1047   Alena-wound Assessment Dry; Intact 04/05/21 1047   Wound Length (cm) 3 5 cm 04/05/21 1047   Wound Width (cm) 3 cm 04/05/21 1047   Wound Depth (cm) 0 2 cm 04/05/21 1047   Wound Surface Area (cm^2) 10 5 cm^2 04/05/21 1047   Wound Volume (cm^3) 2 1 cm^3 04/05/21 1047   Calculated Wound Volume (cm^3) 2 1 cm^3 04/05/21 1047   Tunneling 0 cm 04/05/21 1047   Undermining 0 04/05/21 1047   Drainage Amount Scant 04/05/21 1047   Drainage Description Serosanguineous 04/05/21 1047   Non-staged Wound Description Full thickness 04/05/21 1047   Treatments Cleansed;Site care 04/05/21 1047 Dressing Protective barrier 04/05/21 1047   Wound packed? No 04/05/21 1047   Patient Tolerance Tolerated well 04/05/21 1047       Wound 04/04/21 Buttocks Left;Mid; Inner (Active)   Wound Description Pink;Slough; Yellow 04/05/21 1048   Pressure Injury Stage U 04/05/21 1048   Alena-wound Assessment Dry; Intact 04/05/21 1048   Wound Length (cm) 2 cm 04/05/21 1048   Wound Width (cm) 0 8 cm 04/05/21 1048   Wound Depth (cm) 0 2 cm 04/05/21 1048   Wound Surface Area (cm^2) 1 6 cm^2 04/05/21 1048   Wound Volume (cm^3) 0 32 cm^3 04/05/21 1048   Calculated Wound Volume (cm^3) 0 32 cm^3 04/05/21 1048   Tunneling 0 cm 04/05/21 1048   Undermining 0 04/05/21 1048   Drainage Amount Scant 04/05/21 1048   Drainage Description Tan 04/05/21 1048   Non-staged Wound Description Not applicable 64/94/98 8201   Treatments Cleansed;Site care 04/05/21 1048   Dressing Protective barrier 04/05/21 1048   Wound packed? No 04/05/21 1048   Patient Tolerance Tolerated well 04/05/21 1048       Wound 04/05/21 Heel Right (Active)   Wound Image   04/05/21 1038   Wound Description Light purple 04/05/21 1038   Pressure Injury Stage DTPI 04/05/21 1038   Alena-wound Assessment Intact;Dry 04/05/21 1038   Wound Length (cm) 1 5 cm 04/05/21 1038   Wound Width (cm) 7 cm 04/05/21 1038   Wound Depth (cm) 0 cm 04/05/21 1038   Wound Surface Area (cm^2) 10 5 cm^2 04/05/21 1038   Wound Volume (cm^3) 0 cm^3 04/05/21 1038   Calculated Wound Volume (cm^3) 0 cm^3 04/05/21 1038   Tunneling 0 cm 04/05/21 1038   Undermining 0 04/05/21 1038   Drainage Amount None 04/05/21 1038   Non-staged Wound Description Not applicable 19/02/39 8605   Treatments Cleansed;Site care 04/05/21 1038   Dressing Moisture barrier 04/05/21 1038   Patient Tolerance Tolerated well 04/05/21 1038       Wound 04/05/21 Tibial Posterior;Right (Active)   Wound Image   04/05/21 1049   Wound Description Pink;Slough; Yellow 04/05/21 1049   Alena-wound Assessment Dry; Intact 04/05/21 1049   Wound Length (cm) 2 3 cm 04/05/21 1049   Wound Width (cm) 0 5 cm 04/05/21 1049   Wound Depth (cm) 0 1 cm 04/05/21 1049   Wound Surface Area (cm^2) 1 15 cm^2 04/05/21 1049   Wound Volume (cm^3) 0 12 cm^3 04/05/21 1049   Calculated Wound Volume (cm^3) 0 12 cm^3 04/05/21 1049   Tunneling 0 cm 04/05/21 1049   Undermining 0 04/05/21 1049   Drainage Amount None 04/05/21 1049   Non-staged Wound Description Not applicable 81/62/25 8375   Treatments Cleansed;Site care 04/05/21 1049   Dressing Vaseline gauze;Calcium Alginate;ABD;Dry dressing 04/05/21 1049   Wound packed? No 04/05/21 1049   Dressing Changed Changed 04/05/21 1049   Patient Tolerance Tolerated well 04/05/21 1049   Dressing Status Clean;Dry; Intact 04/05/21 1049         Call or tigertext with any questions  Wound Care will continue to follow      Alyssa Sánchez RN BSN  Wound care    Patient assessed with Amie Go RN, BSN, [facility]

## 2021-04-05 NOTE — ASSESSMENT & PLAN NOTE
In the setting of previous alcohol abuse  Currently she denies any current alcohol abuse    Monitor CMP, if worsening may need further workup and GI consultation and palliative discussion given [address]  and worsening LFTs at that time

## 2021-04-05 NOTE — CASE MANAGEMENT
CM met with pt at bedside  Introduced self and CM role  Pt lives [place] with her boyfriend  Pt lives in 1 story that has 1 rell  Pt denies dme and completes adl's independently  Pt denies hx of rehab, hhc, mh, and sa  Pt uses [facility] and pcp is Charan ayoub  Pt reports preferred hcp is friend, Darwin [place]cia  Pt is disabled and drives      CM reviewed discharge planning process including the following: identifying help at home, patient preference for discharge planning needs, pharmacy preference, and availability of treatment team to discuss questions or concerns patient and/or family may have regarding understanding medications and recognizing signs and symptoms once discharged  CM also encouraged patient to follow up with all recommended appointments after discharge  Patient advised of importance for patient and family to participate in managing patients medical well being      Pt was ipta, needs tbd  Boyfriend or friend will transport  CM Dept will follow pt through dc  PT OT needs to assess d/c needs

## 2021-04-05 NOTE — ASSESSMENT & PLAN NOTE
Erythema and warmth in right lower extremity  Check MRSA culture given crust lesions in arms  She also has significant open ulcers which seem to be infected    Continue ceftriaxone for UTI and vancomycin for cellulitis  Check wound culture and MRSA culture    Will consider involving ID depending on results above  Pulses present distally but reduced, will check a vascular ultrasound    May consider vascular surgery evaluation depending on results

## 2021-04-05 NOTE — PLAN OF CARE
Problem: PHYSICAL THERAPY ADULT  Goal: Performs mobility at highest level of function for planned discharge setting  See evaluation for individualized goals  Description: Treatment/Interventions: LE strengthening/ROM, Therapeutic exercise, Endurance training, Cognitive reorientation, Patient/family training, Equipment eval/education, Bed mobility, Spoke to nursing, Spoke to case management  Equipment Recommended: (TBD pending further mobility)       See flowsheet documentation for full assessment, interventions and recommendations  Note: Prognosis: Fair  Problem List: Decreased strength, Decreased endurance, Impaired balance, Decreased mobility, Decreased cognition, Decreased safety awareness  Assessment: Pt is 63 y o  female seen for PT evaluation on 4/5/2021 s/p admit to [facility] on 4/3/2021 w/ Acute encephalopathy  PT consulted to assess pt's functional mobility and d/c needs  Order placed for PT eval and tx, w/ up w/ A order  PT performed at least 2 patient identifiers during session: Name and wristband  Comorbidities affecting pt's physical performance at time of assessment include: cellulitis, UTI, pancytopenia, lactic acidosis, DARRYL, serum total bilirubin elevated, cirrhosis, hepatocellular carcinoma  PTA, pt was independent w/ all functional mobility w/ no AD, ambulates household distances, has 1 AGUEDA and lives w/ boyfriend in 1 level home  Personal factors affecting pt at time of IE include: stairs to enter home, unable to perform dynamic tasks in community, positive fall history, decreased initiation and engagement, unable to perform physical activity, limited insight into impairments, inability to perform IADLs and inability to perform ADLs   Please find objective findings from PT assessment regarding body systems outlined above with impairments and limitations including weakness, impaired balance, decreased endurance, decreased activity tolerance, decreased functional mobility tolerance, decreased safety awareness, fall risk, decreased skin integrity and decreased cognition, as well as mobility assessment (need for cueing for mobility technique)  The following objective measures performed on IE also reveal limitations: Barthel Index: 6/922 and AM-PAC 6-Clicks: 6/75  Pt's clinical presentation is currently unstable/unpredictable seen in pt's presentation of abnormal lab value(s), need for input for task focus and mobility technique, ongoing medical assessment and +confusion and decreased mental status  Pt to benefit from continued PT tx to address deficits as defined above and maximize level of functional independent mobility and consistency  From PT/mobility standpoint, recommendation at time of d/c would be IP rehab pending progress in order to facilitate return to PLOF  Barriers to Discharge: Other (Comment)(unknown barriers at this time d/t limited assessment)     PT Discharge Recommendation: Post-Acute Rehabilitation Services     PT - OK to Discharge: No    See flowsheet documentation for full assessment

## 2021-04-05 NOTE — ASSESSMENT & PLAN NOTE
Urinalysis suggestive of UTI, she mentions dysuria, Ham[place] catheter placed in the ED    Continue ceftriaxone  Urine culture growing Gram-negative rods, enteric lack, waiting for final speciation

## 2021-04-05 NOTE — PROGRESS NOTES
[facility]  Progress Note - Lindaann Halsted 1960, 63 y o  female MRN: [id number]  Unit/Bed#: -Jerome Encounter: [id number]  Primary Care Provider: Charise Nissen, DO   Date and time admitted to hospital: 4/3/2021  9:00 AM    * Acute encephalopathy  Assessment & Plan  Presented with worsening confusion, fogginess, mentions some tremors in hands as well  In the setting of advanced liver disease and HCC, also possible UTI and cellulitis  Likely acute metabolic encephalopathy due to infection associated with acute hepatic encephalopathy    Patient is slowly improving as far as mental status  She is still has generalized weakness    Plan:  Continue lactulose and titrate for 3 bowel movements per day  Provide IV fluid hydration  Treat cellulitis and UTI with ceftriaxone and vancomycin  Monitor clinically, mental status      Cellulitis  Assessment & Plan  Erythema and warmth in right lower extremity  Check MRSA culture given crust lesions in arms  She also has significant open ulcers which seem to be infected    Continue ceftriaxone for UTI and vancomycin for cellulitis  Check wound culture and MRSA culture    Will consider involving ID depending on results above  Pulses present distally but reduced, will check a vascular ultrasound  May consider vascular surgery evaluation depending on results    UTI (urinary tract infection)  Assessment & Plan  Urinalysis suggestive of UTI, she mentions dysuria, Hamlin catheter placed in the ED    Continue ceftriaxone  Urine culture growing Gram-negative rods, enteric lack, waiting for final speciation    Pancytopenia ([address] )  Assessment & Plan  In the setting of cirrhosis and HCC  Monitor Hb, WBC and platelet numbers  Lactic acidosis  Assessment & Plan  Lactic acid noted to be elevated at 10 when she came    Despite significantly elevated lactic acid she did not have any other signs of hypoperfusion, this was likely secondary to the delayed hepatic clearance and sepsis  Hepatocellular carcinoma and tumor turnover may also be playing a role  Monitor hemodynamics closely  DARRYL (acute kidney injury) ([facility] [address] )  Assessment & Plan  Creatinine around 3 weeks ago was 1 2  Currently 1 9 on admission  Likely in the setting of sepsis, poor intake, liver disease  Received Albumin 50g x 1  Continue IV fluids for today    Kidney function improving    Monitor BMP  Avoid nephrotoxic agents    Serum total bilirubin elevated  Assessment & Plan  In the setting of HC carcinoma cirrhosis  No signs of cholangitis otherwise  Continue to monitor CMP  Cirrhosis ([address] )  Assessment & Plan  In the setting of previous alcohol abuse  Currently she denies any current alcohol abuse    Monitor CMP, if worsening may need further workup and GI consultation and palliative discussion given [address]  and worsening LFTs at that time  Hepatocellular carcinoma [facility])  Assessment & Plan  She does have a history of [address]  and previously underwent ablation  Despite treatment her liver function seems to be pre poor  If no significant improvement in encephalopathy may have to discuss palliative care  Patient not drinking much water, bloodwork repeated and seen, she has still hypokalemia and hypernatremia, will replace K and start d5 1/2NS with K supplementation 75cc/h    VTE Pharmacologic Prophylaxis:   Pharmacologic: Pharmacologic VTE Prophylaxis contraindicated due to Pancytopenia  Mechanical VTE Prophylaxis in Place: Yes    Patient Centered Rounds: I have performed bedside rounds with nursing staff today  Discussions with Specialists or Other Care Team Provider:  Discussed with wound care, nursing, case management    Education and Discussions with Family / Patient:  Patient herself    Time Spent for Care: 30 minutes  More than 50% of total time spent on counseling and coordination of care as described above      Current Length of Stay: 2 day(s)    Current Patient Status: Inpatient Certification Statement: The patient will continue to require additional inpatient hospital stay due to Need for therapy IV    Discharge Plan:  Once stable    Code Status: Level 1 - Full Code      Subjective:     Patient evaluated this morning  She does mention that she feels slightly better although still significantly weak  Denies any nausea vomiting  Potassium noted to be low 3  Other events reported  Objective:     Vitals:   Temp (24hrs), Av 3 °F (36 8 °C), Min:98 2 °F (36 8 °C), Max:98 3 °F (36 8 °C)    Temp:  [98 2 °F (36 8 °C)-98 3 °F (36 8 °C)] 98 3 °F (36 8 °C)  HR:  [82-88] 88  Resp:  [17-18] 17  BP: (112-119)/(64-67) 119/67  SpO2:  [94 %-97 %] 94 %  Body mass index is 21 42 kg/m²  Input and Output Summary (last 24 hours): Intake/Output Summary (Last 24 hours) at 2021 1128  Last data filed at 2021 1741  Gross per 24 hour   Intake --   Output 1750 ml   Net -1750 ml       Physical Exam:     Physical Exam  Vitals signs and nursing note reviewed  Constitutional:       Appearance: Normal appearance  She is normal weight  Comments: Elderly female in bed, awake   HENT:      Head: Normocephalic and atraumatic  Right Ear: External ear normal       Left Ear: External ear normal       Nose: Nose normal  No congestion  Mouth/Throat:      Mouth: Mucous membranes are dry  Pharynx: Oropharynx is clear  No oropharyngeal exudate or posterior oropharyngeal erythema  Eyes:      General: No scleral icterus  Right eye: No discharge  Left eye: No discharge  Extraocular Movements: Extraocular movements intact  Conjunctiva/sclera: Conjunctivae normal       Pupils: Pupils are equal, round, and reactive to light  Neck:      Musculoskeletal: Normal range of motion and neck supple  No neck rigidity or muscular tenderness  Cardiovascular:      Rate and Rhythm: Normal rate and regular rhythm  Pulses: Normal pulses        Heart sounds: Normal heart sounds  No murmur  No friction rub  No gallop  Pulmonary:      Effort: Pulmonary effort is normal  No respiratory distress  Breath sounds: Normal breath sounds  No stridor  No wheezing, rhonchi or rales  Chest:      Chest wall: No tenderness  Abdominal:      General: Abdomen is flat  Bowel sounds are normal  There is no distension  Palpations: Abdomen is soft  There is no mass  Tenderness: There is no abdominal tenderness  There is no guarding or rebound  Musculoskeletal: Normal range of motion  General: No swelling, tenderness, deformity or signs of injury  Comments: She does have generalized weakness   Skin:     General: Skin is warm and dry  Capillary Refill: Capillary refill takes less than 2 seconds  Coloration: Skin is jaundiced  Skin is not pale  Findings: No bruising, erythema, lesion or rash  Comments: There are crust lesions in upper and lower extremities, there is significant erythema and warmth in anterior aspect of the right lower extremity    There also some ulcers with some drainage bilaterally   Neurological:      General: No focal deficit present  Mental Status: She is alert and oriented to person, place, and time  Mental status is at baseline  Cranial Nerves: No cranial nerve deficit  Sensory: No sensory deficit  Motor: No weakness  Coordination: Coordination normal    Psychiatric:         Mood and Affect: Mood normal          Behavior: Behavior normal          Thought Content:  Thought content normal          Judgment: Judgment normal            Additional Data:     Labs:    Results from last 7 days   Lab Units 04/03/21  0921   WBC Thousand/uL 8 87   HEMOGLOBIN g/dL 8 2*   HEMATOCRIT % 25 8*   PLATELETS Thousands/uL 79*   BANDS PCT % 1   LYMPHO PCT % 9*   MONO PCT % 13*   EOS PCT % 0     Results from last 7 days   Lab Units 04/04/21  1643 04/04/21  0414   SODIUM mmol/L 145 141   POTASSIUM mmol/L 3 0* 2 4*   CHLORIDE mmol/L 108 104   CO2 mmol/L 26 27   BUN mg/dL 23 29*   CREATININE mg/dL 1 21 1 16   ANION GAP mmol/L 11 10   CALCIUM mg/dL 9 0 8 4   ALBUMIN g/dL  --  2 7*   TOTAL BILIRUBIN mg/dL  --  11 29*   ALK PHOS U/L  --  83   ALT U/L  --  27   AST U/L  --  53*   GLUCOSE RANDOM mg/dL 109 88     Results from last 7 days   Lab Units 04/03/21  0921   INR  1 95*     Results from last 7 days   Lab Units 04/03/21  1947   POC GLUCOSE mg/dl 113         Results from last 7 days   Lab Units 04/04/21  1003 04/04/21  0414 04/04/21  0006 04/03/21  2103   LACTIC ACID mmol/L 2 9* 2 6* 2 5* 3 3*           * I Have Reviewed All Lab Data Listed Above  * Additional Pertinent Lab Tests Reviewed: [address] Admission Reviewed      Recent Cultures (last 7 days):     Results from last 7 days   Lab Units 04/04/21  1545 04/03/21  0939 04/03/21  0926 04/03/21  0921   BLOOD CULTURE   --   --  No Growth at 24 hrs  No Growth at 24 hrs     GRAM STAIN RESULT  Rare Polys*  1+ Gram positive cocci in pairs*  1+ Gram negative rods*  --   --   --    URINE CULTURE   --  >100,000 cfu/ml Gram Negative Rian Enteric Like*  --   --        Last 24 Hours Medication List:   Current Facility-Administered Medications   Medication Dose Route Frequency Provider Last Rate    acetaminophen  650 mg Oral Q6H PRN Merle Pérez MD      cefTRIAXone  1,000 mg Intravenous Q24H Merle Pérez MD 1,000 mg (04/04/21 1217)    cholecalciferol  400 Units Oral Daily Merle Pérez MD      lactulose  30 g Oral 4x Daily Merle Pérez MD      magnesium sulfate  2 g Intravenous Once Merle Pérez MD      nicotine  1 patch Transdermal Daily Merle Pérez MD      potassium chloride  20 mEq Intravenous Once Merle Pérez MD      potassium chloride  20 mEq Intravenous Once Merle Pérez MD      sodium chloride  75 mL/hr Intravenous Continuous Merle Pérez MD 75 mL/hr (04/05/21 0920)    vancomycin  10 mg/kg Intravenous Q12H Keyla Sandhu  mg (04/05/21 0216)        Today, Patient Was Seen By: Keyla Sandhu MD    ** Please Note: Dictation voice to text software may have been used in the creation of this document   **

## 2021-04-05 NOTE — ASSESSMENT & PLAN NOTE
Presented with worsening confusion, fogginess, mentions some tremors in hands as well  In the setting of advanced liver disease and HCC, also possible UTI and cellulitis  Likely acute metabolic encephalopathy due to infection associated with acute hepatic encephalopathy    Patient is slowly improving as far as mental status    She is still has generalized weakness    Plan:  Continue lactulose and titrate for 3 bowel movements per day  Provide IV fluid hydration  Treat cellulitis and UTI with ceftriaxone and vancomycin  Monitor clinically, mental status

## 2021-04-05 NOTE — DISCHARGE INSTR - OTHER ORDERS
Skin care plans:  1-Calazime to sacrum and buttocks wounds TID and PRN  2-Hydraguard to bilateral heel DTI, BID and PRN  3-Elevate heels to offload pressure  4-Ehob cushion when out of bed  5-Turn/repoisiton q2h or when medically stable for pressure re-distribution on skin  6-Moisturize skin daily with skin nourishing cream  7-B/L legs: Cleanse lower extremity wound with NSS, pat dry  Apply adaptic, then Maxorb with Silver, cover with ABD pad, wrap with Hever  Change daily and PRN for soilage or dislodgement  8-Allevyn foam to B/L elbow, syed right with w/P, syed left w/T for treatment, peel foam back and check skin integrity q-shift  Change q3d  9- B/L leg and left thigh dry black eschars- Keep dry, paint with betadine daily     10-P-500 ordered, please verify gets called in to [facility] ([facility])

## 2021-04-06 PROBLEM — T07.XXXA WOUNDS, MULTIPLE: Status: ACTIVE | Noted: 2021-01-01

## 2021-04-06 PROBLEM — E87.0 HYPERNATREMIA: Status: ACTIVE | Noted: 2021-01-01

## 2021-04-06 NOTE — ASSESSMENT & PLAN NOTE
In the setting of cirrhosis and HCC  Hemoglobin 7 this morning, patient refused blood transfusion  No active bleeding noted  Platelet count 21504  White blood cell count 5 6

## 2021-04-06 NOTE — ASSESSMENT & PLAN NOTE
In the setting of previous alcohol abuse  Currently she denies any current alcohol abuse  GI on board  Appreciate input

## 2021-04-06 NOTE — ASSESSMENT & PLAN NOTE
Workup includes  · CT head negative for any acute findings  · Ammonia level 72, continue with lactulose/rifaximin; patient ripped out her rectal tube  · Concern for bilateral lower extremity cellulitis associated with multiple wounds; continue with ceftriaxone/vancomycin  · Blood cultures x2 negative/C diff negative  · Hypernatremia with sodium 153, IV fluids switch to D5W at 75 cc/hour, monitor sodium closely  · Hemoglobin 7, monitoring H&H closely; patient refused blood transfusion this morning  · GI on board  Appreciate input  · Requested ICU to evaluate patient given worsening mentation

## 2021-04-06 NOTE — ASSESSMENT & PLAN NOTE
Currently 1 9 on admission, 0 9 today  Likely in the setting of sepsis, poor intake, liver disease  Received Albumin 50g x 1  Continue IV fluids for today  Lasix/Aldactone on hold currently

## 2021-04-06 NOTE — CONSULTS
[facility]  Consult Note - Patsy Chris 1960, 63 y o  female MRN: [id number]  Unit/Bed#: -01 Encounter: [id number]  Primary Care Provider: Fidel Sanchez DO   Date and time admitted to hospital: 4/3/2021  9:00 AM    Wounds, multiple  Assessment & Plan  68-year-old female with EtOH liver cirrhosis, hepatocellular carcinoma who is admitted with hepatic encephalopathy  She is found to have multiple bilateral wounds over both lower extremities, buttocks and deep tissue injuries to both heels  Vascular surgery is asked to evaluate her for possible vascular component  WANDA R 1 3/154/98, L 1 1/128/99; there are triphasic waveforms at the ankles bilaterally  Blood cultures negative times 48 hours    Wound cultures with Gram-negative rods     Urine culture significant for E coli infection    A/P Multiple bilateral wounds over arms, buttocks, legs and heels    -ABIs are normal and her vascular exam is essentially normal with 2+ femoral, popliteal and DP pulses  -no definite indication for vascular intervention at this time  -recommend wound care and consultation and local wound care  -antibiotics as per primary team  -treatment of underlying medical problems per SLIM  -Case reviewed with Dr Santi Hernandez and our thoughts and recommendations were communicated with the internal medicine team  -no acute intervention required, vascular will sign off but return at your request            Consulting Service: SLIM    Reason for consultation:  Bilateral wounds evaluate for vascular etiology    HPI: Patsy Chris is a 63 y o  female with alcoholic liver cirrhosis, hepatocellular carcinoma, hepatitis-C who was admitted to the hospital for worsened confusion due to recurrent hepatic encephalopathy  Speech is very slow  She does not seem to answer questions appropriately  She is unable to provide any useful history at this time    Vascular surgery is asked to evaluate her due to multiple wounds over arms, buttocks and legs  The patient is unable to express how she acquired wounds  However, the wounds appear to be pressure and venous related  She was seen by wound care nursing and is receiving local wound care with Adaptic and Maxorb with silver  Review of Systems:  Patient answers basic yes or no questions  She denies any chest pain  She denies arm or leg pain  Review of systems is largely unobtainable due to underlying confusion        Past Medical History:  Past Medical History:   Diagnosis Date    Arthritis     Cirrhosis ([address] )     Extremity pain     BLE    Hepatitis C     Joint pain     Bilateral Shoulders    Liver disease     Low back pain        Past Surgical History:  Past Surgical History:   Procedure Laterality Date    CT GUIDED AND MONITORING PARENCHYMAL TISSUE ABLATION  7/28/2020    ECTOPIC PREGNANCY SURGERY      MOUTH SURGERY      TONSILLECTOMY         Social History:  Social History     Substance and Sexual Activity   Alcohol Use Not Currently    Frequency: Monthly or less    Drinks per session: 1 or 2     Social History     Substance and Sexual Activity   Drug Use Yes    Frequency: 7 0 times per week    Types: Marijuana    Comment: everyday     Social History     Tobacco Use   Smoking Status Current Every Day Smoker    Packs/day: 0 50   Smokeless Tobacco Never Used       Family History:  Family History   Problem Relation Age of Onset    Alcohol abuse Mother     Heart disease Father        Allergies:  No Known Allergies    Medications:  Current Facility-Administered Medications   Medication Dose Route Frequency    acetaminophen (TYLENOL) tablet 650 mg  650 mg Oral Q6H PRN    ceftriaxone (ROCEPHIN) 1 g/50 mL in dextrose IVPB  1,000 mg Intravenous Q24H    cholecalciferol (VITAMIN D3) tablet 400 Units  400 Units Oral Daily    dextrose 5 % infusion  75 mL/hr Intravenous Continuous    lactulose oral solution 30 g  30 g Oral 4x Daily    nicotine (NICODERM CQ) 14 mg/24hr TD 24 hr patch 1 patch  1 patch Transdermal Daily    rifaximin (XIFAXAN) tablet 550 mg  550 mg Oral Q12H [facility]    vancomycin (VANCOCIN) IVPB (premix in dextrose) 750 mg 150 mL  12 5 mg/kg Intravenous Q12H       Vitals:  /59 (BP Location: Left arm)   Pulse 84   Temp 98 1 °F (36 7 °C) (Oral)   Resp 16   Ht 5' 8" (1 727 m)   Wt 63 9 kg (140 lb 14 oz)   SpO2 94%   BMI 21 42 kg/m²     I/Os:  I/O last 24 hours:   In: 195 [P O :195]  Out: 600 [Stool:600]    Lab Results and Cultures:   Lab Results   Component Value Date    WBC 5 60 04/06/2021    HGB 7 9 (L) 04/06/2021    HCT 24 1 (L) 04/06/2021     (H) 04/06/2021    PLT 50 (L) 04/06/2021     Lab Results   Component Value Date    CALCIUM 8 8 04/06/2021    K 3 4 (L) 04/06/2021    CO2 26 04/06/2021     (H) 04/06/2021    BUN 16 04/06/2021    CREATININE 0 99 04/06/2021     Lab Results   Component Value Date    INR 2 08 (H) 04/06/2021    INR 1 95 (H) 04/03/2021    INR 2 61 (H) 03/16/2021    PROTIME 22 4 (H) 04/06/2021    PROTIME 22 5 (H) 04/03/2021    PROTIME 28 2 (H) 03/16/2021       Lipid Panel: No results found for: CHOL,     Blood Culture:   Lab Results   Component Value Date    BLOODCX No Growth at 72 hrs  04/03/2021   ,   Urinalysis:   Lab Results   Component Value Date    COLORU [place] 04/03/2021    CLARITYU Slightly Cloudy 04/03/2021    SPECGRAV 1 020 04/03/2021    PHUR 6 0 04/03/2021    LEUKOCYTESUR Moderate (A) 04/03/2021    NITRITE Positive (A) 04/03/2021    GLUCOSEU 100 (1/10%) (A) 04/03/2021    KETONESU Trace (A) 04/03/2021    BILIRUBINUR Large (A) 04/03/2021    BLOODU Trace-Intact (A) 04/03/2021   ,   Urine Culture:   Lab Results   Component Value Date    URINECX >100,000 cfu/ml Escherichia coli (A) 04/03/2021   ,   Wound Culure:   Lab Results   Component Value Date    WOUNDCULT 4+ Growth of Escherichia coli (A) 04/04/2021    WOUNDCULT 4+ Growth of Acinetobacter baumannii (A) 04/04/2021    WOUNDCULT 4+ Growth of  04/04/2021 Imaging:      WANDA 4/5/21  FINDINGS:     Segment       Rig             Left                            P  W            P  W            Ant  Tibial   150             129               Post  Tibial  121             123               Ankle         150  Triphasic  129  Triphasic    Metatarsal    154             128               Great Toe      98              99                        CONCLUSION:  Impression  RIGHT LOWER LIMB  Ankle/Brachial Index: 1 3  which is in the normal range  PPG/PVR Tracings are normal  Triphasic waveforms at the ankle  Metatarsal Pressure 154 mmHg  Great Toe Pressure: 98 mmHg, which is within the healing range  LEFT LOWER LIMB  Ankle/Brachial Index: 1 1 which is in the normal range  PPG/PVR Tracings are normal  Triphasic waveforms at the ankle  Metatarsal Pressure 128 mmHg  Great Toe Pressure: 99 mmHg, which is within the healing range  CT of the head 4/3/21: No intracranial mass, mass effect or midline shift  No CT signs of acute infarction  No acute parenchymal hemorrhage  Physical Exam:    General appearance:  Ill-appearing, jaundice female  Laying in bed and comfortable in no distress  She is awake and answer questions but slurring of speech and answers seem inappropriate or irrelevant question  Skin:  Extremities are warm to touch  There are multiple wounds over the upper and lower extremities  Neurologic: confused  Head: Normocephalic, without obvious abnormality, atraumatic  Eyes: conjunctivae/corneas clear  PERRL, EOM's intact  Fundi benign  Neck: no adenopathy, no carotid bruit, no JVD and supple, symmetrical, trachea midline  Back: symmetric, no curvature  ROM normal  No CVA tenderness  Lungs: clear to auscultation bilaterally  Chest wall: no tenderness  Heart: regular rate and rhythm, S1, S2 normal, no murmur, click, rub or gallop  Abdomen: soft, non-tender; bowel sounds normal; no masses,  no organomegaly  Extremities: images below    There are 2+ femoral and DP pulses present        Hamlin catheter and Rectal tube in place          R        R        L        L      Pulse exam:  Radial: Right: 2+ Left[de-identified] 2+  Femoral: Right: 2+ Left: 2+  Popliteal: Right: 2+ Left: non-palpable  DP: Right: 2+ Left: 2+        Kiya Noble PA-C  4/6/2021  [place] [id number] [facility]

## 2021-04-06 NOTE — PROGRESS NOTES
Vancomycin IV Pharmacy-to-Dose Consultation    Saroj Velarde is a 63 y o  female who is currently receiving Vancomycin IV with management by the Pharmacy Consult service  Assessment/Plan:  The patient was reviewed  Renal function is stable and no signs or symptoms of nephrotoxicity and/or infusion reactions were documented in the chart  Based on todays assessment, continue current vancomycin (day # 3) dosing of 750 mg IV q 12 h, with a plan for trough to be drawn at 0130 on 4/8/21  Trough came back today at 14 9, but was drawn early  Dosing was kept the same, and trough will be drawn again at time and date stated above  We will continue to follow the patients culture results and clinical progress daily      Sil Alexander, Pharmacist

## 2021-04-06 NOTE — CONSULTS
Consultation - [facility]) [facility]  Jorge Da Silva 63 y o  female MRN: [id number]  Unit/Bed#: -01 Encounter: [id number]         Reason for Consult / Principal Problem:  Encephalopathy, alcoholic cirrhosis    HPI:  Martell Moreno is a 61-year-old female with history of alcohol/HCV cirrhosis untreated complicated by [address]  treated by previous percutaneous ablation  Patient presented to the emergency room on 04/03 with altered mental status  On admission, patient's LFTs were as follows:  , ALT 40, alkaline phosphatase 112, and total bilirubin 16 58  She was found to have several wounds on her body positive for E coli and Acinectobacter baumannii  We were consulted today as patient continues to be encephalopathic  Fortunately, some of her LFTs have improved, including her bilirubin down to 8  10  When I entered the room this afternoon, patient with green feces on her gown and had  She is unable to provide much history as she is only oriented to person  EGD and colonoscopy was last done in 2019  EGD without evidence of varices at that time  She was recommended a repeat in 2020  Colonoscopy with 2 polyps removed, sigmoid diverticulosis  She was due for 5 year recall  Review of Systems:  Unable to accurately obtain given patient's altered mental status        Historical Information   Past Medical History:   Diagnosis Date    Arthritis     Cirrhosis ([address] )     Extremity pain     BLE    Hepatitis C     Joint pain     Bilateral Shoulders    Liver disease     Low back pain      Past Surgical History:   Procedure Laterality Date    CT GUIDED AND MONITORING PARENCHYMAL TISSUE ABLATION  7/28/2020    ECTOPIC PREGNANCY SURGERY      MOUTH SURGERY      TONSILLECTOMY       Social History   Social History     Substance and Sexual Activity   Alcohol Use Not Currently    Frequency: Monthly or less    Drinks per session: 1 or 2     Social History     Substance and Sexual Activity   Drug Use Yes  Frequency: 7 0 times per week    Types: Marijuana    Comment: everyday     Social History     Tobacco Use   Smoking Status Current Every Day Smoker    Packs/day: 0 50   Smokeless Tobacco Never Used     Family History   Problem Relation Age of Onset    Alcohol abuse Mother     Heart disease Father         Meds/Allergies     Current Facility-Administered Medications   Medication Dose Route Frequency    acetaminophen (TYLENOL) tablet 650 mg  650 mg Oral Q6H PRN    ceftriaxone (ROCEPHIN) 1 g/50 mL in dextrose IVPB  1,000 mg Intravenous Q24H    cholecalciferol (VITAMIN D3) tablet 400 Units  400 Units Oral Daily    dextrose 5 % infusion  75 mL/hr Intravenous Continuous    lactulose oral solution 30 g  30 g Oral 4x Daily    nicotine (NICODERM CQ) 14 mg/24hr TD 24 hr patch 1 patch  1 patch Transdermal Daily    vancomycin (VANCOCIN) IVPB (premix in dextrose) 750 mg 150 mL  12 5 mg/kg Intravenous Q12H       No Known Allergies      Objective     Blood pressure 122/64, pulse 88, temperature 98 5 °F (36 9 °C), temperature source Oral, resp  rate 18, height 5' 8" (1 727 m), weight 63 9 kg (140 lb 14 oz), SpO2 95 %  Intake/Output Summary (Last 24 hours) at 4/6/2021 1530  Last data filed at 4/6/2021 1325  Gross per 24 hour   Intake 275 ml   Output 600 ml   Net -325 ml         PHYSICAL EXAM:      General Appearance:   Alert and oriented to person  Cooperative, and in no respiratory distress  Positive asterixis   HEENT:   Normocephalic, atraumatic, scleral icterus noted      Neck:  Supple, symmetrical, trachea midline   Lungs:   Clear to auscultation bilaterally; no rales, rhonchi or wheezing; respirations unlabored    Heart[de-identified]   S1 and S2 normal; regular rate and rhythm; no murmur, rub, or gallop     Abdomen:   Soft, non-tender, non-distended; normal bowel sounds; no masses, no organomegaly    Genitalia:   Deferred    Rectal:   Deferred    Extremities:  No cyanosis, clubbing or edema    Pulses:  2+ and symmetric all extremities    Skin:  Superficial ulcerations throughout her lower legs with black eschar     Lymph nodes:  No palpable cervical or supraclavicular lymphadenopathy        Lab Results:   Results from last 7 days   Lab Units 04/06/21  0503 04/05/21  1309   WBC Thousand/uL 5 60 5 95   HEMOGLOBIN g/dL 7 0* 7 5*   HEMATOCRIT % 21 6* 23 3*   PLATELETS Thousands/uL 50* 55*   NEUTROS PCT %  --  64   LYMPHS PCT %  --  20   MONOS PCT %  --  12   EOS PCT %  --  2     Results from last 7 days   Lab Units 04/06/21  1258 04/06/21  0503   POTASSIUM mmol/L 3 4* 2 9*   CHLORIDE mmol/L  --  120*   CO2 mmol/L  --  26   BUN mg/dL  --  16   CREATININE mg/dL  --  0 99   CALCIUM mg/dL  --  8 8   ALK PHOS U/L  --  84   ALT U/L  --  26   AST U/L  --  46*     Results from last 7 days   Lab Units 04/03/21  0921   INR  1 95*           Imaging Studies: I have personally reviewed pertinent imaging studies  Ct Head Without Contrast    Result Date: 4/3/2021  Impression: No acute intracranial abnormality  Workstation performed: [id number]       ASSESSMENT and PLAN:      1) Hepatitis-C cirrhosis complicated by recent [address]  status post ablation in 2020 here again with hepatic encephalopathy - Unable to calculate recent MELD as patient has not had INR drawn in several days  Patient presenting with hepatic encephalopathy and wounds throughout her lower leg with positives cultures  She had recent HCV RNA quantitative showing positive chronic HCV that has been untreated  She has history of HCC, normally follows with Dr Weston Ling  During her last admission here in mid March, she had attempted repeat MRCP that was limited  AFP at that time was 2 9  Patient also has history of prior alcohol abuse    It is unclear if she is still drinking   - CMP and INR daily  - Continue treatment of cellulitis, she is currently on ceftriaxone and vancomycin  - Continue lactulose as you have been doing with a goal of 3-4 bowel movements daily  - Add Xifaxan twice daily  - Right upper quadrant ultrasound with Doppler, ultimately I think she should have a repeat MRCP  - Patient with history of noncompliance, prognosis is guarded  - Unfortunately, there is no family listed in her chart to further discuss care  - If she continues to have persistent encephalopathy, patient should be transferred to ICU or step-down    2) Chronic microcytic anemia - No melena or rectal bleeding seen  Likely her anemia is multifactorial   Continue to transfuse as necessary  The patient was seen and examined by Dr Isabella Salmeron, all lopez medical decisions were made with Dr Isabella Salmeron  Thank you for allowing us to participate in the care of this pleasant patient  We will follow up with you closely

## 2021-04-06 NOTE — ASSESSMENT & PLAN NOTE
· Continue with ceftriaxone/vancomycin  · Wound cultures/MRSA PCR pending  · Requested vascular surgery evaluation  · Wound Care on board

## 2021-04-06 NOTE — ASSESSMENT & PLAN NOTE
Urinalysis suggestive of UTI, Hamlin catheter placed in the ED  Continue ceftriaxone  Urine culture growing Gram-negative rods, enteric lack, waiting for final speciation

## 2021-04-06 NOTE — PLAN OF CARE
Problem: PHYSICAL THERAPY ADULT  Goal: Performs mobility at highest level of function for planned discharge setting  See evaluation for individualized goals  Description: Treatment/Interventions: LE strengthening/ROM, Therapeutic exercise, Endurance training, Cognitive reorientation, Patient/family training, Equipment eval/education, Bed mobility, Spoke to nursing, Spoke to case management  Equipment Recommended: (TBD pending further mobility)       See flowsheet documentation for full assessment, interventions and recommendations  Outcome: Not Progressing  Note: Prognosis: Fair  Problem List: Decreased strength, Decreased endurance, Impaired balance, Decreased mobility, Decreased cognition, Decreased safety awareness  Assessment: Pt seen for PT treatment session this date with interventions consisting of Therapeutic exercise consisting of: AROM and AAROM 20 reps B LE in supine position  Pt agreeable to PT treatment session upon arrival, pt found supine in bed w/ HOB elevated, in no apparent distress and responsive  In comparison to previous session, pt with no improvements as evidenced by pt limited by cognition  Post session: pt returned BTB, bed alarm engaged, all needs in reach and RN notified of session findings/recommendations  Continue to recommend STR at time of d/c in order to maximize pt's functional independence and safety w/ mobility  Pt continues to be functioning below baseline level, and remains limited 2* factors listed above and including decreased functional mobility  PT will continue to see pt while here in order to address the deficits listed above and provide interventions consistent w/ POC in effort to achieve STGs  Barriers to Discharge: Other (Comment)(unknown barriers at this time d/t limited assessment)     PT Discharge Recommendation: Post-Acute Rehabilitation Services     PT - OK to Discharge: No    See flowsheet documentation for full assessment

## 2021-04-06 NOTE — PHYSICAL THERAPY NOTE
04/06/21 1500   PT Last Visit   PT Visit Date 04/06/21   Note Type   Note Type Treatment   Pain Assessment   Pain Assessment Tool Pain Assessment not indicated - pt denies pain   Pain Score No Pain   Restrictions/Precautions   Weight Bearing Precautions Per Order No   Other Precautions Cognitive; Bed Alarm;Multiple lines; Fall Risk   General   Chart Reviewed Yes   Response to Previous Treatment Patient unable to report, no changes reported from family or staff   Family/Caregiver Present No   Cognition   Overall Cognitive Status Impaired   Arousal/Participation Alert   Attention Difficulty attending to directions   Orientation Level Oriented to person;Oriented to time;Disoriented to place; Disoriented to situation   Memory Decreased recall of biographical information;Decreased short term memory;Decreased recall of recent events;Decreased recall of precautions   Following Commands Follows one step commands with increased time or repetition   Comments pt agreeable to PT session   Exercises   Quad Sets Supine;20 reps;AROM; Bilateral   Heelslides Supine;20 reps;AAROM; Bilateral   Hip Flexion Supine;20 reps;AAROM; Bilateral   Hip Abduction Supine;20 reps;AAROM; Bilateral   Hip Adduction Supine;20 reps;AAROM; Bilateral   Knee AROM Short Arc Quad Supine;20 reps;AAROM; Bilateral   Ankle Pumps Supine;20 reps;AAROM; Bilateral   Assessment   Prognosis Fair   Problem List Decreased strength;Decreased endurance; Impaired balance;Decreased mobility; Decreased cognition;Decreased safety awareness   Assessment Pt seen for PT treatment session this date with interventions consisting of Therapeutic exercise consisting of: AROM and AAROM 20 reps B LE in supine position  Pt agreeable to PT treatment session upon arrival, pt found supine in bed w/ HOB elevated, in no apparent distress and responsive  In comparison to previous session, pt with no improvements as evidenced by pt limited by cognition   Post session: pt returned BTB, bed alarm engaged, all needs in reach and RN notified of session findings/recommendations  Continue to recommend STR at time of d/c in order to maximize pt's functional independence and safety w/ mobility  Pt continues to be functioning below baseline level, and remains limited 2* factors listed above and including decreased functional mobility  PT will continue to see pt while here in order to address the deficits listed above and provide interventions consistent w/ POC in effort to achieve STGs  Goals   PT Treatment Day 1   Plan   Treatment/Interventions Functional transfer training;LE strengthening/ROM; Therapeutic exercise; Endurance training;Cognitive reorientation; Bed mobility;Gait training   Progress No functional improvements   PT Frequency Other (Comment)  (3-5x/wk)   Recommendation   PT Discharge Recommendation Post-Acute Rehabilitation Services   PT - OK to Discharge No   Additional Comments upon conclusion, pt resting in bed with PCA present   Additional Comments 2 The patient's AM-PAC Basic Mobility Inpatient Short Form Low Function Raw Score 15 , Standardized Score is 23 9  A standardized score less 42 9 suggests the patient may benefit from discharge to post-acute rehab services  Please also refer to the recommendation of the Physical Therapist for safe discharge planning     AM-PAC Basic Mobility Inpatient   Turning in Bed Without Bedrails 2   Lying on Back to Sitting on Edge of Flat Bed 2   Moving Bed to Chair 2   Standing Up From Chair 1   Walk in Room 1   Climb 3-5 Stairs 1   Basic Mobility Inpatient Raw Score 9   Turning Head Towards Sound 4   Follow Simple Instructions 2   Low Function Basic Mobility Raw Score 15   Low Function Basic Mobility Standardized Score 23 9

## 2021-04-06 NOTE — ASSESSMENT & PLAN NOTE
· Sodium 153, switched to D5W at 75 cc/hour, monitor sodium every 8 hours  · Potassium 2 9, receiving repletion, monitor potassium closely as well

## 2021-04-06 NOTE — QUICK NOTE
Asked to evaluate patient for encephalopathy  Patient is a 63year old female with a past medical history of acoholic/HCV cirrhosis and HCC  She presented to the emergency department on 4/3 for evaluation of altered mental status  Critical care was asked to evaluate the patient for worsening encephalopathy as the patient has pulled out her olea catheter and her rectal tube  Upon evaluation, the patient is awake and alert and she is pleasantly confused  She joyfully expressed to me that she was getting  today  She is able to follow commands and is easily redirectable  She is 94 % on room air and is adequately maintaining her airway and having conversation in full sentences  She remains hemodynamically stable  PHYSICAL EXAM  General :   Chronically ill appearing female, jaundice, awake and alert, oriented to self, disoriented to   Neuro:   GCS= 14  CN 2-12 intact  Non Focal  HEENT:  Normocephalic, atraumatic, Pupils 3 brisk bilat  PERRLA, EOMI, hearing grossly intact  Symmetrical facial expressions without droop or slurred speech  Tongue midline without fasiculations  Neck:  No JVD, FROM, No masses/adenopathy  Back:   Symmetrical, atruamatic, spinous process pain free on palpation  Cardiovascular:   No heaves,lifts,thrills  S1/S2 Nessa@PowerPlan No noted MRGC  Pulmonary:   Symmetrical expansion of chest  Resp even & unlabored  GI :   Abd SNT + BSX4 quads  No palp/pulsitile masses  :  N/A  Musculoskeletal:  Symmetrical  Multiples cellulitic lesions  FROM in UE & LE  Muscle strength 5/5  No lymphadenopathy  Extrem warm to touch  DTR grossly intact  Brachial/radial/femoral/popliteal/pedal/posterior tibial pulses +2  No lesions noted  CN 2-12 grossly intact  Sensation and motor function intact  Assessment and Plan    Encephalopathy is likely multifactorial secondary to hypernatremia, infection    However, it is largely hepatic encephalopathy     Continue with lactulose and rifaximin  If patient begins to refuse oral lactulose, place nasogastric tube for administration or replace rectal tube for administration of lactulose enemas   GI following, appreciate recommendations   Continue neuro checks  Delirium precautions   Will need gentle correction of her hypernatremia  Continue broad spectrum antibiotic coverage for cellulitis     She does not have critical care needs at this time  Maintain MS/tele level of care

## 2021-04-06 NOTE — PROGRESS NOTES
[facility]  Progress Note - Ryan Prince 1960, 63 y o  female MRN: [id number]  Unit/Bed#: MS [place] Encounter: [id number]  Primary Care Provider: Hao Slater DO   Date and time admitted to hospital: 4/3/2021  9:00 AM    * Acute encephalopathy  Assessment & Plan  Workup includes  · CT head negative for any acute findings  · Ammonia level 72, continue with lactulose/rifaximin; patient ripped out her rectal tube  · Concern for bilateral lower extremity cellulitis associated with multiple wounds; continue with ceftriaxone/vancomycin  · Blood cultures x2 negative/C diff negative  · Hypernatremia with sodium 153, IV fluids switch to D5W at 75 cc/hour, monitor sodium closely  · Hemoglobin 7, monitoring H&H closely; patient refused blood transfusion this morning  · GI on board  Appreciate input  · Requested ICU to evaluate patient given worsening mentation  [address]osis ([facility] [address] )  Assessment & Plan  In the setting of previous alcohol abuse  Currently she denies any current alcohol abuse  GI on board  Appreciate input  Hepatocellular carcinoma [facility])  Assessment & Plan  She does have a history of [address]  and previously underwent ablation  Appreciate GI input  Cellulitis  Assessment & Plan  · Continue with ceftriaxone/vancomycin  · Wound cultures/MRSA PCR pending  · Requested vascular surgery evaluation  · Wound Care on board  Wounds, multiple  Assessment & Plan  · Wound care  Pancytopenia ([address] )  Assessment & Plan  In the setting of cirrhosis and [address]  Hemoglobin 7 this morning, patient refused blood transfusion  No active bleeding noted  Platelet count 44040  White blood cell count 5 6  DARRYL (acute kidney injury) ([address] )  Assessment & Plan  Currently 1 9 on admission, 0 9 today  Likely in the setting of sepsis, poor intake, liver disease  Received Albumin 50g x 1  Continue IV fluids for today  Lasix/Aldactone on hold currently        Hypernatremia  Assessment & Plan  · Sodium 153, switched to D5W at 75 cc/hour, monitor sodium every 8 hours  · Potassium 2 9, receiving repletion, monitor potassium closely as well  UTI (urinary tract infection)  Assessment & Plan  Urinalysis suggestive of UTI, Hamlin catheter placed in the ED  Continue ceftriaxone  Urine culture growing Gram-negative rods, enteric lack, waiting for final speciation      VTE Pharmacologic Prophylaxis:   Pharmacologic: Pharmacologic VTE Prophylaxis contraindicated due to liver disease  Mechanical VTE Prophylaxis in Place: No    Patient Centered Rounds: I have performed bedside rounds with nursing staff today  Discussions with Specialists or Other Care Team Provider: GI/ Vascular / ICU    Time Spent for Care: 45 minutes  More than 50% of total time spent on counseling and coordination of care as described above  Current Length of Stay: 3 day(s)    Current Patient Status: Inpatient   Certification Statement: The patient will continue to require additional inpatient hospital stay due to CONFUSION    Discharge Plan: unclear    Code Status: Level 1 - Full Code      Subjective: This morning patient is awake and oriented to time and place  She however was hallucinating and kept stating she saw something on the ceiling all  Beyond that patient denied abdominal pain nausea vomiting fevers chills sweats  Denied chest pain shortness of breath  On speaking with the patient that her hemoglobin was 7 and I would recommend blood transfusion, patient refused and stated that she wanted to wait  She denied any recent history of alcohol abuse  Objective:     Vitals:   Temp (24hrs), Av 5 °F (36 9 °C), Min:98 5 °F (36 9 °C), Max:98 5 °F (36 9 °C)    Temp:  [98 5 °F (36 9 °C)] 98 5 °F (36 9 °C)  HR:  [84-88] 84  Resp:  [18] 18  BP: (106-122)/(62-64) 122/64  SpO2:  [94 %-95 %] 94 %  Body mass index is 21 42 kg/m²  Input and Output Summary (last 24 hours):        Intake/Output Summary (Last 24 hours) at 4/6/2021 1713  Last data filed at 4/6/2021 1325  Gross per 24 hour   Intake 275 ml   Output 600 ml   Net -325 ml       Physical Exam:     Physical Exam  Constitutional:       Comments: Icteric  Cachectic in appearance  Oriented x2 to place and time  Not in any respiratory distress  HENT:      Mouth/Throat:      Mouth: Mucous membranes are moist       Pharynx: Oropharynx is clear  Cardiovascular:      Rate and Rhythm: Normal rate and regular rhythm  Pulmonary:      Effort: Pulmonary effort is normal  No respiratory distress  Breath sounds: Normal breath sounds  No wheezing or rales  Abdominal:      General: Abdomen is flat  Bowel sounds are normal  There is no distension  Palpations: Abdomen is soft  Tenderness: There is no abdominal tenderness  Skin:     Coloration: Skin is jaundiced     Neurological:      Comments: Oriented x2  No speech difficulty  Strength 4+/5 in all 4 extremities  Tremulous         Additional Data:     Labs:    Results from last 7 days   Lab Units 04/06/21  0503 04/05/21  1309 04/03/21  0921   WBC Thousand/uL 5 60 5 95 8 87   HEMOGLOBIN g/dL 7 0* 7 5* 8 2*   HEMATOCRIT % 21 6* 23 3* 25 8*   PLATELETS Thousands/uL 50* 55* 79*   BANDS PCT %  --   --  1   NEUTROS PCT %  --  64  --    LYMPHS PCT %  --  20  --    LYMPHO PCT %  --   --  9*   MONOS PCT %  --  12  --    MONO PCT %  --   --  13*   EOS PCT %  --  2 0     Results from last 7 days   Lab Units 04/06/21  1258 04/06/21  0503   SODIUM mmol/L  --  153*   POTASSIUM mmol/L 3 4* 2 9*   CHLORIDE mmol/L  --  120*   CO2 mmol/L  --  26   BUN mg/dL  --  16   CREATININE mg/dL  --  0 99   ANION GAP mmol/L  --  7   CALCIUM mg/dL  --  8 8   ALBUMIN g/dL  --  2 3*   TOTAL BILIRUBIN mg/dL  --  7 46*   ALK PHOS U/L  --  84   ALT U/L  --  26   AST U/L  --  46*   GLUCOSE RANDOM mg/dL  --  121     Results from last 7 days   Lab Units 04/03/21  0921   INR  1 95*     Results from last 7 days   Lab Units 04/03/21  1947   POC GLUCOSE mg/dl 113         Results from last 7 days   Lab Units 04/04/21  1003 04/04/21  0414 04/04/21  0006 04/03/21  2103   LACTIC ACID mmol/L 2 9* 2 6* 2 5* 3 3*           * I Have Reviewed All Lab Data Listed Above  * Additional Pertinent Lab Tests Reviewed: All Labs Within Last 24 Hours Reviewed    Recent Cultures (last 7 days):     Results from last 7 days   Lab Units 04/04/21  1545 04/04/21  0011 04/03/21  0939 04/03/21  0926 04/03/21  0921   BLOOD CULTURE   --   --   --  No Growth at 72 hrs  No Growth at 72 hrs  GRAM STAIN RESULT  Rare Polys*  1+ Gram positive cocci in pairs*  1+ Gram negative rods*  --   --   --   --    URINE CULTURE   --   --  >100,000 cfu/ml Escherichia coli*  --   --    WOUND CULTURE  4+ Growth of Escherichia coli*  4+ Growth of Acinetobacter baumannii*  4+ Growth of   --   --   --   --    C DIFF TOXIN B BY PCR   --  Negative  --   --   --        Last 24 Hours Medication List:   Current Facility-Administered Medications   Medication Dose Route Frequency Provider Last Rate    acetaminophen  650 mg Oral Q6H PRN Yunior Arnold MD      cefTRIAXone  1,000 mg Intravenous Q24H Yunior Arnold MD 1,000 mg (04/06/21 0959)    cholecalciferol  400 Units Oral Daily Yunior Arnold MD      dextrose  75 mL/hr Intravenous Continuous Rc Will MD 75 mL/hr (04/06/21 1243)    lactulose  30 g Oral 4x Daily Yunior Arnold MD      nicotine  1 patch Transdermal Daily Yunior Arnold MD      rifaximin  550 mg Oral Q12H [address] Cyrena Merlin, PA-C      vancomycin  12 5 mg/kg Intravenous Q12H Rc Will  mg (04/06/21 1545)        Today, Patient Was Seen By: LUIS M Campuzano      ** Please Note: Dictation voice to text software may have been used in the creation of this document   **

## 2021-04-06 NOTE — ASSESSMENT & PLAN NOTE
74-year-old female with EtOH liver cirrhosis, hepatocellular carcinoma who is admitted with hepatic encephalopathy  She is found to have multiple bilateral wounds over both lower extremities, buttocks and deep tissue injuries to both heels  Vascular surgery is asked to evaluate her for possible vascular component  WANDA R 1 3/154/98, L 1 1/128/99; there are triphasic waveforms at the ankles bilaterally      Blood cultures negative times 48 hours    Wound cultures with Gram-negative rods     Urine culture significant for E coli infection    A/P Multiple bilateral wounds over arms, buttocks, legs and heels    -ABIs are normal and her vascular exam is essentially normal with 2+ femoral, popliteal and DP pulses  -no definite indication for vascular intervention at this time  -recommend wound care and consultation and local wound care  -antibiotics as per primary team  -treatment of underlying medical problems per SLIM  -Case reviewed with Dr Arden Robbins and our thoughts and recommendations were communicated with the internal medicine team  -no acute intervention required, vascular will sign off but return at your request

## 2021-04-06 NOTE — PLAN OF CARE
Problem: Prexisting or High Potential for Compromised Skin Integrity  Goal: Skin integrity is maintained or improved  Description: INTERVENTIONS:  - Identify patients at risk for skin breakdown  - Assess and monitor skin integrity  - Assess and monitor nutrition and hydration status  - Monitor labs   - Assess for incontinence   - Turn and reposition patient  - Assist with mobility/ambulation  - Relieve pressure over bony prominences  - Avoid friction and shearing  - Provide appropriate hygiene as needed including keeping skin clean and dry  - Evaluate need for skin moisturizer/barrier cream  - Collaborate with interdisciplinary team   - Patient/family teaching  - Consider wound care consult   Outcome: Progressing     Problem: Potential for Falls  Goal: Patient will remain free of falls  Description: INTERVENTIONS:  - Assess patient frequently for physical needs  -  Identify cognitive and physical deficits and behaviors that affect risk of falls  -  [place] fall precautions as indicated by assessment   - Educate patient/family on patient safety including physical limitations  - Instruct patient to call for assistance with activity based on assessment  - Modify environment to reduce risk of injury  - Consider OT/PT consult to assist with strengthening/mobility  Outcome: Progressing     Problem: Nutrition/Hydration-ADULT  Goal: Nutrient/Hydration intake appropriate for improving, restoring or maintaining nutritional needs  Description: Monitor and assess patient's nutrition/hydration status for malnutrition  Collaborate with interdisciplinary team and initiate plan and interventions as ordered  Monitor patient's weight and dietary intake as ordered or per policy  Utilize nutrition screening tool and intervene as necessary  Determine patient's food preferences and provide high-protein, high-caloric foods as appropriate       INTERVENTIONS:  - Monitor oral intake, urinary output, labs, and treatment plans  - Assess nutrition and hydration status and recommend course of action  - Evaluate amount of meals eaten  - Assist patient with eating if necessary   - Allow adequate time for meals  - Recommend/ encourage appropriate diets, oral nutritional supplements, and vitamin/mineral supplements  - Order, calculate, and assess calorie counts as needed  - Recommend, monitor, and adjust tube feedings and TPN/PPN based on assessed needs  - Assess need for intravenous fluids  - Provide specific nutrition/hydration education as appropriate  - Include patient/family/caregiver in decisions related to nutrition  Outcome: Progressing

## 2021-04-07 NOTE — ASSESSMENT & PLAN NOTE
In the setting of cirrhosis and HCC  Hemoglobin 8 1 this morning  No active bleeding noted  Platelet count 32001  White blood cell count 6 1

## 2021-04-07 NOTE — PROGRESS NOTES
Progress Note - Saroj Velarde 63 y o  female MRN: [id number]    Unit/Bed#: -01 Encounter: [id number]        Subjective:     Patient is alert and oriented to person and time  She was agreeable to have rectal tube replaced  She reports that she feels confused  ROS: As noted in the HPI, otherwise all others negative  Objective:     Vitals: Blood pressure 116/62, pulse 84, temperature 98 3 °F (36 8 °C), resp  rate 16, height 5' 8" (1 727 m), weight 63 9 kg (140 lb 14 oz), SpO2 97 %  ,Body mass index is 21 42 kg/m²  Intake/Output Summary (Last 24 hours) at 4/7/2021 1122  Last data filed at 4/7/2021 0949  Gross per 24 hour   Intake 1900 5 ml   Output 725 ml   Net 1175 5 ml       Physical Exam:     General Appearance: Alert and oriented x 3  In no respiratory distress  Positive asterixis  Lungs: Clear to auscultation bilaterally, no rales or rhonchi  Heart: Regular rate and rhythm, S1, S2 normal, no murmur, click, rub or gallop  Abdomen: Soft, non-tender, non-distended; bowel sounds normal; no masses or no organomegaly  Brown/green stool in the rectal tube  Extremities: No cyanosis, edema    Invasive Devices     Peripheral Intravenous Line            Peripheral IV 04/05/21 Distal;Right;Ventral (anterior) Forearm 1 day          Drain            Rectal Tube With balloon less than 1 day                Lab Results:  Results from last 7 days   Lab Units 04/07/21  0512   WBC Thousand/uL 6 12   HEMOGLOBIN g/dL 8 1*   HEMATOCRIT % 22 3*   PLATELETS Thousands/uL 52*   NEUTROS PCT % 64   LYMPHS PCT % 20   MONOS PCT % 11   EOS PCT % 3     Results from last 7 days   Lab Units 04/07/21  0512   POTASSIUM mmol/L 3 3*   CHLORIDE mmol/L 122*   CO2 mmol/L 23   BUN mg/dL 15   CREATININE mg/dL 0 97   CALCIUM mg/dL 9 4   ALK PHOS U/L 99   ALT U/L 34   AST U/L 61*     Results from last 7 days   Lab Units 04/06/21  1740   INR  2 08*           Imaging Studies: I have personally reviewed pertinent imaging studies      Ct Head Without Contrast    Result Date: 4/3/2021  Impression: No acute intracranial abnormality  Workstation performed: [id number]     Us Liver Doppler Only    Result Date: 4/7/2021  Impression: 1  Probable main portal vein thrombosis though some bidirectional flow was identified in the extrahepatic portion with bidirectional flow in the splenic vein  Right portal vein was diminutive and poorly visualized with bidirectional flow  Left portal vein not visualized  If more definitive characterization of probable portal vein clot is desired, further evaluation with contrast-enhanced CT is recommended given extensive respiratory motion on MRI  2   Middle and left hepatic veins not visualized  3    Severe left biliary ductal dilatation  This would also be better evaluated on contrast enhanced CT with liver protocol  Workstation performed: [id number]         Assessment and Plan:     1) Hepatitis-C cirrhosis complicated by recent [address]  status post ablation in 2020 here again with hepatic encephalopathy - MELD 22 based on yesterday's labs  Patient presenting with hepatic encephalopathy and wounds throughout her lower leg with positives cultures  She had recent HCV RNA quantitative showing positive chronic HCV that has been untreated  She has history of HCC, normally follows with Dr Arabella Dumont  During her last admission here in mid March, she had attempted repeat MRCP that was limited  AFP at that time was 2 9  Patient also has history of prior alcohol abuse  It is unclear if she is still drinking    As compared to yesterday's exam, she is slightly better from an encephalopathy standpoint   - CMP and INR daily  - Continue treatment of cellulitis, she is currently on ceftriaxone and vancomycin  - Continue lactulose as you have been doing with a goal of 3-4 bowel movements daily  - Xifaxan twice daily  - Patient with history of noncompliance, prognosis is guarded  - Unfortunately, there is no family listed in her chart to further discuss care  - If she continues to have persistent encephalopathy, patient should be transferred to ICU or step-down  Appreciate Critical Care input        2) Possible portal vein thrombosis - This was also questionably seen on CT scan during last admission in March  Yesterday, patient could not tolerate entirety of right upper quadrant ultrasound liver Doppler  Based on available report, there is a probable main portal vein thrombosis with some bidirectional flow  Unfortunately, this could not be definitely confirmed  - Once patient is more stable from encephalopathy standpoint, she should have CT triple phase in order to visualize possible portal vein thrombus, to rule out any residual HCC    3) Chronic microcytic anemia - No melena or rectal bleeding seen  Likely her anemia is multifactorial   Continue to transfuse as necessary    Outpatient EGD for varices screening as well

## 2021-04-07 NOTE — ASSESSMENT & PLAN NOTE
· Switch from ceftriaxone/vancomycin to oral Bactrim based on board cultures growing E coli/Acinetobacter  · Wound Care on board  · Patient was seen and evaluated by vascular surgery/no further recommendations beside treating cellulitis

## 2021-04-07 NOTE — ASSESSMENT & PLAN NOTE
In the setting of previous alcohol abuse  GI on board  Appreciate input  Right upper quadrant ultrasound was limited as patient uncooperative    May need CT triple phase in order to visualize possible portal vein thrombus, to rule out any residual HCC per GI once medically stable

## 2021-04-07 NOTE — ASSESSMENT & PLAN NOTE
Urinalysis suggestive of UTI,   Urine culture growing over 100,000 colonies of E coli  Being switched from ceftriaxone to oral Bactrim today

## 2021-04-07 NOTE — ASSESSMENT & PLAN NOTE
· Resolved  · Currently 1 9 on admission, 0 9 today  · Likely in the setting of sepsis, poor intake, liver disease  · Received Albumin 50g x 1  · Continue IV fluids for today  · Lasix/Aldactone on hold currently

## 2021-04-07 NOTE — ASSESSMENT & PLAN NOTE
· Sodium 155, increase D5W to 125 cc/hour, monitor sodium every 8 hours  · Potassium being repleted daily

## 2021-04-07 NOTE — PROGRESS NOTES
[facility]  Progress Note - Demar Shorelandy 1960, 63 y o  female MRN: [id number]  Unit/Bed#: - Encounter: [id number]  Primary Care Provider: Fish Mcrae,    Date and time admitted to hospital: 4/3/2021  9:00 AM    * Acute encephalopathy  Assessment & Plan  · Multifactorial secondary to hypernatremia/bilateral lower extremity cellulitis/hepatic encephalopathy  Workup includes;  · CT head negative for any acute findings  · Ammonia level 72, continue with lactulose/rifaximin; rectal tube being placed  · Concern for bilateral lower extremity cellulitis associated with multiple wounds; being switched to oral Bactrim to cover as seen on Bactrim for wound culture  · Blood cultures x2 negative/C diff negative  · Hypernatremia with sodium 155 today , IV fluids increased to D5W at 125 cc/hour, monitor sodium closely  · Hemoglobin 8 1 without any transfusion, monitoring H&H closely  · GI on board  Appreciate input  · Patient attempting to pull out IV lines; being placed in soft restraints for now times 24 hours  Cirrhosis ([address] )  Assessment & Plan  In the setting of previous alcohol abuse  GI on board  Appreciate input  Right upper quadrant ultrasound was limited as patient uncooperative  May need CT triple phase in order to visualize possible portal vein thrombus, to rule out any residual HCC per GI once medically stable        Hepatocellular carcinoma ([address] )  Assessment & Plan  She does have a history of [address]  and previously underwent ablation  Appreciate GI input  Cellulitis  Assessment & Plan  · Switch from ceftriaxone/vancomycin to oral Bactrim based on board cultures growing E coli/Acinetobacter  · Wound Care on board  · Patient was seen and evaluated by vascular surgery/no further recommendations beside treating cellulitis  Wounds, multiple  Assessment & Plan  · Wound care      Pancytopenia ([address] )  Assessment & Plan  In the setting of cirrhosis and HCC   Hemoglobin 8 1 this morning  No active bleeding noted  Platelet count 40495  White blood cell count 6 1  DARRYL (acute kidney injury) ([facility] [address] )  Assessment & Plan  · Resolved  · Currently 1 9 on admission, 0 9 today  · Likely in the setting of sepsis, poor intake, liver disease  · Received Albumin 50g x 1  · Continue IV fluids for today  · Lasix/Aldactone on hold currently  Hypernatremia  Assessment & Plan  · Sodium 155, increase D5W to 125 cc/hour, monitor sodium every 8 hours  · Potassium being repleted daily  UTI (urinary tract infection)  Assessment & Plan  Urinalysis suggestive of UTI,   Urine culture growing over 100,000 colonies of E coli  Being switched from ceftriaxone to oral Bactrim today  Lactic acidosis  Assessment & Plan  Lactic acid noted to be elevated at 10 when she came  Despite significantly elevated lactic acid she did not have any other signs of hypoperfusion, this was likely secondary to the delayed hepatic clearance and sepsis  Hepatocellular carcinoma and tumor turnover may also be playing a role  Monitor hemodynamics closely  VTE Pharmacologic Prophylaxis:   Pharmacologic: Pharmacologic VTE Prophylaxis contraindicated due to liver disease  Mechanical VTE Prophylaxis in Place: Yes    Patient Centered Rounds: I have performed bedside rounds with nursing staff today  Discussions with Specialists or Other Care Team Provider: GI/ICU    Education and Discussions with Family / Patient: No family members available    Time Spent for Care: 30 minutes  More than 50% of total time spent on counseling and coordination of care as described above      Current Length of Stay: 4 day(s)    Current Patient Status: Inpatient   Certification Statement: The patient will continue to require additional inpatient hospital stay due to confusion    Discharge Plan: when medically stable    Code Status: Level 1 - Full Code      Subjective:   She remains confused  She is awake alert oriented to place and time but disoriented to person  She has been attempting to pull out her IV lines earlier today  No black-colored stools/bright red blood mixed with stools  No nausea vomiting/abdominal pain  Objective:     Vitals:   Temp (24hrs), Av 3 °F (36 8 °C), Min:98 °F (36 7 °C), Max:98 8 °F (37 1 °C)    Temp:  [98 °F (36 7 °C)-98 8 °F (37 1 °C)] 98 3 °F (36 8 °C)  HR:  [82-90] 84  Resp:  [16] 16  BP: ()/(59-63) 116/62  SpO2:  [94 %-97 %] 97 %  Body mass index is 21 42 kg/m²  Input and Output Summary (last 24 hours): Intake/Output Summary (Last 24 hours) at 2021 1409  Last data filed at 2021 0949  Gross per 24 hour   Intake 1780 5 ml   Output 725 ml   Net 1055 5 ml       Physical Exam:     Physical Exam  Constitutional:       Comments: orientedx2  Pallor +  Icterus +  Not in respiratory distress  Very restless in bed   Eyes:      General: Scleral icterus present  Cardiovascular:      Rate and Rhythm: Normal rate and regular rhythm  Pulmonary:      Effort: Pulmonary effort is normal       Breath sounds: Normal breath sounds  Abdominal:      General: Abdomen is flat  Bowel sounds are normal       Palpations: Abdomen is soft  Musculoskeletal: Normal range of motion  Right lower leg: No edema  Left lower leg: No edema  Neurological:      Mental Status: She is alert  Comments: orientedx2  No focal deficits noted       Additional Data:     Labs:    Results from last 7 days   Lab Units 21  0512  21  0921   WBC Thousand/uL 6 12   < > 8 87   HEMOGLOBIN g/dL 8 1*   < > 8 2*   HEMATOCRIT % 22 3*   < > 25 8*   PLATELETS Thousands/uL 52*   < > 79*   BANDS PCT %  --   --  1   NEUTROS PCT % 64   < >  --    LYMPHS PCT % 20   < >  --    LYMPHO PCT %  --   --  9*   MONOS PCT % 11   < >  --    MONO PCT %  --   --  13*   EOS PCT % 3   < > 0    < > = values in this interval not displayed       Results from last 7 days   Lab Units 21  1110 21  3228 SODIUM mmol/L 153* 156*   POTASSIUM mmol/L  --  3 3*   CHLORIDE mmol/L  --  122*   CO2 mmol/L  --  23   BUN mg/dL  --  15   CREATININE mg/dL  --  0 97   ANION GAP mmol/L  --  11   CALCIUM mg/dL  --  9 4   ALBUMIN g/dL  --  2 4*   TOTAL BILIRUBIN mg/dL  --  8 21*   ALK PHOS U/L  --  99   ALT U/L  --  34   AST U/L  --  61*   GLUCOSE RANDOM mg/dL  --  115     Results from last 7 days   Lab Units 04/06/21  1740   INR  2 08*     Results from last 7 days   Lab Units 04/03/21  1947   POC GLUCOSE mg/dl 113         Results from last 7 days   Lab Units 04/04/21  1003 04/04/21  0414 04/04/21  0006 04/03/21  2103   LACTIC ACID mmol/L 2 9* 2 6* 2 5* 3 3*           * I Have Reviewed All Lab Data Listed Above  * Additional Pertinent Lab Tests Reviewed: All Labs Within Last 24 Hours Reviewed    Recent Cultures (last 7 days):     Results from last 7 days   Lab Units 04/04/21  1545 04/04/21  0011 04/03/21  0939 04/03/21  0926 04/03/21  0921   BLOOD CULTURE   --   --   --  No Growth After 4 Days  No Growth After 4 Days     GRAM STAIN RESULT  Rare Polys*  1+ Gram positive cocci in pairs*  1+ Gram negative rods*  --   --   --   --    URINE CULTURE   --   --  >100,000 cfu/ml Escherichia coli*  --   --    WOUND CULTURE  4+ Growth of Escherichia coli*  4+ Growth of Acinetobacter baumannii*  4+ Growth of   --   --   --   --    C DIFF TOXIN B BY PCR   --  Negative  --   --   --        Last 24 Hours Medication List:   Current Facility-Administered Medications   Medication Dose Route Frequency Provider Last Rate    acetaminophen  650 mg Oral Q6H PRN Saranya Mujica MD      cholecalciferol  400 Units Oral Daily Saranya Mujica MD      dextrose  150 mL/hr Intravenous Continuous Marlena Clancy  mL/hr (04/07/21 0949)    lactulose  30 g Oral 4x Daily Saranya Mujica MD      nicotine  1 patch Transdermal Daily Saranya Mujica MD      rifaximin  550 mg Oral Q12H [address] Enrique Sultana PA-C      sulfamethoxazole-trimethoprim  2 tablet Oral Q12H Toño Ruiz MD          Today, Patient Was Seen By: LUIS M Coto      ** Please Note: Dictation voice to text software may have been used in the creation of this document   **

## 2021-04-07 NOTE — ASSESSMENT & PLAN NOTE
· Multifactorial secondary to hypernatremia/bilateral lower extremity cellulitis/hepatic encephalopathy  Workup includes;  · CT head negative for any acute findings  · Ammonia level 72, continue with lactulose/rifaximin; rectal tube being placed  · Concern for bilateral lower extremity cellulitis associated with multiple wounds; being switched to oral Bactrim to cover as seen on Bactrim for wound culture  · Blood cultures x2 negative/C diff negative  · Hypernatremia with sodium 155 today , IV fluids increased to D5W at 125 cc/hour, monitor sodium closely  · Hemoglobin 8 1 without any transfusion, monitoring H&H closely  · GI on board  Appreciate input  · Patient attempting to pull out IV lines; being placed in soft restraints for now times 24 hours

## 2021-04-08 PROBLEM — E72.20 HYPERAMMONEMIA (HCC): Status: ACTIVE | Noted: 2021-01-01

## 2021-04-08 PROBLEM — J96.01 ACUTE RESPIRATORY FAILURE WITH HYPOXIA (HCC): Status: ACTIVE | Noted: 2021-01-01

## 2021-04-08 PROBLEM — R56.9 SEIZURE (HCC): Status: ACTIVE | Noted: 2021-01-01

## 2021-04-08 PROBLEM — R41.82 AMS (ALTERED MENTAL STATUS): Status: ACTIVE | Noted: 2021-01-01

## 2021-04-08 NOTE — ASSESSMENT & PLAN NOTE
· Improving  · Sodium 146, decrease D5W to 75 cc/hour, monitor sodium every 8 hours  · Potassium being repleted daily

## 2021-04-08 NOTE — ASSESSMENT & PLAN NOTE
63year old female with alcoholic/HCV cirrhosis and [facility] [address]  who was initially admitted with altered mental status  Rapid response was activated today due to witnessed seizure activity  She was given 3 mg Ativan total and 1000 mg Keppra was ordered to be given  At the time this examiner saw patient, she continued to have leftward gaze with horizontal nystagmus  CTH was completed and to personal review, no acute abnormalities noted  Concern for ongoing seizure activity  Patient was transferred to intensive care unit and ultimately intubated  Plan:   - Recommend give 2000 mg Keppra total    - Start maintenance dose of Keppra 1000 mg BID    - Recommend transfer for vEEG monitoring  Case discussed with epileptology team as well as critical care team    - Seizure precautions     - Patient will need eventual MRI brain with and without contrast to evaluate for metastatic lesions    - Medical management as per critical care team

## 2021-04-08 NOTE — PROGRESS NOTES
Primary RN at bedside, pt noted with mental status change and presents unresponsive  Agonal breathing noted  Rapid response called overhead by this RN

## 2021-04-08 NOTE — PROGRESS NOTES
[facility]  Progress Note - Garry Elkins 1960, 63 y o  female MRN: [id number]  Unit/Bed#: -01 Encounter: [id number]  Primary Care Provider: Andre Shah DO   Date and time admitted to hospital: 4/3/2021  9:00 AM    * Acute encephalopathy  Assessment & Plan  · Multifactorial secondary to hypernatremia/bilateral lower extremity cellulitis/hepatic encephalopathy  Workup includes;  · CT head negative for any acute findings  · Ammonia level 22, continue with lactulose/rifaximin; rectal tube off again  · Concern for bilateral lower extremity cellulitis associated with multiple wounds;   being switched to oral Bactrim to cover as seen on Bactrim for wound culture  Shall complete a total of 7 days  · Blood cultures x2 negative/C diff negative  · Hypernatremia with sodium improving to 146 today , IV fluids decreased to D5W at 75 cc/hour, monitor sodium closely  · Hemoglobin 7 0  monitoring H&H closely  · GI on board  Appreciate input  · Patient attempting to pull out IV lines; in soft restraints for now times 24 hours  Cirrhosis ([address] )  Assessment & Plan  In the setting of previous alcohol abuse  GI on board  Appreciate input  Right upper quadrant ultrasound was limited as patient uncooperative  May need CT triple phase in order to visualize possible portal vein thrombus, to rule out any residual HCC per GI once medically stable        Hepatocellular carcinoma ([address] )  Assessment & Plan  She does have a history of [address]  and previously underwent ablation  Appreciate GI input  Cellulitis  Assessment & Plan  · Switch from ceftriaxone/vancomycin to oral Bactrim based on board cultures growing E coli/Acinetobacter  · Wound Care on board  · Patient was seen and evaluated by vascular surgery/no further recommendations beside treating cellulitis  Wounds, multiple  Assessment & Plan  · Wound care      Pancytopenia ([address] )  Assessment & Plan  In the setting of cirrhosis and HCC   Hemoglobin 7 this morning  No active bleeding noted  Platelet count 25895  White blood cell count 8 1  DARRYL (acute kidney injury) ([facility] [address] )  Assessment & Plan  · Resolved  · Currently 1 9 on admission, resolved  · Likely in the setting of sepsis, poor intake, liver disease  · Received Albumin 50g x 1  · Continue IV fluids for today  · Lasix/Aldactone on hold currently  Hypernatremia  Assessment & Plan  · Improving  · Sodium 146, decrease D5W to 75 cc/hour, monitor sodium every 8 hours  · Potassium being repleted daily  UTI (urinary tract infection)  Assessment & Plan  Urinalysis suggestive of UTI,   Urine culture growing over 100,000 colonies of E coli  On oral Bactrim       VTE Pharmacologic Prophylaxis:   Pharmacologic: Pharmacologic VTE Prophylaxis contraindicated due to liver disease/ anemia  Mechanical VTE Prophylaxis in Place: Yes    Patient Centered Rounds: I have performed bedside rounds with nursing staff today  Discussions with Specialists or Other Care Team Provider: GI    Education and Discussions with Family / Patient:  Cody Solorzano to locate any family members for patient  Please refer to social work notes for the same  Time Spent for Care: 30 minutes  More than 50% of total time spent on counseling and coordination of care as described above  Current Length of Stay: 5 day(s)    Current Patient Status: Inpatient   Certification Statement: The patient will continue to require additional inpatient hospital stay due to confusion    Discharge Plan: undetermined    Code Status: Level 1 - Full Code      Subjective:   Patient is still extremely confused  She was oriented to place but disoriented to time and person  She continues to have delusions  Multiple attempts by  to find family members which have been unsuccessful  Plan to pursue guardianship  She pulled out her rectal tube  Continues to take lactulose and having multiple episodes of bowel movements      Objective: Vitals:   Temp (24hrs), Av 7 °F (36 5 °C), Min:97 7 °F (36 5 °C), Max:97 7 °F (36 5 °C)    Temp:  [97 7 °F (36 5 °C)] 97 7 °F (36 5 °C)  Resp:  [17] 17  BP: (90)/(53) 90/53  Body mass index is 21 42 kg/m²  Input and Output Summary (last 24 hours): Intake/Output Summary (Last 24 hours) at 2021 1511  Last data filed at 2021 1215  Gross per 24 hour   Intake 2417 5 ml   Output 900 ml   Net 1517 5 ml       Physical Exam:     Physical Exam  Constitutional:       Comments: Frail in appearance  Disoriented,  Not in distress or discomfort  She is extremely icteric looking  HENT:      Mouth/Throat:      Mouth: Mucous membranes are moist       Pharynx: Oropharynx is clear  Cardiovascular:      Rate and Rhythm: Normal rate and regular rhythm  Pulmonary:      Effort: Pulmonary effort is normal       Breath sounds: Normal breath sounds  Abdominal:      General: Abdomen is flat  Bowel sounds are normal       Tenderness: There is no abdominal tenderness  There is no guarding  Musculoskeletal: Normal range of motion  Skin:     Coloration: Skin is jaundiced  Additional Data:     Labs:    Results from last 7 days   Lab Units 21  0445  21  0921   WBC Thousand/uL 8 11   < > 8 87   HEMOGLOBIN g/dL 7 0*   < > 8 2*   HEMATOCRIT % 20 8*   < > 25 8*   PLATELETS Thousands/uL 47*   < > 79*   BANDS PCT %  --   --  1   NEUTROS PCT % 70   < >  --    LYMPHS PCT % 15   < >  --    LYMPHO PCT %  --   --  9*   MONOS PCT % 8   < >  --    MONO PCT %  --   --  13*   EOS PCT % 6   < > 0    < > = values in this interval not displayed       Results from last 7 days   Lab Units 21  0445  21  0512   SODIUM mmol/L 146*   < > 156*   POTASSIUM mmol/L 3 4*  --  3 3*   CHLORIDE mmol/L 114*  --  122*   CO2 mmol/L 23  --  23   BUN mg/dL 13  --  15   CREATININE mg/dL 1 14  --  0 97   ANION GAP mmol/L 9  --  11   CALCIUM mg/dL 8 6  --  9 4   ALBUMIN g/dL  --   --  2 4*   TOTAL BILIRUBIN mg/dL  -- --  8 21*   ALK PHOS U/L  --   --  99   ALT U/L  --   --  34   AST U/L  --   --  61*   GLUCOSE RANDOM mg/dL 103  --  115    < > = values in this interval not displayed  Results from last 7 days   Lab Units 04/08/21  0445   INR  2 67*     Results from last 7 days   Lab Units 04/03/21  1947   POC GLUCOSE mg/dl 113         Results from last 7 days   Lab Units 04/04/21  1003 04/04/21  0414 04/04/21  0006 04/03/21  2103   LACTIC ACID mmol/L 2 9* 2 6* 2 5* 3 3*           * I Have Reviewed All Lab Data Listed Above  * Additional Pertinent Lab Tests Reviewed: All Labs Within Last 24 Hours Reviewed    Recent Cultures (last 7 days):     Results from last 7 days   Lab Units 04/04/21  1545 04/04/21  0011 04/03/21  0939 04/03/21  0926 04/03/21  0921   BLOOD CULTURE   --   --   --  No Growth After 5 Days  No Growth After 5 Days     GRAM STAIN RESULT  Rare Polys*  1+ Gram positive cocci in pairs*  1+ Gram negative rods*  --   --   --   --    URINE CULTURE   --   --  >100,000 cfu/ml Escherichia coli*  --   --    WOUND CULTURE  4+ Growth of Escherichia coli*  4+ Growth of Acinetobacter baumannii*  4+ Growth of   --   --   --   --    C DIFF TOXIN B BY PCR   --  Negative  --   --   --        Last 24 Hours Medication List:   Current Facility-Administered Medications   Medication Dose Route Frequency Provider Last Rate    acetaminophen  650 mg Oral Q6H PRN Claudette Bryant MD      cholecalciferol  400 Units Oral Daily Claudette Bryant MD      dextrose  75 mL/hr Intravenous Continuous Keaton Park MD 75 mL/hr (04/08/21 1215)    lactulose  30 g Oral 4x Daily Claudette Bryant MD      nicotine  1 patch Transdermal Daily Claudette Bryant MD      rifaximin  550 mg Oral Q12H [address] Crissy Bob PA-C      sulfamethoxazole-trimethoprim  2 tablet Oral Q12H Gurpreet Izquierdo MD          Today, Patient Was Seen By: LUIS M Abdullahi      ** Please Note: Dictation voice to text software may have been used in the creation of this document  **    --please note patient had sepsis on admission secondary to bilateral lower extremity cellulitis/UTI given elevated white blood cell count/elevated lactate/low blood pressure

## 2021-04-08 NOTE — ASSESSMENT & PLAN NOTE
· Resolved  · Currently 1 9 on admission, resolved  · Likely in the setting of sepsis, poor intake, liver disease  · Received Albumin 50g x 1  · Continue IV fluids for today  · Lasix/Aldactone on hold currently

## 2021-04-08 NOTE — CASE MANAGEMENT
Cm attempted to call patient's emergency contact Tim Christian at [phone], but this number is no longer in service  Cm attempted to call patient's home phone number [phone], but this number rang for approximately two minutes and no voicemail box option  It rang until a message came up asking for a password  Cm called patient's PCP office Nika Meyer) at [phone] and spoke to Meryle Nares Cm asked for information on emergency contacts  Meryle Nares reported the following numbers:    Home - [phone]  Mobile - [phone]  Jomansi Boer (s/o) - [phone]    Cm attempted to call patient's S/O Tim Boer again at [phone], but this number is not in service  Cm attempted to call the mobile number provided, [phone]  This number went to voicemail and was the number of the patient herself  Osman called [facility] at [phone] and spoke to [facility] was not able to locate any information, but she asked Cm to call Howard Armendariz at [facility] after 2 pm today  Cm called [facility]  at [phone] and spoke to Maco Dewey confirmed the numbers above and had no additional information  Cm asked RN if patient has phone here, but she doesn't  Cm told RN Manager Susy Louis and Charge Nurse Miladys to get contact information if a patient has visitors  CM to pursue guardianship on Monday if patient does not have her phone here

## 2021-04-08 NOTE — RESPIRATORY THERAPY NOTE
Pt intubated due to seizure activity  04/08/21 3317   Vent Information   Vent ID [email or web address]   Vent type Drager   Drager Vent Mode AC/VC+   $ Vent Charge-INITIAL Yes   Ventilator Start Yes   $ Pulse Oximetry Spot Check Charge Completed   SpO2 100 %   AC/VC+ Settings   Resp Rate (BPM) 16 BPM   VT (mL) 390 mL   Insp Time (S) 0 85 S   FIO2 (%) 60 %   PEEP (cmH2O) 6 cmH2O   Rise Time (%) 15 %   Trigger Sensitivity Flow (LPM) 2 LPM   Humidification Heater   Heater Temp 98 6 °F (37 °C)   AC/VC+ Actuals   Resp Rate (BPM) 16 BPM   VT (mL) 384 mL   MV (Obs) 5 5   MAP (cmH2O) 8 4 cmH2O   Peak Pressure (cmH2O) 19 cmH2O   I:E Ratio (Obs) 1:3 4   Static Compliance (mL/cmH20) 48 4 mL/cmH2O   Plateau Pressure (cm H2O) 18 cm H2O   Heater Temperature (Obs) 96 3 °F (35 7 °C)   AC/VC+ ALARMS   High Peak Pressure (cmH2O) 40 cmH2O   High Resp Rate (BPM) 40 BPM   High MV (L/min) 14 L/min   Low MV (L/min) 4 L/min   High VT (mL) 800 mL   Maintenance   Alarm (pink) cable attached Yes   Resuscitation bag with peep valve at bedside Yes   Water bag changed No   Circuit changed No   ETT  Oral 7 5 mm   Placement Date/Time: 04/08/21 1550   Previously placed by?: Attending  Mask Ventilation: Ventilated by mask (1)  Preoxygenated: Yes  Technique: Direct laryngoscopy  Type: Oral  Tube Size: 7 5 mm  Laryngoscope: Marnie Xie  Location: Oral  Placement Verific       Secured at (cm) 22   Measured from Gums   Secured Location Right   Secured by Commercial tube love   Site Condition Dry   Cuff Pressure (cm H2O) 26 cm H2O   HI-LO Suction  Continuous low suction   HI-LO Secretions Small;Blood tinged   HI-LO Intervention Patent

## 2021-04-08 NOTE — TELEPHONE ENCOUNTER
Spoke with Oscar Chen via tiger text  Patient is too unstable to have her ct abdomen today  Will attempt to complete study when patient becomes stabilized   TB [phone]

## 2021-04-08 NOTE — CONSULTS
Consultation - Neurology   Marisa Guerra 63 y o  female MRN: [id number]  Unit/Bed#: -01 Encounter: [id number]      Assessment/Plan     Seizure [facility])  Assessment & Plan  63year old female with alcoholic/HCV cirrhosis and [address]  who was initially admitted with altered mental status  Rapid response was activated today due to witnessed seizure activity  She was given 3 mg Ativan total and 1000 mg Keppra was ordered to be given  At the time this examiner saw patient, she continued to have leftward gaze with horizontal nystagmus  CTH was completed and to personal review, no acute abnormalities noted  Concern for ongoing seizure activity  Patient was transferred to intensive care unit and ultimately intubated  Plan:   - Recommend give 2000 mg Keppra total    - Start maintenance dose of Keppra 1000 mg BID    - Recommend transfer for vEEG monitoring  Case discussed with epileptology team as well as critical care team    - Seizure precautions  - Patient will need eventual MRI brain with and without contrast to evaluate for metastatic lesions    - Medical management as per critical care team      * Acute encephalopathy  Assessment & Plan  - Initial altered mental status likely in setting of hepatic encephalopathy  Hepatocellular carcinoma ([facility] [address] )  Assessment & Plan  - GI team following    - Triple phase CT scan needed to evaluate for possible portal vein thrombus  Cirrhosis ([facility] [address] )  Assessment & Plan  - GI team following    - In setting of prior ETOH use    Cellulitis  Assessment & Plan  - On oral Bactrim  - Management as per critical care team          Recommendations for outpatient neurological follow up have yet to be determined  Above case and plan discussed with Dr Bryant Rivera and he is in agreement with above plan  History of Present Illness     Reason for Consult / Principal Problem: Seizure  Hx and PE limited by: Altered mental status   HPI: Marisa Guerra is a 63 y o   (handedness not determinable) female with alcoholic/HCV cirrhosis and [address]  who presents with seizure  Per chart review, patient was admitted to the hospital on April 3, 2021 with altered mental status  She was initially noted to be nonverbal but was following simple commands  Per H&P, patient was noted to be oriented but confused  She was placed on antibiotics for treatment of UTI and cellulitis  She was seen by GI team and noted to have decompensated liver cirrhosis secondary to alcohol abuse and HCV infection, as well as [address]  s/p ablation therapy  She was receiving lactulose and rifaximin  ICU team evaluated patient on April 6, 2021 and she was noted to be persistently encephalopathic felt to be secondary to hepatic encephalopathy  GI team was planning for CT triple phase study in order to evaluate for possible portal vein thrombus  Per review of medicine note from today, she continued to be confused and had delusions today and also was pulling at multiple lines in place  This afternoon, patient was noted to have change in metal status with agonal breathing and unresponsiveness  She was noted to have shaking of all extremities  She also was noted to have left facial twitching  She was given 3 mg Ativan total per critical care team for seizure-like activity and continued with left upward gaze and associated nystagmus concerning for seizure       Inpatient consult to Neurology  Consult performed by: Saskhi Mccarthy PA-C  Consult ordered by: Reta Degroot MD          Review of Systems   Unable to perform ROS: Mental status change       Historical Information   Past Medical History:   Diagnosis Date    Arthritis     Cirrhosis ([address] )     Extremity pain     BLE    Hepatitis C     Joint pain     Bilateral Shoulders    Liver disease     Low back pain      Past Surgical History:   Procedure Laterality Date    CT GUIDED AND MONITORING PARENCHYMAL TISSUE ABLATION  7/28/2020    ECTOPIC PREGNANCY SURGERY      MOUTH SURGERY      TONSILLECTOMY       Social History   Social History     Substance and Sexual Activity   Alcohol Use Not Currently    Frequency: Monthly or less    Drinks per session: 1 or 2     Social History     Substance and Sexual Activity   Drug Use Yes    Frequency: 7 0 times per week    Types: Marijuana    Comment: everyday     E-Cigarette/Vaping    E-Cigarette Use Never User      E-Cigarette/Vaping Substances    Nicotine No     THC No     CBD No     Flavoring No     Other No     Unknown No      Social History     Tobacco Use   Smoking Status Current Every Day Smoker    Packs/day: 0 50   Smokeless Tobacco Never Used     Family History:   Family History   Problem Relation Age of Onset    Alcohol abuse Mother     Heart disease Father        Review of previous medical records was completed  Please see HPI  Meds/Allergies   Scheduled Meds:  Current Facility-Administered Medications   Medication Dose Route Frequency Provider Last Rate    acetaminophen  650 mg Oral Q6H PRN Marion Hayes MD      cholecalciferol  400 Units Oral Daily Marion Hayes MD      dextrose  75 mL/hr Intravenous Continuous Zoey Tripp MD 75 mL/hr (04/08/21 1215)    lactulose  30 g Oral 4x Daily Marion Hayes MD      levETIRAcetam  1,000 mg Intravenous Once Waleska Pro MD      LORazepam           LORazepam  1 mg Intravenous Once Waleska Pro MD      LORazepam  1 mg Intravenous Once Waleska Pro MD      nicotine  1 patch Transdermal Daily Marion Hayes MD      rifaximin  550 mg Oral Q12H [facility] Maria Ines Mo PA-C      sulfamethoxazole-trimethoprim  2 tablet Oral Q12H Toño Ruiz MD       Continuous Infusions:dextrose, 75 mL/hr, Last Rate: 75 mL/hr (04/08/21 1215)      PRN Meds:   acetaminophen      No Known Allergies    Objective   Vitals:Blood pressure 140/78, pulse 84, temperature 98 4 °F (36 9 °C), resp  rate 21, height 5' 8" (1 727 m), weight 63 9 kg (140 lb 14 oz), SpO2 97 %  ,Body mass index is 21 42 kg/m²  Intake/Output Summary (Last 24 hours) at 4/8/2021 1701  Last data filed at 4/8/2021 1215  Gross per 24 hour   Intake 2417 5 ml   Output 900 ml   Net 1517 5 ml       Invasive Devices: Invasive Devices     Peripheral Intravenous Line            Peripheral IV 04/05/21 Distal;Right;Ventral (anterior) Forearm 2 days          Drain            Rectal Tube With balloon 1 day                Physical Exam  Constitutional:       General: She is not in acute distress  Appearance: She is ill-appearing and toxic-appearing  She is not diaphoretic  HENT:      Head: Normocephalic and atraumatic  Mouth/Throat:      Mouth: Mucous membranes are dry  Pharynx: Oropharynx is clear  Eyes:      General: Scleral icterus present  Right eye: No discharge  Left eye: No discharge  Pupils: Pupils are equal, round, and reactive to light  Comments: Left upward gaze noted with horizontal nystagmus     Musculoskeletal:      Right lower leg: No edema  Left lower leg: No edema  Skin:     General: Skin is warm and dry  Findings: Erythema and lesion present  Neurologic Exam     Mental Status   Level of consciousness: responsive to painful stimuli  Limited assessment secondary to mental status     Cranial Nerves     CN III, IV, VI   Pupils are equal, round, and reactive to light  Right pupil: Size: 3 mm  Shape: regular  Reactivity: sluggish  Consensual response: intact  Left pupil: Size: 3 mm  Shape: regular  Reactivity: sluggish  Consensual response: intact  Nystagmus: bilateral   Nystagmus type: horizontal  Limited assessment secondary to mental status  No blink to threat B/L      Motor Exam   Muscle bulk: normalWithdrawal noted to noxious stimuli x 4 extremities  Sensory Exam   Unable to reliably assess secondary to mental status       Gait, Coordination, and Reflexes     Tremor   Resting tremor: absent    Reflexes   Right plantar: upgoing  Left plantar: upgoing      Lab Results:   Recent Results (from the past 24 hour(s))   Sodium    Collection Time: 04/07/21  5:55 PM   Result Value Ref Range    Sodium 125 (L) 136 - 145 mmol/L   Sodium    Collection Time: 04/08/21  1:04 AM   Result Value Ref Range    Sodium 147 (H) 136 - 145 mmol/L   Basic metabolic panel    Collection Time: 04/08/21  4:45 AM   Result Value Ref Range    Sodium 146 (H) 136 - 145 mmol/L    Potassium 3 4 (L) 3 5 - 5 3 mmol/L    Chloride 114 (H) 100 - 108 mmol/L    CO2 23 21 - 32 mmol/L    ANION GAP 9 4 - 13 mmol/L    BUN 13 5 - 25 mg/dL    Creatinine 1 14 0 60 - 1 30 mg/dL    Glucose 103 65 - 140 mg/dL    Calcium 8 6 8 3 - 10 1 mg/dL    eGFR 53 ml/min/1 73sq m   CBC and differential    Collection Time: 04/08/21  4:45 AM   Result Value Ref Range    WBC 8 11 4 31 - 10 16 Thousand/uL    RBC 1 57 (L) 3 81 - 5 12 Million/uL    Hemoglobin 7 0 (L) 11 5 - 15 4 g/dL    Hematocrit 20 8 (L) 34 8 - 46 1 %     (H) 82 - 98 fL    MCH 44 6 (H) 26 8 - 34 3 pg    MCHC 33 7 31 4 - 37 4 g/dL    RDW 18 9 (H) 11 6 - 15 1 %    MPV 11 6 8 9 - 12 7 fL    Platelets 47 (LL) 442 - 390 Thousands/uL    nRBC 1 /100 WBCs    Neutrophils Relative 70 43 - 75 %    Immat GRANS % 1 0 - 2 %    Lymphocytes Relative 15 14 - 44 %    Monocytes Relative 8 4 - 12 %    Eosinophils Relative 6 0 - 6 %    Basophils Relative 0 0 - 1 %    Neutrophils Absolute 5 68 1 85 - 7 62 Thousands/µL    Immature Grans Absolute 0 10 0 00 - 0 20 Thousand/uL    Lymphocytes Absolute 1 20 0 60 - 4 47 Thousands/µL    Monocytes Absolute 0 65 0 17 - 1 22 Thousand/µL    Eosinophils Absolute 0 45 0 00 - 0 61 Thousand/µL    Basophils Absolute 0 03 0 00 - 0 10 Thousands/µL   Magnesium    Collection Time: 04/08/21  4:45 AM   Result Value Ref Range    Magnesium 1 9 1 6 - 2 6 mg/dL   Protime-INR    Collection Time: 04/08/21  4:45 AM   Result Value Ref Range    Protime 27 2 (H) 11 6 - 14 5 seconds    INR 2 67 (H) 0 84 - 1 19   Ammonia    Collection Time: 04/08/21  4:45 AM Result Value Ref Range    Ammonia 22 11 - 35 umol/L   POCT Blood Gas (CG8+)    Collection Time: 04/08/21  4:53 PM   Result Value Ref Range    pH, Art i-STAT 7 299 (L) 7 350 - 7 450    pCO2, Art i-STAT 28 6 (LL) 36 0 - 44 0 mm HG    pO2, ART i-STAT 93 0 75 0 - 129 0 mm HG    BE, i-STAT -11 (L) -2 - 3 mmol/L    HCO3, Art i-STAT 14 1 (LL) 22 0 - 28 0 mmol/L    CO2, i-STAT 15 (L) 21 - 32 mmol/L    O2 Sat, i-STAT 97 (H) 60 - 85 %    SODIUM, I-STAT 150 (H) 136 - 145 mmol/l    Potassium, i-STAT 3 6 3 5 - 5 3 mmol/L    Hct, i-STAT 24 (L) 34 8 - 46 1 %    Hgb, i-STAT 8 2 (L) 11 5 - 15 4 g/dl    Glucose, i-STAT 100 65 - 140 mg/dl    Specimen Type ARTERIAL        Imaging Studies: I have personally reviewed pertinent reports  and I have personally reviewed pertinent films in PACS   April 3, 2021 14 [facility]- No acute intracranial abnormality  April 8, 2021 14 [facility]- No acute intracranial abnormality  Microangiopathic changes

## 2021-04-08 NOTE — PROCEDURES
Intubation    Date/Time: 4/8/2021 6:34 PM  Performed by: Gavin Clark PA-C  Authorized by: Gavin Clark PA-C     Patient location:  Bedside  Consent:     Consent obtained:  Emergent situation  Universal protocol:     Patient identity confirmed:  Arm band and hospital-assigned identification number  Pre-procedure details:     Patient status:  Unresponsive    Pretreatment medications:  Fentanyl and midazolam    Paralytics:  Vecuronium  Indications:     Indications for intubation: airway protection    Procedure details:     Preoxygenation:  Bag valve mask    CPR in progress: no      Laryngoscope blade: Mac 3    Tube size (mm):  7 5  Placement assessment:     ETT to lip:  22     Tube secured with:  ETT love    Breath sounds:  Equal and absent over the epigastrium    Placement verification: chest rise, CXR verification, direct visualization, equal breath sounds, ETCO2 detector and tube exhalation      CXR findings:  ETT in proper place  Post-procedure details:     Patient tolerance of procedure:   Tolerated well, no immediate complications

## 2021-04-08 NOTE — ASSESSMENT & PLAN NOTE
Urinalysis suggestive of UTI,   Urine culture growing over 100,000 colonies of E coli  On oral Bactrim

## 2021-04-08 NOTE — NURSING NOTE
Patient removed rectal tube, MD notified  As per Dr Will Mackenzie, ok to keep rectal tube out r/t pt removing several rectal tubes out with balloon intact

## 2021-04-08 NOTE — ASSESSMENT & PLAN NOTE
· Suspect component of prerenal etiology as patient appears hypovolemic  · Gentle IV fluid hydration  · Check CPK  · Trend daily BUN/creatinine  · Maintain [place] for strict I&Os

## 2021-04-08 NOTE — ASSESSMENT & PLAN NOTE
· Intubated 4/8/21 for airway protection and hypoxia  · Maintain FiO2 >92%  · Continue mechanical ventilation at lung protective volumes; wean to extubate  Neurologic status limiting liberation from mechanical ventilation  · SBP/VAP  · Propofol gtt  Goal RASS 0 to -2 for ventilatory comfort

## 2021-04-08 NOTE — ASSESSMENT & PLAN NOTE
In the setting of cirrhosis and HCC  Hemoglobin 7 this morning  No active bleeding noted  Platelet count 28480  White blood cell count 8 1

## 2021-04-08 NOTE — ASSESSMENT & PLAN NOTE
· Presenting with altered mentation however oriented to person and time  Mental status has waxed and waned over course of her hospitalization  · CTH negative for acute intracranial abnormality  · Concern for ongoing seizure-like activity  Appreciate Neurology consultation/recommendations  Plan for transfer to [facility] of breath for continuous vEEG  Will load with Keppra 2g, followed by 1 g b i d  Nikki Miller · Treat hyperammonemia; lactulose t i d  Titrate for 2-3 BMs daily  · Correct metabolic derangement  · Sedation breaks for neurologic examination

## 2021-04-08 NOTE — RAPID RESPONSE
Rapid Response Note  Saroj Velarde 63 y o  female MRN: [id number]  Unit/Bed#:  Encounter: [id number]    Rapid Response Notification(s):   Response called date/time:  4/8/2021 4:38 PM  Response team arrival date/time:  4/8/2021 4:40 PM  Response end date/time:  4/8/2021 5:00 PM  Rapid response location:  [facility] unit  Primary reason for rapid response call:  Acute change in neuro status    Rapid Response Intervention(s):   Airway:  Suction  Breathing:  Oxygen  Circulation:  None  Fluids administered:  Normal saline  Medications administered:  Lorazepam (3mg )       Background/Situation:   Saroj Velarde is a 63 y o  female who presented to [facility] ED 4/3/21 for evaluation of worsening altered mental status  Patient has a significant past medical history HCC s/p ablation (July 9033), alcoholic cirrhosis, hepatitis-C (untreated), and chronic low back pain  EMS was contacted by patient's remained for worsening mental status  Patient reports compliance with medications  She was recently hospitalized at this facility 3/11/21 for similar episode of hepatic encephalopathy  Upon arrival to the emergency department patient was oriented to person, and time  She was able to follow simple commands however extremely slow to respond  She was noted to have extensive open abrasions of bilateral lower extremities  Laboratory workup revealed profound metabolic derangement with worsening transaminitis, and elevated T bili  She was found to be hyperammoniemic  There was concern for precipitating underlying infectious etiology in setting of positive UA and extensive lower extremity abrasions with concern for cellulitis  She was admitted to the hospitalist service  Over the course of her stay mental status has been waxing and waning  Per documented history she is oriented to person only and able to follow simple commands  She was of her baseline this afternoon prior to rapid response    According to nursing staff patient was found be profoundly altered upon entering the room to assess patient she proceeded to sustain approximately 30 sec tonic-clonic seizure described as shaking of upper and lower extremities, this was self-limiting  Upon arrival patient appeared postictal with left upward gaze  She would not respond to command  She retract to painful stimuli in all 4 extremities  She proceeded to developed rhythmic facial twitching concerning for ongoing seizure  She received in total 3 mg of IV lorazepam with cessation of symptoms  Review of Systems   Unable to perform ROS: Mental status change       Objective:   Vitals:    04/08/21 1650 04/08/21 1653 04/08/21 1659 04/08/21 1757   BP: 137/77 135/76 131/75    Pulse:       Resp:       Temp:       TempSrc:       SpO2:    100%   Weight:       Height:         Physical Exam  Vitals signs reviewed  Constitutional:       Comments: Chronically ill cachectic-appearing female   HENT:      Head: Normocephalic  Eyes:      General: Scleral icterus present  Neck:      Musculoskeletal: Normal range of motion  Neck rigidity present  Cardiovascular:      Rate and Rhythm: Regular rhythm  Tachycardia present  Pulmonary:      Effort: Pulmonary effort is normal       Breath sounds: Normal breath sounds  No stridor  Abdominal:      General: Abdomen is flat  Bowel sounds are normal  There is no distension  Palpations: Abdomen is soft  Tenderness: There is no abdominal tenderness  Musculoskeletal:      Right lower leg: No edema  Left lower leg: No edema  Skin:     Coloration: Skin is jaundiced     Neurological:      Comments: Cough, corneal, gag is intact  Retracts to painful stimulus in all 4 extremity  Eyes open spontaneously  Leftward upward gaze         Assessment:   · AMS   · Witnessed tonic-clonic seizure  · Concern for ongoing seizure activity  · Cirrhosis  · HCC  · Bilateral lower extremity cellulitis  · DARRYL       Plan:   · Received 3 mg of lorazepam  · Stat CT head   · Neurology bedside to evaluate patient  · Keppra load  · Plan for spot EEG versus continuous EEG given failure to return to baseline  · Transfer to ICU     Rapid Response Outcome  Family notified of transfer: yes  Family member contacted: friend Roman Reidimelda     Portions of the record may have been created with voice recognition software  Occasional wrong word or "sound a like" substitutions may have occurred due to the inherent limitations of voice recognition software  Read the chart carefully and recognize, using context, where substitutions have occurred      Usman Maria PA-C
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
## 2021-04-08 NOTE — CASE MANAGEMENT
LARS received telephone call from [facility] [phone] that she will be here to interview pt this afternoon  LARS Daniel notified of visit and telephone call

## 2021-04-08 NOTE — ASSESSMENT & PLAN NOTE
· Multifactorial secondary to hypernatremia/bilateral lower extremity cellulitis/hepatic encephalopathy  Workup includes;  · CT head negative for any acute findings  · Ammonia level 22, continue with lactulose/rifaximin; rectal tube off again  · Concern for bilateral lower extremity cellulitis associated with multiple wounds;   being switched to oral Bactrim to cover as seen on Bactrim for wound culture  Shall complete a total of 7 days  · Blood cultures x2 negative/C diff negative  · Hypernatremia with sodium improving to 146 today , IV fluids decreased to D5W at 75 cc/hour, monitor sodium closely  · Hemoglobin 7 0  monitoring H&H closely  · GI on board  Appreciate input  · Patient attempting to pull out IV lines; in soft restraints for now times 24 hours

## 2021-04-09 PROBLEM — B19.20 DECOMPENSATED CIRRHOSIS RELATED TO HEPATITIS C VIRUS (HCV) (HCC): Status: ACTIVE | Noted: 2021-01-01

## 2021-04-09 PROBLEM — A41.9 SEPSIS (HCC): Status: ACTIVE | Noted: 2021-01-01

## 2021-04-09 PROBLEM — I81 PORTAL VEIN THROMBOSIS: Status: ACTIVE | Noted: 2021-01-01

## 2021-04-09 PROBLEM — K74.69 DECOMPENSATED CIRRHOSIS RELATED TO HEPATITIS C VIRUS (HCV) (HCC): Status: ACTIVE | Noted: 2021-01-01

## 2021-04-09 NOTE — ASSESSMENT & PLAN NOTE
· Wound cultures revealing growth of E coli initiated by active  · Currently antibiotic day #2 of appropriate coverage given intermediate susceptibility to ceftriaxone    · Discontinue Bactrim p o will transition to Ancef

## 2021-04-09 NOTE — CONSULTS
[address] 1960, 63 y o  female MRN: [id number]  Unit/Bed#: MICU 01 Encounter: [id number]  Primary Care Provider: Bertin Khalil DO   Date and time admitted to hospital: 4/8/2021 11:54 PM        * 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) [facility])  Assessment & Plan  Patient was diagnosed with cirrhosis in 2013, initially felt due to alcohol abuse, however she was found to be positive for hepatitis C  Patient did not receive any treatment for hepatitis C infection  Results from 4/2019  HCV ,376 (A) NEG IU/mL   LOG OF HCV 5 77 (H)   Comment: TEST RESULTS REPORTED TO [place] [employer] [facility]  0 log IU/ml   Patient was on lactulose, Lasix and spironolactone at home, however not sure how compliant patient was  Patient presenting with acute encephalopathy, diagnosed with hepatocellular carcinoma status post ablation in 2020  Class C Child-Da Silva Score  MELD-Na score 26 with a 15% 90 day mortality increase from previous 22  Continue rifaximin and lactulose    Sepsis ([facility] [address] )  Assessment & Plan  Hypothermia, hypotension, pancytopenia, lactic acidosis  Patient is being treated for pneumonia, UTI, currently on IV antibiotics ( Unasyn ) and Acyclovir   ID and critical care following  Portal vein thrombosis  Assessment & Plan  This is chronic, not on any anticoagulation   Probable main portal vein thrombosis though some bidirectional flow was identified in the extrahepatic portion with bidirectional flow in the splenic vein  Right portal vein was diminutive and poorly visualized with bidirectional flow  Left portal   vein not visualized    If more definitive characterization of probable portal vein clot is desired, further evaluation with contrast-enhanced CT is recommended given extensive respiratory motion on MRI  Hypothermia, hypotension, concern for pylephlebitis low  Patient will have CT the abdomen with contrast Hyperammonemia [facility])  Assessment & Plan  Component      Latest Ref Rng & Units 3/11/2021 4/3/2021 4/8/2021 4/8/2021             4:45 AM  5:01 PM   Ammonia      11 - 35 umol/L 81 (H) 72 (H) 22 79 (H)     Component      Latest Ref Rng & Units 4/9/2021 4/9/2021          12:59 AM  5:21 AM   Ammonia      11 - 35 umol/L 48 (H) 51 (H)       Acute encephalopathy  Assessment & Plan  Patient initially admitted due to altered mental status, thought to be multifactorial secondary to infection, dehydration and hepatic encephalopathy  Patient had rapid response on April 8 due to seizures, subsequently transfer to ICU for EEG monitoring  Patient is now intubated and sedated  Hepatocellular carcinoma [facility])  Assessment & Plan  History of cirrhosis, on a screening imaging in CT liver 11/28/2019 LI-RADS lesion in segment 4A of the liver, repeat MRI in January show a 2 4 x 1 8 centimetre lesion, also partial thrombosis of intrahepatic, repeat MRI on June 2020 show growing of the lesion now measuring 2 1 x 2 7 cm  Patient had microwave ablation by IR of the lesion  VTE Prophylaxis:  sequential compression device         Counseling / Coordination of Care Time: 45 minutes  Greater than 50% of total time spent on patient counseling and coordination of care  Collaboration of Care: Were Recommendations Directly Discussed with Primary Treatment Team? - Yes     History of Present Illness:    Monique Morrow is a 63 y o  female who is originally admitted to the critical care service due to altered mental status  We are consulted for management of decompensated cirrhosis  Patient has a history of cirrhosis diagnosis in 2013 initially felt due to alcohol abuse, she was found to have hepatitis-C, she did have a transfusion after an ectopic pregnancy at age 34  She was diagnosed with hepatocellular carcinoma after imaging screening of CT of the liver in November 2019 show a lesion in the segment 4A of the liver    She subsequently had MRI of the abdomen confirming diagnosis  Patient had of her usual microwave ablation of the lesion by IR in July 2020  Patient had received any treatment for hepatitis C infection, she is on lactulose, spironolactone and Lasix at home vitals, however not sure about compliance  Last colonoscopy in November 2019 show LS than 5 mm sessile polyp in the proximal rectum and distal transverse removed  Also a small mild diverticula in the sigmoid colon  EGD in 2019 showed no viruses just prepyloric erosive gastritis  Patient was initially admitted in [facility] with altered mental status, and was been treated for UTI and cellulitis of lower extremities  However she develop seizures, was subsequently intubated and transferred to [address] [place] [address] for continuous EEG monitoring  Gastroenterology service is consulted  During my encounter patient is sedated, intubated and mechanically ventilated  Na is 148, creat 0 9, AST 85, ALT 34, ammonia 51, WBC 11 7, Hgb 6 8, plat 57, fibrinogen 149  She received 1PRBC, 1 unit platelet, 2u FFP  Spoke with contact Caitlyn Loyola, who states patient has been drinking, she was admitted in March with similar complaints, at that time apparently she had alcohol intoxication  Review of Systems:    Review of Systems   Unable to perform ROS: Intubated       Past Medical and Surgical History:     Past Medical History:   Diagnosis Date    Arthritis     Cirrhosis ([address] )     Extremity pain     BLE    Hepatitis C     Joint pain     Bilateral Shoulders    Liver disease     Low back pain        Past Surgical History:   Procedure Laterality Date    CT GUIDED AND MONITORING PARENCHYMAL TISSUE ABLATION  7/28/2020    ECTOPIC PREGNANCY SURGERY      MOUTH SURGERY      TONSILLECTOMY         Meds/Allergies:    PTA meds:   Prior to Admission Medications   Prescriptions Last Dose Informant Patient Reported?  Taking?   baclofen 10 mg tablet  Self Yes No   Sig: Take 10 mg by mouth   cholecalciferol (VITAMIN D3) 1,000 units tablet  Self Yes No   furosemide (LASIX) 40 mg tablet  Self Yes No   Sig: Take 40 mg by mouth   gabapentin (NEURONTIN) 300 mg capsule  Self No No   Sig: Take 1 capsule (300 mg total) by mouth 2 (two) times a day   lactulose 20 g/30 mL   No No   Sig: Take 45 mL (30 g total) by mouth 3 (three) times a day To achieve 3 bowel movements a day   spironolactone (ALDACTONE) 100 mg tablet  Self Yes No   Sig: TAKE 1 & 1/2 TABLETS BY MOUTH DAILY      Facility-Administered Medications: None       Allergies: No Known Allergies    Social History:     Marital Status:     Substance Use History:   Social History     Substance and Sexual Activity   Alcohol Use Not Currently    Frequency: Monthly or less    Drinks per session: 1 or 2     Social History     Tobacco Use   Smoking Status Current Every Day Smoker    Packs/day: 0 50   Smokeless Tobacco Never Used     Social History     Substance and Sexual Activity   Drug Use Yes    Frequency: 7 0 times per week    Types: Marijuana    Comment: everyday       Family History:    Family History   Problem Relation Age of Onset    Alcohol abuse Mother     Heart disease Father        Physical Exam:     Vitals:   Blood Pressure: 102/52 (04/09/21 1145)  Pulse: 64 (04/09/21 1145)  Temperature: (!) 96 1 °F (35 6 °C) (04/09/21 1145)  Temp Source: Bladder (04/09/21 0400)  Respirations: (!) 11 (04/09/21 1145)  Weight - Scale: 66 7 kg (147 lb 0 8 oz) (04/09/21 0600)  SpO2: 100 % (04/09/21 1148)    Physical Exam  Vitals signs and nursing note reviewed  Constitutional:       Appearance: She is ill-appearing  Interventions: She is sedated and intubated  Eyes:      General:         Right eye: No discharge  Left eye: No discharge  Cardiovascular:      Rate and Rhythm: Normal rate and regular rhythm  Heart sounds: Normal heart sounds  Pulmonary:      Effort: She is intubated        Breath sounds: Normal breath sounds and air entry  Abdominal:      General: There is no distension  Palpations: Abdomen is soft  Tenderness: There is no abdominal tenderness  There is no guarding  Skin:     General: Skin is warm  Findings: Bruising present  Neurological:      Mental Status: She is unresponsive  Cranial Nerves: No facial asymmetry  Motor: Seizure activity present  Psychiatric:         Behavior: Behavior is uncooperative  Additional Data:     Lab Results: I have personally reviewed pertinent reports  Results from last 7 days   Lab Units 04/09/21  0909 04/09/21  0521 04/08/21  1701   WBC Thousand/uL 11 77* 7 27 14 25*   HEMOGLOBIN g/dL 6 8* 6 6* 7 9*   HEMATOCRIT % 19 6* 17 8* 22 4*   PLATELETS Thousands/uL 57* 52* 70*   BANDS PCT %  --   --  0   NEUTROS PCT %  --  77*  --    LYMPHS PCT %  --  12*  --    LYMPHO PCT %  --   --  16   MONOS PCT %  --  7  --    MONO PCT %  --   --  8   EOS PCT %  --  2 1     Results from last 7 days   Lab Units 04/09/21  0521   SODIUM mmol/L 148*   POTASSIUM mmol/L 5 0   CHLORIDE mmol/L 120*   CO2 mmol/L 22   BUN mg/dL 18   CREATININE mg/dL 0 93   ANION GAP mmol/L 6   CALCIUM mg/dL 8 5   ALBUMIN g/dL 2 3*   TOTAL BILIRUBIN mg/dL 5 44*   ALK PHOS U/L 97   ALT U/L 34   AST U/L 85*   GLUCOSE RANDOM mg/dL 71     Results from last 7 days   Lab Units 04/09/21  0521   INR  3 25*     Results from last 7 days   Lab Units 04/08/21  1701 04/03/21  0921   TROPONIN I ng/mL <0 02 <0 02     No results found for: HGBA1C  Results from last 7 days   Lab Units 04/08/21  1640 04/03/21  1947   POC GLUCOSE mg/dl 117 113     Results from last 7 days   Lab Units 04/09/21  0924 04/09/21  0059 04/08/21  1701 04/04/21  1003 04/04/21  0414   LACTIC ACID mmol/L  --  1 5 10 8* 2 9* 2 6*   PROCALCITONIN ng/ml 0 10  --   --   --   --        Imaging: I have personally reviewed pertinent reports        CT abdomen w contrast    (Results Pending)       EKG, Pathology, and Other Studies Reviewed on Admission:     ** Please Note: This note has been constructed using a voice recognition system   **

## 2021-04-09 NOTE — ASSESSMENT & PLAN NOTE
Patient initially admitted due to altered mental status, thought to be multifactorial secondary to infection, dehydration and hepatic encephalopathy  Patient had rapid response on April 8 due to seizures, subsequently transfer to ICU for EEG monitoring  Patient is now intubated and sedated

## 2021-04-09 NOTE — CASE MANAGEMENT
Pt transfer from [facility]  Pt intubated  CM received call from [address] [place] [address] Road protective services ([phone])  Alejo Chen stating she spoke to Kevin, pt's friend, who does not want to be involved with patient any longer  Alejo Chen looking into potential brother-[place] and intends to visit bar where he works today  Per Alejo Chen, patient has no legal spouse, no adult children, no parents involved in her life  CM attempted to call SO - left VM for Kevin ([phone])  CM attempted to call additional number for Kevin, not in service ([phone])  CM called home phone, not in service ([phone])  CM called Russ Barone, mobile - mailbox full  Pt had previously elected a friend "Loretta Weller" as HCR  No contact info available  CM asked bedside RN to alert CM if patient has phone bedside  CM to initiate guardianship if brother cannot be located

## 2021-04-09 NOTE — RESPIRATORY THERAPY NOTE
Patient taken off vent placed on ambulance flight crew ventilator patient tolerating vent settings well at this time  Report given to transferring hospital respiratory therapsit

## 2021-04-09 NOTE — CONSULTS
Consultation - Palliative and Supportive Care   Viktoria Bach 63 y o  female [id number]    Assessment:  Patient Active Problem List   Diagnosis    Degeneration of intervertebral disc of lumbar region    Lumbar spondylosis    Mass of left lobe of liver    Chronic pain syndrome    Hepatocellular carcinoma (HCC)    Acute encephalopathy    Cirrhosis (HCC)    DARRYL (acute kidney injury) ([facility] [address] )    Hyperkalemia    Alcohol abuse    Anemia    Lactic acidosis    Pancytopenia (HCC)    UTI (urinary tract infection)    Cellulitis    Wounds, multiple    Hypernatremia    Seizure (HCC)    Hyperammonemia (HCC)    Acute respiratory failure with hypoxia (HCC)     Plan:  1  Symptom management -    - per critical care team    2  Goals - level 1 full code   - Called and left message for brother, Candy Mckenzie, at [phone]    alex Love chosen not to participate in patient's care  - No contact available for sister, Linda Larios, at this time  - If unable to make contact with family, may need to pursue guardianship for assistance in decision making  Code Status: full - Level 1   Decisional apparatus:  Patient is not competent on my exam today  If competence is lost, patient's substitute decision maker would default to adult siblings by [place]  Advance Directive / Living Will / POLST:  None on file    3  Social support   -      I have reviewed the patient's controlled substance dispensing history in the Prescription Drug Monitoring Program in compliance with the [facility] regulations before prescribing any controlled substances  We appreciate the invitation to be involved in this patient's care  We will continue to follow  Please do not hesitate to reach our on call provider through our clinic answering service at  should you have acute symptom control concerns      Antony Yeboah PA-C  Palliative and Supportive Care  Clinic/Answering Service: [phone]  You can find me on [employer]! IDENTIFICATION:  Inpatient consult to Palliative Care  Consult performed by: Antony Yeboah PA-C  Consult ordered by: Monty Mandujano PA-C        Physician Requesting Consult: Kyle Choi MD  Reason for Consult / Principal Problem: goals of care  Hx and PE limited by: intubation, supplemented via chart review, communication with RN    HISTORY OF PRESENT ILLNESS:       Viktoria Bach is a 63 y o  female with hepatitis C, ETOH cirrhosis, tobacco use, presented to [facility] with hepatic encephalopathy, found to have portal vein thrombosis, later experienced tonic clonic seizures for which she was transferred for video EEG  Patient was intubated for airway protection, started on propofol gtt to suppress active seizures  GI has been consulted for management of decompensated cirrhosis, ID consult pending, neurology consulted for assistance with seizure management  Patient has substantial LE wounds  Patient was reportedly living with a friend, Man Abebe, prior to admission  However, he has decided not to be involved in patient's care  However,  was able to identify brother, Candy Mckenzie, and sister, Linda Larios, as contacts  She reportedly does not have any children and is previously          Review of Systems   Unable to perform ROS: intubated       Past Medical History:   Diagnosis Date    Arthritis     Cirrhosis ([address] )     Extremity pain     BLE    Hepatitis C     Joint pain     Bilateral Shoulders    Liver disease     Low back pain      Past Surgical History:   Procedure Laterality Date    CT GUIDED AND MONITORING PARENCHYMAL TISSUE ABLATION  7/28/2020    ECTOPIC PREGNANCY SURGERY      MOUTH SURGERY      TONSILLECTOMY       Social History     Socioeconomic History    Marital status:      Spouse name: Not on file    Number of children: Not on file    Years of education: Not on file    Highest education level: Not on file   Occupational History    Not on file Social Needs    Financial resource strain: Not on file    Food insecurity     Worry: Not on file     Inability: Not on file    Transportation needs     Medical: Not on file     Non-medical: Not on file   Tobacco Use    Smoking status: Current Every Day Smoker     Packs/day: 0 50    Smokeless tobacco: Never Used   Substance and Sexual Activity    Alcohol use: Not Currently     Frequency: Monthly or less     Drinks per session: 1 or 2    Drug use: Yes     Frequency: 7 0 times per week     Types: Marijuana     Comment: everyday    Sexual activity: Not on file   Lifestyle    Physical activity     Days per week: Not on file     Minutes per session: Not on file    Stress: Not on file   Relationships    Social connections     Talks on phone: Not on file     Gets together: Not on file     Attends Christian service: Not on file     Active member of club or organization: Not on file     Attends meetings of clubs or organizations: Not on file     Relationship status: Not on file    Intimate partner violence     Fear of current or ex partner: Not on file     Emotionally abused: Not on file     Physically abused: Not on file     Forced sexual activity: Not on file   Other Topics Concern    Not on file   Social History Narrative    Not on file     Family History   Problem Relation Age of Onset    Alcohol abuse Mother     Heart disease Father        MEDICATIONS / ALLERGIES:    all current active meds have been reviewed    No Known Allergies    OBJECTIVE:    Physical Exam  Physical Exam  Constitutional:       Appearance: She is ill-appearing  HENT:      Head:      Comments: EEG wires in place  Pulmonary:      Comments: Ventilated via ETT  Abdominal:      Tenderness: There is no guarding  Genitourinary:     Comments: Urinary catheter in place  Skin:     Comments: Significant b/l LE wounds   Neurological:      Comments: Sedated, unable to examine         Lab Results:   I have personally reviewed pertinent labs  , CBC:   Lab Results   Component Value Date    WBC 11 77 (H) 04/09/2021    HGB 6 8 (LL) 04/09/2021    HCT 19 6 (L) 04/09/2021     (H) 04/09/2021    PLT 57 (L) 04/09/2021    MCH 47 2 (H) 04/09/2021    MCHC 34 7 04/09/2021    RDW 19 6 (H) 04/09/2021    MPV 11 9 04/09/2021    NRBC 0 04/09/2021   , CMP:   Lab Results   Component Value Date    SODIUM 148 (H) 04/09/2021    K 5 0 04/09/2021     (H) 04/09/2021    CO2 22 04/09/2021    CO2 15 (L) 04/08/2021    BUN 18 04/09/2021    CREATININE 0 93 04/09/2021    GLUCOSE 100 04/08/2021    CALCIUM 8 5 04/09/2021    AST 85 (H) 04/09/2021    ALT 34 04/09/2021    ALKPHOS 97 04/09/2021    EGFR 67 04/09/2021     Imaging Studies: reviewed pertinent studies  EKG, Pathology, and Other Studies: reviewed pertinent studies    Counseling / Coordination of Care    Total floor / unit time spent today 35+ minutes  Greater than 50% of total time was spent with the patient and / or family counseling and / or coordination of care  A description of the counseling / coordination of care: time spent assessing patient, communicating with RN, chart review, attempts to reach family

## 2021-04-09 NOTE — PROCEDURES
Arterial Line Insertion    Date/Time: 4/9/2021 4:05 PM  Performed by: Kezia Mina DO  Authorized by: Kezia Mina DO     Patient location:  ICU  Consent:     Consent obtained:  Verbal    Consent given by:  Spouse    Risks discussed:  Bleeding, infection, pain, ischemia and repeat procedure  Universal protocol:     Procedure explained and questions answered to patient or proxy's satisfaction: yes      Relevant documents present and verified: yes      Test results available and properly labeled: yes      Radiology Images displayed and confirmed  If images not available, report reviewed: yes      Required blood products, implants, devices, and special equipment available: yes      Site/side marked: yes      Immediately prior to procedure a time out was called: yes      Patient identity confirmed:  Arm band, provided demographic data and hospital-assigned identification number  Indications:     Indications: hemodynamic monitoring, multiple ABGs, continuous blood pressure monitoring and frequent labs / infusion    Pre-procedure details:     Skin preparation:  Chlorhexidine    Preparation: Patient was prepped and draped in sterile fashion    Procedure details:     Location / Tip of Catheter:  Radial    Laterality:  Left    Needle gauge:  22 G    Placement technique:  Seldinger    Number of attempts:  1    Successful placement: yes      Transducer: waveform confirmed    Post-procedure details:     Post-procedure:  Sterile dressing applied and sutured    CMS:  Normal    Patient tolerance of procedure:   Tolerated well, no immediate complications

## 2021-04-09 NOTE — H&P
H&P Exam - [facility],[address] 63 y o  female MRN: [id number]  Unit/Bed#: MICU 01 Encounter: [id number]      -------------------------------------------------------------------------------------------------------------  Chief Complaint:  Seizures, status epilepticus    History of Present Illness   HX and PE limited by:  AMS, intubation  Thania Mireles is a 63 y o  female with PMH significant for alcoholic cirrhosis, ETOH abuse, hepatocellular carcinoma status post ablation in 2020, history of hepatic encephalopathy, and extensive bilateral lower extremity wounds who presented to the [facility] ER on 4/3 for altered mental status  Patient's roommate called EMS for decreased mental status  Upon arrival patient was noted to be alert and oriented to person and time and follow commands but slow to respond  Patient denies any missed medication doses  Patient was noted to have elevated ammonia as well as concern about possible lower extremity cellulitis secondary to multiple lower extremity wounds that had been poorly cared for  Patient was admitted to [address] [place] [address] and  over the last few days has had a waxing and waning mental status  Unfortunately rapid response was called yesterday evening for a witnessed tonic-clonic seizure that had resolved on its own  By the time the rapid response team arrived, the patient was noted to be postictal and have a left upward gaze, no longer following commands, but was withdrawing to pain in all 4  Patient then proceeded to have persistent facial twitching  She received 3 mg of Ativan with resolution  Patient was ultimately intubated for airway protection  Neurology was consulted patient was given it 2 g IV Keppra load and started on Keppra b i d  Patient was then transferred to [facility] for continuous video EEG monitoring in the setting of presumed status epilepticus    Just prior to transfer, patient had a 2nd episode of rhythmic jaw thrusting around 2115 for which she received additional 4 mg of IV Ativan  Upon arrival to the ICU, the patient was on propofol at 50 which was held  The epileptologist on-call was contacted and the patient was subsequently started on continuous video EEG monitoring  On exam, patient suddenly but spontaneously moves bilateral upper extremities  Patient had a present cough and gag reflex  Patient opens eyes to voice, no appreciated gaze preference  Extensive lower extremity wounds appreciated  Patient was noted to be hypotensive after being settled into ICU  Likely related to sedation vs volume depletion  Patient given a 500 cc bolus with temporary resolution  One pressure dropped again, patient given a 250 cc bolus of albumin  History obtained from chart review  -------------------------------------------------------------------------------------------------------------  Assessment and Plan:    Neuro:    Diagnosis: acute metabolic encephalopathy   o Likely multifactorial, concerned about status epilepticus, hyperammonemia, hepatic encephalopathy, lactic acidosis, infection  o 4/8 CT head - negative  o Continue closely monitor neuro exam   Q 1 hour neuro checks   Repeat CT head with any acute decline  o Continue video EEG monitoring and Keppra b i d   o Continue lactulose t i d  And rifaximin  o Continue vitamin replacement with thiamin and folic acid, as well as a multivitamin   Continue to monitor for signs of alcohol withdrawal  o Continue Ancef for concern of lower extremity wound infection  o Neurology following   Diagnosis: seizure activity  o 4/8 - RRT called for tonic-clonic seizure   Given 3 mg Ativan   Loaded with Keppra 2 g x 1   Transferred to [facility] for continuous video EEG monitoring in the setting of possible status epilepticus  o Continue video EEG monitoring   Continue Keppra 750 mg b i d   - Increase AED regimen should EEG reveals seizure activity   Continue seizure precautions   P r n   Ativan for any witnessed seizure activity  o Neurology following  · Diagnosis: hx of ETOH abuse   · Continue thiamine, folic acid, multivitamin replacement  · Continue to closely monitor for signs of withdrawal  · Follow-up with family/friends in regard to accurate drinking history  · Diagnosis:  Pain and sedation  · Continue to trend exam off continuous IV sedation    CV:    Diagnosis:  Intermittent hypotension  o Possibly sedation related vs hypovolemia vs infection  o Continue with volume resuscitation  o Low threshold to initiate low-dose levophed in order to maintain MAP > 65  o Continue closely monitor in telemetry  o Continue to closely monitor hemodynamics    Pulm:   Diagnosis:  Acute hypoxic respiratory failure  o 4/8 - intubated for airway protection in the setting of seizures  o Currently on ACVC    Wean as tolerated   Maintain SaO2 greater than 92%  o Plan for daily SBT  o Continue good pulmonary hygiene   frequent suctioning and respiratory protocol    GI:    Diagnosis: hx of ETOH cirrhosis, hepatocellular carcinoma, hepatic encephalopathy  o Unknown sobriety status, follow-up with family later today  o Meld 28  o Continue lactulose t i d  for 3-4 BM q d   o Continue rifaximin b i d   o Appreciate GI recs    Renal/:    Diagnosis:  Lactic acidosis - improved  o In the setting of acute seizure activity  o Lactic cleared at this time  o Continue to closely monitor   Trend daily BMP   Strict I&Os   Monitor UOP closely    F/E/N:    Fluids - continue volume resuscitation   Electrolytes - trend and replete as needed   Nutrition - NPO at this time  - Advance diet later today    Heme/Onc:    Diagnosis:  Pancytopenia  o Continue closely monitor with daily CBC   DVT ppx:  SCDs, SQH    Endo:    Diagnosis:  No acute issues    ID:    Diagnosis:  Lower extremity cellulitis  o In the setting of chronic, un-cared for lower extremity wounds  o Blood cultures from 04/03 result is no growth after 4 days  o MRSA positive  o Continue Ancef  o Consider broadening antibiotic coverage should patient clinically decline  · Diagnosis:  COVID-19 exposure  · Patient's roommate positive for COVID  · COVID swab negative x2  · Continue precautions this time, can likely D/C later today given 2 negative tests     MSK/Skin:    Diagnosis:  Extensive, hronic lower extremity wounds bilaterally  o Wound consult placed   Continue local wound care  o Concern for possible neglect - palliative consult placed    Disposition: Admit to Critical Care   Code Status: Level 1 - Full Code  --------------------------------------------------------------------------------------------------------------  Review of Systems  Patient seen examined upon arrival to the ICU  Patient intubated and therefore history unable to be obtained  Review of systems was unable to be performed secondary to Intubation    Physical Exam  Constitutional:       General: She is not in acute distress  Appearance: She is ill-appearing  Comments: Minimally responsive female appearing older than stated age  Ill-appearing  In no acute distress  Thin   HENT:      Head: Normocephalic and atraumatic  Comments: Video EEG leads in place     Nose: Nose normal       Mouth/Throat:      Comments: ETT in place  Eyes:      Pupils: Pupils are equal, round, and reactive to light  Comments: Pupils are equal, round, reactive to light bilaterally  3 mm bilaterally  No appreciated gaze preference/fixed gaze   Neck:      Musculoskeletal: No neck rigidity  Cardiovascular:      Rate and Rhythm: Normal rate and regular rhythm  Pulses: Normal pulses  Heart sounds: No murmur  Pulmonary:      Effort: Pulmonary effort is normal  No respiratory distress  Breath sounds: Normal breath sounds  No wheezing, rhonchi or rales  Comments: Breath sounds appreciated bilaterally  Intubated on ACVC, synchronous with the vent  Abdominal:      General: Abdomen is flat  Palpations: Abdomen is soft  Skin:     General: Skin is warm and dry  Capillary Refill: Capillary refill takes less than 2 seconds  Findings: Lesion present  Comments: Extensive basia LE wounds    Neurological:      Comments: Minimally responsive   Opens eyes to voice   No gaze preference or deviation appreciated   Spontaneously moving basia UE, withdraws in basia UE   + cough/gag    Psychiatric:      Comments: Minimally responsive, no agitation        --------------------------------------------------------------------------------------------------------------  Vitals:   Vitals:    04/08/21 2357   SpO2: 93%     Temp  Min: 97 3 °F (36 3 °C)  Max: 99 °F (37 2 °C)        There is no height or weight on file to calculate BMI    N/A    Laboratory and Diagnostics:  Results from last 7 days   Lab Units 04/08/21  1701 04/08/21  1653 04/08/21  0445 04/07/21  0512 04/07/21  0013 04/06/21  1740 04/06/21  0503 04/05/21  1309 04/03/21  0921   WBC Thousand/uL 14 25*  --  8 11 6 12  --   --  5 60 5 95 8 87   HEMOGLOBIN g/dL 7 9*  --  7 0* 8 1* 8 1* 7 9* 7 0* 7 5* 8 2*   I STAT HEMOGLOBIN g/dl  --  8 2*  --   --   --   --   --   --   --    HEMATOCRIT % 22 4*  --  20 8* 22 3* 26 0* 24 1* 21 6* 23 3* 25 8*   HEMATOCRIT, ISTAT %  --  24*  --   --   --   --   --   --   --    PLATELETS Thousands/uL 70*  --  47* 52*  --   --  50* 55* 79*   NEUTROS PCT %  --   --  70 64  --   --   --  64  --    BANDS PCT % 0  --   --   --   --   --   --   --  1   MONOS PCT %  --   --  8 11  --   --   --  12  --    MONO PCT % 8  --   --   --   --   --   --   --  13*     Results from last 7 days   Lab Units 04/08/21  1701 04/08/21  1653 04/08/21  0445 04/08/21  0104 04/07/21  1755 04/07/21  1110 04/07/21  0512 04/07/21  0013  04/06/21  1258 04/06/21  0503 04/05/21  1309 04/04/21  1643 04/04/21  0414 04/03/21  0921   SODIUM mmol/L 150*  --  146* 147* 125* 153* 156* 155*   < >  --  153* 150* 145 141 132*   POTASSIUM mmol/L 3 8  --  3 4*  --   -- --  3 3*  --   --  3 4* 2 9* 2 9* 3 0* 2 4* 5 0   CHLORIDE mmol/L 115*  --  114*  --   --   --  122*  --   --   --  120* 113* 108 104 90*   CO2 mmol/L 16*  --  23  --   --   --  23  --   --   --  26 26 26 27 22   CO2, I-STAT mmol/L  --  15*  --   --   --   --   --   --   --   --   --   --   --   --   --    ANION GAP mmol/L 19*  --  9  --   --   --  11  --   --   --  7 11 11 10 20*   BUN mg/dL 16  --  13  --   --   --  15  --   --   --  16 19 23 29* 33*   CREATININE mg/dL 1 45*  --  1 14  --   --   --  0 97  --   --   --  0 99 0 98 1 21 1 16 1 98*   CALCIUM mg/dL 9 0  --  8 6  --   --   --  9 4  --   --   --  8 8 8 8 9 0 8 4 8 7   GLUCOSE RANDOM mg/dL 99  --  103  --   --   --  115  --   --   --  121 100 109 88 102   ALT U/L 40  --   --   --   --   --  34  --   --   --  26 26  --  27 40   AST U/L 76*  --   --   --   --   --  61*  --   --   --  46* 45  --  53* 131*   ALK PHOS U/L 111  --   --   --   --   --  99  --   --   --  84 86  --  83 112   ALBUMIN g/dL 2 4*  --   --   --   --   --  2 4*  --   --   --  2 3* 2 5*  --  2 7* 3 0*   TOTAL BILIRUBIN mg/dL 6 98*  --   --   --   --   --  8 21*  --   --   --  7 46* 8 10*  --  11 29* 16 58*    < > = values in this interval not displayed       Results from last 7 days   Lab Units 04/08/21  1701 04/08/21  0445 04/07/21  0512 04/05/21  1309 04/04/21  1643   MAGNESIUM mg/dL 2 1 1 9 2 3 2 0 1 9      Results from last 7 days   Lab Units 04/08/21  0445 04/06/21  1740 04/03/21  0921   INR  2 67* 2 08* 1 95*   PTT seconds  --   --  32      Results from last 7 days   Lab Units 04/08/21  1701 04/03/21  0921   TROPONIN I ng/mL <0 02 <0 02     Results from last 7 days   Lab Units 04/08/21  1701 04/04/21  1003 04/04/21  0414 04/04/21  0006 04/03/21  2103 04/03/21  1218 04/03/21  0921   LACTIC ACID mmol/L 10 8* 2 9* 2 6* 2 5* 3 3* 5 1* 10 6*     ABG:    VBG:          Micro:  Results from last 7 days   Lab Units 04/04/21  1545 04/04/21  0011 04/03/21  1442 04/03/21  8441 04/03/21  1549 04/03/21  1317   BLOOD CULTURE   --   --   --   --  No Growth After 5 Days  No Growth After 5 Days  GRAM STAIN RESULT  Rare Polys*  1+ Gram positive cocci in pairs*  1+ Gram negative rods*  --   --   --   --   --    URINE CULTURE   --   --   --  >100,000 cfu/ml Escherichia coli*  --   --    WOUND CULTURE  4+ Growth of Escherichia coli*  4+ Growth of Acinetobacter baumannii*  4+ Growth of   --   --   --   --   --    MRSA CULTURE ONLY   --   --  Methicillin Resistant Staphylococcus aureus isolated*  This patient requires contact isolation precautions per [place] law  Contact precautions are not required in [place] for nasal surveillance cultures  --   --   --    C DIFF TOXIN B BY PCR   --  Negative  --   --   --   --      Imaging: I have personally reviewed pertinent reports        Historical Information   Past Medical History:   Diagnosis Date    Arthritis     Cirrhosis ([address] )     Extremity pain     BLE    Hepatitis C     Joint pain     Bilateral Shoulders    Liver disease     Low back pain      Past Surgical History:   Procedure Laterality Date    CT GUIDED AND MONITORING PARENCHYMAL TISSUE ABLATION  7/28/2020    ECTOPIC PREGNANCY SURGERY      MOUTH SURGERY      TONSILLECTOMY       Social History   Social History     Substance and Sexual Activity   Alcohol Use Not Currently    Frequency: Monthly or less    Drinks per session: 1 or 2     Social History     Substance and Sexual Activity   Drug Use Yes    Frequency: 7 0 times per week    Types: Marijuana    Comment: everyday     Social History     Tobacco Use   Smoking Status Current Every Day Smoker    Packs/day: 0 50   Smokeless Tobacco Never Used     Family History:   Family History   Problem Relation Age of Onset    Alcohol abuse Mother     Heart disease Father      I have reviewed this patient's family history and commented on sigificant items within the HPI      Medications:  Current Facility-Administered Medications   Medication Dose Route Frequency    ceFAZolin (ANCEF) IVPB (premix in dextrose) 2,000 mg 50 mL  2,000 mg Intravenous Q8H    chlorhexidine (PERIDEX) 0 12 % oral rinse 15 mL  15 mL Swish & Spit Q12H [address]    cholecalciferol (VITAMIN D3) tablet 400 Units  400 Units Oral Daily    fentanyl citrate (PF) 100 MCG/2ML 50 mcg  50 mcg Intravenous Q2H PRN    lactulose oral solution 30 g  30 g Oral TID    levETIRAcetam (KEPPRA) 1,000 mg in sodium chloride 0 9 % 100 mL IVPB  1,000 mg Intravenous Q12H [address]    LORazepam (ATIVAN) injection 2 mg  2 mg Intravenous Q4H PRN    multi-electrolyte (PLASMALYTE-A/ISOLYTE-S PH 7 4) IV solution  100 mL/hr Intravenous Continuous    nicotine (NICODERM CQ) 14 mg/24hr TD 24 hr patch 1 patch  1 patch Transdermal Daily    ondansetron (ZOFRAN) injection 4 mg  4 mg Intravenous Q6H PRN    propofol (DIPRIVAN) 1000 mg in 100 mL infusion (premix)  5-50 mcg/kg/min Intravenous Titrated    rifaximin (XIFAXAN) tablet 550 mg  550 mg Oral Q12H [address]     Home medications:  Prior to Admission Medications   Prescriptions Last Dose Informant Patient Reported?  Taking?   baclofen 10 mg tablet  Self Yes No   Sig: Take 10 mg by mouth   cholecalciferol (VITAMIN D3) 1,000 units tablet  Self Yes No   furosemide (LASIX) 40 mg tablet  Self Yes No   Sig: Take 40 mg by mouth   gabapentin (NEURONTIN) 300 mg capsule  Self No No   Sig: Take 1 capsule (300 mg total) by mouth 2 (two) times a day   lactulose 20 g/30 mL   No No   Sig: Take 45 mL (30 g total) by mouth 3 (three) times a day To achieve 3 bowel movements a day   spironolactone (ALDACTONE) 100 mg tablet  Self Yes No   Sig: TAKE 1 & 1/2 TABLETS BY MOUTH DAILY      Facility-Administered Medications: None     Allergies:  No Known Allergies  ------------------------------------------------------------------------------------------------------------  Advance Directive and Living Will:      Power of :    POLST: ------------------------------------------------------------------------------------------------------------  Anticipated Length of Stay is > 2 midnights    Care Time Delivered:   No Critical Care time spent     Nelly Blanco PA-C      Portions of the record may have been created with voice recognition software  Occasional wrong word or "sound a like" substitutions may have occurred due to the inherent limitations of voice recognition software    Read the chart carefully and recognize, using context, where substitutions have occurred

## 2021-04-09 NOTE — PLAN OF CARE
Problem: Prexisting or High Potential for Compromised Skin Integrity  Goal: Skin integrity is maintained or improved  Description: INTERVENTIONS:  - Identify patients at risk for skin breakdown  - Assess and monitor skin integrity  - Assess and monitor nutrition and hydration status  - Monitor labs   - Assess for incontinence   - Turn and reposition patient  - Assist with mobility/ambulation  - Relieve pressure over bony prominences  - Avoid friction and shearing  - Provide appropriate hygiene as needed including keeping skin clean and dry  - Evaluate need for skin moisturizer/barrier cream  - Collaborate with interdisciplinary team   - Patient/family teaching  - Consider wound care consult   Outcome: Progressing     Problem: PAIN - ADULT  Goal: Verbalizes/displays adequate comfort level or baseline comfort level  Description: Interventions:  - Encourage patient to monitor pain and request assistance  - Assess pain using appropriate pain scale  - Administer analgesics based on type and severity of pain and evaluate response  - Implement non-pharmacological measures as appropriate and evaluate response  - Consider cultural and social influences on pain and pain management  - Notify physician/advanced practitioner if interventions unsuccessful or patient reports new pain  Outcome: Progressing     Problem: INFECTION - ADULT  Goal: Absence or prevention of progression during hospitalization  Description: INTERVENTIONS:  - Assess and monitor for signs and symptoms of infection  - Monitor lab/diagnostic results  - Monitor all insertion sites, i e  indwelling lines, tubes, and drains  - Monitor endotracheal if appropriate and nasal secretions for changes in amount and color  - [place] appropriate cooling/warming therapies per order  - Administer medications as ordered  - Instruct and encourage patient and family to use good hand hygiene technique  - Identify and instruct in appropriate isolation precautions for identified infection/condition  Outcome: Progressing  Goal: Absence of fever/infection during neutropenic period  Description: INTERVENTIONS:  - Monitor WBC    Outcome: Progressing     Problem: SAFETY ADULT  Goal: Patient will remain free of falls  Description: INTERVENTIONS:  - Assess patient frequently for physical needs  -  Identify cognitive and physical deficits and behaviors that affect risk of falls    -  [place] fall precautions as indicated by assessment   - Educate patient/family on patient safety including physical limitations  - Instruct patient to call for assistance with activity based on assessment  - Modify environment to reduce risk of injury  - Consider OT/PT consult to assist with strengthening/mobility  Outcome: Progressing  Goal: Maintain or return to baseline ADL function  Description: INTERVENTIONS:  -  Assess patient's ability to carry out ADLs; assess patient's baseline for ADL function and identify physical deficits which impact ability to perform ADLs (bathing, care of mouth/teeth, toileting, grooming, dressing, etc )  - Assess/evaluate cause of self-care deficits   - Assess range of motion  - Assess patient's mobility; develop plan if impaired  - Assess patient's need for assistive devices and provide as appropriate  - Encourage maximum independence but intervene and supervise when necessary  - Involve family in performance of ADLs  - Assess for home care needs following discharge   - Consider OT consult to assist with ADL evaluation and planning for discharge  - Provide patient education as appropriate  Outcome: Progressing  Goal: Maintain or return mobility status to optimal level  Description: INTERVENTIONS:  - Assess patient's baseline mobility status (ambulation, transfers, stairs, etc )    - Identify cognitive and physical deficits and behaviors that affect mobility  - Identify mobility aids required to assist with transfers and/or ambulation (gait belt, sit-to-stand, lift, walker, cane, etc )  - [place] fall precautions as indicated by assessment  - Record patient progress and toleration of activity level on Mobility SBAR; progress patient to next Phase/Stage  - Instruct patient to call for assistance with activity based on assessment  - Consider rehabilitation consult to assist with strengthening/weightbearing, etc   Outcome: Progressing     Problem: DISCHARGE PLANNING  Goal: Discharge to home or other facility with appropriate resources  Description: INTERVENTIONS:  - Identify barriers to discharge w/patient and caregiver  - Arrange for needed discharge resources and transportation as appropriate  - Identify discharge learning needs (meds, wound care, etc )  - Arrange for interpretive services to assist at discharge as needed  - Refer to Case Management Department for coordinating discharge planning if the patient needs post-hospital services based on physician/advanced practitioner order or complex needs related to functional status, cognitive ability, or social support system  Outcome: Progressing     Problem: Knowledge Deficit  Goal: Patient/family/caregiver demonstrates understanding of disease process, treatment plan, medications, and discharge instructions  Description: Complete learning assessment and assess knowledge base    Interventions:  - Provide teaching at level of understanding  - Provide teaching via preferred learning methods  Outcome: Progressing     Problem: SAFETY,RESTRAINT: NV/NON-SELF DESTRUCTIVE BEHAVIOR  Goal: Remains free of harm/injury (restraint for non violent/non self-detsructive behavior)  Description: INTERVENTIONS:  - Instruct patient/family regarding restraint use   - Assess and monitor physiologic and psychological status   - Provide interventions and comfort measures to meet assessed patient needs   - Identify and implement measures to help patient regain control  - Assess readiness for release of restraint   Outcome: Progressing  Goal: Returns to optimal restraint-free functioning  Description: INTERVENTIONS:  - Assess the patient's behavior and symptoms that indicate continued need for restraint  - Identify and implement measures to help patient regain control  - Assess readiness for release of restraint   Outcome: Progressing

## 2021-04-09 NOTE — ASSESSMENT & PLAN NOTE
History of cirrhosis, on a screening imaging in CT liver 11/28/2019 LI-RADS lesion in segment 4A of the liver, repeat MRI in January show a 2 4 x 1 8 centimetre lesion, also partial thrombosis of intrahepatic, repeat MRI on June 2020 show growing of the lesion now measuring 2 1 x 2 7 cm  Patient had microwave ablation by IR of the lesion

## 2021-04-09 NOTE — CONSULTS
Consultation - Infectious Disease   Rhiannon Maloney 63 y o  female MRN: [id number]  [facility]: [id number]      IMPRESSION & RECOMMENDATIONS:   Impression/Recommendations:  1  Acute hypoxic respiratory failure  Patient was intubated during rapid response for airway protection in the setting of altered mentation  Also concern for developing aspiration pneumonia as chest x-ray shows worsening bilateral airspace disease  No fevers or significant leukocytosis  Patient is hypotensive which is more likely sedation related  Procalcitonin level is pending  Agree with empiric treatment for now for possible aspiration pneumonia     -continue IV Unasyn  Change to 3 g q 6 hours  -monitor temperatures and hemodynamics  -monitor ventilator requirements  -follow-up sputum culture  -follow-up blood culture  -follow-up procalcitonin    2  Hypotension  Showed more likely related to sedation     Also consider developing acute infection, as above  No associated fever  Recent blood cultures are negative     -antibiotic plan as above  -follow-up new blood culture  -monitor temperatures and hemodynamics  -recheck CBC in a m     3  Acute encephalopathy  Initially presented to [facility] on 04/03 and has had waxing and waning mentation  Felt to be secondary to hepatic encephalopathy, alcohol withdrawal   Ultimately transferred to [facility] due to seizure-like activity  EEG now shows status epilepticus  Negative CT head x2  Very low suspicion for primary CNS infection given duration of symptoms, absence of fevers, and other much more plausible explanations  Patient was started on IV acyclovir for possible HSV CNS infection  LP cannot be obtained due to coagulopathy     -continue IV acyclovir for now pending MRI brain  If negative for temporal lobe enhancement, acyclovir can be discontinued   -anti-epileptic management as per Neurology  -serial neuro exams  -monitor temperatures and hemodynamics    4  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  With portal vein thrombosis, hepatic encephalopathy, coagulopathy  GI follow-up ongoing  5  Bilateral lower extremity wounds  Right leg wound culture from 04/04 showed E coli and Acinetobacter  No clinical evidence of acute infection of any of the wounds on exam   Discussed with Podiatry     -no antibiotics indicated  -daily local wound care and dressing changes    6  Recent bacteriuria  Urine culture from 04/03 showed pansensitive E coli  Blood cultures were negative  Patient already received adequate antibiotic course for the possibility of UTI  No further antibiotic indicated for this  Antibiotics:  Antibiotic 7  Unasyn 1  IV acyclovir 1    I discussed above plan with Dr Shaina Wyman from critical care service  I personally reviewed recent prior hospitalization records  Thank you for this consultation  We will follow along with you  Will formally re-evaluate on 04/12  Please call us with any new questions in the interim  HISTORY OF PRESENT ILLNESS:  Reason for Consult:  Pneumonia, status epilepticus    HPI: Mariposa Sinclair is a 63 y o  female with alcohol use, HCV, cirrhosis, HCC, hepatic encephalopathy who most recently presented on 04/03 to [facility] due to worsening confusion  Patient had multiple metabolic derangements including elevated LFTs with bilirubin of 16, lactic acid of 10 6, and hyperammoniemia  Patient noted to have bilateral lower extremity wounds and positive urinalysis so was started on empiric ceftriaxone and vancomycin in the setting of possible sepsis  Urine culture ultimately showed pansensitive E coli  Right leg wound culture was also checked which showed E coli and Acinetobacter  Antibiotic was later changed to Bactrim  On 04/08, rapid response was called due to worsening mentation and concern for tonic colonic seizure    Patient was intubated and ultimately transferred to [facility] for video EEG   EEG is showing seizure activity with concern for status epilepticus  Patient also had transient hypotension overnight requiring initiation of Levophed drip  Chest x-ray today is showing worsening bilateral pulmonary airspace disease with left-sided effusion and pneumothorax  Remains afebrile  Currently sedated on ventilator  REVIEW OF SYSTEMS:  Cannot obtain as patient is sedated on ventilator  PAST MEDICAL HISTORY:  Past Medical History:   Diagnosis Date    Arthritis     Cirrhosis ([address] )     Extremity pain     BLE    Hepatitis C     Joint pain     Bilateral Shoulders    Liver disease     Low back pain      Past Surgical History:   Procedure Laterality Date    CT GUIDED AND MONITORING PARENCHYMAL TISSUE ABLATION  2020    ECTOPIC PREGNANCY SURGERY      MOUTH SURGERY      TONSILLECTOMY         FAMILY HISTORY:  Non-contributory    SOCIAL HISTORY:  Social History     Substance and Sexual Activity   Alcohol Use Not Currently    Frequency: Monthly or less    Drinks per session: 1 or 2     Social History     Substance and Sexual Activity   Drug Use Yes    Frequency: 7 0 times per week    Types: Marijuana    Comment: everyday     Social History     Tobacco Use   Smoking Status Current Every Day Smoker    Packs/day: 0 50   Smokeless Tobacco Never Used       ALLERGIES:  No Known Allergies    MEDICATIONS:  All current active medications have been reviewed      PHYSICAL EXAM:  Vitals:  Temp:  [95 °F (35 °C)-98 4 °F (36 9 °C)] 96 1 °F (35 6 °C)  HR:  [] 64  Resp:  [11-21] 11  BP: ()/(39-84) 102/52  SpO2:  [12 %-100 %] 100 %  Temp (24hrs), Av 1 °F (36 2 °C), Min:95 °F (35 °C), Max:98 4 °F (36 9 °C)  Current: Temperature: (!) 96 1 °F (35 6 °C)     Physical Exam:  General:  Sedated on ventilator  Eyes:  Conjunctive clear with no hemorrhages or effusions  Oropharynx:  ET tube in place  Neck:  Supple, no lymphadenopathy  Lungs:  Ventilated breath sounds  Abdomen:  Soft, non-tender, non-distended  Extremities:  Mild symmetric edema  Skin:  Multiple wounds on both legs with eschar  No fluctuance, surrounding erythema or drainage  Neurological:  Sedated on ventilator    LABS, IMAGING, & OTHER STUDIES:  Lab Results:  I have personally reviewed pertinent labs  Results from last 7 days   Lab Units 04/09/21  0521 04/09/21  0059 04/08/21  1701 04/08/21  1653  04/07/21  0512   POTASSIUM mmol/L 5 0 4 3 3 8  --    < > 3 3*   CHLORIDE mmol/L 120* 120* 115*  --    < > 122*   CO2 mmol/L 22 22 16*  --    < > 23   CO2, I-STAT mmol/L  --   --   --  15*  --   --    BUN mg/dL 18 17 16  --    < > 15   CREATININE mg/dL 0 93 0 97 1 45*  --    < > 0 97   EGFR ml/min/1 73sq m 67 64 39  --    < > 64   GLUCOSE, ISTAT mg/dl  --   --   --  100  --   --    CALCIUM mg/dL 8 5 8 3 9 0  --    < > 9 4   AST U/L 85*  --  76*  --   --  61*   ALT U/L 34  --  40  --   --  34   ALK PHOS U/L 97  --  111  --   --  99    < > = values in this interval not displayed  Results from last 7 days   Lab Units 04/09/21  0909 04/09/21  0521 04/08/21  1701   WBC Thousand/uL 11 77* 7 27 14 25*   HEMOGLOBIN g/dL 6 8* 6 6* 7 9*   PLATELETS Thousands/uL 57* 52* 70*     Results from last 7 days   Lab Units 04/04/21  1545 04/04/21  0011 04/03/21  1442 04/03/21  0939 04/03/21  0926 04/03/21  0921   BLOOD CULTURE   --   --   --   --  No Growth After 5 Days  No Growth After 5 Days  GRAM STAIN RESULT  Rare Polys*  1+ Gram positive cocci in pairs*  1+ Gram negative rods*  --   --   --   --   --    URINE CULTURE   --   --   --  >100,000 cfu/ml Escherichia coli*  --   --    WOUND CULTURE  4+ Growth of Escherichia coli*  4+ Growth of Acinetobacter baumannii*  4+ Growth of   --   --   --   --   --    MRSA CULTURE ONLY   --   --  Methicillin Resistant Staphylococcus aureus isolated*  This patient requires contact isolation precautions per [place] law  Contact precautions are not required in [place] for nasal surveillance cultures    --   -- --    C DIFF TOXIN B BY PCR   --  Negative  --   --   --   --        Imaging Studies:   I have personally reviewed pertinent imaging study reports and images in PACS  CT head shows no acute intracranial abnormality  Liver ultrasound shows probable main portal vein thrombosis  Severe left biliary ductal dilatation  Chest x-ray shows persistent and worsening bilateral airspace disease  Development of left-sided pneumothorax with left-sided pleural effusion  Recent MRCP from 03/15/2021 showed development of increasing dilation of intrahepatic biliary ducts  No extrahepatic dilatation  EKG, Pathology, and Other Studies:   I have personally reviewed pertinent reports

## 2021-04-09 NOTE — PROGRESS NOTES
NEUROLOGY RESIDENCY PROGRESS NOTE     Name: Brody Garnica   Age & Sex: 63 y o  female   MRN: [id number]  Unit/Bed#: [facility] 01   Encounter: [id number]    Recommendations for outpatient neurological follow up have yet to be determined  ASSESSMENT & PLAN     Seizure [facility])  Assessment & Plan  · Assessment:  Brody Garnica is a 63 y o  female with alcoholic/HCV cirrhosis and [address]  who was initially admitted to [facility] with altered mental status  Rapid response was activated on 4/8 due to witnessed seizure activity and neurology was consulted for further assessment  She was given 3 mg Ativan total and 1000 mg Keppra was ordered to be given  During the evaluation by neurology on 4/8 she was noted to have leftward gaze with horizontal nystagmus  CTH was completed and showed no acute abnormalities  Due to concern for ongoing seizure activity decsion was made to transfer patient to [facility] for vEEG  Patient was transferred to intensive care unit and ultimately intubated  4/8-4/9 Received 6mg of Ativan, Loaded with 2 gram Keppra, 2mg Versed, Started on Propfol 30, inc to 50  4/9: 1:45am Found to have frequent right temporal intermittent rthymic delta and intermittent right temporal discharges with no clear seizures  Around 3:20am, found to have left arm twitching and showed evolution of pattern  She was started on Propofol at this point and given 2mg of Ativan  Pattern continued so that required increase in propofol to 50  Plan:   · Current AEDs:  · Patient was on Keppra 750mg BID, noted to have another electrographic seizure in the right temporal, recommended to be loaded with keppra 1500 once and 2mg Ativan and Keppra 1500 BID maintained dose   · If seizures continue may consider next adding  Vimpat with a load of 400 mg and maintance dose of 200 BID next  · Recommend continuing  vEEG monitoring  · Seizure precautions     · At this point would recommend MRI brain with/without to further evaluate for underlying cause for seizures  Recommend coordinating to have MRI imaging soon and to place patient back on vEEG if leads are not MRI compatible  · Recommend starting Acyclovir   · ID on board, recommendations appreciated   · Medical management as per critical care team            SUBJECTIVE     Patient was seen and examined  Overnight she was placed on vEEG and required ativan and propofol  She was started on deric for hypotension      Review of Systems   Unable to perform ROS: Intubated       OBJECTIVE     Patient ID: Nichelle Martell is a 63 y o  female  Vitals:    21 1315 21 1330 21 1345 21 1400   BP: 101/53 (!) 101/48 106/52 102/51   BP Location:       Pulse: 66 74 74 76   Resp: (!) 11 (!) 11 (!) 11 14   Temp: (!) 95 4 °F (35 2 °C) (!) 95 7 °F (35 4 °C) (!) 95 7 °F (35 4 °C) (!) 96 1 °F (35 6 °C)   TempSrc:       SpO2: 99% 99% 99% 98%   Weight:          Temperature:   Temp (24hrs), Av 7 °F (35 9 °C), Min:95 °F (35 °C), Max:98 4 °F (36 9 °C)    Temperature: (!) 96 1 °F (35 6 °C)      Physical Exam  Vitals signs and nursing note reviewed  Constitutional:       Appearance: She is ill-appearing  Interventions: She is sedated, intubated and restrained  Eyes:      General:         Right eye: No discharge  Left eye: No discharge  Cardiovascular:      Rate and Rhythm: Normal rate and regular rhythm  Heart sounds: Normal heart sounds  Pulmonary:      Effort: She is intubated  Breath sounds: Normal breath sounds and air entry  Abdominal:      General: There is no distension  Palpations: Abdomen is soft  Tenderness: There is no abdominal tenderness  There is no guarding  Skin:     General: Skin is warm  Findings: Bruising present  Neurologic Exam     Mental Status   Level of consciousness: responsive to painful stimuli  Patient was examined on 50 of Propofol   She was not able to participate in a formal neurological assessment      Cranial Nerves Decreased corneal reflexes  No blink to threat       Motor Exam   Overall muscle tone: normalWithdrawal noted to noxious stimuli in all 4 extremities      Sensory Exam   Responsive to nox stim      Gait, Coordination, and Reflexes     Tremor   Resting tremor: absent  Intention tremor: absent  Action tremor: absent    Reflexes   Reflexes 2+ except as noted  LABORATORY DATA     Labs: I have personally reviewed pertinent reports  Results from last 7 days   Lab Units 04/09/21  0909 04/09/21  0521 04/08/21  1701  04/08/21  0445 04/07/21  0512   WBC Thousand/uL 11 77* 7 27 14 25*  --  8 11 6 12   HEMOGLOBIN g/dL 6 8* 6 6* 7 9*  --  7 0* 8 1*   I STAT HEMOGLOBIN   --   --   --    < >  --   --    HEMATOCRIT % 19 6* 17 8* 22 4*  --  20 8* 22 3*   HEMATOCRIT, ISTAT   --   --   --    < >  --   --    PLATELETS Thousands/uL 57* 52* 70*  --  47* 52*   NEUTROS PCT %  --  77*  --   --  70 64   MONOS PCT %  --  7  --   --  8 11   MONO PCT %  --   --  8  --   --   --     < > = values in this interval not displayed  Results from last 7 days   Lab Units 04/09/21  0521 04/09/21  0059 04/08/21  1701 04/08/21  1653  04/07/21  0512   SODIUM mmol/L 148* 148* 150*  --    < > 156*   POTASSIUM mmol/L 5 0 4 3 3 8  --    < > 3 3*   CHLORIDE mmol/L 120* 120* 115*  --    < > 122*   CO2 mmol/L 22 22 16*  --    < > 23   CO2, I-STAT mmol/L  --   --   --  15*  --   --    BUN mg/dL 18 17 16  --    < > 15   CREATININE mg/dL 0 93 0 97 1 45*  --    < > 0 97   CALCIUM mg/dL 8 5 8 3 9 0  --    < > 9 4   ALK PHOS U/L 97  --  111  --   --  99   ALT U/L 34  --  40  --   --  34   AST U/L 85*  --  76*  --   --  61*   GLUCOSE, ISTAT mg/dl  --   --   --  100  --   --     < > = values in this interval not displayed       Results from last 7 days   Lab Units 04/09/21  0521 04/08/21  1701 04/08/21  0445   MAGNESIUM mg/dL 2 4 2 1 1 9     Results from last 7 days   Lab Units 04/09/21  0521   PHOSPHORUS mg/dL 4 2      Results from last 7 days   Lab Units 04/09/21  0521 04/08/21  0445 04/06/21  1740 04/03/21  0921   INR  3 25* 2 67* 2 08* 1 95*   PTT seconds  --   --   --  32     Results from last 7 days   Lab Units 04/09/21  0059   LACTIC ACID mmol/L 1 5     Results from last 7 days   Lab Units 04/08/21  1701 04/03/21  0921   TROPONIN I ng/mL <0 02 <0 02       IMAGING & DIAGNOSTIC TESTING     Radiology Results: I have personally reviewed pertinent reports  CT abdomen w contrast    (Results Pending)   XR chest portable ICU    (Results Pending)       Other Diagnostic Testing: I have personally reviewed pertinent reports        ACTIVE MEDICATIONS     Current Facility-Administered Medications   Medication Dose Route Frequency    acyclovir (ZOVIRAX) 650 mg in sodium chloride 0 9 % 100 mL IVPB  10 mg/kg (Ideal) Intravenous Q8H    ampicillin-sulbactam (UNASYN) 3 g in sodium chloride 0 9 % 100 mL IVPB  3 g Intravenous Q6H    chlorhexidine (PERIDEX) 0 12 % oral rinse 15 mL  15 mL Swish & Spit Q12H [facility]    cholecalciferol (VITAMIN D3) tablet 400 Units  400 Units Oral Daily    diazepam (VALIUM) tablet 5 mg  5 mg Oral P8F    folic acid (FOLVITE) tablet 1 mg  1 mg Oral Daily    lactulose oral solution 30 g  30 g Oral TID    levETIRAcetam (KEPPRA) 1,500 mg in sodium chloride 0 9 % 100 mL IVPB  1,500 mg Intravenous Once    levETIRAcetam (KEPPRA) 1,500 mg in sodium chloride 0 9 % 100 mL IVPB  1,500 mg Intravenous Q12H [facility]    LORazepam (ATIVAN) injection 2 mg  2 mg Intravenous Q4H PRN    multi-electrolyte (PLASMALYTE-A/ISOLYTE-S PH 7 4) IV solution  100 mL/hr Intravenous Continuous    nicotine (NICODERM CQ) 14 mg/24hr TD 24 hr patch 1 patch  1 patch Transdermal Daily    norepinephrine (LEVOPHED) 1 mg/mL injection **ADS Override Pull**        norepinephrine (LEVOPHED) 4 mg (STANDARD CONCENTRATION) IV in sodium chloride 0 9% 250 mL  1-30 mcg/min Intravenous Titrated    omeprazole (PRILOSEC) suspension 2 mg/mL  20 mg Oral Early Morning    ondansetron (ZOFRAN) injection 4 mg  4 mg Intravenous Q6H PRN    propofol (DIPRIVAN) 1000 mg in 100 mL infusion (premix)  5-50 mcg/kg/min Intravenous Titrated    rifaximin (XIFAXAN) tablet 550 mg  550 mg Oral Q12H [address]    thiamine tablet 100 mg  100 mg Oral Daily       Prior to Admission medications    Medication Sig Start Date End Date Taking?  Authorizing Provider   baclofen 10 mg tablet Take 10 mg by mouth 5/7/19   Historical Provider, MD   cholecalciferol (VITAMIN D3) 1,000 units tablet     Historical Provider, MD   furosemide (LASIX) 40 mg tablet Take 40 mg by mouth 5/20/19   Historical Provider, MD   gabapentin (NEURONTIN) 300 mg capsule Take 1 capsule (300 mg total) by mouth 2 (two) times a day 8/4/20   Eliel Neff DO   lactulose 20 g/30 mL Take 45 mL (30 g total) by mouth 3 (three) times a day To achieve 3 bowel movements a day 3/16/21   Jerlyn Schilder, CRNP   spironolactone (ALDACTONE) 100 mg tablet TAKE 1 & 1/2 TABLETS BY MOUTH DAILY 5/20/19   Historical Provider, MD     VTE Mechanical Prophylaxis: sequential compression device    ==  MD Martha Tomlin [facility], PGY-2

## 2021-04-09 NOTE — OCCUPATIONAL THERAPY NOTE
Occupational Therapy         Patient Name: Lisa Rodriguez  KZOEQ'G Date: 4/9/2021 04/09/21 0900   OT Last Visit   OT Visit Date 04/09/21   Note Type   Note type Evaluation   Cancel Reasons Intubated/sedated     Daralene Nip, OT

## 2021-04-09 NOTE — ASSESSMENT & PLAN NOTE
· Witnessed tonic-clonic seizure  · Appreciate neurology consultation/recommendation  · Stat CT head without obvious precipitating etiology  · 2 g Keppra load followed by Keppra 1 g b i d   · Plan for transfer to hospitals for continuous vEEG

## 2021-04-09 NOTE — ASSESSMENT & PLAN NOTE
· Alcoholic cirrhosis  Sobriety status unclear  · MELD 28  · Appreciate GI consultation/recommendation  · Continue lactulose 3 times daily  Titrate for 3-4 BM  · Rifaximin b i d

## 2021-04-09 NOTE — PROGRESS NOTES
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, suspected active ETOH abuse, chronic LE wounds presented 4/3 for AMS  Treated for cellulitis/UTI (vanc/CTX) and HE  Rapid response 4/8 for seizure activity and required intubation  Concern for continued seizure activity prompted transfer for cEEG monitoring  24hr events - suspected seizure activity on EEG and given ativan/propofol, persisted seizure activity at 0500 prompted increased propofol and further ativan with cessation  Also given keppra  Transient hypotension responded to albumin and crystalloid, required NE gtt        VS TM 97 5, TL 95, HR 70-80's, MAPS 50-70's, SpO2 100% 0 4/6P, I/Os +850cc  Exam  GEN Middle aged woman appearing older than stated age, intubated, sedated, grimace/wd noxious stim  HEENT PERRL, mild scleral icterus, no nystagmus, no thrush, MM dry  NECK no accessory muscle use, no JVD  CV reg, single s1/2, no m/r  Pulm mechanical BS, no wheeze, no rales, no secretions, pltP 15  ABD +BS soft NTND, no rebound  EXT multiple LE wounds with dressings in place, no edema, brisk cap refill   olea in place, yellow urine    Laboratory and Diagnostics  Results from last 7 days   Lab Units 04/08/21  1701 04/08/21  1653 04/08/21  0445 04/07/21  0512 04/07/21  0013 04/06/21  1740 04/06/21  0503 04/05/21  1309 04/03/21  0921   WBC Thousand/uL 14 25*  --  8 11 6 12  --   --  5 60 5 95 8 87   HEMOGLOBIN g/dL 7 9*  --  7 0* 8 1* 8 1* 7 9* 7 0* 7 5* 8 2*   I STAT HEMOGLOBIN g/dl  --  8 2*  --   --   --   --   --   --   --    HEMATOCRIT % 22 4*  --  20 8* 22 3* 26 0* 24 1* 21 6* 23 3* 25 8*   HEMATOCRIT, ISTAT %  --  24*  --   --   --   --   --   --   --    PLATELETS Thousands/uL 70*  --  47* 52*  --   --  50* 55* 79*   NEUTROS PCT %  --   --  70 64  --   --   --  64  --    BANDS PCT % 0  --   --   --   --   --   --   --  1   MONOS PCT %  --   --  8 11  --   --   --  12  --    MONO PCT % 8  --   --   --   --   -- --   --  13*     Results from last 7 days   Lab Units 04/09/21  0521 04/09/21  0059 04/08/21  1701 04/08/21  1653 04/08/21  0445 04/08/21  0104 04/07/21  1755 04/07/21  1110 04/07/21  0512  04/06/21  1258 04/06/21  0503 04/05/21  1309  04/04/21  0414 04/03/21  0921   SODIUM mmol/L 148* 148* 150*  --  146* 147* 125* 153* 156*   < >  --  153* 150*   < > 141 132*   POTASSIUM mmol/L 5 0 4 3 3 8  --  3 4*  --   --   --  3 3*  --  3 4* 2 9* 2 9*   < > 2 4* 5 0   CHLORIDE mmol/L 120* 120* 115*  --  114*  --   --   --  122*  --   --  120* 113*   < > 104 90*   CO2 mmol/L 22 22 16*  --  23  --   --   --  23  --   --  26 26   < > 27 22   CO2, I-STAT mmol/L  --   --   --  15*  --   --   --   --   --   --   --   --   --   --   --   --    ANION GAP mmol/L 6 6 19*  --  9  --   --   --  11  --   --  7 11   < > 10 20*   BUN mg/dL 18 17 16  --  13  --   --   --  15  --   --  16 19   < > 29* 33*   CREATININE mg/dL 0 93 0 97 1 45*  --  1 14  --   --   --  0 97  --   --  0 99 0 98   < > 1 16 1 98*   CALCIUM mg/dL 8 5 8 3 9 0  --  8 6  --   --   --  9 4  --   --  8 8 8 8   < > 8 4 8 7   GLUCOSE RANDOM mg/dL 71 101 99  --  103  --   --   --  115  --   --  121 100   < > 88 102   ALT U/L 34  --  40  --   --   --   --   --  34  --   --  26 26  --  27 40   AST U/L 85*  --  76*  --   --   --   --   --  61*  --   --  46* 45  --  53* 131*   ALK PHOS U/L 97  --  111  --   --   --   --   --  99  --   --  84 86  --  83 112   ALBUMIN g/dL 2 3*  --  2 4*  --   --   --   --   --  2 4*  --   --  2 3* 2 5*  --  2 7* 3 0*   TOTAL BILIRUBIN mg/dL 5 44*  --  6 98*  --   --   --   --   --  8 21*  --   --  7 46* 8 10*  --  11 29* 16 58*    < > = values in this interval not displayed       Results from last 7 days   Lab Units 04/09/21  0521 04/08/21  1701 04/08/21  0445 04/07/21  0512 04/05/21  1309 04/04/21  1643   MAGNESIUM mg/dL 2 4 2 1 1 9 2 3 2 0 1 9   PHOSPHORUS mg/dL 4 2  --   --   --   --   --       Results from last 7 days   Lab Units 04/09/21  0521 04/08/21  0445 04/06/21  1740 04/03/21  0921   INR  3 25* 2 67* 2 08* 1 95*   PTT seconds  --   --   --  32      Results from last 7 days   Lab Units 04/08/21  1701 04/03/21  0921   TROPONIN I ng/mL <0 02 <0 02     Results from last 7 days   Lab Units 04/09/21  0059 04/08/21  1701 04/04/21  1003 04/04/21  0414 04/04/21  0006 04/03/21  2103 04/03/21  1218   LACTIC ACID mmol/L 1 5 10 8* 2 9* 2 6* 2 5* 3 3* 5 1*     Results from last 7 days   Lab Units 04/07/21  0512   FERRITIN ng/mL 206  208     ABG:   Results from last 7 days   Lab Units 04/09/21  0059   PH ART  7 333*   PCO2 ART mm Hg 42 8   PO2 ART mm Hg 163 3*   HCO3 ART mmol/L 22 2   BASE EXC ART mmol/L -3 4   ABG SOURCE  Artery       NH3 51    MICRO  FLU/RSV neg  COVID-19 neg  Cdiff neg 4/4  MRSA (+) 4/3  Urine 4/3 (+) E  Coli  Bld Cx 4/3 NGTD  Wound Cx 4/4 (+) E coli/acinetobacter     RADIOGRAPHS - images personally reviewed  CT head 4/8 - no acute mass/bleed or shift    CXR 4/8 - ETT nearly JOE, RUL medial infiltrates, suspected RML infiltrate, left basilar infiltrate and likely effusion, no PTX    Hepatic doppler - likely main PV thrombosis severe left biliary ductal dilation    Assessment  1  Status epilepticus   2  Suspect ETOH withdraw  3  Acute hypoxic respiratory failure  4  Hypotension - suspected sedation related  5  Pneumonia - suspected aspiration  6  Ecoli UTI and polymicrobial LE wound infection  7  Cirrhosis/HepC/HCC post ablation/Portal vein thrombosis  8  Anemia/thrombocytopenia  9  Coagulopathy  10  Hypernatremia  11  Resolved lactic acidosis - setting of seizure  12  Sepsis POA    PLAN  · NEURO - remain on cEEG for now, cont keppra, d/w neurology added second agent, add diazepam 5mg q6hrs and consider precedex gtt  Hold on LP given coagulopathy    Continue ativan prn, continue thiamine/folate, adding acyclovir as unable to LP and 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 pending MRI imaging   · CARDIAC - goal MAP >65, titrate NE for goal MAP, continue volume resuscitation with 250cc albumin now, will need CVC, consider TTE  · PULM - cont MV support, SBT once neuro neuro status improves  · GI - continue lactulose/rifampin, follow stool output, repeat ammonia in AM, hold on abd CT for now given need for cEEG, consult GI  · RENAL - no active issues, trend Na, resolved DARRYL  · ID - repeat cultures, expand abx to Unasyn, consult ID and wound, take down dressings for further evaluation  · ENDO - check TSH and cortisol now  · HEME - check hemolysis smear, fibrinogen, haptoglobin, start vit K, - repeat CBC now and may need transfusion  · ICU Care - SCDs alone, add PPI, VAP bundle, Full Code  · I called her emergency contact Grace Solares and updated him  He reported he is attempting to contact her brother  I discussed need for transfusions and likely CVC placement  Consent obtained verbally over telephone for both    All questions answered    Additional CCM time excluding procedures, teaching and updates is 40 minutes      Alcon Yarbrough, DO

## 2021-04-09 NOTE — PROCEDURES
Central Line    Date/Time: 4/9/2021 4:02 PM  Performed by: Tremaine Branch DO  Authorized by: Tremaine Branch DO     Patient location:  ICU  Consent:     Consent obtained:  Verbal    Consent given by:  Spouse    Risks discussed:  Arterial puncture, bleeding, infection, incorrect placement, nerve damage and pneumothorax    Alternatives discussed:  Observation, alternative treatment, delayed treatment and no treatment  Universal protocol:     Procedure explained and questions answered to patient or proxy's satisfaction: yes      Relevant documents present and verified: yes      Test results available and properly labeled: yes      Radiology Images displayed and confirmed  If images not available, report reviewed: yes      Required blood products, implants, devices, and special equipment available: yes      Site/side marked: yes      Immediately prior to procedure, a time out was called: yes      Patient identity confirmed:  Verbally with patient, arm band, provided demographic data and hospital-assigned identification number  Pre-procedure details:     Hand hygiene: Hand hygiene performed prior to insertion      Sterile barrier technique: All elements of maximal sterile technique followed      Skin preparation:  ChloraPrep    Skin preparation agent: Skin preparation agent completely dried prior to procedure    Indications:     Central line indications: medications requiring central line    Anesthesia (see MAR for exact dosages):      Anesthesia method:  Local infiltration    Local anesthetic:  Lidocaine 1% w/o epi  Procedure details:     Location:  Left internal jugular    Vessel type: vein      Laterality:  Left    Approach: percutaneous technique used      Patient position:  Reverse Trendelenburg    Catheter type:  Triple lumen 20cm    Catheter size:  7 Fr    Landmarks identified: yes      Ultrasound guidance: yes      Ultrasound image availability:  Images available in PACS    Sterile ultrasound techniques: Sterile gel and sterile probe covers were used      Number of attempts:  1    Successful placement: yes    Post-procedure details:     Post-procedure:  Dressing applied and line sutured    Assessment:  Blood return through all ports, free fluid flow and placement verified by x-ray    Post-procedure complications: none      Patient tolerance of procedure:   Tolerated well, no immediate complications  Comments:      Persistent small left pneumothorax (initially identified on cxr 4/8/21 prior to line placement)

## 2021-04-09 NOTE — UTILIZATION REVIEW
Notification of Inpatient Admission/Inpatient Authorization Request   This is a Notification of Inpatient Admission for [facility]  Be advised that this patient was admitted to our facility under Inpatient Status  Contact Rani Arce at [phone] for additional admission information  [facility]  DEDICATED [phone]  Patient Name:   Bryan Jimenez   YOB: 1960       [address]   [address]:   [place] [address]  Tax ID: [id number]  NPI: [id number] Attending Provider/NPI:  Phone:  Address: [address]   [phone]  Same as SERA/Brenda Rutledge 1106 of Service Code: 24 Place of Service Name:  [address] [place] [address]   Start Date: 4/8/21 2354 Discharge Date & Time: No discharge date for patient encounter  Type of Admission: Inpatient Status Discharge Disposition (if discharged): [address] [place] [address]   Patient Diagnoses: Encephalopathy [G93 40]     Orders: Admission Orders (From admission, onward)     Ordered        04/09/21 0004  Inpatient Admission  Once                    Assigned Utilization Review Contact: Rani Arce  Utilization   Network Utilization Review Department  Phone: [phone]; Fax [phone]  Email: [email or web address]   ATTENTION PAYERS: Please call the assigned Utilization  directly with any questions or concerns ALL voicemails in the department are confidential  Send all requests for admission clinical reviews, approved or denied determinations and any other requests to dedicated fax number belonging to the campus where the patient is receiving treatment

## 2021-04-09 NOTE — NURSING NOTE
Patient noted to have grand mal seizure like activity b/l upper and lower extremities  Rapid response called  Patient placed on her side and suctioned, PRN O2 initiated via nasal cannula  SLIM notified

## 2021-04-09 NOTE — CONSULTS
Podiatry - Consultation    Patient Information:   Mariposa Sinclair 63 y o  female MRN: [id number]  Unit/Bed#: MICU 01 Encounter: [id number]  PCP: Ag Guzmán DO  Date of Admission:  4/8/2021  Date of Consultation: 04/09/21  Requesting Physician: Dotty Horne MD    ASSESSMENT:    Mariposa Sinclair is a 63 y o  female with:    1  Bilateral foot eschars  2  Bilateral superficial leg ulcerations    PLAN:    · Local wound care consisting of Betadine paint open to air to bilateral feet  Wound care instructions placed  · Dressings to the bilateral legs were left clean , dry, and intact with no strike through noted, The ulcerations were previously evaluated by wound care, we agree with and will continue with their wound care orders  No need for podiatric surgical intervention  We will gladly follow along for local wound care to the lower extremities  Patient will be seen on a weekly basis  · Elevation on green foam wedges or pillows when non-ambulatory  · Rest of care per primary team   · Will discuss this plan with my attending and update as needed  Weightbearing status:  Weight-bearing as tolerated    SUBJECTIVE:    History of Present Illness:    Mariposa Sinclair is a 63 y o  female who is originally admitted 4/8/2021 due to altered mental status  Patient has a past medical history of alcohol cirrhosis, alcohol abuse, hepatocellular carcinoma, hepatic encephalopathy  We are consulted for management of the patient's bilateral lower extremity ulcerations  A subjective history is unable to be obtained secondary to patient's mental status and current medical state  Review of Systems:    Constitutional: Negative  HENT: Negative  Eyes: Negative  Respiratory: Negative  Cardiovascular: Negative  Gastrointestinal: Negative      Musculoskeletal:  Unable to be properly assessed  Skin:  Bilateral superficial leg ulcerations, bilateral foot eschars   Neurological:  Unable to be properly assessed   Psych:  Unable to be properly assessed    Past Medical and Surgical History:     Past Medical History:   Diagnosis Date    Arthritis     Cirrhosis ([address] )     Extremity pain     BLE    Hepatitis C     Joint pain     Bilateral Shoulders    Liver disease     Low back pain      Past Surgical History:   Procedure Laterality Date    CT GUIDED AND MONITORING PARENCHYMAL TISSUE ABLATION  7/28/2020    ECTOPIC PREGNANCY SURGERY      MOUTH SURGERY      TONSILLECTOMY       Meds/Allergies:    Medications Prior to Admission   Medication    baclofen 10 mg tablet    cholecalciferol (VITAMIN D3) 1,000 units tablet    furosemide (LASIX) 40 mg tablet    gabapentin (NEURONTIN) 300 mg capsule    lactulose 20 g/30 mL    spironolactone (ALDACTONE) 100 mg tablet     No Known Allergies    Social History:     Marital Status:     Substance Use History:   Social History     Substance and Sexual Activity   Alcohol Use Not Currently    Frequency: Monthly or less    Drinks per session: 1 or 2     Social History     Tobacco Use   Smoking Status Current Every Day Smoker    Packs/day: 0 50   Smokeless Tobacco Never Used     Social History     Substance and Sexual Activity   Drug Use Yes    Frequency: 7 0 times per week    Types: Marijuana    Comment: everyday     Family History:    Family History   Problem Relation Age of Onset    Alcohol abuse Mother     Heart disease Father      OBJECTIVE:    Vitals:   Blood Pressure: (!) 93/44 (04/09/21 1300)  Pulse: 62 (04/09/21 1300)  Temperature: (!) 95 7 °F (35 4 °C) (04/09/21 1300)  Temp Source: Bladder (04/09/21 0400)  Respirations: 12 (04/09/21 1300)  Weight - Scale: 66 7 kg (147 lb 0 8 oz) (04/09/21 0600)  SpO2: 98 % (04/09/21 1300)    Physical Exam:    General Appearance:  Not awake or alert  Patient is intubated  HEENT: Head normocephalic, atraumatic, without obvious abnormality  Heart: Normal rate and rhythm    Lungs:  Patient is intubated  Abdomen: Without distension  Psychiatric:  Patient is not awake, alert, or oriented  Lower Extremity:  Vascular:   Right DP and PT pulses are present  Left DP and PT pulses are present  CRT < 3 seconds at the digits  +2/4 edema noted at bilateral lower extremities  Pedal hair is absent  Skin temperature is WNL bilaterally  Musculoskeletal:  MMT is unable to be assessed secondary to patient's current medical state  ROM at the 1st MPJ and ankle joint are decreased bilaterally with the leg extended  Dermatological:  Bilateral eschars are noted to the dorsal foot  Eschars are clean, dry, and free from local signs of infection  No malodor, ascending erythema, warmth, or drainage noted to the area  Bilateral leg ulcers not directly assessed and dressings were left clean, dry, and intact with no strike through noted  Pictures of patient's leg ulcers reviewed, and wound care orders placed  Neurological:  Unable to be tested secondary to patient's medical condition  Additional data:     Lab Results: I have personally reviewed pertinent labs including:    Results from last 7 days   Lab Units 04/09/21  0909 04/09/21  0521   WBC Thousand/uL 11 77* 7 27   HEMOGLOBIN g/dL 6 8* 6 6*   HEMATOCRIT % 19 6* 17 8*   PLATELETS Thousands/uL 57* 52*   NEUTROS PCT %  --  77*   LYMPHS PCT %  --  12*   MONOS PCT %  --  7   EOS PCT %  --  2     Results from last 7 days   Lab Units 04/09/21  0521  04/08/21  1653   POTASSIUM mmol/L 5 0   < >  --    CHLORIDE mmol/L 120*   < >  --    CO2 mmol/L 22   < >  --    CO2, I-STAT mmol/L  --   --  15*   BUN mg/dL 18   < >  --    CREATININE mg/dL 0 93   < >  --    CALCIUM mg/dL 8 5   < >  --    ALK PHOS U/L 97   < >  --    ALT U/L 34   < >  --    AST U/L 85*   < >  --    GLUCOSE, ISTAT mg/dl  --   --  100    < > = values in this interval not displayed       Results from last 7 days   Lab Units 04/09/21  0521   INR  3 25*       Cultures: I have personally reviewed pertinent cultures including:    Results from last 7 days   Lab Units 04/09/21  0923 04/04/21  1545 04/04/21  0011 04/03/21  0939 04/03/21  0926 04/03/21  0921   BLOOD CULTURE  Received in Microbiology Lab  Culture in Progress  --   --   --  No Growth After 5 Days  No Growth After 5 Days  GRAM STAIN RESULT   --  Rare Polys*  1+ Gram positive cocci in pairs*  1+ Gram negative rods*  --   --   --   --    URINE CULTURE   --   --   --  >100,000 cfu/ml Escherichia coli*  --   --    WOUND CULTURE   --  4+ Growth of Escherichia coli*  4+ Growth of Acinetobacter baumannii*  4+ Growth of   --   --   --   --    C DIFF TOXIN B BY PCR   --   --  Negative  --   --   --            Imaging: I have personally reviewed pertinent reports in PACS  EKG, Pathology, and Other Studies: I have personally reviewed pertinent reports  ** Please Note: Portions of the record may have been created with voice recognition software  Occasional wrong word or "sound a like" substitutions may have occurred due to the inherent limitations of voice recognition software  Read the chart carefully and recognize, using context, where substitutions have occurred   **

## 2021-04-09 NOTE — ASSESSMENT & PLAN NOTE
· Wound care consultation  · [facility] consulted given concern for an accurate home care, and extremely poor nutritional status

## 2021-04-09 NOTE — ASSESSMENT & PLAN NOTE
· Likely secondary to underlying liver dysfunction  · Maintain HgB>7 0, platelets greater than 20,000

## 2021-04-09 NOTE — WOUND OSTOMY CARE
Consult Note - Wound   Solway Manner 63 y o  female MRN: [id number]  Unit/Bed#: MICU 01 Encounter: [id number]      History and Present Illness: Patient is sen for wound care consult today   The patient is a transfer from [facility] to the MICU   Patient is in the MICU on a ventilator and medically compromised   She is admitted with decompensataed cirrhosis related to hepatitis C , hepatocellular carcinoma and DARRYL   Patient has multiple lower leg wounds that vascular was consulted at [facility] and they signed off   MD made aware here and to consult podiatry   The patient is a Max A of 2 for rolling in the bed   Hamlin and flexiseal in place for management of bowel and bladder   Wedges to off load and allevyn on the heels with elevation   On a low air loss critical care bed  Assessment Findings:   1  Left anterior pretibial wounds - POA unclear etiology   Wound beds are mixture of pink and adherent slough   80% slough and 20 % pink tissue   Fragile and small amount of serosanguinous drainage   2  Left anterior foot dry scabbed brown abrasion -POA  No fluctuance or drainage noted   No erythema noted   3  Right great toe scattered dry abrasion-POA  areas - no fluctuance drainage or erythema noted   4  Right heel - DTI - POA on the bottom of the heel area is absorbed brown intact intact area  The wound has the potential to evolve to a full thickness injury stage 3 stage 4 or unstageable   No erythema or fluctuance   5  Left elbow skin tear is resolved   6  Left posterior tibial area - POA unknown etiology of 80% adherent slough and 20% granular tissue   Small drainage on the area   7    Right tibial area - wounds of unknown etiology   Wound beds 90% yellow / white tissue noted unable to know depth due to the devitalized tissue   Small serosanguinous drainage   8  Right posterior tibial area - unknown etiology   70 % yellow tissue in the wound bed unable to know depth due to the devitalized tissue   9   Left buttocks - unstageable pressure injury present on admission   90% yellow tissue in the wound bed and 10% pink   Area is appears scattered in appearance and round circular  10  Right buttocks - unstagable pressure injury present on admission - 90% slough with pink buds in the wound bed area with small bloody drainage tissue is very friable  Right and left buttocks measured together   11  Mid back and right flank bruising    12  Right wrist skin tear is dry and intact scabbed area   13  Left heel - DTI -POA are can have the potential to evolve to a stage 3 or stage 4 , unstageable area POA   Area is intact at this time      Treatments done and bedside RN ware of the assessment and treatment   Skin care plans:  1- Sacral and right buttocks - cleanse with Normal saline then apply maxorb then top with allevyn foam syed with a T and date change every other day   2- Bilateral heels apply allevyn foam syed with a T an date assess skin every shift then reapply change every 3 days   3-Elevate heels to offload pressure  4-Ehob cushion when out of bed  5-Turn/repoisiton q2h or when medically stable for pressure re-distribution on skin  6-Moisturize skin daily with skin nourishing cream  7-B/L legs: Cleanse lower extremity wound with NSS,  Apply silvasorb gel then top with adaptic , cover with ABD pad, wrap with Hever  Change daily and PRN for soilage or dislodgement  8-Allevyn foam to B/L elbow, syed  with w/P,  peel foam back and check skin integrity q-shift  Change q3d  9- Bilateral knee abrasion - cleanse with Normal saline then apply demagran then top with a ABD and hever change every other day   10  On low air loss critical care bed  Vitals: Blood pressure (!) 93/44, pulse 62, temperature (!) 95 7 °F (35 4 °C), resp  rate 12, weight 66 7 kg (147 lb 0 8 oz), SpO2 98 %  ,Body mass index is 22 36 kg/m²  Wound 04/04/21 Traumatic Knee Left; Anterior (Active)   Wound Image   04/09/21 1108   Wound Description Dry;Brown;Pink 04/09/21 1108   Alena-wound Assessment Clean;Dry; Intact 04/09/21 1108   Wound Length (cm) 6 cm 04/09/21 1108   Wound Width (cm) 7 cm 04/09/21 1108   Wound Depth (cm) 0 1 cm 04/09/21 1108   Wound Surface Area (cm^2) 42 cm^2 04/09/21 1108   Wound Volume (cm^3) 4 2 cm^3 04/09/21 1108   Calculated Wound Volume (cm^3) 4 2 cm^3 04/09/21 1108   Drainage Amount None 04/09/21 1108   Non-staged Wound Description Partial thickness 04/09/21 1108   Treatments Cleansed;Site care 04/09/21 1108   Dressing Dermagran gauze 04/09/21 1108   Dressing Changed Changed 04/09/21 1108   Patient Tolerance Tolerated well 04/09/21 1108   Dressing Status Clean;Dry; Intact 04/09/21 0415       Wound 04/04/21 Venous Ulcer Pretibial Left; Lower; Anterior (Active)   Wound Image   04/09/21 1106   Wound Description Beefy red;Fragile;Slough 04/09/21 1106   Alena-wound Assessment Clean;Dry; Intact 04/09/21 1106   Wound Length (cm) 5 cm 04/09/21 1106   Wound Width (cm) 4 cm 04/09/21 1106   Wound Depth (cm) 0 2 cm 04/05/21 1042   Wound Surface Area (cm^2) 20 cm^2 04/09/21 1106   Wound Volume (cm^3) 4 cm^3 04/05/21 1042   Calculated Wound Volume (cm^3) 4 cm^3 04/05/21 1042   Tunneling 0 cm 04/05/21 1042   Undermining 0 04/05/21 1042   Drainage Amount Small 04/09/21 1106   Drainage Description Serosanguineous; Serous 04/09/21 1106   Non-staged Wound Description Partial thickness 04/09/21 1106   Treatments Cleansed;Irrigation with NSS 04/09/21 1106   Dressing Silvasorb gel;Vaseline gauze 04/09/21 1106   Wound packed? No 04/05/21 1042   Dressing Changed Changed 04/09/21 1106   Patient Tolerance Tolerated well 04/09/21 1106   Dressing Status Clean;Dry; Intact 04/09/21 0415       Wound 04/04/21 Foot Anterior; Left (Active)   Wound Image   04/09/21 1105   Wound Description Dry; Intact; Brown 04/09/21 1105   Alena-wound Assessment Clean;Dry; Intact 04/09/21 1105   Wound Length (cm) 3 cm 04/09/21 1105   Wound Width (cm) 3 cm 04/09/21 1105   Wound Surface Area (cm^2) 9 cm^2 04/09/21 1105   Drainage Amount None 04/09/21 1105   Non-staged Wound Description Partial thickness 04/09/21 1105   Treatments Cleansed;Site care 04/09/21 1105   Dressing Other (Comment) 04/09/21 1105   Patient Tolerance Tolerated well 04/09/21 1105       Wound 04/04/21 Venous Ulcer Tibial Left;Posterior (Active)   Wound Image   04/09/21 1108   Wound Description Fragile;Pink; White 04/09/21 1108   Alena-wound Assessment Clean;Dry; Intact 04/09/21 1108   Wound Length (cm) 2 5 cm 04/09/21 1108   Wound Width (cm) 3 5 cm 04/09/21 1108   Wound Depth (cm) 0 2 cm 04/05/21 1041   Wound Surface Area (cm^2) 8 75 cm^2 04/09/21 1108   Wound Volume (cm^3) 2 cm^3 04/05/21 1041   Calculated Wound Volume (cm^3) 2 cm^3 04/05/21 1041   Tunneling 0 cm 04/05/21 1041   Undermining 0 04/05/21 1041   Drainage Amount Small 04/09/21 1108   Drainage Description Serosanguineous; Serous 04/09/21 1108   Non-staged Wound Description Partial thickness 04/09/21 1108   Treatments Cleansed;Site care 04/09/21 1108   Dressing Silvasorb gel;Vaseline gauze 04/09/21 1108   Wound packed? No 04/05/21 1041   Dressing Changed Changed 04/09/21 1108   Patient Tolerance Tolerated well 04/09/21 1108   Dressing Status Clean;Dry; Intact 04/09/21 0415       Wound 04/04/21 Pressure Injury Heel Left (Active)   Wound Image   04/09/21 1106   Wound Description Dry; Intact; Brown 04/09/21 1106   Pressure Injury Stage DTPI 04/09/21 1106   Alena-wound Assessment Clean;Dry; Intact 04/09/21 1106   Wound Length (cm) 1 cm 04/09/21 1106   Wound Width (cm) 5 cm 04/09/21 1106   Wound Depth (cm) 0 cm 04/05/21 1040   Wound Surface Area (cm^2) 5 cm^2 04/09/21 1106   Wound Volume (cm^3) 0 cm^3 04/05/21 1040   Calculated Wound Volume (cm^3) 0 cm^3 04/05/21 1040   Tunneling 0 cm 04/05/21 1040   Undermining 0 04/05/21 1040   Drainage Amount None 04/09/21 1106   Non-staged Wound Description Not applicable 65/95/37 8400   Treatments Site care 04/09/21 1106   Dressing Other (Comment) 04/09/21 1106   Wound packed? No 04/05/21 1040   Patient Tolerance Tolerated well 04/09/21 1106       Wound 04/04/21 Traumatic Knee Anterior;Right (Active)   Wound Image   04/09/21 1110   Wound Description Fragile;Pink 04/09/21 1110   Alena-wound Assessment Clean;Dry; Intact 04/09/21 1110   Wound Length (cm) 4 cm 04/09/21 1110   Wound Width (cm) 4 cm 04/09/21 1110   Wound Depth (cm) 0 1 cm 04/09/21 1110   Wound Surface Area (cm^2) 16 cm^2 04/09/21 1110   Wound Volume (cm^3) 1 6 cm^3 04/09/21 1110   Calculated Wound Volume (cm^3) 1 6 cm^3 04/09/21 1110   Drainage Amount Scant 04/09/21 1110   Drainage Description Serosanguineous 04/09/21 1110   Non-staged Wound Description Partial thickness 04/09/21 1110   Treatments Cleansed;Site care 04/09/21 1110   Dressing Dermagran gauze 04/09/21 1110   Dressing Changed Changed 04/09/21 1110   Patient Tolerance Tolerated well 04/09/21 1110       Wound 04/04/21 Venous Ulcer Pretibial Right; Outer (Active)   Wound Image   04/09/21 1111   Wound Description Fragile;Pink;Yellow 04/09/21 1111   Alena-wound Assessment Clean;Dry; Intact 04/09/21 1111   Wound Length (cm) 5 cm 04/09/21 1111   Wound Width (cm) 5 cm 04/09/21 1111   Wound Surface Area (cm^2) 25 cm^2 04/09/21 1111   Drainage Amount Small 04/09/21 1111   Drainage Description Serosanguineous 04/09/21 1111   Non-staged Wound Description Not applicable 82/40/98 4416   Treatments Cleansed;Site care 04/09/21 1111   Dressing Silvasorb gel 04/09/21 1111   Dressing Changed Changed 04/09/21 1111   Patient Tolerance Tolerated well 04/09/21 1111       Wound 04/04/21 Pretibial Distal;Right;Medial (Active)   Wound Image   04/09/21 1109   Wound Description Clean;Fragile;Pink; White 04/09/21 1109   Alena-wound Assessment Clean;Dry; Intact 04/09/21 1109   Wound Length (cm) 10 cm 04/09/21 1109   Wound Width (cm) 2 5 cm 04/09/21 1109   Wound Depth (cm) 0 1 cm 04/09/21 1109   Wound Surface Area (cm^2) 25 cm^2 04/09/21 1109   Wound Volume (cm^3) 2 5 cm^3 04/09/21 1109   Calculated Wound Volume (cm^3) 2 5 cm^3 04/09/21 1109   Change in Wound Size % 69 06 04/09/21 1109   Tunneling 0 cm 04/05/21 1039   Undermining 0 04/05/21 1039   Drainage Amount Small 04/09/21 1109   Drainage Description Serosanguineous 04/09/21 1109   Non-staged Wound Description Full thickness 04/05/21 1039   Treatments Cleansed;Irrigation with NSS;Site care 04/09/21 1109   Dressing Silvasorb gel;Vaseline gauze 04/09/21 1109   Wound packed? No 04/05/21 1039   Dressing Changed Changed 04/09/21 1109   Patient Tolerance Tolerated well 04/09/21 1109   Dressing Status Clean;Dry; Intact 04/09/21 0415       Wound 04/04/21 Foot Anterior;Right (Active)   Wound Image   04/09/21 1112   Wound Description Dry; Intact; Roseann Mello 04/09/21 1112   Alena-wound Assessment Clean;Dry; Intact 04/09/21 1112   Wound Length (cm) 1 cm 04/09/21 1112   Wound Width (cm) 1 5 cm 04/09/21 1112   Wound Surface Area (cm^2) 1 5 cm^2 04/09/21 1112   Drainage Amount None 04/09/21 1112   Non-staged Wound Description Not applicable 78/31/99 6655   Treatments Cleansed;Irrigation with NSS 04/09/21 1112   Dressing Open to air 04/09/21 1112   Patient Tolerance Tolerated well 04/09/21 1112       Wound 04/04/21 Skin Tear Wrist Lateral;Right (Active)   Wound Image   04/09/21 1118   Wound Description Dry; Intact; Brown 04/09/21 1118   Aelna-wound Assessment Clean;Dry; Intact 04/09/21 1118   Wound Length (cm) 2 cm 04/09/21 1118   Wound Width (cm) 0 5 cm 04/09/21 1118   Wound Surface Area (cm^2) 1 cm^2 04/09/21 1118   Drainage Amount None 04/09/21 1118   Treatments Site care 04/09/21 1118   Dressing Other (Comment) 04/09/21 1118   Patient Tolerance Tolerated well 04/09/21 1118   Dressing Status Clean;Dry; Intact 04/09/21 0415       Wound 04/04/21 Pressure Injury Buttocks (Active)   Wound Image   04/09/21 1131   Wound Description Fragile;Pink;Slough 04/09/21 1131   Pressure Injury Stage U 04/09/21 1131   Alena-wound Assessment Clean;Dry; Intact 04/09/21 1131   Wound Length (cm) 3 cm 04/09/21 1131   Wound Width (cm) 6 cm 04/09/21 1131   Wound Depth (cm) 0 2 cm 04/05/21 1047   Wound Surface Area (cm^2) 18 cm^2 04/09/21 1131   Wound Volume (cm^3) 2 1 cm^3 04/05/21 1047   Calculated Wound Volume (cm^3) 2 1 cm^3 04/05/21 1047   Tunneling 0 cm 04/05/21 1047   Undermining 0 04/05/21 1047   Drainage Amount Small 04/09/21 1131   Drainage Description Serosanguineous 04/09/21 1131   Non-staged Wound Description Full thickness 04/05/21 1047   Treatments Cleansed;Irrigation with NSS 04/09/21 1131   Dressing Calcium Alginate; Foam, Silicon (eg  Allevyn, etc) 04/09/21 1131   Wound packed? No 04/05/21 1047   Dressing Changed New 04/09/21 0415   Patient Tolerance Tolerated well 04/09/21 1131       Wound 04/04/21 Pressure Injury Buttocks Left;Mid; Inner (Active)   Wound Image   04/09/21 1130   Wound Description Fragile;Slough 04/09/21 1130   Pressure Injury Stage U 04/09/21 1130   Alena-wound Assessment Dry; Intact 04/09/21 0415   Wound Length (cm) 2 cm 04/05/21 1048   Wound Width (cm) 0 8 cm 04/05/21 1048   Wound Depth (cm) 0 2 cm 04/05/21 1048   Wound Surface Area (cm^2) 1 6 cm^2 04/05/21 1048   Wound Volume (cm^3) 0 32 cm^3 04/05/21 1048   Calculated Wound Volume (cm^3) 0 32 cm^3 04/05/21 1048   Tunneling 0 cm 04/05/21 1048   Undermining 0 04/05/21 1048   Drainage Amount Scant 04/09/21 1130   Drainage Description Serous 04/09/21 1130   Non-staged Wound Description Not applicable 91/70/65 3421   Treatments Cleansed;Site care 04/09/21 1130   Dressing Calcium Alginate; Foam, Silicon (eg  Allevyn, etc) 04/09/21 1130   Wound packed? No 04/05/21 1048   Dressing Changed New 04/09/21 0415   Patient Tolerance Tolerated well 04/09/21 1130       Wound 04/05/21 Pressure Injury Heel Right (Active)   Wound Image   04/05/21 1038   Wound Description Dry; Intact; Light purple 04/09/21 1130   Pressure Injury Stage DTPI 04/09/21 1130   Alena-wound Assessment Clean;Dry; Intact 04/09/21 1130   Wound Length (cm) 1 5 cm 04/05/21 1038   Wound Width (cm) 7 cm 04/05/21 1038   Wound Depth (cm) 0 cm 04/05/21 1038   Wound Surface Area (cm^2) 10 5 cm^2 04/05/21 1038   Wound Volume (cm^3) 0 cm^3 04/05/21 1038   Calculated Wound Volume (cm^3) 0 cm^3 04/05/21 1038   Tunneling 0 cm 04/05/21 1038   Undermining 0 04/05/21 1038   Drainage Amount None 04/09/21 1130   Non-staged Wound Description Not applicable 90/34/59 9276   Treatments Site care 04/09/21 1130   Dressing Foam, Silicon (eg  Allevyn, etc) 04/09/21 1130   Patient Tolerance Tolerated well 04/09/21 1130       Wound 04/05/21 Tibial Posterior;Right (Active)   Wound Image   04/05/21 1049   Wound Description Fragile;Pink; White 04/09/21 1130   Alena-wound Assessment Clean;Dry; Intact 04/09/21 1130   Wound Length (cm) 2 3 cm 04/05/21 1049   Wound Width (cm) 0 5 cm 04/05/21 1049   Wound Depth (cm) 0 1 cm 04/05/21 1049   Wound Surface Area (cm^2) 1 15 cm^2 04/05/21 1049   Wound Volume (cm^3) 0 12 cm^3 04/05/21 1049   Calculated Wound Volume (cm^3) 0 12 cm^3 04/05/21 1049   Tunneling 0 cm 04/05/21 1049   Undermining 0 04/05/21 1049   Drainage Amount None 04/05/21 1049   Non-staged Wound Description Not applicable 90/49/09 9924   Treatments Cleansed 04/09/21 0000   Dressing Silvasorb adaptic  04/09/21 0500   Wound packed? No 04/05/21 1049   Dressing Changed Changed 04/06/21 1030   Patient Tolerance Tolerated well 04/06/21 0500   Dressing Status Clean;Dry; Intact 04/06/21 1030       Wound care will follow weekly call or tiger text with questions     Carly Gore RN BSN CWOCN

## 2021-04-09 NOTE — ASSESSMENT & PLAN NOTE
Patient was diagnosed with cirrhosis in 2013, initially felt due to alcohol abuse, however she was found to be positive for hepatitis C  Patient did not receive any treatment for hepatitis C infection  Results from 4/2019  HCV ,764 (A) NEG IU/mL   LOG OF HCV 5 77 (H)   Comment: TEST RESULTS REPORTED TO [place] DEPT OF HEALTH  0 log IU/ml   Patient was on lactulose, Lasix and spironolactone at home, however not sure how compliant patient was  Patient presenting with acute encephalopathy, diagnosed with hepatocellular carcinoma status post ablation in 2020  Class C Child-Da Silva Score  MELD-Na score 26 with a 15% 90 day mortality increase from previous 22     Continue rifaximin and lactulose

## 2021-04-09 NOTE — ASSESSMENT & PLAN NOTE
Hypothermia, hypotension, pancytopenia, lactic acidosis  Patient is being treated for pneumonia, UTI, currently on IV antibiotics ( Unasyn ) and Acyclovir   ID and critical care following

## 2021-04-09 NOTE — UTILIZATION REVIEW
Notification of Discharge  This is a Notification of Discharge from our facility [address]  Please be advised that this patient has been discharge from our facility  Below you will find the admission and discharge date and time including the patients disposition  PRESENTATION DATE: 4/3/2021  9:00 AM  OBS ADMISSION DATE:   IP ADMISSION DATE: 4/3/21 1118   DISCHARGE DATE: 4/8/2021 11:00 PM  DISPOSITION: [address]   Admission Orders listed below:  Admission Orders (From admission, onward)     Ordered        04/03/21 1118  Inpatient Admission  Once                   Please contact the UR Department if additional information is required to close this patient's authorization/case  [address] Utilization Review Department  Main: [phone] x carefully listen to the prompts  All voicemails are confidential   [email or web address]  Send all requests for admission clinical reviews, approved or denied determinations and any other requests to dedicated fax number below belonging to the campus where the patient is receiving treatment   List of dedicated fax numbers:  [address] DENIALS (Administrative/Medical Necessity) [phone]   [address] (Maternity/NICU/Pediatrics) [phone]   Angelia Cuevas [phone]   [facility] [phone]   Bibi Tang [phone]   [facility]amy Deal [facility] [address] [facility] [phone]   [facility]  [phone]   [address] [facility] [address]  [address] [facility] [address] [facility] [phone]

## 2021-04-09 NOTE — RESPIRATORY THERAPY NOTE
RT Ventilator Management Note  Annette De La Rosa 63 y o  female MRN: [id number]  Unit/Bed#: [facility] 01 Encounter: [id number]      Daily Screen       4/8/2021 2118 4/9/2021  0754          Patient safety screen outcome[de-identified]  Failed  Failed      Not Ready for Weaning due to[de-identified]  Underline problem not resolved  Underline problem not resolved              Physical Exam:   Assessment Type: Assess only  General Appearance: Sedated  Respiratory Pattern: Assisted  Chest Assessment: Chest expansion symmetrical  Bilateral Breath Sounds: Diminished, Clear  Cough: None  O2 Device: G5 Vent      Resp Comments: Pt has clear bs  Sx for scant white secretions  SBT not indicated due to pts mental status  Cont to eval for wean  Will follow per resp protocol

## 2021-04-09 NOTE — DISCHARGE SUMMARY
[address] [facility]  Discharge- Drew Hassan 1960, 63 y o  female MRN: [id number]  Unit/Bed#:  Encounter: [id number]  Primary Care Provider: Dionicio Hernandez DO   Date and time admitted to hospital: 4/3/2021  9:00 AM    * Acute encephalopathy  Assessment & Plan  · Presenting with altered mentation however oriented to person and time  Mental status has waxed and waned over course of her hospitalization  · CTH negative for acute intracranial abnormality  · Concern for ongoing seizure-like activity  Appreciate Neurology consultation/recommendations  Plan for transfer to shortness of breath for continuous vEEG  Will load with Keppra 2g, followed by 1 g b i d  Lajean Cassette · Treat hyperammonemia; lactulose t i d  Titrate for 2-3 BMs daily  · Correct metabolic derangement  · Seizure activity: rhythmic L jaw thrusting received 2mg ativan at 2115 4/8  · Sedation breaks for neurologic examination  Seizure [facility])  Assessment & Plan  · Witnessed tonic-clonic seizure  · Appreciate neurology consultation/recommendation  · Stat CT head without obvious precipitating etiology  · 2 g Keppra load followed by Keppra 1 g b i d   · Plan for transfer to [facility] for continuous vEEG    Acute respiratory failure with hypoxia (HCC)  Assessment & Plan  · Intubated 4/8/21 for airway protection and hypoxia  · Maintain FiO2 >92%  · Continue mechanical ventilation at lung protective volumes; wean to extubate  Neurologic status limiting liberation from mechanical ventilation  · SBP/VAP  · Propofol gtt  Goal RASS 0 to -2 for ventilatory comfort  Hepatocellular carcinoma [facility])  Assessment & Plan  · S/p ablation by Dr Osvaldo Lubin July 2020    Cirrhosis [facility])  Assessment & Plan  · Alcoholic cirrhosis  Sobriety status unclear  · MELD 28  · Appreciate GI consultation/recommendation  · Continue lactulose 3 times daily  Titrate for 3-4 BM  · Rifaximin b i d      Hyperammonemia (HCC)  Assessment & Plan  · Lactulose 30g t i d  Titrate for 2-3 BM  · Rifaximin 550 mg b i d  Cellulitis  Assessment & Plan  · Wound cultures revealing growth of E coli initiated by active  · Currently antibiotic day #2 of appropriate coverage given intermediate susceptibility to ceftriaxone  · Discontinue Bactrim p o will transition to Ancef    UTI (urinary tract infection)  Assessment & Plan  · Urine culture revealing pansensitive E coli  · Three day course of ceftriaxone complete    DARRYL (acute kidney injury) ([address] )  Assessment & Plan  · Suspect component of prerenal etiology as patient appears hypovolemic  · Gentle IV fluid hydration  · Check CPK  · Trend daily BUN/creatinine  · Maintain [place] for strict I&Os  Lactic acidosis  Assessment & Plan  · Gentle IV fluid hydration; isolate 100 mL/hour  May require hypotonic solution    Pancytopenia ([address] )  Assessment & Plan  · Likely secondary to underlying liver dysfunction  · Maintain HgB>7 0, platelets greater than 20,000    Wounds, multiple  Assessment & Plan  · Wound care consultation  · [facility] consulted given concern for an accurate home care, and extremely poor nutritional status              Resolved Problems  Date Reviewed: 4/6/2021    None          Admission Date:   Admission Orders (From admission, onward)     Ordered        04/03/21 1118  Inpatient Admission  Once                     Admitting Diagnosis: Hepatic encephalopathy ([address] ) [K72 90]  Hepatocellular carcinoma ([address] ) [U63 2]  Alcoholic cirrhosis ([address] ) [R91 23]  UTI (urinary tract infection) [N39 0]  Altered mental status [R41 82]  Hepatitis C [B19 20]  AMS (altered mental status) [R41 82]    HPI/Summary of Hospital Course: 63 yr old female PMH [address]  s/p ablation (July 4796), alcoholic cirrhosis, hepatitis C (untreated) and chronic low back pain who presented to [facility] ED on 4/3 for altered mental status  EMS contacted by patient's roommate for decreased mental status  Patient reported medication compliance   On arrival to ED she was oriented to person and time and was able to follow simple commands but slow to respond  She had extensive open wounds to b/l LE  Labs showed extensive metabolic derangements, worsening elevated liver enzymes and elevated T bili  She was hyperammoniemic  There was also concern for underlying infectious causes to do positive UA as well as bilateral LE wounds concerning for cellulitis  She was admitted to [facility]  Over the last few days her mental status was waxing and waning  Per documented history she was alert to person only and able to follow simple commands this afternoon  RRT was then called at 1638 this evening for tonic-clonic seizure episode described as shaking up upper and lower extremities bilaterally sustained for about 30 seconds and self-resolved  Upon critical care arrival she appeared postictal and was noted to have left upward eye gaze  She was note responding to commands but did contract to pain in all 4 extremities  She then proceeded to have rhythmic facial twitching concerning for ongoing seizure  She received 3mg IV ativan with resolution  She was transferred to the ICU for further evaluation and treatment and ultimately intubated for airway protection  Neurology was consutled and she was loaded with 2g IV Keppra and started on Keppra 1g BID  She had another episode of rhythmic jaw thrusting at 2115 for which she received 4mg IV ativan  After discussion with neurology and epileptology decision was made to transfer to [facility] for cVEEG monitoring  Procedures Performed:   Orders Placed This Encounter   Procedures    Critical Care    Intubation       Significant Findings, Care, Treatment and Services Provided:   4/3 [facility] without acute abnormalities  4/5 Liver dopplers    1  Probable main portal vein thrombosis though some bidirectional flow  wasmidentified in the extrahepatic portion with bidirectional flow in the splenic  vein    Right portal vein was diminutive and poorly visualized with bidirectional  flow  Left portal vein not visualized  If more definitive characterization of probable  portal vein clot is desired, further evaluation with contrast-enhanced CT is  recommended given extensive respiratory motion on MRI  2  Middle and left hepatic veins not visualized  3  Severe left biliary ductal dilatation  This would also be better evaluated on  contrast enhanced CT with liver protocol    4/8 RRT for seizure activity  Received 3mg IV Ativan  2g Keppra load  4/8 CTH with dion cute abnormalities    Complications: none    Condition at Discharge: poor         Discharge instructions/Information to patient and family:   See after visit summary for information provided to patient and family  Provisions for Follow-Up Care:  See after visit summary for information related to follow-up care and any pertinent home health orders  PCP: Fidel Sanchez DO    Disposition: Transfer to [facility]    Planned Readmission: No    Discharge Statement   I spent 22 minutes discharging the patient  This time was spent on the day of discharge  I had direct contact with the patient on the day of discharge  Additional documentation is required if more than 30 minutes were spent on discharge  Discharge Medications:  See after visit summary for reconciled discharge medications provided to patient and family

## 2021-04-09 NOTE — ASSESSMENT & PLAN NOTE
This is chronic, not on any anticoagulation   Probable main portal vein thrombosis though some bidirectional flow was identified in the extrahepatic portion with bidirectional flow in the splenic vein  Right portal vein was diminutive and poorly visualized with bidirectional flow  Left portal   vein not visualized    If more definitive characterization of probable portal vein clot is desired, further evaluation with contrast-enhanced CT is recommended given extensive respiratory motion on MRI  Hypothermia, hypotension, concern for pylephlebitis low  Patient will have CT the abdomen with contrast

## 2021-04-09 NOTE — ASSESSMENT & PLAN NOTE
· Presenting with altered mentation however oriented to person and time  Mental status has waxed and waned over course of her hospitalization  · CTH negative for acute intracranial abnormality  · Concern for ongoing seizure-like activity  Appreciate Neurology consultation/recommendations  Plan for transfer to [facility] breath for continuous vEEG  Will load with Keppra 2g, followed by 1 g b i d  Gerhardt Sep · Treat hyperammonemia; lactulose t i d  Titrate for 2-3 BMs daily  · Correct metabolic derangement  · Seizure activity: rhythmic L jaw thrusting received 2mg ativan at 2115 4/8  · Sedation breaks for neurologic examination

## 2021-04-09 NOTE — UTILIZATION REVIEW
Initial Clinical Review    Admission: Date/Time/Statement:   Admission Orders (From admission, onward)     Ordered        04/09/21 0004  Inpatient Admission  Once                   Orders Placed This Encounter   Procedures    Inpatient Admission     Standing Status:   Standing     Number of Occurrences:   1     Order Specific Question:   Level of Care     Answer:   Critical Care [15]     Order Specific Question:   Estimated length of stay     Answer:   More than 2 Midnights     Order Specific Question:   Certification     Answer:   I certify that inpatient services are medically necessary for this patient for a duration of greater than two midnights  See H&P and MD Progress Notes for additional information about the patient's course of treatment  4/3/2021 - 4/8/2021 (5 days) critical care inpatient  4-3-21 [address]  Acute encephalopathy  Possibly related to seizure   4-8 rapid response   Transfer to [facility] critical care for higher level of care    Consult neurology   4-8  Rapid response was activated today due to witnessed seizure activity  She was given 3 mg Ativan total and 1000 mg Keppra was ordered to be given  At the time this examiner saw patient, she continued to have leftward gaze with horizontal nystagmus  CTH was completed and to personal review, no acute abnormalities noted  Concern for ongoing seizure activity  Patient was transferred to intensive care unit and ultimately intubated       Plan:   - Recommend give 2000 mg Keppra total    - Start maintenance dose of Keppra 1000 mg BID    - Recommend transfer for vEEG monitoring  Case discussed with epileptology team as well as critical care team    - Seizure precautions  - Patient will need eventual MRI brain with and without contrast to evaluate for metastatic lesions       Assessment/Plan:    63year old female received from [facility] critical care for critical admission to further evaluate and treat acute encephalopathy, possible seizure,  worsening liver indices  Liver doppler suggests portal vein thrombosis  During rapid response 4-8 patient with witnessed tonic clonic seizure like activity  Plan for neurology consult, 24 hour continuous video EEG monitoring,iv fluids 100, iv norepinephrine gtt, iv propofol gtt, iv acyclovir, iv keppra, continue iv ancef for ( lower extremity cellulitis), npo, trend and replete electrolytes, lactulose ad rifaximin, mechanical ventilation wean and extubate as tolerated,neuro checks q1 hr      4-9-21  Critical care  Video EEG monitoring continue  gsc 7  Continue all IV medications  Consult palliative care  4-9  Goals - level 1 full code  If competence is lost, patient's substitute decision maker would default to adult siblings     Consult gastroenterology    64 yo woman with cirrhosis (thought to be secondary to alcohol use + HCV) c/b HCC for which she underwent ablation, p/w AMS and decompensated cirrhosis  She is being treated for a UTI as well as possible LE infection and now pneumonia  She developed worsening AMS in the setting of seizure and was intubated and transferred here  Mental status remains poor and she is undergoing an EEG    ICU care and ongoing antibiotics for sepsis (UTI, pneumonia)  F/u pending stool tests  Ongoing hemodynamic support  Ongoing work-up for seizures (alcohol withdrawal?)  Maintain lactulose and rifaximin  She is on IV acyclovir  HCC appears stable based on prior imaging (decreased in size after ablation)  Daily MELDs  No ascites and Cr good  Hold on diuretics  Hgb low  Continue to trend and monitor for overt bleeding    Consult infectious disease      Acute hypoxic respiratory failure  Patient was intubated during rapid response for airway protection in the setting of altered mentation  Also concern for developing aspiration pneumonia as chest x-ray shows worsening bilateral airspace disease   No fevers or significant leukocytosis  Patient is hypotensive which is more likely sedation related  Procalcitonin level is pending  Agree with empiric treatment for now for possible aspiration pneumonia      -continue IV Unasyn  Change to 3 g q 6 hours  -monitor temperatures and hemodynamics  -monitor ventilator requirements  -follow-up sputum culture  -follow-up blood culture  -follow-up procalcitonin    Acute encephalopathy  Initially presented to [facility] on 04/[facility] and has had waxing and waning mentation  Felt to be secondary to hepatic encephalopathy, alcohol withdrawal   Ultimately transferred to [facility] due to seizure-like activity  EEG now shows status epilepticus  Negative CT head x2  Very low suspicion for primary CNS infection given duration of symptoms, absence of fevers, and other much more plausible explanations  Patient was started on IV acyclovir for possible HSV CNS infection  LP cannot be obtained due to coagulopathy      -continue IV acyclovir for now pending MRI brain  If negative for temporal lobe enhancement, acyclovir can be discontinued   -anti-epileptic management as per Neurology  -serial neuro exams  -monitor temperatures and hemodynamics    Bilateral lower extremity wounds  Right leg wound culture from 04/04 showed E coli and Acinetobacter  No clinical evidence of acute infection of any of the wounds on exam      Recent bacteriuria  Urine culture from 04/03 showed pansensitive E coli  Blood cultures were negative  Patient already received adequate antibiotic course for the possibility of UTI    No further antibiotic indicated for this      ED Triage Vitals   04/09/21 0000 04/09/21 0000 04/09/21 0000 04/09/21 0000 04/08/21 2357   97 9 °F (36 6 °C) (!) 120 20 99/64 93 %      Oral Monitor         Pain Score          04/09/21 66 7 kg (147 lb 0 8 oz)     Additional Vital Signs:     Date/Time  Temp  Pulse  Resp  BP  MAP (mmHg)  SpO2   O2 Device    04/09/21 1345  95 7 °F (35 4 °C)Abnormal   74 11Abnormal   106/52  75  99 %   --    04/09/21 1330  95 7 °F (35 4 °C)Abnormal   74  11Abnormal   101/48Abnormal   67  99 %   --    04/09/21 1315  95 4 °F (35 2 °C)Abnormal   66  11Abnormal   101/53  67  99 %   --    04/09/21 1300  95 7 °F (35 4 °C)Abnormal   62  12  93/44Abnormal   63  98 %   --    04/09/21 1246  95 7 °F (35 4 °C)Abnormal   60  12  96/53  --  --   --    04/09/21 1245  95 7 °F (35 4 °C)Abnormal   60  11Abnormal   97/52  68  99 %   --    04/09/21 1230  95 7 °F (35 4 °C)Abnormal   62  11Abnormal   96/53  69  98 %   --    04/09/21 1215  96 1 °F (35 6 °C)Abnormal   62  11Abnormal   97/51  67  98 %   --    04/09/21 1200  96 1 °F (35 6 °C)Abnormal   64  12  96/50  69  97 %   --    04/09/21 1148  --  --  --  --  --  100 %   --    04/09/21 1145  96 1 °F (35 6 °C)Abnormal   64  11Abnormal   102/52  70  100 %   --    04/09/21 1130  96 1 °F (35 6 °C)Abnormal   66  12  101/54  72  100 %   --    04/09/21 1115  96 4 °F (35 8 °C)Abnormal   64  11Abnormal   99/57  69  100 %   --    04/09/21 1100  96 4 °F (35 8 °C)Abnormal   64  12  95/51  65  100 %   --    04/09/21 1045  96 4 °F (35 8 °C)Abnormal   64  11Abnormal   98/52  68  100 %   --    04/09/21 1030  96 8 °F (36 °C)Abnormal   64  11Abnormal   95/54  64  100 %   --    04/09/21 1015  96 8 °F (36 °C)Abnormal   66  12  93/52  62  100 %   --    04/09/21 1000  96 8 °F (36 °C)Abnormal   68  12  97/51  63  100 %   --    04/09/21 0945  97 2 °F (36 2 °C)Abnormal   72  12  103/50  64  99 %   --    04/09/21 0930  97 2 °F (36 2 °C)Abnormal   78  12  99/56  69  99 %   --    04/09/21 0915  97 2 °F (36 2 °C)Abnormal   72  12  104/55  68  100 %   --    04/09/21 0900  97 5 °F (36 4 °C)  74  12  103/56  71  100 %   --    04/09/21 0845  97 5 °F (36 4 °C)  72  16  106/54  71  100 %   --    04/09/21 0830  97 5 °F (36 4 °C)  70  12  96/53  64  100 %   --    04/09/21 0815  97 5 °F (36 4 °C)  68  11Abnormal   99/51  63  100 %   --    04/09/21 0800  97 5 °F (36 4 °C)  72  11Abnormal 103/53  68  100 %   --    04/09/21 0754  --  --  --  --  --  12 %Abnormal    --    04/09/21 0700  97 5 °F (36 4 °C)  74  11Abnormal   106/57  72  100 %   --    04/09/21 0630  --  --  --  83/44Abnormal   54  --   --    04/09/21 0615  --  --  --  87/44Abnormal   57  --   --    04/09/21 0600  97 5 °F (36 4 °C)  82  11Abnormal   79/39Abnormal   50  100 %   --    04/09/21 0500  97 2 °F (36 2 °C)Abnormal   84  12  84/40Abnormal   57  100 %   --    04/09/21 0400  96 1 °F (35 6 °C)Abnormal   80  12  79/43Abnormal   59  100 %   Ventilator    04/09/21 0347  96 1 °F (35 6 °C)Abnormal   --  --  --  --  --   --    04/09/21 0300  95 4 °F (35 2 °C)Abnormal   78  --  83/44Abnormal   63  99 %   --    04/09/21 0250  --  --  --  --  --  99 %   --    04/09/21 0205  95 °F (35 °C)Abnormal   84  --  --  --  100 %   --    04/09/21 0200  --  82  --  89/56Abnormal   68  100 %   --    04/09/21 0100  --  88  20  84/52Abnormal   60  100 %   --    04/09/21 0040  --  84  --  --  --  100 %   --    04/09/21 0000  97 9 °F (36 6 °C)  120Abnormal   20  99/64  80  94 %   Ventilator      Blood Gases  (Last 48 hours)    04/09 0059    pH, Arterial 7 333Low         pCO2, Arterial 42 8        pO2, Arterial 163  3High         HCO3, Arterial 22 2           Respiratory Data      04/09 0000  Most Recent   Drager Vent Mode   AC/VC+   Resp Rate (BPM) (BPM)   16   VT (mL) (mL)   390   Insp Time (S) (S)   0 92   FIO2 (%) (%)   40   PEEP (cmH2O) (cmH2O)   6   O2 Device Ventilator  Ventilator   [place] C3/G5 Vent Mode   (S)CMV   Resp Rate (BPM) (BPM)   12   VT (mL) (mL)   400   FIO2 (%) (%)   40   PEEP (cmH2O) (cmH2O)   6   I:E Ratio   1/4   Insp Time (%) (%)   1   Flow Trigger (LPM)   5   Humidification   Heater   Heater Temperature (Set) (°F)   95 (35)            Date and Time Eye Opening Best Verbal Response Best Motor Response [place] Coma Scale Score   04/09/21 0900 1 1 4 6   04/09/21 0415 2 1 4 7   04/09/21 0000 1 1 3 5   04/08/21 1730 1 1 4 6   04/08/21 0810 4 4 6 14   04/07/21 1943 4 4 6 14   04/07/21 0950 4 5 6 15   04/06/21 1920 4 5 6 15   04/06/21 1030 4 5 6 15   04/05/21 1944 4 5 6 15   04/05/21 1045 4 5 6 15   04/04/21 2100 4 5 6 15   04/04/21 1336 4 5 6 15   04/03/21 2100 4 5 6 15   04/03/21 1900 4 5 6 15   04/03/21 1802 4 5 6 15   04/03/21 1800 4 5 6 15   04/03/21 1700 4 4 6 14   04/03/21 1600 4 4 6 14   04/03/21 1500 4 4 6 14   04/03/21 1400 4 4 6 14           Pertinent Labs/Diagnostic Test Results:     CT abdomen w contrast    (Results Pending)     Results from last 7 days   Lab Units 04/09/21  0221 04/07/21  1252   SARS-COV-2  Negative Negative     Results from last 7 days   Lab Units 04/09/21  0909 04/09/21  0521 04/08/21  1701 04/08/21  1653 04/08/21  0445 04/07/21  0512  04/03/21  0921   WBC Thousand/uL 11 77* 7 27 14 25*  --  8 11 6 12   < > 8 87   HEMOGLOBIN g/dL 6 8* 6 6* 7 9*  --  7 0* 8 1*   < > 8 2*   I STAT HEMOGLOBIN g/dl  --   --   --  8 2*  --   --   --   --    HEMATOCRIT % 19 6* 17 8* 22 4*  --  20 8* 22 3*   < > 25 8*   HEMATOCRIT, ISTAT %  --   --   --  24*  --   --   --   --    PLATELETS Thousands/uL 57* 52* 70*  --  47* 52*   < > 79*   NEUTROS ABS Thousands/µL  --  5 59  --   --  5 68 3 93   < >  --    BANDS PCT %  --   --  0  --   --   --   --  1    < > = values in this interval not displayed           Results from last 7 days   Lab Units 04/09/21  0521 04/09/21  0059 04/08/21  1701 04/08/21  1653 04/08/21  0445 04/08/21  0104  04/07/21  0512  04/05/21  1309   SODIUM mmol/L 148* 148* 150*  --  146* 147*   < > 156*   < > 150*   POTASSIUM mmol/L 5 0 4 3 3 8  --  3 4*  --   --  3 3*   < > 2 9*   CHLORIDE mmol/L 120* 120* 115*  --  114*  --   --  122*   < > 113*   CO2 mmol/L 22 22 16*  --  23  --   --  23   < > 26   CO2, I-STAT mmol/L  --   --   --  15*  --   --   --   --   --   --    ANION GAP mmol/L 6 6 19*  --  9  --   --  11   < > 11   BUN mg/dL 18 17 16  --  13  --   --  15   < > 19   CREATININE mg/dL 0 93 0 97 1 45*  --  1 14  --   --  0 97 < > 0 98   EGFR ml/min/1 73sq m 67 64 39  --  53  --   --  64   < > 63   CALCIUM mg/dL 8 5 8 3 9 0  --  8 6  --   --  9 4   < > 8 8   CALCIUM, IONIZED mmol/L 1 19  --   --   --   --   --   --   --   --   --    MAGNESIUM mg/dL 2 4  --  2 1  --  1 9  --   --  2 3  --  2 0   PHOSPHORUS mg/dL 4 2  --   --   --   --   --   --   --   --   --     < > = values in this interval not displayed  Results from last 7 days   Lab Units 04/09/21 0521 04/09/21 0059 04/08/21 1701 04/08/21 0445 04/07/21  0512 04/06/21  0503 04/05/21  1309  04/03/21 0921   AST U/L 85*  --  76*  --  61* 46* 45   < > 131*   ALT U/L 34  --  40  --  34 26 26   < > 40   ALK PHOS U/L 97  --  111  --  99 84 86   < > 112   TOTAL PROTEIN g/dL 5 5*  --  6 2*  --  6 1* 5 3* 5 8*   < > 6 9   ALBUMIN g/dL 2 3*  --  2 4*  --  2 4* 2 3* 2 5*   < > 3 0*   TOTAL BILIRUBIN mg/dL 5 44*  --  6 98*  --  8 21* 7 46* 8 10*   < > 16 58*   BILIRUBIN DIRECT mg/dL  --   --   --   --   --   --   --   --  8 41*   AMMONIA umol/L 51* 48* 79* 22  --   --   --   --  72*    < > = values in this interval not displayed       Results from last 7 days   Lab Units 04/08/21  1640 04/03/21  1947   POC GLUCOSE mg/dl 117 113     Results from last 7 days   Lab Units 04/09/21 0521 04/09/21 0059 04/08/21 1701 04/08/21 0445 04/07/21  0512 04/06/21  0503 04/05/21  1309 04/04/21  1643 04/04/21  0414 04/03/21 0921   GLUCOSE RANDOM mg/dL 71 101 99 103 115 121 100 109 88 102       Results from last 7 days   Lab Units 04/09/21  0059   PH ART  7 333*   PCO2 ART mm Hg 42 8   PO2 ART mm Hg 163 3*   HCO3 ART mmol/L 22 2   BASE EXC ART mmol/L -3 4   O2 CONTENT ART mL/dL 11 1*   O2 HGB, ARTERIAL % 96 6   ABG SOURCE  Artery         Results from last 7 days   Lab Units 04/08/21  1653   I STAT BASE EXC mmol/L -11*   I STAT O2 SAT % 97*   ISTAT PH ART  7 299*   I STAT ART PCO2 mm HG 28 6*   I STAT ART PO2 mm HG 93 0   I STAT ART HCO3 mmol/L 14 1*     Results from last 7 days   Lab Units 04/08/21  1833 CK TOTAL U/L 76     Results from last 7 days   Lab Units 04/08/21  1701 04/03/21  0921   TROPONIN I ng/mL <0 02 <0 02         Results from last 7 days   Lab Units 04/09/21  0521 04/08/21  0445 04/06/21  1740 04/03/21  0921   PROTIME seconds 32 9* 27 2* 22 4* 22 5*   INR  3 25* 2 67* 2 08* 1 95*   PTT seconds  --   --   --  32     Results from last 7 days   Lab Units 04/09/21  0521   TSH 3RD GENERATON uIU/mL 5 060*     Results from last 7 days   Lab Units 04/09/21  0924   PROCALCITONIN ng/ml 0 10     Results from last 7 days   Lab Units 04/09/21  0059 04/08/21  1701 04/04/21  1003 04/04/21  0414 04/04/21  0006 04/03/21  2103 04/03/21  1218   LACTIC ACID mmol/L 1 5 10 8* 2 9* 2 6* 2 5* 3 3* 5 1*             Results from last 7 days   Lab Units 04/08/21  1701   NT-PRO BNP pg/mL 536*     Results from last 7 days   Lab Units 04/07/21  0512   FERRITIN ng/mL 206  208     Results from last 7 days   Lab Units 04/09/21  1144 04/03/21  0939   CLARITY UA  Cloudy Slightly Cloudy   COLOR UA  Dk Yellow [place]   SPEC GRAV UA  1 023 1 020   PH UA  5 5 6 0   GLUCOSE UA mg/dl Negative 100 (1/10%)*   KETONES UA mg/dl Negative Trace*   BLOOD UA  Negative Trace-Intact*   PROTEIN UA mg/dl Negative 30 (1+)*   NITRITE UA  Negative Positive*   BILIRUBIN UA  Negative Large*   UROBILINOGEN UA E U /dl 0 2 >=8 0*   LEUKOCYTES UA  Negative Moderate*   WBC UA /hpf  --  30-50*   RBC UA /hpf  --  1-2   BACTERIA UA /hpf  --  Innumerable*   EPITHELIAL CELLS WET PREP /hpf  --  Occasional     Results from last 7 days   Lab Units 04/09/21  0221 04/07/21  1252   INFLUENZA A PCR  Negative Negative   INFLUENZA B PCR  Negative Negative   RSV PCR  Negative Negative         Results from last 7 days   Lab Units 04/03/21  0939   AMPH/METH  Negative   BARBITURATE UR  Negative   BENZODIAZEPINE UR  Negative   COCAINE UR  Negative   METHADONE URINE  Negative   OPIATE UR  Negative   PCP UR  Negative   THC UR  Positive*     Results from last 7 days   Lab Units 04/03/21  0921   ETHANOL LVL mg/dL <3   ACETAMINOPHEN LVL ug/mL <7 4*   SALICYLATE LVL mg/dL <3*     Results from last 7 days   Lab Units 04/04/21  0011   C DIFF TOXIN B BY PCR  Negative             Results from last 7 days   Lab Units 04/09/21  0923 04/04/21  1545 04/03/21  0939 04/03/21  0926 04/03/21  0921   BLOOD CULTURE  Received in Microbiology Lab  Culture in Progress  --   --  No Growth After 5 Days  No Growth After 5 Days     GRAM STAIN RESULT   --  Rare Polys*  1+ Gram positive cocci in pairs*  1+ Gram negative rods*  --   --   --    URINE CULTURE   --   --  >100,000 cfu/ml Escherichia coli*  --   --    WOUND CULTURE   --  4+ Growth of Escherichia coli*  4+ Growth of Acinetobacter baumannii*  4+ Growth of   --   --   --      ED Treatment:   Medication Administration - No Administrations Displayed (No Start Event Found)     None        Past Medical History:   Diagnosis    Arthritis    Cirrhosis (HCC)    Extremity pain    BLE    Hepatitis C    Joint pain    Bilateral Shoulders    Liver disease    Low back pain     Present on Admission:   Acute encephalopathy   Acute respiratory failure with hypoxia (HCC)   DARRYL (acute kidney injury) ([facility] [address] )   Alcohol abuse   Anemia   Cellulitis   Chronic pain syndrome   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) (HCC)   Degeneration of intervertebral disc of lumbar region   Hepatocellular carcinoma (HCC)   Hyperammonemia (HCC)   Seizure (HCC)   Hypernatremia   Lactic acidosis   Pancytopenia (HCC)   UTI (urinary tract infection)   Wounds, multiple      Admitting Diagnosis: Encephalopathy [G93 40]    Age/Sex: 63 y o  female     Admission Orders:    Scheduled Medications:  acyclovir, 10 mg/kg (Ideal), Intravenous, Q8H  ampicillin-sulbactam, 3 g, Intravenous, Q6H  chlorhexidine, 15 mL, Swish & Spit, Q12H [address]  cholecalciferol, 400 Units, Oral, Daily  diazepam, 5 mg, Oral, W7P  folic acid, 1 mg, Oral, Daily  lactulose, 30 g, Oral, TID  levETIRAcetam, 750 mg, Intravenous, Q12H [address]  nicotine, 1 patch, Transdermal, Daily  norepinephrine, , ,   omeprazole (PRILOSEC) suspension 2 mg/mL, 20 mg, Oral, Early Morning  rifaximin, 550 mg, Oral, Q12H CITLALLI  thiamine, 100 mg, Oral, Daily      Continuous IV Infusions:  multi-electrolyte, 100 mL/hr, Intravenous, Continuous  norepinephrine, 1-30 mcg/min, Intravenous, Titrated  propofol, 5-50 mcg/kg/min, Intravenous, Titrated      PRN Meds:  LORazepam, 2 mg, Intravenous, Q4H PRN  ondansetron, 4 mg, Intravenous, Q6H PRN        IP CONSULT TO CASE MANAGEMENT  IP CONSULT TO PALLIATIVE CARE  IP CONSULT TO GASTROENTEROLOGY  IP CONSULT TO INFECTIOUS DISEASES  IP CONSULT TO PODIATRY    Network Utilization Review Department  ATTENTION: Please call with any questions or concerns to [phone] and carefully listen to the prompts so that you are directed to the right person  All voicemails are confidential   [facility] all requests for admission clinical reviews, approved or denied determinations and any other requests to dedicated fax number below belonging to the campus where the patient is receiving treatment   List of dedicated fax numbers for the Facilities:  [address] DENIALS (Administrative/Medical Necessity) [phone]   [address] (Maternity/NICU/Pediatrics) [phone]   [address] [address] [facility] [address]) [address] [place] [address] [address] [facility]) [address] [place] [address] [phone]

## 2021-04-09 NOTE — QUICK NOTE
Contacted by on-call epileptologist, Marzena Francois, at 5:02am for eeg showing concerning activity for seizures  Pt was started on propofol gtt @ 30 at this time  Contacted again at 5:57am for persistent seizure activity  Pt given 2mg ativan x1 and propofol gtt increased to 50  Will follow-up with epileptology to monitor response to medications

## 2021-04-09 NOTE — PROGRESS NOTES
[address] [facility]  Progress Note - Nimesh Hudson 1960, 63 y o  female MRN: [id number]  Unit/Bed#:  Encounter: [id number]  Primary Care Provider: La Patiño DO   Date and time admitted to hospital: 4/3/2021  9:00 AM    * Acute encephalopathy  Assessment & Plan  · Presenting with altered mentation however oriented to person and time  Mental status has waxed and waned over course of her hospitalization  · CTH negative for acute intracranial abnormality  · Concern for ongoing seizure-like activity  Appreciate Neurology consultation/recommendations  Plan for transfer to shortness of breath for continuous vEEG  Will load with Keppra 2g, followed by 1 g b i d  Amos Schultz · Treat hyperammonemia; lactulose t i d  Titrate for 2-3 BMs daily  · Correct metabolic derangement  · Sedation breaks for neurologic examination  Seizure [facility])  Assessment & Plan  · Witnessed tonic-clonic seizure  · Appreciate neurology consultation/recommendation  · Stat CT head without obvious precipitating etiology  · 2 g Keppra load followed by Keppra 1 g b i d   · Plan for transfer to [facility] for continuous vEEG    Acute respiratory failure with hypoxia (HCC)  Assessment & Plan  · Intubated 4/8/21 for airway protection and hypoxia  · Maintain FiO2 >92%  · Continue mechanical ventilation at lung protective volumes; wean to extubate  Neurologic status limiting liberation from mechanical ventilation  · SBP/VAP  · Propofol gtt  Goal RASS 0 to -2 for ventilatory comfort  Hepatocellular carcinoma [facility])  Assessment & Plan  · S/p ablation by Dr Darling Brooks July 2020    Cirrhosis [facility])  Assessment & Plan  · Alcoholic cirrhosis  Sobriety status unclear  · MELD 28  · Appreciate GI consultation/recommendation  · Continue lactulose 3 times daily  Titrate for 3-4 BM  · Rifaximin b i d  Hyperammonemia (HCC)  Assessment & Plan  · Lactulose 30g t i d  Titrate for 2-3 BM  · Rifaximin 550 mg b i d      Cellulitis  Assessment & Plan  · Wound cultures revealing growth of E coli initiated by active  · Currently antibiotic day #2 of appropriate coverage given intermediate susceptibility to ceftriaxone  · Discontinue Bactrim p o will transition to Ancef    UTI (urinary tract infection)  Assessment & Plan  · Urine culture revealing pansensitive E coli  · Three day course of ceftriaxone complete    DARRYL (acute kidney injury) ([facility] [address] )  Assessment & Plan  · Suspect component of prerenal etiology as patient appears hypovolemic  · Gentle IV fluid hydration  · Check CPK  · Trend daily BUN/creatinine  · Maintain Hamlin for strict I&Os  Lactic acidosis  Assessment & Plan  · Gentle IV fluid hydration; isolate 100 mL/hour  May require hypotonic solution    Pancytopenia ([facility] )  Assessment & Plan  · Likely secondary to underlying liver dysfunction  · Maintain HgB>7 0, platelets greater than 20,000    Wounds, multiple  Assessment & Plan  · Wound care consultation  · Agency of aging consulted given concern for an accurate home care, and extremely poor nutritional status      ----------------------------------------------------------------------------------------  HPI/24hr events: Please see rapid note  Upon arrival to ICU patient remained remained profoundly altered with episodes of desaturation  No return to baseline neurologic status  Decision was made to intubate patient for airway protection  7 5 ETT tube placed without incident  Patient remains intubated, and sedated       Disposition: Transfer to Critical Care   Code Status: Level 1 - Full Code  ---------------------------------------------------------------------------------------  SUBJECTIVE  Intubated and sedated  ---------------------------------------------------------------------------------------  OBJECTIVE    Vitals   Vitals:    04/08/21 1845 04/08/21 1850 04/08/21 1930 04/08/21 1935   BP: 94/51 91/50 108/57 104/58   Pulse: 89 91 94 93   Resp: 16 16 16 16   Temp:       [facility]:       Weight:       Height:         Temp (24hrs), Av 2 °F (36 8 °C), Min:97 7 °F (36 5 °C), Max:98 4 °F (36 9 °C)  Current: Temperature: 98 4 °F (36 9 °C)          Respiratory:       Invasive/non-invasive ventilation settings   Respiratory    Lab Data (Last 4 hours)    None         O2/Vent Data (Last 4 hours)      1757          Drager Vent Mode AC/VC+       Resp Rate (BPM) (BPM) 16       VT (mL) (mL) 390       Insp Time (S) (S) 0 85       FIO2 (%) (%) 60       PEEP (cmH2O) (cmH2O) 6       Rise Time (%) (%) 15       MV (Obs) 5 5                   Physical Exam    Laboratory and Diagnostics:  Results from last 7 days   Lab Units 21  0445 21  0512 21  0013 21  1740 21  0503 21  1309 21  0921   WBC Thousand/uL 14 25*  --  8 11 6 12  --   --  5 60 5 95 8 87   HEMOGLOBIN g/dL 7 9*  --  7 0* 8 1* 8 1* 7 9* 7 0* 7 5* 8 2*   I STAT HEMOGLOBIN g/dl  --  8 2*  --   --   --   --   --   --   --    HEMATOCRIT % 22 4*  --  20 8* 22 3* 26 0* 24 1* 21 6* 23 3* 25 8*   HEMATOCRIT, ISTAT %  --  24*  --   --   --   --   --   --   --    PLATELETS Thousands/uL 70*  --  47* 52*  --   --  50* 55* 79*   NEUTROS PCT %  --   --  70 64  --   --   --  64  --    BANDS PCT % 0  --   --   --   --   --   --   --  1   MONOS PCT %  --   --  8 11  --   --   --  12  --    MONO PCT % 8  --   --   --   --   --   --   --  13*     Results from last 7 days   Lab Units 21  17021  0445 21  0104 21  1755 21  1110 21  0512 21  0013  21  1258 21  0503 21  1309 21  1643 21  0414 21  0921   SODIUM mmol/L 150*  --  146* 147* 125* 153* 156* 155*   < >  --  153* 150* 145 141 132*   POTASSIUM mmol/L 3 8  --  3 4*  --   --   --  3 3*  --   --  3 4* 2 9* 2 9* 3 0* 2 4* 5 0   CHLORIDE mmol/L 115*  --  114*  --   --   --  122*  --   --   --  120* 113* 108 104 90*   CO2 mmol/L 16*  --  23 --   --   --  23  --   --   --  26 26 26 27 22   CO2, I-STAT mmol/L  --  15*  --   --   --   --   --   --   --   --   --   --   --   --   --    ANION GAP mmol/L 19*  --  9  --   --   --  11  --   --   --  7 11 11 10 20*   BUN mg/dL 16  --  13  --   --   --  15  --   --   --  16 19 23 29* 33*   CREATININE mg/dL 1 45*  --  1 14  --   --   --  0 97  --   --   --  0 99 0 98 1 21 1 16 1 98*   CALCIUM mg/dL 9 0  --  8 6  --   --   --  9 4  --   --   --  8 8 8 8 9 0 8 4 8 7   GLUCOSE RANDOM mg/dL 99  --  103  --   --   --  115  --   --   --  121 100 109 88 102   ALT U/L 40  --   --   --   --   --  34  --   --   --  26 26  --  27 40   AST U/L 76*  --   --   --   --   --  61*  --   --   --  46* 45  --  53* 131*   ALK PHOS U/L 111  --   --   --   --   --  99  --   --   --  84 86  --  83 112   ALBUMIN g/dL 2 4*  --   --   --   --   --  2 4*  --   --   --  2 3* 2 5*  --  2 7* 3 0*   TOTAL BILIRUBIN mg/dL 6 98*  --   --   --   --   --  8 21*  --   --   --  7 46* 8 10*  --  11 29* 16 58*    < > = values in this interval not displayed  Results from last 7 days   Lab Units 04/08/21  1701 04/08/21  0445 04/07/21  0512 04/05/21  1309 04/04/21  1643   MAGNESIUM mg/dL 2 1 1 9 2 3 2 0 1 9      Results from last 7 days   Lab Units 04/08/21 0445 04/06/21  1740 04/03/21  0921   INR  2 67* 2 08* 1 95*   PTT seconds  --   --  32      Results from last 7 days   Lab Units 04/08/21  1701 04/03/21  0921   TROPONIN I ng/mL <0 02 <0 02     Results from last 7 days   Lab Units 04/08/21  1701 04/04/21  1003 04/04/21  0414 04/04/21  0006 04/03/21  2103 04/03/21  1218 04/03/21  0921   LACTIC ACID mmol/L 10 8* 2 9* 2 6* 2 5* 3 3* 5 1* 10 6*     ABG:    VBG:          Micro  Results from last 7 days   Lab Units 04/04/21  1545 04/04/21  0011 04/03/21  1442 04/03/21  0939 04/03/21  0926 04/03/21  0921   BLOOD CULTURE   --   --   --   --  No Growth After 5 Days  No Growth After 5 Days     GRAM STAIN RESULT  Rare Polys*  1+ Gram positive cocci in pairs* 1+ Gram negative rods*  --   --   --   --   --    URINE CULTURE   --   --   --  >100,000 cfu/ml Escherichia coli*  --   --    WOUND CULTURE  4+ Growth of Escherichia coli*  4+ Growth of Acinetobacter baumannii*  4+ Growth of   --   --   --   --   --    MRSA CULTURE ONLY   --   --  Methicillin Resistant Staphylococcus aureus isolated*  This patient requires contact isolation precautions per [place] law  Contact precautions are not required in [place] for nasal surveillance cultures  --   --   --    C DIFF TOXIN B BY PCR   --  Negative  --   --   --   --        EKG: NSR  Imaging: I have personally reviewed pertinent reports  and I have personally reviewed pertinent films in PACS    Intake and Output  I/O       04/07 0701 - 04/08 0700 04/08 0701 - 04/09 0700    P  O  118     I V  (mL/kg) 2712 5 (42 4) 295 (4 6)    Total Intake(mL/kg) 2830 5 (44 3) 295 (4 6)    Stool 900     Total Output 900     Net +1930 5 +295          Unmeasured Stool Occurrence 2 x           Height and Weights   Height: 5' 8" (172 7 cm)  IBW: 63 9 kg  Body mass index is 21 42 kg/m²  Weight (last 2 days)     None            Nutrition       Diet Orders   (From admission, onward)             Start     Ordered    04/05/21 1559  Dietary nutrition supplements  Once     Question Answer Comment   Select Supplement: Ensure Compact-Vanilla    Frequency Breakfast, Lunch, Dinner        04/05/21 1559    04/04/21 0550  Diet Dysphagia/Modified Consistency; Dysphagia 3-Dental Soft; Thin Liquid  Diet effective now     Question Answer Comment   Diet Type Dysphagia/Modified Consistency    Dysphagia/Modified Consistency Dysphagia 3-Dental Soft    Liquid Modifier Thin Liquid    RD to adjust diet per protocol?  Yes        04/04/21 0549                  Active Medications  Scheduled Meds:  Current Facility-Administered Medications   Medication Dose Route Frequency Provider Last Rate    acetaminophen  650 mg Oral Q6H PRN Anjelica Pedersen PA-C      cefazolin 2,000 mg Intravenous Q8H Vicky Gross PA-C      cholecalciferol  400 Units Oral Daily José Miguel Ndiaye      [START ON 4/9/2021] lactulose  30 g Oral TID Vicky Gross PA-C      [START ON 4/9/2021] levETIRAcetam  1,000 mg Intravenous Q12H [address] Vicky Gross PA-C      multi-electrolyte  500 mL Intravenous Once Vicky Gross [place]      multi-electrolyte  100 mL/hr Intravenous Continuous Vicky Gross PA-C 100 mL/hr (04/08/21 1818)    nicotine  1 patch Transdermal Daily Vicky Gross PA-C      propofol  5-50 mcg/kg/min Intravenous Titrated Vicky Gross PA-C      rifaximin  550 mg Oral Q12H [address] Vicky Gross PA-C       Continuous Infusions:  multi-electrolyte, 100 mL/hr, Last Rate: 100 mL/hr (04/08/21 1818)  propofol, 5-50 mcg/kg/min      PRN Meds:   acetaminophen, 650 mg, Q6H PRN        Invasive Devices Review  Invasive Devices     Peripheral Intravenous Line            Peripheral IV 04/05/21 Distal;Right;Ventral (anterior) Forearm 2 days    Peripheral IV 04/08/21 Right Wrist less than 1 day          Drain            Rectal Tube With balloon 2 days          Airway            ETT  Oral 7 5 mm less than 1 day                Rationale for remaining devices: N/A  ---------------------------------------------------------------------------------------  Advance Directive and Living Will:      Power of :    POLST:    ---------------------------------------------------------------------------------------  Care Time Delivered:   Upon my evaluation, this patient had a high probability of imminent or life-threatening deterioration due to altered mental status, seizure-like activity, decompensated cirrhosis, which required my direct attention, intervention, and personal management  I have personally provided 35 minutes (1700 to 2000) of critical care time, exclusive of procedures, teaching, family meetings, and any prior time recorded by providers other than myself         Vicky Gross PA-C      Portions of the record may have been created with voice recognition software  Occasional wrong word or "sound a like" substitutions may have occurred due to the inherent limitations of voice recognition software    Read the chart carefully and recognize, using context, where substitutions have occurred

## 2021-04-09 NOTE — APP STUDENT NOTE
ABDIFATAH STUDENT  Inpatient Progress Note for TRAINING ONLY  Not Part of Legal Medical Record       Progress Note - Daniel Single 63 y o  female MRN: [id number]    Unit/Bed#: MICU 01 Encounter: [id number]        HPI/Last 24 hours:   63 yr old alcoholic cirrhosis, ETOH abuse, hepatocellular carcinoma status post ablation in 2020, history of hepatic encephalopathy, and extensive bilateral lower extremity wounds who presented to the [facility] ER on 4/3 for altered mental status  Patient was noted to have elevated ammonia as well as concern about possible lower extremity cellulitis secondary to multiple lower extremity wounds that had been poorly cared for  Patient was admitted to [facility] and  over the last few days has had a waxing and waning mental status       Unfortunately rapid response was called yesterday evening for a witnessed tonic-clonic seizure that had resolved on its own  By the time the rapid response team arrived, the patient was noted to be postictal and have a left upward gaze, no longer following commands, but was withdrawing to pain in all 4  Patient then proceeded to have persistent facial twitching  She received 3 mg of Ativan with resolution  Patient was ultimately intubated for airway protection  Neurology was consulted patient was given it 2 g IV Keppra load and started on Keppra b i d  Patient was then transferred to [facility] for continuous video EEG monitoring in the setting of presumed status epilepticus  Just prior to transfer, patient had a 2nd episode of rhythmic jaw thrusting around 2115 for which she received additional 4 mg of IV Ativan  Propfol gtt for seizure suppression  Transient hypotension that resolved with 500mL of Isolyte and  5% albumin  Persistant seizures on EEG this AM  This morning was started on a Levo gtt and repeat Albumin dose for persistent hypotension SBP 80s with a MAP in the 50s   Assessment:    Prinicipal Problem:   1   Status Epilepticus requiring mechanical ventilation   2  Suspected ETHO withdrawal  3  Acute Hepatic Encepholopahy  4  Acute Hypoxic Res Failure  5  Tonic Clonic Seizures   6  Hepatocellular Carcinoma/ E  Coli UTI /ETOH cirrhosis/ Hepatitis C / Hyperammonemia  7  Portal Vein Thrombosis  8  Coagulopathy of Liver Disease  9  Anemia/Thrombocytopenia  10  Lactic Acidosis - in setting of seizure  11  PNA - Suspected Asp  12  Sepsis       Plan:    Neuro -   - Diagnosis: acute metabolic encephalopathy   - Multifactorial in the setting of status epilepticus,  hyperammonemia, hepatic encephalopathy, infection   - Continue video EEG    - Seizures continue and Propofol increase at 0530    - Discuss with neurology as sedation could be   contributing to hypotension     - Neuro checks Q 1    - Neurology Following      - Maintain propofol gtt due to contiuning seizures  - Seizure precautions  - Continue Keppra IV BID   - Add diazepam 5mg q 6hrs   - Potential for Precedex gtt     - PRN ativan for seizure like activity     - Continue Lactulose and Rixaximin    - Trend Ammonia Levels    - Thiamine, MVI,  and Folic Acid     - Monitor for ETOH withdrawal     - Continue Steven Hugger for hypothermia  - hold LP in setting of hypercoagability   CV:    - Diagnosis: Hypotension    - Likely due to sedation vs hypovolemia vs sepsis   - Limit fluid resuscitation given third spacing    - Repeat Albumin dose    - Levo gtt     - Will need CL as it is running peripherally at  --   currently  - ECHO pending    - Anemia:    -Hemoglobin 6 6     - 1 unit PRBCS pending T&C      - No events on tele overnight - will continue to monitor  Pulm:   - Diagnosis: Acute hypoxic Res Failure:   multifactorial in the setting of seizures and worsening cirrhosis/MS   ? 4/8 - intubated for airway protection in the setting of seizures  ? AC: 12/400/6/40%   ? Wean as tolerated   ?  Continue good pulmonary hygiene  § frequent suctioning and respiratory protocol  · Chest XR - concerning for RML PNA  ? Give Unasyn IV   GI:   - Diagnosis: hx of ETOH cirrhosis, hepatocellular carcinoma, hepatic encephalopathy   - Unknown sobriety    - MELD: 28 - followed by surg onc and GI   ? Continue lactulose t i d  for 3-4 BM q d   ? Continue rifaximin b i d   ? Appreciate GI recs      Renal/:   · Diagnosis:  Lactic acidosis  ? In the setting of acute seizure activity  · Continue to trend lactic   · Trend daily BMP  · Strict I&Os  · UO - 75 q 2h   · Hamlin day 1   · Monitor UOP closely    F/E/N:   · Fluids - continue IVF  · Electrolytes - trend and replete as needed  · Nutrition - NPO at this time  ? OG tube to suction   ? Start TF Today     Heme/Onc:   · Anemia   ? Transfuse 1 unit  · Thrombocytopenic  ? In setting of [address]    ? No evidence of bleeding  · Continue closely monitor with daily CBC  · DVT ppx:  SCDs, SQH    Endo:   - Hypothermic   - Steven hugger   - Check TSH and Cortisol     ID:   · Diagnosis:  Lower extremity cellulitis  ? In the setting of chronic, un-cared for lower extremity wounds  ? Blood cultures from 04/03 result is no growth after 5 days  ? Urine culture: E  Coli   ? Treated wit to Ancef for 4 days   ? MRSA positive on 4/3    ? Treated with 3 days of Vanco   ? Continue Ancef   ? Chronic LE concern for cellulitis   ? Consider broadening antibiotic coverage should patient clinically decline  · Diagnosis:  COVID-19 exposure  § Patient's roommate positive for COVID  § COVID swab negative x2  § Continue precautions this time, can likely D/C later today given 2 negative tests    MSK/Skin:   · Diagnosis:  Extensive, hronic lower extremity wounds bilaterally  ? Wound consult placed  § Continue local wound care  ? Concern for possible neglect - palliative consult placed  ? Case Management      Subjective:    Unable to communicate - intubated     Objective:     Vitals: Blood pressure (!) 89/56, pulse 84, temperature (!) 95 °F (35 °C), temperature source Bladder, resp   rate 20, weight 66 7 kg (147 lb 0 8 oz), SpO2 99 %  ,Body mass index is 22 36 kg/m²  Intake/Output Summary (Last 24 hours) at 4/9/2021 0614  Last data filed at 4/9/2021 0210  Gross per 24 hour   Intake 103 33 ml   Output 275 ml   Net -171 67 ml       Physical Exam: /57   Pulse 74   Temp 97 5 °F (36 4 °C)   Resp (!) 11   Wt 66 7 kg (147 lb 0 8 oz)   SpO2 100%   BMI 22 36 kg/m²   General appearance: Critically ill  Eyes: pupil size:  left:  1  sluggish, Right  Lungs: clear to auscultation bilaterally  Heart: S1, S2 normal, no click and no rub  Abdomen: soft, non-tender; bowel sounds normal; no masses,  no organomegaly  Extremities: edema +2 generalized edema   Pulses: 2+ and symmetric  Skin: Bruises, scabs and wounds generalized  Neurologic: Mental status: alertness: Propofol held at time of exam  withdrawals in all extremites  +cough and gag  Pupils +1 and sluggish  Invasive Devices     Peripheral Intravenous Line            Peripheral IV 04/05/21 Distal;Right;Ventral (anterior) Forearm 3 days    Peripheral IV 04/08/21 Right Wrist less than 1 day    Peripheral IV 04/09/21 Right;Ventral (anterior) Forearm less than 1 day          Drain            Rectal Tube 1 day    NG/OG/Enteral Tube 18 Fr Center mouth less than 1 day    Urethral Catheter Temperature probe 16 Fr  less than 1 day          Airway            ETT  Oral 7 5 mm less than 1 day                Lab, Imaging and other studies: I have personally reviewed pertinent reports      VTE Pharmacologic Prophylaxis: Heparin  VTE Mechanical Prophylaxis: sequential compression device

## 2021-04-09 NOTE — RESPIRATORY THERAPY NOTE
RT Ventilator Management Note  Jasmine Erwin 63 y o  female MRN: [id number]  Unit/Bed#:  Encounter: [id number]      Daily Screen       4/8/2021 2118             Patient safety screen outcome[de-identified]  Failed    Not Ready for Weaning due to[de-identified]  Underline problem not resolved            Physical Exam:   Assessment Type: Assess only  General Appearance: Sedated  Respiratory Pattern: Irregular, Assisted  Chest Assessment: Chest expansion symmetrical  Bilateral Breath Sounds: Clear, Diminished  Cough: None  O2 Device: mechanical; ventilation  Subjective Data: sedated      Resp Comments: patient remains intubated and sedated on full emchanical ventilation et tube in place and secure tie tesnion adeqauet no evidence of skin breakdown noted at thsi time pateint unresponsive to stimuli  plan: paris mackey pateint scheduedl to be transfered to [facility] further evaluation

## 2021-04-09 NOTE — ASSESSMENT & PLAN NOTE
· Witnessed tonic-clonic seizure  · Appreciate neurology consultation/recommendation  · Stat CT head without obvious precipitating etiology  · 2 g Keppra load followed by Keppra 1 g b i d   · Plan for transfer to [facility] for continuous vEEG

## 2021-04-09 NOTE — ASSESSMENT & PLAN NOTE
Assessment:  · 63 y o  female with alcoholic/HCV cirrhosis and [address]  who was initially admitted to THE [facility] with altered mental status  Rapid response was activated on 4/8 due to witnessed seizure activity and neurology was consulted for further assessment  She was given 3 mg Ativan total and 1000 mg Keppra was ordered to be given  During the evaluation by neurology on 4/8 she was noted to have leftward gaze with horizontal nystagmus  CTH was completed and showed no acute abnormalities  Due to concern for ongoing seizure activity decsion was made to transfer patient to [facility] for vEEG  Patient was transferred to intensive care unit and ultimately intubated  · Timeline of Events:   ·  4/08: Lorazapam 1mg IV, > Lorazapam 1mg IV > Keppra 2G IV > ativan 2mg IV > on 4/8/ 22:20: ativan 2mg IV  · 4/09: 05:30 Propofol ggt started at 30 then increased to 50  and at: 06:03 Lorazepam 2mg IV > 09:32 Keppra 750 mg > Right temporal seizures started around 14:15 and occurred every 2 minutes roughly > 15:04 Lorazapam 5mg IV >15:30 no significant improvement >4/09: 15:48 Lorazapam 8mg IV >16:04 no significant improvement > 16:19 Lorazapam 10mg IV >16:20 Keppra 1 5G IV > 16:54 5mg Valium >16:58 slight improvement, but persistent seizures >17:13 Midazolam ggt titration >17:25 Lorazapam 10mg IV  · 4/10: refractory status-On Versed gtt increased to 60, Propafol gtt at 100, Vimpat load, valium 20 mg, Ketamine bolus and ketamine 2 mg/kg gtt, > v EEG shows further seizure activity in left temporal region  At 13:32- No seizures EEG completely suppressed except for isolated right temporal discharges occurring every 15-40 seconds  · 4/11: Propofol at 100 , Ketamine at 2mg/kg/hr, Versed at 60 and Keppra dose to 2 5g and vimpat to 200mg BID, no further seizures on EMU, got her MRI brain, and plan to decrease Propofol by 10 mg/hr    · 4/11: MRI brain with and without: inconclusive   · 4/12: Continued to decrease Propofol, propofol stopped around 1900   · 4/13: 2 episodes of right hemispheric seizures at 0039 and 0336  Loaded with Topamax 400 mg and continue with Topamax 200mg BID with maintained Versed at 60, Ketamine at 20, keppra 2 5mg BID and Vimpat 200 q12  · 4/13: LP done  · 4/14:  Weaned off of ketamine  · 4/15:  Planned for weaning off of midazolam, midazolam   Weaned off on 04/17 at 9:00 a m   · 4/18- 5:30 am electrographic seizure right occipital area, low amplitude, eeg suppressed with occasional right posterior epileptiform discharges with low-amplitude  Loaded with Depacon and continued on 250 mg BID  · 4/19: MRI brain with and without: Slightly more pronounced symmetric signal abnormality within the deep gray matter involving the lentiform nuclei, globus pallidus, bilateral thalami and central ranjeet without enhancement  The findings are nonspecific but highly concerning for a toxic-metabolic insult such as hepatic encephalopathy with hyperammonemia  These findings have also been reported in patients with Valproic acid-induced hyperammonemia  An alternative less likely considerations such as viral encephalitis should be excluded  on a clinical basis with CSF cytology  Few new punctate foci of cerebral gradient susceptibility artifact suggesting microhemorrhage  This may be secondary to underlying coagulopathy  Previously noted areas of prior cerebral and cerebellar chronic microhemorrhage are unchanged  Plan:   · Current AEDs:  · Continue Keppra 2500 BID maintained dose   · Increase Vimpat from 200 b i d  to 300 b i d  And will load 400 once on 4/18   · Loaded with Tomapax 4/13 with 400, continue 200mg BID, d/c Topamax on 04/17  · Loaded with Depacon on 4/18 and continued with 250 mg BID  · Levels obtain for Keppra and topamax:  Ordered and pending  · Current Sedation:  Off sedation as of  04/17 at 9:00 a m    · ID on board, recommendations appreciated   · Received a total of 11 days Abx, and 4 days of Antiviral-Acyclovir, d/c on 4/12  · Medical management as per critical care team    · Patient has been transitioned to comfort care, neurology will sign off

## 2021-04-09 NOTE — CASE MANAGEMENT
CM received followup call from Buster Kessler stating he is not patients SO, he is a friend who is allowing patient to stay with him  CM inquired if patient has any family  Buster Kessler provided contact information for patients brothmargarita Aguilar #[phone]  Buster Kessler states patient is , has no children, and has one other sister named Jody Caballero (contact info unknown)  Pt's friend Rachel Szymanski is involved in her life but does not have a cellphone  BrotherDelano ph# [phone]      ADDENDUM 14:21 : CM provided ph# for brother Angie Aguilar to [address] [place] [address] Road protective services by phone

## 2021-04-10 PROBLEM — G40.901 STATUS EPILEPTICUS (HCC): Status: ACTIVE | Noted: 2021-01-01

## 2021-04-10 NOTE — RESPIRATORY THERAPY NOTE
RT Ventilator Management Note  Matthew Celaya 63 y o  female MRN: [id number]  Unit/Bed#: MICU 01 Encounter: [id number]      Daily Screen       4/8/2021 2118 4/9/2021  0752          Patient safety screen outcome[de-identified]  Failed  Failed      Not Ready for Weaning due to[de-identified]  Underline problem not resolved  Underline problem not resolved              Physical Exam:   Assessment Type: (P) Assess only  General Appearance: (P) Sedated  Respiratory Pattern: (P) Assisted  Chest Assessment: (P) Chest expansion symmetrical  Bilateral Breath Sounds: (P) Clear  Cough: (P) None  Suction: (P) ET Tube  O2 Device: (P) Ventilator      Resp Comments: (P) Pt  remains stable on CMV/VC mode  Will continue to assess and monitor pt per protocol

## 2021-04-10 NOTE — RESPIRATORY THERAPY NOTE
resp care      04/10/21 1451   Respiratory Assessment   Assessment Type Assess only   Resp Comments Pt  stable on current vent settings  No changes at this time   ETT  Oral 7 5 mm   Placement Date/Time: 04/08/21 1550   Previously placed by?: Attending  Mask Ventilation: Ventilated by mask (1)  Preoxygenated: Yes  Technique: Direct laryngoscopy  Type: Oral  Tube Size: 7 5 mm  Laryngoscope: Adriana De León  Location: Oral  Placement Verific       Secured at (cm) 24   Measured from Lips   Secured Location Right   Secured by Commercial tube love   Site Condition Dry   HI-LO Suction  Intermittent suction   Additional Assessments   SpO2 100 %

## 2021-04-10 NOTE — PROGRESS NOTES
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) (HCC)    [facility]YL [facility]) [facility] [address] )    Hyperkalemia    Alcohol abuse    Anemia    Lactic acidosis    Pancytopenia (HCC)    UTI (urinary tract infection)    Cellulitis    Wounds, multiple    Hypernatremia    Status epilepticus (HCC)    Hyperammonemia (HCC)    Acute respiratory failure with hypoxia (HCC)    Portal vein thrombosis    [facility]     Plan:  1  Symptom management -    - per primary team    2  Goals - level 1 full code   - Contacted both of Adela's siblings (Angie Aguilar and Jody Caballero) who are both agreeable to receive information and act as substitute decision makers for [employer]  - They are both aware of her critical condition and are open to goals of care discussions as clinical course evolves  Ongoing treatment directed cares at this time     Code Status: full - Level 1   Decisional apparatus:  Patient is not competent on my exam today  If competence is lost, patient's substitute decision maker would default to adult siblings by [place]  Advance Directive / Living Will / POLST:  None completed  3  Social support   - Time spent providing update and supportive listening to both Rivervirginie Malagon and Jody Caballero via phone  Interval history:       Patient with persistent seizure activity on EEG yesterday requiring adjustments of sedation and AED  Continues to require vasopressor support for hypotension  Received several units of blood products since admission       MEDICATIONS / ALLERGIES:     all current active meds have been reviewed    No Known Allergies    OBJECTIVE:    Physical Exam  Physical Exam  Constitutional:       Appearance: She is ill-appearing  HENT:      Head:      Comments: EEG leads in place  Cardiovascular:      Comments: Hypotensive requiring vasopressor support  Pulmonary:      Comments: Ventilate via ETT  Genitourinary:     Comments: Urinary catheter in place  Skin:     Comments: B/l LE wounds   Neurological:      Comments: Sedated, unable to examine         Lab Results:   I have personally reviewed pertinent labs  , CBC:   Lab Results   Component Value Date    WBC 10 35 (H) 04/10/2021    HGB 8 6 (L) 04/10/2021    HCT 27 0 (L) 04/10/2021     (H) 04/10/2021    PLT 67 (L) 04/10/2021    MCH 36 9 (H) 04/10/2021    MCHC 31 9 04/10/2021    RDW 28 2 (H) 04/10/2021    MPV 11 7 04/10/2021    NRBC 0 04/10/2021   , CMP:   Lab Results   Component Value Date    SODIUM 150 (H) 04/10/2021    K 4 1 04/10/2021     (H) 04/10/2021    CO2 23 04/10/2021    BUN 17 04/10/2021    CREATININE 0 93 04/10/2021    CALCIUM 8 2 (L) 04/10/2021    AST 65 (H) 04/10/2021    ALT 30 04/10/2021    ALKPHOS 90 04/10/2021    EGFR 67 04/10/2021     Imaging Studies: reviewed pertinent studies  EKG, Pathology, and Other Studies: reviewed pertinent studies    Counseling / Coordination of Care    Total floor / unit time spent today 45+ minutes  Greater than 50% of total time was spent with the patient and / or family counseling and / or coordination of care  A description of the counseling / coordination of care: time spent assessing patient, communicating with siblings via phone, coordinating care with primary team and RN

## 2021-04-10 NOTE — RESPIRATORY THERAPY NOTE
RT Ventilator Management Note  Laurie Mcardle 63 y o  female MRN: [id number]  Unit/Bed#: [facility] 01 Encounter: [id number]      Daily Screen       4/9/2021  0754 4/10/2021  0823          Patient safety screen outcome[de-identified]  Failed  Failed      Not Ready for Weaning due to[de-identified]  Underline problem not resolved  Underline problem not resolved              Physical Exam:   Assessment Type: (P) Assess only  General Appearance: (P) Sedated  Respiratory Pattern: (P) Assisted  Chest Assessment: (P) Chest expansion symmetrical  Bilateral Breath Sounds: (P) Clear  Cough: None  Suction: (P) ET Tube  O2 Device: Ventilator      Resp Comments: (P) Pt  still on EMU and sedated  No changes at this time

## 2021-04-10 NOTE — PROGRESS NOTES
NEUROLOGY RESIDENCY PROGRESS NOTE     Name: Kade Cooley   Age & Sex: 63 y o  female   MRN: [id number]  Unit/Bed#: MICU 01   Encounter: [id number]      ASSESSMENT & PLAN     Status epilepticus [facility])  Assessment & Plan  · Assessment:  63 y o  female with alcoholic/HCV cirrhosis and [address]  who was initially admitted to THE [facility] with altered mental status  Rapid response was activated on 4/8 due to witnessed seizure activity and neurology was consulted for further assessment  She was given 3 mg Ativan total and 1000 mg Keppra was ordered to be given  During the evaluation by neurology on 4/8 she was noted to have leftward gaze with horizontal nystagmus  CTH was completed and showed no acute abnormalities  Due to concern for ongoing seizure activity decsion was made to transfer patient to [facility] for vEEG  Patient was transferred to intensive care unit and ultimately intubated  4/8-4/9 Received 6mg of Ativan, Loaded with 2 gram Keppra, 2mg Versed, Started on Propfol 30, inc to 50  4/9: 1:45am Found to have frequent right temporal intermittent rthymic delta and intermittent right temporal discharges with no clear seizures  Around 3:20am, found to have left arm twitching and showed evolution of pattern  She was started on Propofol later required propofol gtt to 50  Afternoon  status epilepticus refractory to multiple high dose ativan boluses total 38 mg ativan, repeat Keppra load, received 15 mg of Versed,   4/10- refractory status-On Versed gtt increased to 60, Propafol gtt at 100, Vimpat load, valium 20 mg, Ketamine bolus and ketamine 2 mg/kg gtt, v EEG shows further seizure activity in left temporal region  At 13:32- No seizures EEG completely suppressed except for isolated right temporal discharges occurring every 15-40 seconds  Plan:   · Current AEDs:  · Continue Keppra 1500 BID maintained dose   · Continue Vimpat 100 mg bid   · Continue vEEG monitoring     · Currently on Versed at 60, propofol at 100, ketamine at 2 mg/kg   · Seizure precautions  · At this point would recommend MRI brain with/without to further evaluate for underlying cause for seizures  Recommend coordinating to have MRI imaging soon and to place patient back on vEEG if leads are not MRI compatible  · Continue Acyclovir   · ID on board, recommendations appreciated   · Medical management as per critical care team      MRSA (+) 4/3  Urine 4/3 (+) E  Coli  Bld Cx 49 no growth  Sputum Cx 4/9 1+ pmn no bacteria  Wound Cx 4/ (+) E coli/acinetobacter       SUBJECTIVE     Patient was seen and examined  Refractory status-Started on Versed gtt increased to 60, Propafol gtt at 100, Vimpat load, Ketamine bolus and ketamine gtt, v EEG shows further seizure activity in left temporal region  At 1:32 p m  No seizures EEG completely suppressed except for isolated right temporal discharges occurring every 15-40 seconds  Unable to obtain ROS as pt is intubated/sedated and mental status change  OBJECTIVE     Patient ID: Bryan Jimenez is a 63 y o  female  Vitals:    04/10/21 1451 04/10/21 1500 04/10/21 1535 04/10/21 1540   BP:       BP Location:       Pulse:  70 72 70   Resp:  12 (!) 11 (!) 11   Temp:  (!) 96 4 °F (35 8 °C) (!) 96 8 °F (36 °C) (!) 96 8 °F (36 °C)   TempSrc:       SpO2: 100% 100% 100% 100%   Weight:          Temperature:   Temp (24hrs), Av °F (36 7 °C), Min:95 7 °F (35 4 °C), Max:99 3 °F (37 4 °C)    Temperature: (!) 96 8 °F (36 °C)    Physical Exam  Vitals signs and nursing note reviewed  Constitutional:       Appearance: She is ill-appearing  Interventions: She is sedated, intubated  Eyes:      General:         Right eye: No discharge  Left eye: No discharge  Cardiovascular:      Rate and Rhythm: Normal rate and regular rhythm  Heart sounds: Normal heart sounds  Pulmonary:      Effort: She is intubated  Mechanical breath sounds  Abdominal:      General: There is no distension        Palpations: Abdomen is soft       Tenderness: There is no abdominal tenderness  There is no guarding  Skin:     General: Skin is warm  Findings: Bruising present  She has wounds in her bilateral lower extremities with dressing in place  Neurological Examination:     Mental Status:  Patient was examined on propofol at 100 and were said on 60 and ketamine on 2  She is intubated and sedated and unresponsive at this    Cranial Nerves:   Pupils size small- 2mm equal, not reactive to light  VOR- fixed pupils, Hector reflex absent, gag/cough absent    Motor Examination:     Bulk: Normal  No atrophy Tone: decreased  Fasciculations: None  No withdraw to noxious stimuli  Reflexes:                   Biceps Brachioradialis Triceps Patella Achilles Plantars   Right          Trace/none                                                           mute  Left              Trace/none                                                          mute    Clonus: None    Sensory:  No withdraw to noxious stimuli      LABORATORY DATA     Labs: I have personally reviewed pertinent reports  Results from last 7 days   Lab Units 04/10/21  0428 04/09/21  2330 04/09/21  1844 04/09/21  0909 04/09/21  0521 04/08/21  1701  04/08/21  0445 04/07/21  0512   WBC Thousand/uL 10 35*  --  10 26* 11 77* 7 27 14 25*  --  8 11 6 12   HEMOGLOBIN g/dL 8 6* 8 6* 6 8* 6 8* 6 6* 7 9*  --  7 0* 8 1*   I STAT HEMOGLOBIN   --   --   --   --   --   --    < >  --   --    HEMATOCRIT % 27 0*  --  22 7* 19 6* 17 8* 22 4*  --  20 8* 22 3*   HEMATOCRIT, ISTAT   --   --   --   --   --   --    < >  --   --    PLATELETS Thousands/uL 67*  --  72* 57* 52* 70*  --  47* 52*   NEUTROS PCT %  --   --   --   --  77*  --   --  70 64   MONOS PCT %  --   --   --   --  7  --   --  8 11   MONO PCT %  --   --   --   --   --  8  --   --   --     < > = values in this interval not displayed        Results from last 7 days   Lab Units 04/10/21  0428 04/09/21  1732 04/09/21  0521  04/08/21  1701 04/08/21  1653   SODIUM mmol/L 150* 152* 148*   < > 150*  --    POTASSIUM mmol/L 4 1 4 3 5 0   < > 3 8  --    CHLORIDE mmol/L 123* 122* 120*   < > 115*  --    CO2 mmol/L 23 25 22   < > 16*  --    CO2, I-STAT mmol/L  --   --   --   --   --  15*   BUN mg/dL 17 20 18   < > 16  --    CREATININE mg/dL 0 93 0 99 0 93   < > 1 45*  --    CALCIUM mg/dL 8 2* 8 6 8 5   < > 9 0  --    ALK PHOS U/L 90  --  97  --  111  --    ALT U/L 30  --  34  --  40  --    AST U/L 65*  --  85*  --  76*  --    GLUCOSE, ISTAT mg/dl  --   --   --   --   --  100    < > = values in this interval not displayed  Results from last 7 days   Lab Units 04/10/21  0428 04/09/21  1732 04/09/21  0521   MAGNESIUM mg/dL 2 3 2 6 2 4     Results from last 7 days   Lab Units 04/10/21  0428 04/09/21  1732 04/09/21  0521   PHOSPHORUS mg/dL 3 0 3 6 4 2      Results from last 7 days   Lab Units 04/10/21  0428 04/09/21  0521 04/08/21  0445   INR  2 14* 3 25* 2 67*     Results from last 7 days   Lab Units 04/09/21  0059   LACTIC ACID mmol/L 1 5     Results from last 7 days   Lab Units 04/08/21  1701   TROPONIN I ng/mL <0 02       IMAGING & DIAGNOSTIC TESTING     Radiology Results: I have personally reviewed pertinent reports  XR chest portable ICU   Final Result by Janeth Marshall MD (04/10 1253)      Small left apical pneumothorax identified possibly slightly increased from prior exam       Bilateral alveolar opacities in keeping with pneumonia  Small bilateral pleural effusions  Workstation performed: [id number]         XR chest portable ICU   Final Result by Salvador Carmen MD (04/09 1610)   Left internal jugular central line terminates in the right atrium, and should be pulled back by 3 cm  Unchanged small left pneumothorax  Worsening bilateral pneumonia  Stable small left pleural effusion  The study was marked in [facility] for immediate notification           Workstation performed: [id number]         CT abdomen w contrast (Results Pending)   XR chest portable ICU    (Results Pending)       Other Diagnostic Testing: I have personally reviewed pertinent reports        ACTIVE MEDICATIONS     Current Facility-Administered Medications   Medication Dose Route Frequency    acyclovir (ZOVIRAX) 650 mg in sodium chloride 0 9 % 100 mL IVPB  10 mg/kg (Ideal) Intravenous Q8H    ampicillin-sulbactam (UNASYN) 3 g in sodium chloride 0 9 % 100 mL IVPB  3 g Intravenous Q6H    chlorhexidine (PERIDEX) 0 12 % oral rinse 15 mL  15 mL Swish & Spit Q12H [address]    cholecalciferol (VITAMIN D3) tablet 400 Units  400 Units Oral Daily    dextrose 5 % infusion  75 mL/hr Intravenous Continuous    diazepam (VALIUM) tablet 10 mg  10 mg Oral P5C    folic acid (FOLVITE) tablet 1 mg  1 mg Oral Daily    ketamine 500 mg (DOUBLE CONCENTRATION) IV in sodium chloride 0 9% 250 mL  2 mg/kg/hr (Order-Specific) Intravenous Continuous    lacosamide (VIMPAT) 100 mg in sodium chloride 0 9 % 100 mL IVPB  100 mg Intravenous Q12H    lactulose oral solution 30 g  30 g Oral TID    levETIRAcetam (KEPPRA) 1,500 mg in sodium chloride 0 9 % 100 mL IVPB  1,500 mg Intravenous Q12H CITLALLI    LORazepam (ATIVAN) injection 2 mg  2 mg Intravenous Q4H PRN    midazolam (VERSED) 1 mg/mL (DOUBLE CONCENTRATION) 100 mL infusion  60 mg/hr Intravenous Continuous    nicotine (NICODERM CQ) 14 mg/24hr TD 24 hr patch 1 patch  1 patch Transdermal Daily    norepinephrine (LEVOPHED) 4 mg (STANDARD CONCENTRATION) IV in sodium chloride 0 9% 250 mL  1-30 mcg/min Intravenous Titrated    omeprazole (PRILOSEC) suspension 2 mg/mL  20 mg Oral Early Morning    ondansetron (ZOFRAN) injection 4 mg  4 mg Intravenous Q6H PRN    phytonadione (MEPHYTON) tablet 5 mg  5 mg Oral Daily    propofol (DIPRIVAN) 1000 mg in 100 mL infusion (premix)  100 mcg/kg/min (Adjusted) Intravenous Titrated    rifaximin (XIFAXAN) tablet 550 mg  550 mg Oral Q12H [address]    thiamine tablet 100 mg  100 mg Oral Daily       Prior to Admission medications    Medication Sig Start Date End Date Taking? Authorizing Provider   baclofen 10 mg tablet Take 10 mg by mouth 5/7/19   Historical Provider, MD   cholecalciferol (VITAMIN D3) 1,000 units tablet     Historical Provider, MD   furosemide (LASIX) 40 mg tablet Take 40 mg by mouth 5/20/19   Historical Provider, MD   gabapentin (NEURONTIN) 300 mg capsule Take 1 capsule (300 mg total) by mouth 2 (two) times a day 8/4/20   Salina Paez DO   lactulose 20 g/30 mL Take 45 mL (30 g total) by mouth 3 (three) times a day To achieve 3 bowel movements a day 3/16/21   SILVANO Caruso   spironolactone (ALDACTONE) 100 mg tablet TAKE 1 & 1/2 TABLETS BY MOUTH DAILY 5/20/19   Historical Provider, MD     VTE Mechanical Prophylaxis: sequential compression device    ==  MD Bonita Loyola's Neurology Residency, PGY-2

## 2021-04-10 NOTE — PROGRESS NOTES
Daily Progress Note - Critical Care   Patsy Chris 63 y o  female MRN: [id number]  Unit/Bed#: MICU 01 Encounter: [id number]        ----------------------------------------------------------------------------------------  HPI/24hr events: On cEEG monitor yesterday noted to have persistent seizure activity despite high dose propofol infusion and keppra dosing  Was given multiple push doses of ativan and re-loaded with keppra and dose increased  Pt was started on a versed gtt yesterday eveniong  Overnight seizure activity persisted and versed gtt increased to 40 mg/hr, pt was given push dose ketamine and started on ketamine infusion at 1 mg/kg/hr, Propofol infusion was increased to 80, and pt was given push doses of versed and Phenobarbital overnight, she was also started on vimpat     Pt continued on vasopressors for BP support with high dose sedative medications  Her random cortisol level yesterday was 13 and She had a cortisol stim test done yesterday evening with normal response      ---------------------------------------------------------------------------------------  SUBJECTIVE  Unable to assess    Review of Systems   Unable to perform ROS: Intubated   HENT: Positive for sinus pain         ---------------------------------------------------------------------------------------  Assessment and Plan:  Principal Problem:    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) ([facility] )  Active Problems:    Acute encephalopathy    Seizure ([facility] )    Hepatocellular carcinoma ([facility] )    Acute respiratory failure with hypoxia (HCC)    Hyperammonemia (HCC)    Cellulitis    UTI (urinary tract infection)    DARRYL (acute kidney injury) (HCC)    Hypernatremia    Lactic acidosis    Pancytopenia (HCC)    Degeneration of intervertebral disc of lumbar region    Chronic pain syndrome    Alcohol abuse    Anemia    Wounds, multiple    Portal vein thrombosis    Sepsis ([facility] )      Neuro:    Diagnosis: Status epilepticus, pt with persistent seizure activity overnight requiring increased benzos; vimpat and ketamine added  o Plan: Appreciate neurology and epileptology input  o Continue current infusions  - Propofol at 80  - Ketamine at 1 mg/kg/hr  - Versed at 40 mg/hr  o Continue Keppra 1,500 mg BID and Vimpat 100 mg BID  o Consider increasing valium PO to 10 mg Q6H  o Consider adding starting a pentobarbital infusion if seizures persist today   Diagnosis: Acute metabolic encephalopathy, suspected ETOH withdraw   o Plan: Continue routine neuro exams, delirium precautions      CV:    Diagnosis: Shock state, likely medicine induce   o Plan: Continue vasopressor and titrate for MAP goal > 65  o ECHO obtained this AM, f/u read  o Continue telemetry monitoring   o Normal stim test yesterday, f/u ACTH level  Hold on starting steroids       Pulm:   Diagnosis: Acute respiratory failure secondary to neuro status, PNA suspected aspiration   o Vent Day 3  o Plan: Continue full vent support, unable for SBP today due to ongoing seizures  o VAP precautions   Diagnosis: Possible persistent small Left apical PTX vs bleb  o Plan: F/U CXR this AM  o Continue to monitor airway pressures on vent      GI:    Diagnosis: Cirrhosis/HCV untreated/ HCC post ablation/portal V  thrombosis  o Plan:MELD score 21 today  o Appreciate GI input  o Continue Rifaximin and lactulose      :    Diagnosis: DARRYL resolved  o Plan: Continue close monitoring of I/O status and renal function daily      F/E/N:    Hypernatremia, Hyperchloremia: continue D5W @ 75 ml/hr and trend serum Na and CL   Nutrition: Consider starting trickle TFs today      Heme/Onc:    Diagnosis:  Anemia, thrombocytopenia, coagulopathic  o Plan: S/P transfusion 4/9 2 FFP, 1 pack platelets, and 1 PRBC  o Hgb improved to 8 6, platelets slightly down to 67, INR improved to 2 14  o Continue to hold chemical DVT prophylaxis  o Continue to trend CBC, INR daily and transfuse as needed   Diagnosis: DVT prophylaxis  o Plan: SCDs      Endo:    Diagnosis:  No acute issues    ID:    Diagnosis: Ecoli UTI-resolved, Polymicrobial LE wound infection, PNA suspected aspiration  o Plan: Appreciate ID input  o Continue unasyn D2, Total Abx day 7  o F/u cxs   Diagnosis: 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   o Plan: Continue acyclovir  o Obtain mri or lp when medical stable enough      MSK/Skin:    Diagnosis:  Bilateral lower extremity wounds  o Plan:  Appreciate Podiatry and Wound consult  Wound care as recommended   Frequent repositioning to offload pressure points    Disposition: Continue Critical Care   Code Status: Level 1 - Full Code  ---------------------------------------------------------------------------------------  ICU CORE MEASURES    Prophylaxis   VTE Pharmacologic Prophylaxis: Pharmacologic VTE Prophylaxis contraindicated due to Thrombocytopenia  VTE Mechanical Prophylaxis: sequential compression device  Stress Ulcer Prophylaxis: Omeprazole PO    ABCDE Protocol (if indicated)  Plan to perform spontaneous awakening trial today? No  Plan to perform spontaneous breathing trial today? No  Obvious barriers to extubation? Yes    Invasive Devices Review  Invasive Devices     Central Venous Catheter Line            CVC Central Lines 04/09/21 Triple 20cm less than 1 day          Peripheral Intravenous Line            Peripheral IV 04/08/21 Right Wrist 1 day    Peripheral IV 04/09/21 Right;Ventral (anterior) Forearm 1 day    Peripheral IV 04/09/21 Left;Ventral (anterior) Forearm less than 1 day          Arterial Line            Arterial Line 04/09/21 Radial less than 1 day          Drain            Rectal Tube 2 days    NG/OG/Enteral Tube 18 Fr Center mouth 1 day    Urethral Catheter Temperature probe 16 Fr  1 day          Airway            ETT  Oral 7 5 mm 1 day              Can any invasive devices be discontinued today? No  ---------------------------------------------------------------------------------------  OBJECTIVE    Vitals   Vitals:    04/10/21 0500 04/10/21 0513 04/10/21 0558 04/10/21 0600   BP: 92/58 91/54  97/59   BP Location:       Pulse: 74 76  76   Resp: 12 12  12   Temp: 97 5 °F (36 4 °C) 97 5 °F (36 4 °C)  98 6 °F (37 °C)   TempSrc: Bladder      SpO2: 99% 99%  99%   Weight:   74 6 kg (164 lb 7 4 oz)      Temp (24hrs), Av 5 °F (36 4 °C), Min:95 4 °F (35 2 °C), Max:99 3 °F (37 4 °C)  Current: Temperature: 98 6 °F (37 °C)  HR: 77  BP: 120/52  RR: 12  SpO2: 99    Respiratory:  SpO2: SpO2: 99 %       Invasive/non-invasive ventilation settings   Respiratory    Lab Data (Last 4 hours)    None         O2/Vent Data (Last 4 hours)    None                Physical Exam  Vitals signs and nursing note reviewed  Constitutional:       Appearance: She is normal weight  She is ill-appearing and toxic-appearing  Comments: Sedated and intubated on exam   HENT:      Head: Normocephalic and atraumatic  Nose: Nose normal       Mouth/Throat:      Mouth: Mucous membranes are moist       Pharynx: Oropharynx is clear  Eyes:      General: Scleral icterus present  Comments: Pupils 3 mm b/l and not reactive   Neck:      Musculoskeletal: Normal range of motion and neck supple  Cardiovascular:      Rate and Rhythm: Normal rate and regular rhythm  Pulses: Normal pulses  Heart sounds: Normal heart sounds  Pulmonary:      Breath sounds: No wheezing  Comments: Vent Settings CMV 12/400/6/40 with peak pressure 18, mean pressure 8 6, MV 4 8,  and SpO2 99%  LS coarse b/l  Abdominal:      General: Bowel sounds are normal  There is no distension  Palpations: Abdomen is soft  Genitourinary:     Comments: Hamlin intact  Musculoskeletal: Normal range of motion  Comments: BLLE wounds with dressings CDI  (+) trace to mild Pitting edema BLLE   Skin:     General: Skin is warm and dry        Coloration: Skin is jaundiced  Neurological:      Comments: Sedated with high dose versed/ketamine/propofol gtts, GCS 3, pupils 3 mm and not reactive  cEEG monitor intact, no clinical sign of seizure activity          Laboratory and Diagnostics:  Results from last 7 days   Lab Units 04/10/21  0428 04/09/21  2330 04/09/21  1844 04/09/21  0909 04/09/21  0521 04/08/21  1701 04/08/21  1653 04/08/21  0445 04/07/21  0512  04/05/21  1309 04/03/21  0921   WBC Thousand/uL 10 35*  --  10 26* 11 77* 7 27 14 25*  --  8 11 6 12   < > 5 95 8 87   HEMOGLOBIN g/dL 8 6* 8 6* 6 8* 6 8* 6 6* 7 9*  --  7 0* 8 1*   < > 7 5* 8 2*   I STAT HEMOGLOBIN g/dl  --   --   --   --   --   --  8 2*  --   --   --   --   --    HEMATOCRIT % 27 0*  --  22 7* 19 6* 17 8* 22 4*  --  20 8* 22 3*   < > 23 3* 25 8*   HEMATOCRIT, ISTAT %  --   --   --   --   --   --  24*  --   --   --   --   --    PLATELETS Thousands/uL 67*  --  72* 57* 52* 70*  --  47* 52*   < > 55* 79*   NEUTROS PCT %  --   --   --   --  77*  --   --  70 64  --  64  --    BANDS PCT %  --   --   --   --   --  0  --   --   --   --   --  1   MONOS PCT %  --   --   --   --  7  --   --  8 11  --  12  --    MONO PCT %  --   --   --   --   --  8  --   --   --   --   --  13*    < > = values in this interval not displayed       Results from last 7 days   Lab Units 04/10/21  0428 04/09/21  1732 04/09/21  0521 04/09/21  0059 04/08/21  1701 04/08/21  1653 04/08/21  0445 04/08/21  0104  04/07/21  0512  04/06/21  0503 04/05/21  1309  04/04/21  0414   SODIUM mmol/L 150* 152* 148* 148* 150*  --  146* 147*   < > 156*   < > 153* 150*   < > 141   POTASSIUM mmol/L 4 1 4 3 5 0 4 3 3 8  --  3 4*  --   --  3 3*   < > 2 9* 2 9*   < > 2 4*   CHLORIDE mmol/L 123* 122* 120* 120* 115*  --  114*  --   --  122*  --  120* 113*   < > 104   CO2 mmol/L 23 25 22 22 16*  --  23  --   --  23  --  26 26   < > 27   CO2, I-STAT mmol/L  --   --   --   --   --  15*  --   --   --   --   --   --   --   --   --    ANION GAP mmol/L 4 5 6 6 19*  -- 9  --   --  11  --  7 11   < > 10   BUN mg/dL 17 20 18 17 16  --  13  --   --  15  --  16 19   < > 29*   CREATININE mg/dL 0 93 0 99 0 93 0 97 1 45*  --  1 14  --   --  0 97  --  0 99 0 98   < > 1 16   CALCIUM mg/dL 8 2* 8 6 8 5 8 3 9 0  --  8 6  --   --  9 4  --  8 8 8 8   < > 8 4   GLUCOSE RANDOM mg/dL 121 76 71 101 99  --  103  --   --  115  --  121 100   < > 88   ALT U/L 30  --  34  --  40  --   --   --   --  34  --  26 26  --  27   AST U/L 65*  --  85*  --  76*  --   --   --   --  61*  --  46* 45  --  53*   ALK PHOS U/L 90  --  97  --  111  --   --   --   --  99  --  84 86  --  83   ALBUMIN g/dL 2 5*  --  2 3*  --  2 4*  --   --   --   --  2 4*  --  2 3* 2 5*  --  2 7*   TOTAL BILIRUBIN mg/dL 4 94*  --  5 44*  --  6 98*  --   --   --   --  8 21*  --  7 46* 8 10*  --  11 29*    < > = values in this interval not displayed       Results from last 7 days   Lab Units 04/10/21  0428 04/09/21  1732 04/09/21  0521 04/08/21  1701 04/08/21  0445 04/07/21  0512 04/05/21  1309   MAGNESIUM mg/dL 2 3 2 6 2 4 2 1 1 9 2 3 2 0   PHOSPHORUS mg/dL 3 0 3 6 4 2  --   --   --   --       Results from last 7 days   Lab Units 04/10/21  0428 04/09/21  0521 04/08/21  0445 04/06/21  1740 04/03/21  0921   INR  2 14* 3 25* 2 67* 2 08* 1 95*   PTT seconds  --   --   --   --  32      Results from last 7 days   Lab Units 04/08/21 1701 04/03/21 0921   TROPONIN I ng/mL <0 02 <0 02     Results from last 7 days   Lab Units 04/09/21  0059 04/08/21  1701 04/04/21  1003 04/04/21  0414 04/04/21  0006 04/03/21  2103 04/03/21  1218   LACTIC ACID mmol/L 1 5 10 8* 2 9* 2 6* 2 5* 3 3* 5 1*     ABG:  Results from last 7 days   Lab Units 04/09/21  0059   PH ART  7 333*   PCO2 ART mm Hg 42 8   PO2 ART mm Hg 163 3*   HCO3 ART mmol/L 22 2   BASE EXC ART mmol/L -3 4   ABG SOURCE  Artery     VBG:  Results from last 7 days   Lab Units 04/09/21  0059   ABG SOURCE  Artery     Results from last 7 days   Lab Units 04/10/21  0428 04/09/21  0924   PROCALCITONIN ng/ml 0  14 0 10       Micro  Results from last 7 days   Lab Units 04/09/21  1728 04/09/21  9129 04/09/21  0908 04/04/21  1545 04/04/21  0011 04/03/21  1442 04/03/21  0939 04/03/21  0926 04/03/21  1319   BLOOD CULTURE  Received in Microbiology Lab  Culture in Progress  Received in Microbiology Lab  Culture in Progress  --   --   --   --   --  No Growth After 5 Days  No Growth After 5 Days  GRAM STAIN RESULT   --   --  No Epithelial cells per low power field  1+ Polys  No bacteria seen Rare Polys*  1+ Gram positive cocci in pairs*  1+ Gram negative rods*  --   --   --   --   --    URINE CULTURE   --   --   --   --   --   --  >100,000 cfu/ml Escherichia coli*  --   --    WOUND CULTURE   --   --   --  4+ Growth of Escherichia coli*  4+ Growth of Acinetobacter baumannii*  4+ Growth of   --   --   --   --   --    MRSA CULTURE ONLY   --   --   --   --   --  Methicillin Resistant Staphylococcus aureus isolated*  This patient requires contact isolation precautions per [place] law  Contact precautions are not required in [place] for nasal surveillance cultures  --   --   --    C DIFF TOXIN B BY PCR   --   --   --   --  Negative  --   --   --   --        EKG: NSR 75 bpm  Imaging:  I have personally reviewed pertinent reports  Intake and Output  I/O       04/08 0701 - 04/09 0700 04/09 0701 - 04/10 0700    I V  (mL/kg) 1030 6 (15 5) 3594 9 (48 2)    Blood  1300    NG/GT 0 60    IV Piggyback 250 1390    Total Intake(mL/kg) 1280 6 (19 2) 6344 9 (85 1)    Urine (mL/kg/hr) 425 1305 (0 7)    Emesis/NG output 0 0    Total Output 425 1305    Net +855 6 +5039 9                  Height and Weights      IBW: -92 5 kg  Body mass index is 25 01 kg/m²    Weight (last 2 days)     Date/Time   Weight    04/10/21 0558   74 6 (164 46)    04/09/21 0600   66 7 (147 05)    04/09/21 0005   66 7 (147 05)                Nutrition       Diet Orders   (From admission, onward)             Start     Ordered    04/09/21 0007  Diet NPO; Gonzales Aguilar with meds  Diet effective now     Question Answer Comment   Diet Type NPO    NPO Except: Sips with meds    RD to adjust diet per protocol?  Yes        04/09/21 0009                    Active Medications  Scheduled Meds:  Current Facility-Administered Medications   Medication Dose Route Frequency Provider Last Rate    acyclovir  10 mg/kg (Ideal) Intravenous Q8H Isra Campa,  mg (04/10/21 0340)    ampicillin-sulbactam  3 g Intravenous Q6H Lou Aldea DO 3 g (04/10/21 0339)    chlorhexidine  15 mL Swish & Spit Q12H [facility] Kathy Brennan PA-C      cholecalciferol  400 Units Oral Daily Kathy Brennan PA-C      dextrose  75 mL/hr Intravenous Continuous Twan Lopez MD 75 mL/hr (04/09/21 2000)    diazepam  5 mg Oral Q6H SILVANO Yousif      folic acid  1 mg Oral Daily SILVANO Yousif      ketamine  1 mg/kg/hr Intravenous Continuous Rob Wharton MD 1 mg/kg/hr (04/10/21 0505)    lacosamide (VIMPAT) IVPB  100 mg Intravenous Q12H SILVANO Yousif      lactulose  30 g Oral TID Kathy Brennan PA-C      levETIRAcetam  1,500 mg Intravenous Q12H [facility] Karyle HornsDO 1,500 mg (04/09/21 2104)    LORazepam  2 mg Intravenous Q4H PRN Kathy Brennan PA-C      midazolam  40 mg/hr Intravenous Continuous Rob Wharton MD 40 mg/hr (04/10/21 0418)    nicotine  1 patch Transdermal Daily Kathy Brennan PA-C      norepinephrine  1-30 mcg/min Intravenous Titrated Kathy Brennan PA-C 7 mcg/min (04/10/21 0513)    omeprazole (PRILOSEC) suspension 2 mg/mL  20 mg Oral Early Morning SILVANO Yousif      ondansetron  4 mg Intravenous Q6H PRN Kathy Brennan PA-C      propofol  80 mcg/kg/min Intravenous Titrated Rob Wharton MD 80 mcg/kg/min (04/10/21 0419)    rifaximin  550 mg Oral Q12H [facility] Kathy Brennan PA-C      thiamine  100 mg Oral Daily SILVANO Yousif       Continuous Infusions:  dextrose, 75 mL/hr, Last Rate: 75 mL/hr (04/09/21 2000)  ketamine, 1 mg/kg/hr, Last Rate: 1 mg/kg/hr (04/10/21 1517)  midazolam, 40 mg/hr, Last Rate: 40 mg/hr (04/10/21 1968)  norepinephrine, 1-30 mcg/min, Last Rate: 7 mcg/min (04/10/21 9091)  propofol, 80 mcg/kg/min, Last Rate: 80 mcg/kg/min (04/10/21 0419)      PRN Meds:   LORazepam, 2 mg, Q4H PRN  ondansetron, 4 mg, Q6H PRN        Allergies   No Known Allergies  ---------------------------------------------------------------------------------------  Advance Directive and Living Will:      Power of :    POLST:    ---------------------------------------------------------------------------------------  Care Time Delivered:   Upon my evaluation, this patient had a high probability of imminent or life-threatening deterioration due to Status epilepticus acute respiratory failure, which required my direct attention, intervention, and personal management  I have personally provided Thirty-five minutes ([phone] to ) of critical care time, exclusive of procedures, teaching, family meetings, and any prior time recorded by providers other than myself  SILVANO Piña      Portions of the record may have been created with voice recognition software  Occasional wrong word or "sound a like" substitutions may have occurred due to the inherent limitations of voice recognition software    Read the chart carefully and recognize, using context, where substitutions have occurred

## 2021-04-11 NOTE — CASE MANAGEMENT
CM received VM from Shari Elder, pt's sister, requesting call back  CM called and spoke to Shari Elder via phone  CM introduced self/CM role and provided emotional support  Shari Elder states she has been estranged from patient for a few years  Pt residing in [facility] home 1STE with friend Giancarlo Ramsey  Pt on disability  Pt was driving PTA; using car provided by friend  To her knowledge, pt was independent with ADLs; independently ambulating no devices prior to admission  Pt has no hx of HHC or STR  Pt has no hx of MH/D&A treatment  Sister reports pt has long history of alcohol abuse but has never accepted treatment of any kind  PCP: Dr Luciana Deleon     CM to follow for dcp  Pt has open case w/ adult protective services;  Monique Holliday ph# [phone]

## 2021-04-11 NOTE — PROGRESS NOTES
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) ([facility] )  Active Problems:    Acute encephalopathy    Seizure ([facility] )    Hepatocellular carcinoma ([facility] )    Acute respiratory failure with hypoxia (HCC)    Hyperammonemia ([facility] )    Cellulitis    UTI (urinary tract infection)    DARRYL (acute kidney injury) ([facility] )    Hypernatremia    Lactic acidosis    Pancytopenia (HCC)    Degeneration of intervertebral disc of lumbar region    Chronic pain syndrome    Alcohol abuse    Anemia    Wounds, multiple    Portal vein thrombosis    Sepsis ([facility] )    -Neuro:   Dx:  Refractory Status epilepticus   -  Infusion:   Propofol at 100   Ketamine at 2mg/kg/hr   Versed at 60  - Per neurology increase Keppra dose to 2 5g and vimpat to 200mg BID  - Increased valium PO to 10mg q6h  Dx: Acute metabolic encephalopathy possibly due to ETOH withdraw  - Continue routine neuro exams, CAM-ICU, maintain goodsleep hygiene      CV:   - Dx: Shock state most likely medication induced   - Plan: Currently on 5 of norepi and maintaining good MAP    - Will try to wean as possible  - ECHO showed EF of 60% with no valvular disorder  - Continue telemetry monitoring   - MAP goal > 65        Lung:   - Dx: Acute respiratory failure 2/2 neuro status  - Vent day 4     - SCMV 12/400/6/40%   - Maintaining good oxygen saturation    - Continue VAP precautions  - CXR this morning shows mild increase in hte left apical pneumothorax   - Continue to monitor airway pressures on vent      - Continue Respiratory Protocol and Airway Clearance Protocol      GI:   - Dx: Cirrhosis from [address] [facility]  post ablation   - MELD score yesterday 21   - Will order GI consult for recommendations   - Stress ulcer ppx: Prilosec  - Started trickle feeds with Jevity 1 2 with rate of 15  - Triglyceride this   - Continue lactulose 30g TID and rifaximin 550mg BID for hepatic encephalopathy         FEN: Hypernatremia and hyperchloremia  - Fluids: 75cc D5  - Electrolytes: trend and replete as needed  - Nutrition: Currently on trickle feed     :   - Dx: Resolved DARRYL   - Cr 0 93 this AM  - I/O last 24 hours: In: 7653 9 [I V :6497 9; NG/GT:180; IV Piggyback:750; Feedings:226]  Out: 2045 [Urine:1720; Stool:325]  - Hamlin in place     ID:   - Dx: Ecoli UTI resolved, suspected aspiration PNA, polymycrobial LE wound  - Continue unasyn 3/7 days  -  Sputum culture showed no growth and blood culture no growth at 24 hours  - WBC: Trending down from 10 35 to 8 43  - Temp: Remained afebrile overnight   - Monitor WBC/temp curve  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  - Continue Acyclovir    - monitor renal function and continue IV fluids while receiving acyclovir  - MRI once patient is stable    - Patient's INR was 2 3 so no LP     Heme:   - Dx: Anemia, thrombocytopenia  - Hb: Stable at 8 8   - Will transfuse as needed  - Continue to monitor Hb and platelet in addition to INR   - Haptoglobin <10 w/ Fib 149 (Likely due to cirrhosis) and no schistocytes   - No signs of active bleeding or hemolytic anemia  - Will start vit K 5mg PO daily   - DVT ppx: Holding DVT ppx, SCDs     Endo:   - Dx: No acute issues   - Goal -180    Msk/Skin:   - Dx: Bilateral lower extremity wounds   - Wound care and Podiatry service is managing   - PT/OT  - Turning/repositioning  - Wound care     Disposition: Continue ICU care           Invasive lines and devices:   Invasive Devices     Central Venous Catheter Line            CVC Central Lines 04/09/21 Triple 20cm 1 day          Peripheral Intravenous Line            Peripheral IV 04/08/21 Right Wrist 2 days    Peripheral IV 21 Right;Ventral (anterior) Forearm 2 days    Peripheral IV 21 Left;Ventral (anterior) Forearm 1 day          Arterial Line            Arterial Line 21 Radial 1 day          Drain            Rectal Tube 3 days    NG/OG/Enteral Tube 18 Fr Center mouth 2 days    Urethral Catheter Temperature probe 16 Fr  2 days          Airway            ETT  Oral 7 5 mm 2 days                   Physical exam  General: Sedated and  distress  Neuro: GCS 3T (Eye 1, Verbal 1, Motor 1)  HENT: Normocephalic and atraumatic, PERRL   - Mild scleral icterus    - No JVD noted  Heart:  RRR, pulses intact  Lungs: Bilateral breath sounds present  No wheezes no rales  Abdomen: Bowel sounds present, soft non distended  Skin:  Multiple woudns in the lower extremity with dressing in place   Mild edema  Rectal tube and olea in place     Vitals  Temperature:   Temp (24hrs), Av 6 °F (35 9 °C), Min:95 7 °F (35 4 °C), Max:98 2 °F (36 8 °C)    Current: Temperature: 97 5 °F (36 4 °C)    Vitals:    21 0600 21 0700 21 0730 21 0800   BP:       BP Location:       Pulse: 70 72 66 66   Resp: (!) 11 (!) 11 12 12   Temp: 97 5 °F (36 4 °C)      TempSrc:       SpO2: 98% 99% 99% 99%   Weight: 81 7 kg (180 lb 1 9 oz)        Arterial Line BP: 122/56  Arterial Line MAP (mmHg): 80 mmHg    Labs:   Results from last 7 days   Lab Units 21  0405 04/10/21  0428 21  2330 21  1844  21  0521 21  1701  21  0445 21  0512   WBC Thousand/uL 8 43 10 35*  --  10 26*   < > 7 27 14 25*  --  8 11 6 12   HEMOGLOBIN g/dL 8 8* 8 6* 8 6* 6 8*   < > 6 6* 7 9*  --  7 0* 8 1*   I STAT HEMOGLOBIN   --   --   --   --   --   --   --    < >  --   --    HEMATOCRIT % 29 1* 27 0*  --  22 7*   < > 17 8* 22 4*  --  20 8* 22 3*   HEMATOCRIT, ISTAT   --   --   --   --   --   --   --    < >  --   --    PLATELETS Thousands/uL 59* 67*  --  72*   < > 52* 70*  --  47* 52*   NEUTROS PCT % --   --   --   --   --  77*  --   --  70 64   MONOS PCT %  --   --   --   --   --  7  --   --  8 11   MONO PCT %  --   --   --   --   --   --  8  --   --   --     < > = values in this interval not displayed  Results from last 7 days   Lab Units 04/11/21  0405 04/10/21  0428 04/09/21  1732 04/09/21  0521  04/08/21  1701 04/08/21  1653   SODIUM mmol/L 150* 150* 152* 148*   < > 150*  --    POTASSIUM mmol/L 3 4* 4 1 4 3 5 0   < > 3 8  --    CHLORIDE mmol/L 125* 123* 122* 120*   < > 115*  --    CO2 mmol/L 20* 23 25 22   < > 16*  --    CO2, I-STAT mmol/L  --   --   --   --   --   --  15*   BUN mg/dL 13 17 20 18   < > 16  --    CREATININE mg/dL 0 93 0 93 0 99 0 93   < > 1 45*  --    CALCIUM mg/dL 8 1* 8 2* 8 6 8 5   < > 9 0  --    ALK PHOS U/L  --  90  --  97  --  111  --    ALT U/L  --  30  --  34  --  40  --    AST U/L  --  65*  --  85*  --  76*  --    GLUCOSE, ISTAT mg/dl  --   --   --   --   --   --  100    < > = values in this interval not displayed       Results from last 7 days   Lab Units 04/11/21  0405 04/10/21  0428 04/09/21  1732   MAGNESIUM mg/dL 2 0 2 3 2 6     Results from last 7 days   Lab Units 04/11/21  0405 04/10/21  0428 04/09/21  1732   PHOSPHORUS mg/dL 2 3* 3 0 3 6      Results from last 7 days   Lab Units 04/11/21  0611 04/10/21  0428 04/09/21  0521   INR  2 32* 2 14* 3 25*     Results from last 7 days   Lab Units 04/09/21  0059   LACTIC ACID mmol/L 1 5       Other Labs  Triglyceride 297      Intake and Outputs:  I/O       04/09 0701 - 04/10 0700 04/10 0701 - 04/11 0700 04/11 0701 - 04/12 0700    I V  (mL/kg) 4068 (54 5) 6497 9 (79 5)     Blood 1300      NG/ 180     IV Piggyback 1590 750     Feedings  226     Total Intake(mL/kg) 7058 (94 6) 7653 9 (93 7)     Urine (mL/kg/hr) 1430 (0 8) 1720 (0 9)     Emesis/NG output 0      Stool 175 325     Total Output 1605 2045     Net +5453 +5608 9                  Imaging Studies  CXR shows slight increase in size of left apical pneumothorax      Non-Invasive/Invasive Ventilation Settings:  Respiratory    Lab Data (Last 4 hours)    None         O2/Vent Data (Last 4 hours)      04/11 0739          Patient safety screen outcome: Failed                 No results found for: PHART, CRU7ZMJ, PO2ART, GZT5VBA, E6OCNJKF, BEART, SOURCE    Imaging:   XR chest portable ICU   Final Result by Corey Ortega MD (04/10 1253)      Small left apical pneumothorax identified possibly slightly increased from prior exam       Bilateral alveolar opacities in keeping with pneumonia  Small bilateral pleural effusions  Workstation performed: [id number]         XR chest portable ICU   Final Result by Jayde Segal MD (04/09 1610)   Left internal jugular central line terminates in the right atrium, and should be pulled back by 3 cm  Unchanged small left pneumothorax  Worsening bilateral pneumonia  Stable small left pleural effusion  The study was marked in [facility] for immediate notification  Workstation performed: [id number]         CT abdomen w contrast    (Results Pending)   XR chest portable ICU    (Results Pending)   MRI brain seizure wo and w contrast    (Results Pending)     I have personally reviewed pertinent reports  Micro:  Lab Results   Component Value Date    BLOODCX No Growth at 24 hrs  04/09/2021    BLOODCX No Growth at 24 hrs  04/09/2021    BLOODCX No Growth After 5 Days   04/03/2021    URINECX >100,000 cfu/ml Escherichia coli (A) 04/03/2021    WOUNDCULT 4+ Growth of Escherichia coli (A) 04/04/2021    WOUNDCULT 4+ Growth of Acinetobacter baumannii (A) 04/04/2021    WOUNDCULT 4+ Growth of  04/04/2021    SPUTUMCULTUR No growth 04/09/2021         Allergies: No Known Allergies      Medications:   Scheduled Meds:  Current Facility-Administered Medications   Medication Dose Route Frequency Provider Last Rate    acyclovir  10 mg/kg (Ideal) Intravenous Q8H Eileen Ashley DO Stopped (04/11/21 5811)   Cecilia Canchola ampicillin-sulbactam  3 g Intravenous Q6H Lou Aldea, DO 3 g (04/11/21 0354)    chlorhexidine  15 mL Swish [employer] Spit Q12H [address] Luz Maria Mills, PA-C      cholecalciferol  400 Units Oral Daily Luz Maria Slovenian, PA-C      dextrose  75 mL/hr Intravenous Continuous Summer Harris MD 75 mL/hr (04/11/21 0802)    diazepam  10 mg Oral Q6H OLIVERIO CruzNP      folic acid  1 mg Oral Daily SILVANO Cruz      ketamine  2 mg/kg/hr (Order-Specific) Intravenous Continuous Jackson Villa CRNP 2 mg/kg/hr (04/11/21 0813)    Ketamine HCl  100 mg Intravenous Once Summer Harris MD      lacosamide (VIMPAT) IVPB  200 mg Intravenous Q12H Erlin Hood DO Stopped (04/11/21 0027)    lactulose  30 g Oral TID Luz Maria Mills PA-C      levETIRAcetam  2,500 mg Intravenous Q12H [address] Erlin Hood DO 2,500 mg (04/11/21 0810)    midazolam  60 mg/hr Intravenous Continuous OLIVERIO CruzNP 60 mg/hr (04/11/21 0802)    midazolam  10 mg Intravenous Once Summer Harris MD      midazolam  20 mg Intravenous Once PRN Summer Harris MD      midazolam  20 mg Intravenous Once PRN Summer Harris MD      nicotine  1 patch Transdermal Daily Luz Maria Mills, PA-C      norepinephrine  1-30 mcg/min Intravenous Titrated Luz Maria Mills, PA-C 6 mcg/min (04/11/21 0705)    omeprazole (PRILOSEC) suspension 2 mg/mL  20 mg Oral Early Morning SILVANO Cruz      ondansetron  4 mg Intravenous Q6H PRN Luz Maria Mills, PA-C      phytonadione  5 mg Oral Daily SILVANO Cruz      potassium chloride  40 mEq Oral Once Evan Celaya MD      propofol  100 mcg/kg/min (Adjusted) Intravenous Titrated Jackson Villa, CRNP 100 mcg/kg/min (04/11/21 0814)    rifaximin  550 mg Oral Q12H [address] Luz Maria Mills, PA-C      thiamine  100 mg Oral Daily SILVANO Cruz       Continuous Infusions:dextrose, 75 mL/hr, Last Rate: 75 mL/hr (04/11/21 0802)  ketamine, 2 mg/kg/hr (Order-Specific), Last Rate: 2 mg/kg/hr (04/11/21 0813)  midazolam, 60 mg/hr, Last Rate: 60 mg/hr (04/11/21 0802)  norepinephrine, 1-30 mcg/min, Last Rate: 6 mcg/min (04/11/21 0705)  propofol, 100 mcg/kg/min (Adjusted), Last Rate: 100 mcg/kg/min (04/11/21 0814)      PRN Meds:  midazolam, 20 mg, Once PRN  midazolam, 20 mg, Once PRN  ondansetron, 4 mg, Q6H PRN        Counseling / Coordination of Care  Total Critical Care time spent 30 minutes excluding procedures, teaching and family updates  Code Status: Level 1 - Full Code     Portions of the record may have been created with voice recognition software  Occasional wrong word or "sound a like" substitutions may have occurred due to the inherent limitations of voice recognition software  Read the chart carefully and recognize, using context, where substitutions have occurred       Lyubov Mc MD

## 2021-04-11 NOTE — RESPIRATORY THERAPY NOTE
RT Ventilator Management Note  Lindaann Halsted 63 y o  female MRN: [id number]  Unit/Bed#: [facility] 01 Encounter: [id number]      Daily Screen       4/10/2021  0823 4/11/2021  0739          Patient safety screen outcome[de-identified]  Failed  Failed      Not Ready for Weaning due to[de-identified]  Underline problem not resolved  Underline problem not resolved              Physical Exam:   Assessment Type: Assess only  General Appearance: Sedated  Respiratory Pattern: Assisted  Chest Assessment: Chest expansion symmetrical  Bilateral Breath Sounds: Clear, Diminished  Suction: ET Tube      Resp Comments: pt transported from MRI back to ICU 1 and placed back on g5 ventilator at this time  will continue to monitor per protocol

## 2021-04-11 NOTE — PROGRESS NOTES
per dicussion with RN, propofol will be decreased ~ hourly to planned goal of 24 6ml/hr; goal TF recs below will be based on this propofol goal rate    Recommend:as tolerated increase jevity 1 2 to goal of 45ml/hour with 1 pkt prosource TF tid; will provide 1416kcal; 93g pro; propofol at goal of 24 6ml/hr will provide additional 649kcal

## 2021-04-11 NOTE — QUICK NOTE
I called and updated her sister Freddrick Aschoff, I updated her on the clinical course and treatment plan for today  All questions answered  She states she will update her brother  Based upon further testing, will re-evaluate GOC, later this week  Remains Full Code for now      Connie Yousif, DO

## 2021-04-11 NOTE — PROGRESS NOTES
Plan was for MRI to be completed tonight of patient's brain  Patient is on multiple medication infusions for burst suppression  A plan had been in place to maintain infusions along with bolus dosing of medication for burst suppression prior to MRI  Unfortunately MRI infusion pump not functioning appropriately and MRI to be delayed  Patient did have an episode of hypotension during this time  Discussed with epileptologist plan to replace patient's EEG  Leads with MRI compatible leads and pursue MRI imaging when patient is stable and infusion pumps are functioning appropriately  Robbie Cantor

## 2021-04-11 NOTE — PROGRESS NOTES
NEUROLOGY RESIDENCY PROGRESS NOTE     Name: Lisa Rodriguez   Age & Sex: 63 y o  female   MRN: [id number]  [facility]   Encounter: [id number]    Recommendations for outpatient neurological follow up have yet to be determined  ASSESSMENT & PLAN     Status epilepticus [facility])  Assessment & Plan  · Assessment:  63 y o  female with alcoholic/HCV cirrhosis and [address]  who was initially admitted to [facility] with altered mental status  Rapid response was activated on 4/8 due to witnessed seizure activity and neurology was consulted for further assessment  She was given 3 mg Ativan total and 1000 mg Keppra was ordered to be given  During the evaluation by neurology on 4/8 she was noted to have leftward gaze with horizontal nystagmus  CTH was completed and showed no acute abnormalities  Due to concern for ongoing seizure activity decsion was made to transfer patient to [facility] for vEEG  Patient was transferred to intensive care unit and ultimately intubated  4/8-4/9 Received 6mg of Ativan, Loaded with 2 gram Keppra, 2mg Versed, Started on Propfol 30, inc to 50  4/9: 1:45am Found to have frequent right temporal intermittent rthymic delta and intermittent right temporal discharges with no clear seizures  Around 3:20am, found to have left arm twitching and showed evolution of pattern  She was started on Propofol later required propofol gtt to 50  Afternoon  status epilepticus refractory to multiple high dose ativan boluses total 38 mg ativan, repeat Keppra load, received 15 mg of Versed,   4/10- refractory status-On Versed gtt increased to 60, Propafol gtt at 100, Vimpat load, valium 20 mg, Ketamine bolus and ketamine 2 mg/kg gtt, v EEG shows further seizure activity in left temporal region  At 13:32- No seizures EEG completely suppressed except for isolated right temporal discharges occurring every 15-40 seconds       · Plan:   · Current AEDs:  · Continue Keppra 2500 BID maintained dose   · Continue Vimpat 200 mg bid · Continue vEEG monitoring  · Currently on Versed at 60, propofol at 100, ketamine at 2 mg/kg   · Agree with decreasing propofol by 10 per hour  · Continue Seizure precautions  · Pending MRI brain  · Continue video EEG  · Continue Acyclovir   · ID on board, recommendations appreciated   · Medical management as per critical care team      MRSA (+) 4/3  Urine 4/3 (+) E  Coli  Bld Cx 4/9 no growth  Sputum Cx 4/9 1+ pmn no bacteria  Wound Cx 4/4 (+) E coli/acinetobacter             SUBJECTIVE     Patient was seen and examined  No acute events overnight  Patient was unable to get his MRI due to non-compatible pump  No overnight reported seizures  Review of Systems   Unable to perform ROS: Intubated       OBJECTIVE     Patient ID: Viktoria aBch is a 63 y o  female  Vitals:    21 1230 21 1300 21 1330 21 1430   BP:       BP Location:       Pulse: 70 68 72    Resp: 12 12 12    Temp:    (!) 94 9 °F (34 9 °C)   TempSrc:    Axillary   SpO2: 94% 97% 97%    Weight:          Temperature:   Temp (24hrs), Av °F (35 6 °C), Min:94 2 °F (34 6 °C), Max:97 7 °F (36 5 °C)    Temperature: (!) 94 9 °F (34 9 °C)      Physical Exam  Vitals signs and nursing note reviewed  Constitutional:       Appearance: She is ill-appearing and toxic-appearing  Interventions: She is sedated, intubated and restrained  HENT:      Head: Normocephalic and atraumatic  Nose: Nose normal    Pulmonary:      Effort: She is intubated  Comments: Ventilate via ETT  Musculoskeletal:         General: Swelling present  Skin:     Comments: B/l LE wounds          Neurologic Exam     Mental Status   Patient was examined on 100 of Propofol and 60 of Precedex ketamine 20   She was not able to participate in a formal neurological assessment      Cranial Nerves   Decreased corneal reflexes  No blink to threat       Motor Exam   Overall muscle tone: normal    Gait, Coordination, and Reflexes     Tremor   Resting tremor: absent  Intention tremor: absent  Action tremor: absent    Reflexes   Reflexes 2+ except as noted  LABORATORY DATA     Labs: I have personally reviewed pertinent reports  Results from last 7 days   Lab Units 04/11/21  0405 04/10/21  0428 04/09/21  2330 04/09/21  1844  04/09/21  0521 04/08/21  1701  04/08/21  0445 04/07/21  0512   WBC Thousand/uL 8 43 10 35*  --  10 26*   < > 7 27 14 25*  --  8 11 6 12   HEMOGLOBIN g/dL 8 8* 8 6* 8 6* 6 8*   < > 6 6* 7 9*  --  7 0* 8 1*   I STAT HEMOGLOBIN   --   --   --   --   --   --   --    < >  --   --    HEMATOCRIT % 29 1* 27 0*  --  22 7*   < > 17 8* 22 4*  --  20 8* 22 3*   HEMATOCRIT, ISTAT   --   --   --   --   --   --   --    < >  --   --    PLATELETS Thousands/uL 59* 67*  --  72*   < > 52* 70*  --  47* 52*   NEUTROS PCT %  --   --   --   --   --  77*  --   --  70 64   MONOS PCT %  --   --   --   --   --  7  --   --  8 11   MONO PCT %  --   --   --   --   --   --  8  --   --   --     < > = values in this interval not displayed  Results from last 7 days   Lab Units 04/11/21  1353 04/11/21  0405 04/10/21  0428  04/09/21  0521  04/08/21  1701 04/08/21  1653   SODIUM mmol/L 152* 150* 150*   < > 148*   < > 150*  --    POTASSIUM mmol/L 3 2* 3 4* 4 1   < > 5 0   < > 3 8  --    CHLORIDE mmol/L 126* 125* 123*   < > 120*   < > 115*  --    CO2 mmol/L 20* 20* 23   < > 22   < > 16*  --    CO2, I-STAT mmol/L  --   --   --   --   --   --   --  15*   BUN mg/dL 12 13 17   < > 18   < > 16  --    CREATININE mg/dL 0 96 0 93 0 93   < > 0 93   < > 1 45*  --    CALCIUM mg/dL 7 8* 8 1* 8 2*   < > 8 5   < > 9 0  --    ALK PHOS U/L  --   --  90  --  97  --  111  --    ALT U/L  --   --  30  --  34  --  40  --    AST U/L  --   --  65*  --  85*  --  76*  --    GLUCOSE, ISTAT mg/dl  --   --   --   --   --   --   --  100    < > = values in this interval not displayed       Results from last 7 days   Lab Units 04/11/21  0405 04/10/21  0428 04/09/21  1732   MAGNESIUM mg/dL 2 0 2 3 2 6 Results from last 7 days   Lab Units 04/11/21  0405 04/10/21  0428 04/09/21  1732   PHOSPHORUS mg/dL 2 3* 3 0 3 6      Results from last 7 days   Lab Units 04/11/21  0611 04/10/21  0428 04/09/21  0521   INR  2 32* 2 14* 3 25*     Results from last 7 days   Lab Units 04/09/21  0059   LACTIC ACID mmol/L 1 5     Results from last 7 days   Lab Units 04/08/21  1701   TROPONIN I ng/mL <0 02       IMAGING & DIAGNOSTIC TESTING     Radiology Results: I have personally reviewed pertinent reports  XR chest portable ICU   Final Result by Miguel Ángel Brandt MD (04/11 1142)      Small left apical pneumothorax, unchanged from previous examination  Unchanged bilateral pleural effusions and pulmonary parenchymal airspace opacities  Workstation performed: [id number]         XR chest portable ICU   Final Result by Aba Stahl MD (04/10 1253)      Small left apical pneumothorax identified possibly slightly increased from prior exam       Bilateral alveolar opacities in keeping with pneumonia  Small bilateral pleural effusions  Workstation performed: [id number]         XR chest portable ICU   Final Result by Bruce Mantilla MD (04/09 1610)   Left internal jugular central line terminates in the right atrium, and should be pulled back by 3 cm  Unchanged small left pneumothorax  Worsening bilateral pneumonia  Stable small left pleural effusion  The study was marked in [facility] for immediate notification  Workstation performed: [id number]         MRI brain seizure wo and w contrast    (Results Pending)       Other Diagnostic Testing: I have personally reviewed pertinent reports        ACTIVE MEDICATIONS     Current Facility-Administered Medications   Medication Dose Route Frequency    acyclovir (ZOVIRAX) 650 mg in sodium chloride 0 9 % 100 mL IVPB  10 mg/kg (Ideal) Intravenous Q8H    albumin human (FLEXBUMIN) 25 % injection 12 5 g  12 5 g Intravenous Once    ampicillin-sulbactam (UNASYN) 3 g in sodium chloride 0 9 % 100 mL IVPB  3 g Intravenous Q6H    chlorhexidine (PERIDEX) 0 12 % oral rinse 15 mL  15 mL Swish & Spit Q12H [address]    cholecalciferol (VITAMIN D3) tablet 400 Units  400 Units Oral Daily    diazepam (VALIUM) tablet 10 mg  10 mg Oral Y6J    folic acid (FOLVITE) tablet 1 mg  1 mg Oral Daily    ipratropium (ATROVENT) 0 02 % inhalation solution 0 5 mg  0 5 mg Nebulization TID    ketamine 500 mg (DOUBLE CONCENTRATION) IV in sodium chloride 0 9% 250 mL  2 mg/kg/hr (Order-Specific) Intravenous Continuous    lacosamide (VIMPAT) 200 mg in sodium chloride 0 9 % 100 mL IVPB  200 mg Intravenous Q12H    lactulose oral solution 30 g  30 g Oral TID    levalbuterol (XOPENEX) inhalation solution 1 25 mg  1 25 mg Nebulization TID    levETIRAcetam (KEPPRA) 2,500 mg in sodium chloride 0 9 % 250 mL IVPB  2,500 mg Intravenous Q12H [address]    midazolam (VERSED) 1 mg/mL (DOUBLE CONCENTRATION) 100 mL infusion  60 mg/hr Intravenous Continuous    nicotine (NICODERM CQ) 14 mg/24hr TD 24 hr patch 1 patch  1 patch Transdermal Daily    norepinephrine (LEVOPHED) 4 mg (STANDARD CONCENTRATION) IV in sodium chloride 0 9% 250 mL  1-30 mcg/min Intravenous Titrated    omeprazole (PRILOSEC) suspension 2 mg/mL  20 mg Oral Early Morning    ondansetron (ZOFRAN) injection 4 mg  4 mg Intravenous Q6H PRN    phytonadione (MEPHYTON) tablet 5 mg  5 mg Oral Daily    potassium chloride 20 mEq IVPB (premix)  20 mEq Intravenous Once    potassium chloride oral solution 40 mEq  40 mEq Oral Once    potassium phosphates 21 mmol in sodium chloride 0 9 % 250 mL infusion  21 mmol Intravenous Once    propofol (DIPRIVAN) 1000 mg in 100 mL infusion (premix)  100 mcg/kg/min (Adjusted) Intravenous Titrated    rifaximin (XIFAXAN) tablet 550 mg  550 mg Oral Q12H [address]    thiamine tablet 100 mg  100 mg Oral Daily       Prior to Admission medications    Medication Sig Start Date End Date Taking?  Authorizing Provider   baclofen 10 mg tablet Take 10 mg by mouth 5/7/19   Historical Provider, MD   cholecalciferol (VITAMIN D3) 1,000 units tablet     Historical Provider, MD   furosemide (LASIX) 40 mg tablet Take 40 mg by mouth 5/20/19   Historical Provider, MD   gabapentin (NEURONTIN) 300 mg capsule Take 1 capsule (300 mg total) by mouth 2 (two) times a day 8/4/20   Harini Arredondo DO   lactulose 20 g/30 mL Take 45 mL (30 g total) by mouth 3 (three) times a day To achieve 3 bowel movements a day 3/16/21   SILVANO De La Garza   spironolactone (ALDACTONE) 100 mg tablet TAKE 1 & 1/2 TABLETS BY MOUTH DAILY 5/20/19   Historical Provider, MD       VTE Mechanical Prophylaxis: sequential compression device    ==  MD Omari Muniz's Neurology Residency, PGY-2

## 2021-04-11 NOTE — RESPIRATORY THERAPY NOTE
RT Ventilator Management Note  Lindaann Halsted 63 y o  female MRN: [id number]  Unit/Bed#: [facility] 01 Encounter: [id number]      Daily Screen       4/9/2021  0754 4/10/2021  0823          Patient safety screen outcome[de-identified]  Failed  Failed      Not Ready for Weaning due to[de-identified]  Underline problem not resolved  Underline problem not resolved              Physical Exam:   Assessment Type: Assess only  General Appearance: Sedated  Respiratory Pattern: Assisted  Chest Assessment: Chest expansion symmetrical  Bilateral Breath Sounds: Clear  Cough: None  Suction: ET Tube  O2 Device: Ventilator      Resp Comments: Pt  remains stable on AC mode  Will continue to assess and monitor pt  per protocol

## 2021-04-11 NOTE — RESPIRATORY THERAPY NOTE
RT Ventilator Management Note  Thania Mireles 63 y o  female MRN: [id number]  Unit/Bed#: [facility] 01 Encounter: [id number]      Daily Screen       4/10/2021  0823 4/11/2021  0739          Patient safety screen outcome[de-identified]  Failed  Failed      Not Ready for Weaning due to[de-identified]  Underline problem not resolved  Underline problem not resolved              Physical Exam:   Assessment Type: (P) Assess only  General Appearance: (P) Sedated  Respiratory Pattern: (P) Assisted  Chest Assessment: (P) Chest expansion symmetrical  Bilateral Breath Sounds: (P) Clear, Diminished  Cough: None  Suction: (P) ET Tube  O2 Device: Ventilator      Resp Comments: (P) Pt  resting comfortably on current vent settings  no changes or weaning at this time

## 2021-04-12 NOTE — PHYSICAL THERAPY NOTE
Physical Therapy Cancellation Note    PT orders received chart review completed  Pt is currently intubated/sedated and not appropriate to participate in skilled PT at this time  PT will follow and eval as medically appropriate       04/12/21 1200   Note Type   Cancel Reasons Intubated/sedated     Jessica Mcgee, PT

## 2021-04-12 NOTE — RESPIRATORY THERAPY NOTE
RT Ventilator Management Note  No Thakur 63 y o  female MRN: [id number]  [facility]: [id number]      Daily Screen       4/11/2021  0739 4/12/2021  0717          Patient safety screen outcome[de-identified]  Failed  Failed      Not Ready for Weaning due to[de-identified]  Underline problem not resolved  Underline problem not resolved              Physical Exam:   O2 Device: vent      Resp Comments: (P) no vent changes today, pt appears comfortable on current settings, will continue to monitor

## 2021-04-12 NOTE — CASE MANAGEMENT
CM received call from [facility]  CM provided update via phone stating patient remains intubated and unable to speak to  at this time

## 2021-04-12 NOTE — PROGRESS NOTES
Progress Note - Critical Care   Aidee Vee 63 y o  female MRN: [id number]  Unit/Bed#: MICU 01 Encounter: [id number]      HPI/24HR Event:  63year old woman with alcohol abuse, hep C cirrhosis, HCC post ablation, PV thrombosis without AC, and chronic leg wound presented 4/3 for AMS  Was being treated for cellulitis/UTI with vanc and ceftriaxone along with hepatic encephalopathy  Rapid response  for seizures and got intubated  Developed severe status epilepticus and reached burst suppression on 4/10  No acute events overnight  Propofol weaned down to 60 and MRI results returned with subtle faint diffusion restriction in the pulvinar of the right thalamus with associated T2/FLAIR hyperintensity, a nonspecific finding which may be related to subacute infarction  Possible Behcet's disease or Sjogren's  Could be [place] or non enhancing glioma  Not prion disease or herpes encephalitis  ROS  Unable to gather ROS  Patient intubated  Invasive lines and devices: Invasive Devices     Central Venous Catheter Line            CVC Central Lines 21 Triple 20cm 2 days          Peripheral Intravenous Line            Peripheral IV 21 Left;Ventral (anterior) Forearm 3 days          Arterial Line            Arterial Line 21 Radial 2 days          Drain            Rectal Tube 4 days    NG/OG/Enteral Tube 18 Fr Center mouth 3 days    Urethral Catheter Temperature probe 16 Fr  3 days          Airway            ETT  Oral 7 5 mm 3 days                 Physical exam  General: Sedated and in no acute distress  Neuro: GCS 3T (Eye 1, Verbal 1, Motor 1)  HENT: Normocephalic and atraumatic, PERRL   - Mild scleral icterus  Heart:  RRR, pulses intact  Lungs:  Bilateral breath sounds present  No wheezes or rales noted  Abdomen: Bowel sounds present, soft non distended  Skin:  Multiple wounds in lower extremity with dressings   Mild edema noted    Vitals  Temperature:   Temp (24hrs), Av 8 °F (35 4 °C), Min:93 6 °F (34 2 °C), Max:98 6 °F (37 °C)    Current: Temperature: (!) 97 2 °F (36 2 °C)    Vitals:    04/12/21 0717 04/12/21 0730 04/12/21 0800 04/12/21 0830   BP:       BP Location:       Pulse:  92 88 88   Resp:  12 12 12   Temp:  97 9 °F (36 6 °C) 97 9 °F (36 6 °C) (!) 97 2 °F (36 2 °C)   TempSrc:   Esophageal    SpO2: 98% 98% 98% 99%   Weight:         Arterial Line BP: 104/46  Arterial Line MAP (mmHg): 66 mmHg  Dropped to 88/40 around 10PM last night and Levo requirement increased to 9 from 5 yesterday  Intake and Outputs:  I/O       04/10 0701 - 04/11 0700 04/11 0701 - 04/12 0700    I V  (mL/kg) 6620 5 (81) 4730 7 (57 9)    NG/ 610    IV Piggyback 750 900    Feedings 226 140    Total Intake(mL/kg) 7916 5 (96 9) 6380 7 (78 1)    Urine (mL/kg/hr) 1845 (0 9) 1860 (0 9)    Stool 1025 1000    Total Output 2870 2860    Net +5046 5 +3520 7                  Labs:   Results from last 7 days   Lab Units 04/12/21  0437 04/11/21  0405 04/10/21  0428  04/09/21  0521 04/08/21  1701  04/08/21  0445   WBC Thousand/uL 7 91 8 43 10 35*   < > 7 27 14 25*  --  8 11   HEMOGLOBIN g/dL 8 6* 8 8* 8 6*   < > 6 6* 7 9*  --  7 0*   I STAT HEMOGLOBIN   --   --   --   --   --   --    < >  --    HEMATOCRIT % 28 1* 29 1* 27 0*   < > 17 8* 22 4*  --  20 8*   HEMATOCRIT, ISTAT   --   --   --   --   --   --    < >  --    PLATELETS Thousands/uL 44* 59* 67*   < > 52* 70*  --  47*   NEUTROS PCT % 62  --   --   --  77*  --   --  70   MONOS PCT % 9  --   --   --  7  --   --  8   MONO PCT %  --   --   --   --   --  8  --   --     < > = values in this interval not displayed        Results from last 7 days   Lab Units 04/12/21  0515 04/11/21  1353 04/11/21  0405 04/10/21  0428  04/09/21  0521  04/08/21  1701 04/08/21  1653   SODIUM mmol/L 153* 152* 150* 150*   < > 148*   < > 150*  --    POTASSIUM mmol/L 4 5 3 2* 3 4* 4 1   < > 5 0   < > 3 8  --    CHLORIDE mmol/L 130* 126* 125* 123*   < > 120*   < > 115*  --    CO2 mmol/L 18* 20* 20* 23   < > 22   < > 16*  --    CO2, I-STAT mmol/L  --   --   --   --   --   --   --   --  15*   BUN mg/dL 11 12 13 17   < > 18   < > 16  --    CREATININE mg/dL 1 04 0 96 0 93 0 93   < > 0 93   < > 1 45*  --    CALCIUM mg/dL 7 8* 7 8* 8 1* 8 2*   < > 8 5   < > 9 0  --    ALK PHOS U/L  --   --   --  90  --  97  --  111  --    ALT U/L  --   --   --  30  --  34  --  40  --    AST U/L  --   --   --  65*  --  85*  --  76*  --    GLUCOSE, ISTAT mg/dl  --   --   --   --   --   --   --   --  100    < > = values in this interval not displayed       Results from last 7 days   Lab Units 04/12/21  0515 04/11/21  0405 04/10/21  0428   MAGNESIUM mg/dL 1 8 2 0 2 3     Results from last 7 days   Lab Units 04/12/21  0515 04/11/21  0405 04/10/21  0428   PHOSPHORUS mg/dL 3 9 2 3* 3 0      Results from last 7 days   Lab Units 04/11/21  0611 04/10/21  0428 04/09/21  0521   INR  2 32* 2 14* 3 25*     Results from last 7 days   Lab Units 04/09/21  0059   LACTIC ACID mmol/L 1 5       Other Labs  Autoimmune encephalitis panel ordered by Dr Horn [address] pending      Imaging Studies  No new imaging studies       Assessment & Plan:   Principal Problem:    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) ([facility] )  Active Problems:    Acute encephalopathy    Seizure ([facility] )    Hepatocellular carcinoma ([facility] )    Acute respiratory failure with hypoxia ([facility] )    Hyperammonemia ([facility] )    Cellulitis    UTI (urinary tract infection)    DARRYL (acute kidney injury) ([facility] )    Hypernatremia    Lactic acidosis    Pancytopenia ([facility] )    Degeneration of intervertebral disc of lumbar region    Chronic pain syndrome    Alcohol abuse    Anemia    Wounds, multiple    Portal vein thrombosis    Sepsis (HCC)     - Neuro  - Dx: Refractory Status epilepticus    -  Infusion:               Propofol at 60               Ketamine at 2mg/kg/hr               Versed at 60   - Keppra at 2 5g daily and vimpat 200mg BID   - Valium PO to 10mg q6h  - CAM-ICU  - Good sleep hygiene        CV:   - Dx: Shock state most likely medication induced    - Increasing levo requirement from 5 to 9              - Will try to wean as possible  - ECHO showed EF of 60% with no valvular disorder  - Continue telemetry monitoring   - MAP goal > 65         Lung:   - Dx: Acute respiratory failure 2/2 seizures  - Vent day 5                - SCMV 12/400/6/40%              - Added xopenex/atrovent nebs              - Continue VAP precautions  - Dx: Mild Apical pneumothorax              - Continue to monitor airway pressures on vent   - Continue Respiratory Protocol and Airway Clearance Protocol        GI:   - Dx: 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 / [address]  post ablation              - MELD score 2 days ago 21   - HCC stable per CT   - Stress ulcer ppx: Prilosec  - Started trickle feeds with Jevity 1 2 with rate of 15   - Nutrition recommend goal of 45 w/ 1pkt prosource TID w/ prop goal  - Triglyceride this   - Continue lactulose 30g TID and rifaximin 550mg BID for hepatic encephalopathy          FEN: Hypernatremia and hyperchloremia  - Fluids: No maintenance   - Electrolytes: trend and replete as needed  - Nutrition: consider advancing TF per nutritions recommendation with I/Os controlled    - Consider advancing FWF to address hypernatremia      :   - Dx: Resolved DARRYL   - Cr 1 04 this AM  - Dx: Volume overload   - Received 20 of lasix with increased urine output   - Consider lasix gtt   - BMP BID  - Hamlin in place     ID:   - Dx: Polymycrobial LE wound   - Continue unasyn 4/7 days   - Sputum culture showed no growth and blood culture no growth at 48 hours   - 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   - MRI inconclusive   - Continue acyclovir    - Monitor renal function while on acyclovir    - Patient's yesterday INR 2 3 so no LP   - Monitor WBC/temp curve    Heme:   - Dx: Anemia and thrombocytopenia  - Hb: Stable at 8 6 from 8 8   - Continue to monitor Hb and platelet in addition to INR              - Haptoglobin <10 w/ Fib 149 (Likely due to cirrhosis) and no schistocytes              - No signs of active bleeding or hemolytic anemia  - Pt: Platelet dipped to 44 from 59    - With no invasive procedure planned will hold transfusion    - Will consider if below 20   - DVT ppx: Hold pharm ppx, SCDs     Endo:   - Dx: No acute issues  - Goal -180    Msk/Skin:   - Dx: Bilateral lower extremity wounds   - Wound care and Podiatry service managing   - Patient on unasyn   - PT/OT  - Turning/repositioning  - Wound care     Disposition: Continue ICU care        Non-Invasive/Invasive Ventilation Settings:  Respiratory    Lab Data (Last 4 hours)    None         O2/Vent Data (Last 4 hours)      04/12 0717          Patient safety screen outcome: Failed                 No results found for: PHART, VIY8CPJ, PO2ART, BVR0QWG, Z0OVNVGP, BEART, SOURCE    Imaging:   MRI brain seizure wo and w contrast   Final Result by Joseph Oliva MD (04/11 9241)         1  Subtle, faint diffusion restriction in the pulvinar of the right thalamus with associated T2/FLAIR hyperintensity, a nonspecific finding which may be related to subacute infarction, which statistically would probably be the most likely differential    diagnosis  Other unusual differential considerations would include Behcet's disease or other connective tissue disorders like Sjogren's syndrome  Infectious encephalitis disease such as West Nile virus could be considered in the appropriate clinical    setting  A nonenhancing glioma is also possible as are reports of status epilepticus  The unilateral appearance argues against prion disease such as CJD  Further clinical assessment recommended  Recommend repeat follow-up contrast enhanced MRI of the    brain in 6-12 weeks to assess for stability  2   A few white matter lesions elsewhere may represent microangiopathy, although given the findings in the right thalamic pulvinar, other etiologies are certainly possible  3    No MR evidence of mesial temporal sclerosis  4   No classic MR findings of herpes encephalitis  Further clinical assessment and follow-up recommended  Workstation performed: [id number]         XR chest portable ICU   Final Result by Irish Mccabe MD (04/11 1142)      Small left apical pneumothorax, unchanged from previous examination  Unchanged bilateral pleural effusions and pulmonary parenchymal airspace opacities  Workstation performed: [id number]         XR chest portable ICU   Final Result by Jennifer Fall MD (04/10 1253)      Small left apical pneumothorax identified possibly slightly increased from prior exam       Bilateral alveolar opacities in keeping with pneumonia  Small bilateral pleural effusions  Workstation performed: [id number]         XR chest portable ICU   Final Result by Tran Bello MD (04/09 1610)   Left internal jugular central line terminates in the right atrium, and should be pulled back by 3 cm  Unchanged small left pneumothorax  Worsening bilateral pneumonia  Stable small left pleural effusion  The study was marked in [facility] for immediate notification  Workstation performed: [id number]           I have personally reviewed pertinent reports  Micro:  Lab Results   Component Value Date    BLOODCX No Growth at 48 hrs  04/09/2021    BLOODCX No Growth at 48 hrs  04/09/2021    BLOODCX No Growth After 5 Days   04/03/2021    URINECX >100,000 cfu/ml Escherichia coli (A) 04/03/2021    WOUNDCULT 4+ Growth of Escherichia coli (A) 04/04/2021    WOUNDCULT 4+ Growth of Acinetobacter baumannii (A) 04/04/2021    WOUNDCULT 4+ Growth of  04/04/2021    SPUTUMCULTUR No growth 04/09/2021         Allergies: No Known Allergies      Medications:   Scheduled Meds:  Current Facility-Administered Medications   Medication Dose Route Frequency Provider Last Rate    acyclovir  10 mg/kg (Ideal) Intravenous Q8H Alexus Mims DO Stopped (04/12/21 0545)    albumin human  12 5 g Intravenous Once Sagar Hickey MD      ampicillin-sulbactam  3 g Intravenous Q6H Lou Aldea, DO 3 g (04/12/21 0350)    chlorhexidine  15 mL Swish & Spit Q12H [address] Shruti Aguirre PA-C      cholecalciferol  400 Units Oral Daily Shruti Aguirre PA-C      diazepam  10 mg Oral Q6H EagleSILVANO Bazan      folic acid  1 mg Oral Daily SILVANO Gates      ipratropium  0 5 mg Nebulization TID SILVANO Latham      ketamine  2 mg/kg/hr (Order-Specific) Intravenous Continuous EagleOLIVERIO BazanNP 2 mg/kg/hr (04/12/21 0755)    lacosamide (VIMPAT) IVPB  200 mg Intravenous Q12H Erlin Hood DO Stopped (04/11/21 9555)    lactulose  30 g Oral TID Shruti Aguirre PA-C      levalbuterol  1 25 mg Nebulization TID SILVANO Latham      levETIRAcetam  2,500 mg Intravenous Q12H [address] Erlin Hood DO 2,500 mg (04/12/21 0745)    midazolam  60 mg/hr Intravenous Continuous SILVANO Gates 60 mg/hr (04/12/21 0755)    nicotine  1 patch Transdermal Daily Shruti Aguirre PA-C      norepinephrine  1-30 mcg/min Intravenous Titrated TOMMY Carrillo-C 9 mcg/min (04/12/21 0755)    omeprazole (PRILOSEC) suspension 2 mg/mL  20 mg Oral Early Morning SILVANO Gates      ondansetron  4 mg Intravenous Q6H PRN Shruti Aguirre PA-C      phytonadione  5 mg Oral Daily SILVANO Gates      propofol  100 mcg/kg/min (Adjusted) Intravenous Titrated Clover Delarosa PA-C 60 mcg/kg/min (04/12/21 0747)    rifaximin  550 mg Oral Q12H [address] Gayathri Lemus PA-C      thiamine  100 mg Oral Daily SILVANO Gates       Continuous Infusions:ketamine, 2 mg/kg/hr (Order-Specific), Last Rate: 2 mg/kg/hr (04/12/21 0325)  midazolam, 60 mg/hr, Last Rate: 60 mg/hr (04/12/21 4955)  norepinephrine, 1-30 mcg/min, Last Rate: 9 mcg/min (04/12/21 0755)  propofol, 100 mcg/kg/min (Adjusted), Last Rate: 60 mcg/kg/min (04/12/21 9040)      PRN Meds:  ondansetron, 4 mg, Q6H PRN        Counseling / Coordination of Care  Total Critical Care time spent 30 minutes excluding procedures, teaching and family updates  Code Status: Level 1 - Full Code     Portions of the record may have been created with voice recognition software  Occasional wrong word or "sound a like" substitutions may have occurred due to the inherent limitations of voice recognition software  Read the chart carefully and recognize, using context, where substitutions have occurred       Bushra Zavala MD

## 2021-04-12 NOTE — OCCUPATIONAL THERAPY NOTE
OT CANCEL NOTE    OT orders received  Chart reviewed  Pt is currently intubated/sedated and not appropriate to engage in skilled OT services at this time  Will hold initial OT evaluation  Will continue to follow pt on caseload and see pt when medically stable and as clinically appropriate         04/12/21 5712   Note Type   Cancel Reasons Intubated/sedated       Ella Burgos MS, OTR/L

## 2021-04-12 NOTE — UTILIZATION REVIEW
Continued Stay Review    Date: 04-12-21                       Current Patient Class: inpatient   Current Level of Care:MICU    HPI:61 y o  female initially admitted on  04-08-21 decompensated cirrhosis related to Hep C     Assessment/Plan: patient remains on vent central line A line NG tube olea  Bld Cx 4/9 NGTD  Sputum Cx 4/9 1+ pmn no bacteria  Wound Cx 4/4 (+) E coli/acinetobacter    Right leg wound culture from 04/04 showed E coli and Acinetobacter  Continue on IV antibiotics  Seizure IV keppra  And pressor drips   Pertinent Labs/Diagnostic Results:   Results from last 7 days   Lab Units 04/09/21  0221 04/07/21  1252   SARS-COV-2  Negative Negative     Results from last 7 days   Lab Units 04/12/21  0437 04/11/21  0405 04/10/21  0428 04/09/21  2330 04/09/21  1844 04/09/21  0909 04/09/21  0521 04/08/21  1701  04/08/21  0445   WBC Thousand/uL 7 91 8 43 10 35*  --  10 26* 11 77* 7 27 14 25*  --  8 11   HEMOGLOBIN g/dL 8 6* 8 8* 8 6* 8 6* 6 8* 6 8* 6 6* 7 9*  --  7 0*   I STAT HEMOGLOBIN   --   --   --   --   --   --   --   --    < >  --    HEMATOCRIT % 28 1* 29 1* 27 0*  --  22 7* 19 6* 17 8* 22 4*  --  20 8*   HEMATOCRIT, ISTAT   --   --   --   --   --   --   --   --    < >  --    PLATELETS Thousands/uL 44* 59* 67*  --  72* 57* 52* 70*  --  47*   NEUTROS ABS Thousands/µL 4 91  --   --   --   --   --  5 59  --   --  5 68   BANDS PCT %  --   --   --   --   --   --   --  0  --   --     < > = values in this interval not displayed           Results from last 7 days   Lab Units 04/12/21  0515 04/11/21  1353 04/11/21  0405 04/10/21  0428 04/09/21  1732 04/09/21  0521   SODIUM mmol/L 153* 152* 150* 150* 152* 148*   POTASSIUM mmol/L 4 5 3 2* 3 4* 4 1 4 3 5 0   CHLORIDE mmol/L 130* 126* 125* 123* 122* 120*   CO2 mmol/L 18* 20* 20* 23 25 22   ANION GAP mmol/L 5 6 5 4 5 6   BUN mg/dL 11 12 13 17 20 18   CREATININE mg/dL 1 04 0 96 0 93 0 93 0 99 0 93   EGFR ml/min/1 73sq m 59 65 67 67 63 67   CALCIUM mg/dL 7 8* 7 8* 8 1* 8 2* 8 6 8 5   CALCIUM, IONIZED mmol/L  --   --   --  1 20  --  1 19   MAGNESIUM mg/dL 1 8  --  2 0 2 3 2 6 2 4   PHOSPHORUS mg/dL 3 9  --  2 3* 3 0 3 6 4 2     Results from last 7 days   Lab Units 04/10/21  0437 04/10/21  0428 04/09/21  0521 04/09/21  0059 04/08/21  1701 04/08/21  0445 04/07/21  0512 04/06/21  0503   AST U/L  --  65* 85*  --  76*  --  61* 46*   ALT U/L  --  30 34  --  40  --  34 26   ALK PHOS U/L  --  90 97  --  111  --  99 84   TOTAL PROTEIN g/dL  --  5 7* 5 5*  --  6 2*  --  6 1* 5 3*   ALBUMIN g/dL  --  2 5* 2 3*  --  2 4*  --  2 4* 2 3*   TOTAL BILIRUBIN mg/dL  --  4 94* 5 44*  --  6 98*  --  8 21* 7 46*   BILIRUBIN DIRECT mg/dL  --  2 90*  --   --   --   --   --   --    AMMONIA umol/L 50*  --  51* 48* 79* 22  --   --      Results from last 7 days   Lab Units 04/08/21  1640   POC GLUCOSE mg/dl 117     Results from last 7 days   Lab Units 04/12/21  0515 04/11/21  1353 04/11/21  0405 04/10/21  0428 04/09/21  1732 04/09/21  0521 04/09/21  0059 04/08/21  1701 04/08/21  0445 04/07/21  0512 04/06/21  0503 04/05/21  1309   GLUCOSE RANDOM mg/dL 83 100 108 121 76 71 101 99 103 115 121 100             No results found for: BETA-HYDROXYBUTYRATE   Results from last 7 days   Lab Units 04/09/21  0059   PH ART  7 333*   PCO2 ART mm Hg 42 8   PO2 ART mm Hg 163 3*   HCO3 ART mmol/L 22 2   BASE EXC ART mmol/L -3 4   O2 CONTENT ART mL/dL 11 1*   O2 HGB, ARTERIAL % 96 6   ABG SOURCE  Artery         Results from last 7 days   Lab Units 04/08/21  1653   I STAT BASE EXC mmol/L -11*   I STAT O2 SAT % 97*   ISTAT PH ART  7 299*   I STAT ART PCO2 mm HG 28 6*   I STAT ART PO2 mm HG 93 0   I STAT ART HCO3 mmol/L 14 1*     Results from last 7 days   Lab Units 04/08/21  1833   CK TOTAL U/L 76     Results from last 7 days   Lab Units 04/08/21  1701   TROPONIN I ng/mL <0 02         Results from last 7 days   Lab Units 04/11/21  0611 04/10/21  0428 04/09/21  0521   PROTIME seconds 25 4* 23 8* 32 9*   INR  2 32* 2 14* 3 25* Results from last 7 days   Lab Units 04/09/21  0521   TSH 3RD GENERATON uIU/mL 5 060*     Results from last 7 days   Lab Units 04/10/21  0428 04/09/21  0924   PROCALCITONIN ng/ml 0 14 0 10     Results from last 7 days   Lab Units 04/09/21  0059 04/08/21  1701   LACTIC ACID mmol/L 1 5 10 8*             Results from last 7 days   Lab Units 04/08/21  1701   NT-PRO BNP pg/mL 536*     Results from last 7 days   Lab Units 04/07/21  0512   FERRITIN ng/mL 206  208                     Results from last 7 days   Lab Units 04/09/21  1144   CLARITY UA  Cloudy   COLOR UA  Dk Yellow   SPEC GRAV UA  1 023   PH UA  5 5   GLUCOSE UA mg/dl Negative   KETONES UA mg/dl Negative   BLOOD UA  Negative   PROTEIN UA mg/dl Negative   NITRITE UA  Negative   BILIRUBIN UA  Negative   UROBILINOGEN UA E U /dl 0 2   LEUKOCYTES UA  Negative     Results from last 7 days   Lab Units 04/09/21  0221 04/07/21  1252   INFLUENZA A PCR  Negative Negative   INFLUENZA B PCR  Negative Negative   RSV PCR  Negative Negative     Results from last 7 days   Lab Units 04/09/21  1728 04/09/21  0923 04/09/21  0908   BLOOD CULTURE  No Growth at 48 hrs   No Growth at 72 hrs   --    SPUTUM CULTURE   --   --  No growth   GRAM STAIN RESULT   --   --  No Epithelial cells per low power field  1+ Polys  No bacteria seen     Results from last 7 days   Lab Units 04/08/21  1701   TOTAL COUNTED  100           Vital Signs:   Date/Time  Temp  Pulse  Resp  BP  MAP (mmHg)  Arterial Line BP  MAP  SpO2  FiO2 (%)  O2 Device  Patient Position - Orthostatic VS   04/12/21 1200  95 7 °F (35 4 °C)Abnormal   82  12  --  --  104/48  70 mmHg  99 %  --  --  --   04/12/21 1130  95 7 °F (35 4 °C)Abnormal   82  12  --  --  106/50  72 mmHg  99 %  --  --  --   04/12/21 1100  96 1 °F (35 6 °C)Abnormal   84  12  --  --  112/52  76 mmHg  98 %  --  --  --   04/12/21 1030  96 1 °F (35 6 °C)Abnormal   84  12  --  --  104/50  70 mmHg  98 %  --  --  --   04/12/21 1000  96 4 °F (35 8 °C)Abnormal   90 12  --  --  104/50  70 mmHg  97 %  --  --  --   04/12/21 0930  96 4 °F (35 8 °C)Abnormal   90  12  --  --  108/52  72 mmHg  97 %  --  --  --   04/12/21 0900  96 8 °F (36 °C)Abnormal   86  12  --  --  104/44  66 mmHg  98 %  --  --  --   04/12/21 0830  97 2 °F (36 2 °C)Abnormal   88  12  --  --  104/46  66 mmHg  99 %  --  --  --   04/12/21 0800  97 9 °F (36 6 °C)  88  12  --  --  96/42  62 mmHg  98 %  40  Ventilator   --   O2 Device: Rate 12/400/peep 6 at 04/12/21 0800   04/12/21 0730  97 9 °F (36 6 °C)  92  12  --  --  110/48  68 mmHg  98 %  --  --  --   04/12/21 0717  --  --  --  --  --  --  --  98 %  --  --  --   04/12/21 0700  97 9 °F (36 6 °C)  92  11Abnormal   --  --  104/44  64 mmHg  98 %  --  --  --   04/12/21 0600  98 6 °F (37 °C)  94  11Abnormal   --  --  98/44  62 mmHg  97 %  --  --  --   04/12/21 0400  98 2 °F (36 8 °C)  96  12  --  --  108/48  70 mmHg  98 %  --  Ventilator  --   04/12/21 0300  97 9 °F (36 6 °C)  90  12  --  --  94/42  60 mmHg  98 %  --  --  --   04/12/21 0247  --  --  --  --  --  --  --  98 %  --  --  --   04/12/21 0200  96 8 °F (36 °C)Abnormal   84  11Abnormal   --  --  108/48  70 mmHg  98 %               Medications:   Scheduled Medications:  acyclovir, 10 mg/kg (Ideal), Intravenous, Q8H  albumin human, 12 5 g, Intravenous, Once  ampicillin-sulbactam, 3 g, Intravenous, Q6H  chlorhexidine, 15 mL, Swish & Spit, Q12H [address]  cholecalciferol, 400 Units, Oral, Daily  diazepam, 10 mg, Oral, L7M  folic acid, 1 mg, Oral, Daily  furosemide, 40 mg, Intravenous, Once  ipratropium, 0 5 mg, Nebulization, TID  lacosamide (VIMPAT) IVPB, 200 mg, Intravenous, Q12H  lactulose, 30 g, Oral, TID  levalbuterol, 1 25 mg, Nebulization, TID  levETIRAcetam, 2,500 mg, Intravenous, Q12H CITLALLI  magnesium sulfate, 2 g, Intravenous, Once  nicotine, 1 patch, Transdermal, Daily  omeprazole (PRILOSEC) suspension 2 mg/mL, 20 mg, Oral, Early Morning  phytonadione, 5 mg, Oral, Daily  rifaximin, 550 mg, Oral, Q12H [address]  thiamine, 100 mg, Oral, Daily      Continuous IV Infusions:  ketamine, 2 mg/kg/hr (Order-Specific), Intravenous, Continuous  midazolam, 60 mg/hr, Intravenous, Continuous  norepinephrine, 1-30 mcg/min, Intravenous, Titrated  propofol, 5-60 mcg/kg/min (Adjusted), Intravenous, Titrated      PRN Meds:  ondansetron, 4 mg, Intravenous, Q6H PRN        Discharge Plan: D    Network Utilization Review Department  ATTENTION: Please call with any questions or concerns to [phone] and carefully listen to the prompts so that you are directed to the right person  All voicemails are confidential   Makayla Skipper all requests for admission clinical reviews, approved or denied determinations and any other requests to dedicated fax number below belonging to the campus where the patient is receiving treatment   List of dedicated fax numbers for the Facilities:  [address] DENIALS (Administrative/Medical Necessity) [phone]   [address] (Maternity/NICU/Pediatrics) [phone] [address] Brianda Agudelo 7937 (  Fede Lieberman "Stacia" 103) [address] [place] [address] [address] 14[address] [place] [address] [phone]

## 2021-04-12 NOTE — PROGRESS NOTES
Progress Note - Infectious Disease   Marisa Guerra 63 y o  female MRN: [id number]  Unit/Bed#: [facility] 01 Encounter: [id number]      IMPRESSION & RECOMMENDATIONS:   Impression/Recommendations:  1  Acute hypoxic respiratory failure  Patient was intubated during rapid response for airway protection in the setting of altered mentation  Also concern for developing aspiration pneumonia as chest x-ray shows worsening bilateral airspace disease  No fevers or significant leukocytosis  Patient is hypotensive which is more likely sedation related  Serial procalcitonin levels remain negative  Sputum culture showed no growth      -continue IV Unasyn     -complete 5 day course, through 4/13  Tentative last day is tomorrow   -monitor temperatures and hemodynamics  -monitor ventilator requirements     2  Hypotension  Showed more likely related to sedation     Also consider developing acute infection, as above  No associated fever  Blood cultures all remain negative      -antibiotic plan as above  -monitor temperatures and hemodynamics  -monitor CBC     3  Acute encephalopathy  Initially presented to [facility] on 04/03 and has had waxing and waning mentation  Felt to be secondary to hepatic encephalopathy, alcohol withdrawal   Ultimately transferred to [facility] due to seizure-like activity  EEG now shows status epilepticus  Negative CT head x2  Very low suspicion for primary CNS infection given duration of symptoms, absence of fevers, and other much more plausible explanations  Patient was started on IV acyclovir for possible HSV CNS infection  LP cannot be obtained due to coagulopathy  MRI does not show any temporal lobe involvement      -discontinue acyclovir as ongoing low suspicion HSV CNS infection  -anti-epileptic management as per Neurology  -serial neuro exams  -monitor temperatures and hemodynamics     4  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    With portal vein thrombosis, hepatic encephalopathy, coagulopathy  GI follow-up ongoing      5  Bilateral lower extremity wounds  Right leg wound culture from  showed E coli and Acinetobacter  No clinical evidence of acute infection of any of the wounds on exam   Discussed with Podiatry      -no antibiotics indicated  -daily local wound care and dressing changes     6  Recent bacteriuria  Urine culture from  showed pansensitive E coli  Blood cultures were negative  Patient already received adequate antibiotic course for the possibility of UTI  No further antibiotic indicated for this      Antibiotics:  Antibiotic 10  Unasyn 4  IV acyclovir 4    Discussed above plan with critical care service            Subjective:  Remains sedated on ventilator  EEG now without evidence of seizure  No fevers  Stable vent requirements  Remains on Levophed drip  Objective:  Vitals:  Temp:  [93 6 °F (34 2 °C)-98 6 °F (37 °C)] 95 7 °F (35 4 °C)  HR:  [68-96] 82  Resp:  [11-12] 12  SpO2:  [94 %-99 %] 99 %  Temp (24hrs), Av °F (35 6 °C), Min:93 6 °F (34 2 °C), Max:98 6 °F (37 °C)  Current: Temperature: (!) 95 7 °F (35 4 °C)    Physical Exam:   General:  Sedated on ventilator  Eyes:  Conjunctive clear with no hemorrhages or effusions  Oropharynx:  ET tube in place  Neck:  Trachea midline  Lungs:  Ventilated breath sounds  Abdomen:  Soft, non-tender, non-distented  Extremities:  No peripheral cyanosis, clubbing, or edema  Skin:  No rashes, no ulcers  Neurological:  Sedated on ventilator    Lab Results:  I have personally reviewed pertinent labs    Results from last 7 days   Lab Units 21  0515 21  1353 21  0405 04/10/21  0428  21  0521  21  1701 21  1653   POTASSIUM mmol/L 4 5 3 2* 3 4* 4 1   < > 5 0   < > 3 8  --    CHLORIDE mmol/L 130* 126* 125* 123*   < > 120*   < > 115*  --    CO2 mmol/L 18* 20* 20* 23   < > 22   < > 16*  --    CO2, I-STAT mmol/L  --   --   --   --   --   --   --   --  15*   BUN mg/dL 11 12 13 17   < > 18   < > 16  --    CREATININE mg/dL 1 04 0 96 0 93 0 93   < > 0 93   < > 1 45*  --    EGFR ml/min/1 73sq m 59 65 67 67   < > 67   < > 39  --    GLUCOSE, ISTAT mg/dl  --   --   --   --   --   --   --   --  100   CALCIUM mg/dL 7 8* 7 8* 8 1* 8 2*   < > 8 5   < > 9 0  --    AST U/L  --   --   --  65*  --  85*  --  76*  --    ALT U/L  --   --   --  30  --  34  --  40  --    ALK PHOS U/L  --   --   --  90  --  97  --  111  --     < > = values in this interval not displayed  Results from last 7 days   Lab Units 04/12/21  0437 04/11/21  0405 04/10/21  0428   WBC Thousand/uL 7 91 8 43 10 35*   HEMOGLOBIN g/dL 8 6* 8 8* 8 6*   PLATELETS Thousands/uL 44* 59* 67*     Results from last 7 days   Lab Units 04/09/21  1728 04/09/21  0923 04/09/21  0908   BLOOD CULTURE  No Growth at 48 hrs  No Growth at 72 hrs   --    SPUTUM CULTURE   --   --  No growth   GRAM STAIN RESULT   --   --  No Epithelial cells per low power field  1+ Polys  No bacteria seen       Imaging Studies:   I have personally reviewed pertinent imaging study reports and images in PACS  MRI brain shows little faint diffusion restriction right thalamus may be related to subacute infarct  No MRI findings to suggest herpes encephalitis  B chest x-ray shows small left apical pneumothorax  Unchanged bilateral pleural effusions and parenchymal opacities    EKG, Pathology, and Other Studies:   I have personally reviewed pertinent reports

## 2021-04-12 NOTE — RESPIRATORY THERAPY NOTE
RT Ventilator Management Note  Tere Otto 63 y o  female MRN: [id number]  Unit/Bed#: [facility] 01 Encounter: [id number]      Daily Screen       4/11/2021  0739 4/12/2021  0717          Patient safety screen outcome[de-identified]  Failed  Failed      Not Ready for Weaning due to[de-identified]  Underline problem not resolved  Underline problem not resolved              Physical Exam:   Assessment Type: Assess only  General Appearance: Sedated  Respiratory Pattern: Assisted  Chest Assessment: Chest expansion symmetrical  Bilateral Breath Sounds: Coarse  Cough: Productive  Suction: ET Tube  O2 Device: (P) vent      Resp Comments: (P) pt appears comfortable on current settings, pt unresponsive, no weaning attempted, will continue to monitor

## 2021-04-12 NOTE — PROGRESS NOTES
NEUROLOGY RESIDENCY PROGRESS NOTE     Name: Leann Santos   Age & Sex: 63 y o  female   MRN: [id number]  Unit/Bed#: [facility] 01   Encounter: [id number]    Recommendations for outpatient neurological follow up have yet to be determined  ASSESSMENT & PLAN     Status epilepticus [facility])  Assessment & Plan  · Assessment:  63 y o  female with alcoholic/HCV cirrhosis and [address]  who was initially admitted to [facility] with altered mental status  Rapid response was activated on 4/8 due to witnessed seizure activity and neurology was consulted for further assessment  She was given 3 mg Ativan total and 1000 mg Keppra was ordered to be given  During the evaluation by neurology on 4/8 she was noted to have leftward gaze with horizontal nystagmus  CTH was completed and showed no acute abnormalities  Due to concern for ongoing seizure activity decsion was made to transfer patient to [facility] for vEEG  Patient was transferred to intensive care unit and ultimately intubated  4/8-4/9 Received 6mg of Ativan, Loaded with 2 gram Keppra, 2mg Versed, Started on Propfol 30, inc to 50  4/9: 1:45am Found to have frequent right temporal intermittent rthymic delta and intermittent right temporal discharges with no clear seizures  Around 3:20am, found to have left arm twitching and showed evolution of pattern  She was started on Propofol later required propofol gtt to 50  Afternoon  status epilepticus refractory to multiple high dose ativan boluses total 38 mg ativan, repeat Keppra load, received 15 mg of Versed,   4/10- refractory status-On Versed gtt increased to 60, Propafol gtt at 100, Vimpat load, valium 20 mg, Ketamine bolus and ketamine 2 mg/kg gtt, v EEG shows further seizure activity in left temporal region  At 13:32- No seizures EEG completely suppressed except for isolated right temporal discharges occurring every 15-40 seconds       · Plan:   · Current AEDs:  · Continue Keppra 2500 BID maintained dose   · Continue Vimpat 200 mg bid  · Continue vEEG monitoring  · Currently on Versed at 60, propofol at 50, ketamine at 2 mg/kg   · If at some point additional sedation is needed, would recommend Pecedex at that time   · Agree with decreasing propofol by 10 per hour  · Continue Seizure precautions  · MRI brain with and without contrast obtained  · Continue video EEG  · Continue Acyclovir   · ID on board, recommendations appreciated   · Medical management as per critical care team    · Would recommend obtaining LP:  · CSF Labs : WBC, RBC, Total Protein, CSF Cx and Gram Stain, CSF Glucose, ME panel, ENC2 panel, West Nile, MS panel, Flow Cytometry and Flow Cytology,     MRSA (+) 4/3  Urine 4/3 (+) E  Coli  Bld Cx  no growth  Sputum Cx  1+ pmn no bacteria  Wound Cx  (+) E coli/acinetobacter     SUBJECTIVE     Patient was seen and examined  No acute events overnight  Review of Systems   Unable to perform ROS: Intubated       OBJECTIVE     Patient ID: Lisa Rodriguez is a 63 y o  female  Vitals:    21 1521 21 1527 21 1530 21 1531   BP: (!) 100/49   (!) 96/48   BP Location:       Pulse:  (!) 114 (!) 114 (!) 112   Resp:  (!) 11 12 12   Temp:  (!) 95 °F (35 °C) (!) 95 °F (35 °C) (!) 95 °F (35 °C)   TempSrc:   Esophageal    SpO2:  98% 98% 98%   Weight:          Temperature:   Temp (24hrs), Av 8 °F (35 4 °C), Min:93 6 °F (34 2 °C), Max:98 6 °F (37 °C)    Temperature: (!) 95 °F (35 °C)      Physical Exam  Vitals signs and nursing note reviewed  Constitutional:       Appearance: She is ill-appearing and toxic-appearing  Interventions: She is sedated, intubated and restrained  HENT:      Head: Normocephalic and atraumatic  Nose: Nose normal    Pulmonary:      Effort: She is intubated  Comments: Ventilate via ETT  Musculoskeletal:         General: Swelling present     Skin:     Comments: B/l LE wounds   Neurological:      Deep Tendon Reflexes:      Reflex Scores:       Bicep reflexes are 0 on the right side and 0 on the left side  Brachioradialis reflexes are 0 on the right side and 0 on the left side  Patellar reflexes are 0 on the right side and 0 on the left side  Achilles reflexes are 0 on the right side and 0 on the left side  Neurologic Exam     Mental Status   Patient was examined on 50 of Propofol and 60 of versed, ketamine 20  She was not able to participate in a formal neurological assessment      Cranial Nerves   Patient was unable to participate in an exam  She had no gag or cough present, she had no corneal reflexes and did not blink on eyelash stroke  Motor Exam   Muscle bulk: decreased  Overall muscle tone: decreased  Right arm tone: decreased  Left arm tone: decreased  Right leg tone: decreased  Left leg tone: decreasedPatient did not respond to nox stim in all four extremities      Sensory Exam   Did not respond to nox stim proximally or distally in all 4 extremities  Gait, Coordination, and Reflexes     Tremor   Resting tremor: absent  Intention tremor: absent  Action tremor: absent    Reflexes   Right brachioradialis: 0  Left brachioradialis: 0  Right biceps: 0  Left biceps: 0  Right patellar: 0  Left patellar: 0  Right achilles: 0  Left achilles: 0  Right plantar: equivocal  Left plantar: equivocal  Right Card: absent  Left Card: absent  Right ankle clonus: absent  Left ankle clonus: absent       LABORATORY DATA     Labs: I have personally reviewed pertinent reports      Results from last 7 days   Lab Units 04/12/21  0437 04/11/21  0405 04/10/21  0428  04/09/21  0521 04/08/21  1701  04/08/21  0445   WBC Thousand/uL 7 91 8 43 10 35*   < > 7 27 14 25*  --  8 11   HEMOGLOBIN g/dL 8 6* 8 8* 8 6*   < > 6 6* 7 9*  --  7 0*   I STAT HEMOGLOBIN   --   --   --   --   --   --    < >  --    HEMATOCRIT % 28 1* 29 1* 27 0*   < > 17 8* 22 4*  --  20 8*   HEMATOCRIT, ISTAT   --   --   --   --   --   --    < >  --    PLATELETS Thousands/uL 44* 59* 67*   < > 52* 70*  --  47*   NEUTROS PCT % 62  --   --   --  77*  --   --  70   MONOS PCT % 9  --   --   --  7  --   --  8   MONO PCT %  --   --   --   --   --  8  --   --     < > = values in this interval not displayed  Results from last 7 days   Lab Units 04/12/21  0515 04/11/21  1353 04/11/21  0405 04/10/21  0428  04/09/21  0521  04/08/21  1701 04/08/21  1653   SODIUM mmol/L 153* 152* 150* 150*   < > 148*   < > 150*  --    POTASSIUM mmol/L 4 5 3 2* 3 4* 4 1   < > 5 0   < > 3 8  --    CHLORIDE mmol/L 130* 126* 125* 123*   < > 120*   < > 115*  --    CO2 mmol/L 18* 20* 20* 23   < > 22   < > 16*  --    CO2, I-STAT mmol/L  --   --   --   --   --   --   --   --  15*   BUN mg/dL 11 12 13 17   < > 18   < > 16  --    CREATININE mg/dL 1 04 0 96 0 93 0 93   < > 0 93   < > 1 45*  --    CALCIUM mg/dL 7 8* 7 8* 8 1* 8 2*   < > 8 5   < > 9 0  --    ALK PHOS U/L  --   --   --  90  --  97  --  111  --    ALT U/L  --   --   --  30  --  34  --  40  --    AST U/L  --   --   --  65*  --  85*  --  76*  --    GLUCOSE, ISTAT mg/dl  --   --   --   --   --   --   --   --  100    < > = values in this interval not displayed  Results from last 7 days   Lab Units 04/12/21  0515 04/11/21  0405 04/10/21  0428   MAGNESIUM mg/dL 1 8 2 0 2 3     Results from last 7 days   Lab Units 04/12/21  0515 04/11/21  0405 04/10/21  0428   PHOSPHORUS mg/dL 3 9 2 3* 3 0      Results from last 7 days   Lab Units 04/11/21  0611 04/10/21  0428 04/09/21  0521   INR  2 32* 2 14* 3 25*     Results from last 7 days   Lab Units 04/09/21  0059   LACTIC ACID mmol/L 1 5     Results from last 7 days   Lab Units 04/08/21  1701   TROPONIN I ng/mL <0 02       IMAGING & DIAGNOSTIC TESTING     Radiology Results: I have personally reviewed pertinent reports  MRI brain seizure wo and w contrast   Final Result by Heather Myers MD (04/11 1723)         1    Subtle, faint diffusion restriction in the pulvinar of the right thalamus with associated T2/FLAIR hyperintensity, a nonspecific finding which may be related to subacute infarction, which statistically would probably be the most likely differential    diagnosis  Other unusual differential considerations would include Behcet's disease or other connective tissue disorders like Sjogren's syndrome  Infectious encephalitis disease such as West Nile virus could be considered in the appropriate clinical    setting  A nonenhancing glioma is also possible as are reports of status epilepticus  The unilateral appearance argues against prion disease such as CJD  Further clinical assessment recommended  Recommend repeat follow-up contrast enhanced MRI of the    brain in 6-12 weeks to assess for stability  2   A few white matter lesions elsewhere may represent microangiopathy, although given the findings in the right thalamic pulvinar, other etiologies are certainly possible  3  No MR evidence of mesial temporal sclerosis  4   No classic MR findings of herpes encephalitis  Further clinical assessment and follow-up recommended  Workstation performed: [id number]         XR chest portable ICU   Final Result by Neil Bhat MD (04/11 1142)      Small left apical pneumothorax, unchanged from previous examination  Unchanged bilateral pleural effusions and pulmonary parenchymal airspace opacities  Workstation performed: [id number]         XR chest portable ICU   Final Result by Amy Jane MD (04/10 1253)      Small left apical pneumothorax identified possibly slightly increased from prior exam       Bilateral alveolar opacities in keeping with pneumonia  Small bilateral pleural effusions  Workstation performed: [id number]         XR chest portable ICU   Final Result by Nhi Longo MD (04/09 1610)   Left internal jugular central line terminates in the right atrium, and should be pulled back by 3 cm  Unchanged small left pneumothorax  Worsening bilateral pneumonia    Stable small left pleural effusion  The study was marked in [facility] for immediate notification  Workstation performed: [id number]         FL < 1 hour    (Results Pending)   FL IN-patient lumbar puncture    (Results Pending)       Other Diagnostic Testing: I have personally reviewed pertinent reports        ACTIVE MEDICATIONS     Current Facility-Administered Medications   Medication Dose Route Frequency    albumin human (FLEXBUMIN) 25 % injection 12 5 g  12 5 g Intravenous Once    ampicillin-sulbactam (UNASYN) 3 g in sodium chloride 0 9 % 100 mL IVPB  3 g Intravenous Q6H    chlorhexidine (PERIDEX) 0 12 % oral rinse 15 mL  15 mL Swish & Spit Q12H [address]    cholecalciferol (VITAMIN D3) tablet 400 Units  400 Units Oral Daily    diazepam (VALIUM) tablet 10 mg  10 mg Oral P0D    folic acid (FOLVITE) tablet 1 mg  1 mg Oral Daily    ipratropium (ATROVENT) 0 02 % inhalation solution 0 5 mg  0 5 mg Nebulization TID    ketamine 500 mg (DOUBLE CONCENTRATION) IV in sodium chloride 0 9% 250 mL  2 mg/kg/hr (Order-Specific) Intravenous Continuous    lacosamide (VIMPAT) 200 mg in sodium chloride 0 9 % 100 mL IVPB  200 mg Intravenous Q12H    lactulose oral solution 30 g  30 g Oral TID    levalbuterol (XOPENEX) inhalation solution 1 25 mg  1 25 mg Nebulization TID    levETIRAcetam (KEPPRA) 2,500 mg in sodium chloride 0 9 % 250 mL IVPB  2,500 mg Intravenous Q12H [address]    midazolam (VERSED) 1 mg/mL (DOUBLE CONCENTRATION) 100 mL infusion  60 mg/hr Intravenous Continuous    nicotine (NICODERM CQ) 14 mg/24hr TD 24 hr patch 1 patch  1 patch Transdermal Daily    norepinephrine (LEVOPHED) 4 mg (STANDARD CONCENTRATION) IV in sodium chloride 0 9% 250 mL  1-30 mcg/min Intravenous Titrated    omeprazole (PRILOSEC) suspension 2 mg/mL  20 mg Oral Early Morning    ondansetron (ZOFRAN) injection 4 mg  4 mg Intravenous Q6H PRN    phytonadione (MEPHYTON) tablet 5 mg  5 mg Oral Daily    propofol (DIPRIVAN) 1000 mg in 100 mL infusion (premix)  5-60 mcg/kg/min (Adjusted) Intravenous Titrated    rifaximin (XIFAXAN) tablet 550 mg  550 mg Oral Q12H [facility]    thiamine tablet 100 mg  100 mg Oral Daily       Prior to Admission medications    Medication Sig Start Date End Date Taking?  Authorizing Provider   baclofen 10 mg tablet Take 10 mg by mouth 5/7/19   Historical Provider, MD   cholecalciferol (VITAMIN D3) 1,000 units tablet     Historical Provider, MD   furosemide (LASIX) 40 mg tablet Take 40 mg by mouth 5/20/19   Historical Provider, MD   gabapentin (NEURONTIN) 300 mg capsule Take 1 capsule (300 mg total) by mouth 2 (two) times a day 8/4/20   Curtistine Hotter, DO   lactulose 20 g/30 mL Take 45 mL (30 g total) by mouth 3 (three) times a day To achieve 3 bowel movements a day 3/16/21   SILVANO Holder   spironolactone (ALDACTONE) 100 mg tablet TAKE 1 & 1/2 TABLETS BY MOUTH DAILY 5/20/19   Historical Provider, MD       VTE Mechanical Prophylaxis: sequential compression device    ==  MD [facility] Neurology Residency, PGY-2

## 2021-04-12 NOTE — CASE MANAGEMENT
CM received call from [facility]  CM provided update via phone stating patient remains intubated and unable to speak to  at this time

## 2021-04-13 NOTE — RESPIRATORY THERAPY NOTE
RT Ventilator Management Note  Christy Mckeon 63 y o  female MRN: [id number]  Unit/Bed#: [facility] 01 Encounter: [id number]      Daily Screen       4/12/2021  0717 4/13/2021  0715          Patient safety screen outcome[de-identified]  Failed  Failed      Not Ready for Weaning due to[de-identified]  Underline problem not resolved  Underline problem not resolved              Physical Exam:   O2 Device: (P) vent      Resp Comments: (P) pt transported to [facility] dept via portable vent and an ambubag, returned from [facility], placed back on vent, will continue to monitor

## 2021-04-13 NOTE — PHYSICAL THERAPY NOTE
Physical Therapy Cancellation Note    PT orders received chart review completed  Pt is currently intubated/sedated and not appropriate to participate in skilled PT at this time  PT will follow and eval as medically appropriate       04/13/21 1300   Note Type   Cancel Reasons Intubated/sedated     Andrés Lynch, PT

## 2021-04-13 NOTE — PROGRESS NOTES
Patient had subclinical seizure approximately midnight  She has subsequent subclinical seizure around 3:30 a m  Tessie Valdovinos Patient had been weaned off of propofol and is presently on Versed at 60 mg and ketamine infusion at 2 mg  AED medications include Keppra 2500 mg q 12 and lacosamide 200 mg q 12  Patient is also on scheduled Valium  With propofol being weaned to off and patient still having seizure events with 2 other infusions running there is concern with attempt to titrate off ketamine and Versed that patient may have return to refractory status epilepticus  Topamax 400 mg load administered  A plan to continue with Topamax 200 mg q 12 hours times 3-5 days and then decrease dose pending patient's response and control of subclinical seizure events  Epileptologist aware of plan        Critical care time 33 minutes

## 2021-04-13 NOTE — SOCIAL WORK
BRYAN left a message with brother requesting a time that he is available for a family meeting either today or tomorrow        Spoke with patient's sister regarding a family meeting and she states she is available at any time via phone

## 2021-04-13 NOTE — CASE MANAGEMENT
Per chart review, pt remains intubated and sedated  Pt had subclinical seizure at midnight and another at 3:30 AM  Palliative following and working with family to schedule a meeting today vs tomorrow  CM department to follow

## 2021-04-13 NOTE — NUTRITION
When safe to resume TF, recommend Jevity 1 2 @ 10 ml/hr and increase by 10 ml q 4 hrs as to to goal rate of 66 ml/hr to = qd: 1584 ml,1901 Kcal,88 gm PRO,268 gm CHO,62 gm Fat,1278 ml Free H2O,2 3 mg CHO/kg/min  Consider 100 ml Free H2O flush q 4 hrs

## 2021-04-13 NOTE — ASSESSMENT & PLAN NOTE
· Continue watching Acid/Base Status  · Recommend increasing Free water flushes     · Continue monitoring Sodium

## 2021-04-13 NOTE — OCCUPATIONAL THERAPY NOTE
Occupational Therapy         Patient Name: Kenn James  DTPQC'I Date: 4/13/2021 04/13/21 1400   OT Last Visit   OT Visit Date 04/13/21   Note Type   Note type Evaluation   Cancel Reasons Intubated/sedated     Narvis Maria Eugenia, OT

## 2021-04-13 NOTE — PROGRESS NOTES
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 with altered mental status  Rapid response was activated on 4/8 due to witnessed seizure activity and neurology was consulted for further assessment  She was given 3 mg Ativan total and 1000 mg Keppra was ordered to be given  During the evaluation by neurology on 4/8 she was noted to have leftward gaze with horizontal nystagmus  CTH was completed and showed no acute abnormalities  Due to concern for ongoing seizure activity decsion was made to transfer patient to [facility] for vEEG  Patient was transferred to intensive care unit and ultimately intubated  · Timeline of Events:   ·  4/08: Lorazapam 1mg IV, > Lorazapam 1mg IV > Keppra 2G IV > ativan 2mg IV > on 4/8/ 22:20: ativan 2mg IV  · 4/09: 05:30 Propofol ggt started at 30 then increased to 50  and at: 06:03 Lorazepam 2mg IV > 09:32 Keppra 750 mg > Right temporal seizures started around 14:15 and occurred every 2 minutes roughly > 15:04 Lorazapam 5mg IV >15:30 no significant improvement >4/09: 15:48 Lorazapam 8mg IV >16:04 no significant improvement > 16:19 Lorazapam 10mg IV >16:20 Keppra 1 5G IV > 16:54 5mg Valium >16:58 slight improvement, but persistent seizures >17:13 Midazolam ggt titration >17:25 Lorazapam 10mg IV  · 4/10: refractory status-On Versed gtt increased to 60, Propafol gtt at 100, Vimpat load, valium 20 mg, Ketamine bolus and ketamine 2 mg/kg gtt, > v EEG shows further seizure activity in left temporal region   At 13:32- No seizures EEG completely suppressed except for isolated right temporal discharges occurring every 15-40 seconds  · 4/11: Propofol at 100 , Ketamine at 2mg/kg/hr, Versed at 60 and Keppra dose to 2 5g and vimpat to 200mg BID, no further seizures on EMU, got her MRI brain, and plan to decrease Propofol by 10 mg/hr  · 4/11: MRI brain with and without: inconclusive   · 4/12: Continued to decrease Propofol, propofol stopped around 1900  · 4/13: 2 episodes of right hemispheric seizures at 0039 and 0336  Loaded with Topamax 400 mg and continue with Topamax 200mg BID with maintained Versed at 60, Ketamine at 20, keppra 2 5mg BID and Vimpat 200 q12  Plan:   · Current AEDs:  · Continue Keppra 2500 BID maintained dose   · Continue Vimpat 200 mg bid  · Loaded with Tomapax 4/13 with 400, continue 200mg BID  · Current Sedation: on Versed at 60, ketamine at 2 mg/kg   · Continue vEEG monitoring  · Continue Seizure precautions  · MRI brain with and without contrast obtained, however inconclusive  · Continue Acyclovir   · ID on board, recommendations appreciated   · Medical management as per critical care team    · LP planned per radiology  · CSF Labs : WBC, RBC, Total Protein, CSF Cx and Gram Stain, CSF Glucose, ME panel, ENC2 panel, West Nile, MS panel, Flow Cytometry and Flow Cytology,     MRSA (+) 4/3  Urine 4/3 (+) E  Coli  Bld Cx 4/9 no growth  Sputum Cx 4/9 1+ pmn no bacteria  Wound Cx 4/4 (+) E coli/acinetobacter     Hypernatremia  Assessment & Plan  · Continue watching Acid/Base Status  · Recommend increasing Free water flushes  · Continue monitoring Sodium      SUBJECTIVE     Patient was seen and examined  Overnight patient after propofol was stopped around 7pm, patient had 2  Subclinical seizures at [address], and 0330  Patient  Was loaded with topamax 400 mg and continued on topamax 200 mg q12  Unable to get LP yesterday due to increased INR, plan for LP today  Review of Systems   Unable to perform ROS: Intubated       OBJECTIVE     Patient ID: Tere Otto is a 63 y o  female      Vitals:    04/13/21 0715 04/13/21 0800 04/13/21 1014 21 1017   BP:    113/52   Pulse:  (!) 106 104    Resp:  (!) 11 12    Temp:  (!) 96 8 °F (36 °C) (!) 96 1 °F (35 6 °C)    TempSrc:  Probe Probe    SpO2: 97% 97% 97%    Weight:          Temperature:   Temp (24hrs), Av °F (35 6 °C), Min:95 °F (35 °C), Max:98 6 °F (37 °C)    Temperature: (!) 96 1 °F (35 6 °C)      Physical Exam  Vitals signs and nursing note reviewed  Constitutional:       Appearance: She is ill-appearing and toxic-appearing  Interventions: She is sedated, intubated and restrained  HENT:      Head: Normocephalic and atraumatic  Nose: Nose normal    Pulmonary:      Effort: She is intubated  Comments: Ventilate via ETT  Musculoskeletal:         General: Swelling present  Skin:     Comments: B/l LE wounds   Neurological:      Deep Tendon Reflexes:      Reflex Scores:       Bicep reflexes are 0 on the right side and 0 on the left side  Brachioradialis reflexes are 0 on the right side and 0 on the left side  Patellar reflexes are 0 on the right side and 0 on the left side  Achilles reflexes are 0 on the right side and 0 on the left side  Neurologic Exam     Mental Status   Patient was examined on 60 of versed, ketamine 20  She was not able to participate in a formal neurological assessment      Cranial Nerves     CN III, IV, VI   Right pupil: Reactivity: non-reactive  Left pupil: Reactivity: non-reactive  Vestibulo-ocular reflex: absent  Patient was unable to participate in an exam  She had no gag or cough present, she had no corneal reflexes and did not blink on eyelash stroke  Motor Exam   Muscle bulk: decreased  Overall muscle tone: decreased  Right arm tone: decreased  Left arm tone: decreased  Right leg tone: decreased  Left leg tone: decreasedPatient did not respond to nox stim in all four extremities      Sensory Exam   Did not respond to nox stim proximally or distally in all 4 extremities       Gait, Coordination, and Reflexes Tremor   Resting tremor: absent  Intention tremor: absent  Action tremor: absent    Reflexes   Right brachioradialis: 0  Left brachioradialis: 0  Right biceps: 0  Left biceps: 0  Right patellar: 0  Left patellar: 0  Right achilles: 0  Left achilles: 0  Right plantar: equivocal  Left plantar: equivocal  Right Card: absent  Left Card: absent  Right ankle clonus: absent  Left ankle clonus: absent       LABORATORY DATA     Labs: I have personally reviewed pertinent reports  Results from last 7 days   Lab Units 04/13/21  0530 04/12/21  0437 04/11/21  0405  04/09/21  0521   WBC Thousand/uL 8 27 7 91 8 43   < > 7 27   HEMOGLOBIN g/dL 8 4* 8 6* 8 8*   < > 6 6*   HEMATOCRIT % 28 3* 28 1* 29 1*   < > 17 8*   PLATELETS Thousands/uL 55* 44* 59*   < > 52*   NEUTROS PCT % 76* 62  --   --  77*   MONOS PCT % 8 9  --   --  7    < > = values in this interval not displayed  Results from last 7 days   Lab Units 04/13/21  0818 04/13/21  0530 04/12/21  1741  04/10/21  0428  04/09/21  0521  04/08/21  1653   SODIUM mmol/L 158* 157* 156*   < > 150*   < > 148*   < >  --    POTASSIUM mmol/L 4 0 4 0 4 2   < > 4 1   < > 5 0   < >  --    CHLORIDE mmol/L 137* 129* 134*   < > 123*   < > 120*   < >  --    CO2 mmol/L 19* 19* 17*   < > 23   < > 22   < >  --    CO2, I-STAT mmol/L  --   --   --   --   --   --   --   --  15*   BUN mg/dL 12 11 12   < > 17   < > 18   < >  --    CREATININE mg/dL 1 04 1 04 1 13   < > 0 93   < > 0 93   < >  --    CALCIUM mg/dL 7 6* 8 0* 7 8*   < > 8 2*   < > 8 5   < >  --    ALK PHOS U/L  --  112  --   --  90  --  97   < >  --    ALT U/L  --  20  --   --  30  --  34   < >  --    AST U/L  --  42  --   --  65*  --  85*   < >  --    GLUCOSE, ISTAT mg/dl  --   --   --   --   --   --   --   --  100    < > = values in this interval not displayed       Results from last 7 days   Lab Units 04/13/21  0530 04/12/21  0515 04/11/21  0405   MAGNESIUM mg/dL 1 9 1 8 2 0     Results from last 7 days   Lab Units 04/13/21  0530 04/12/21  0515 04/11/21  0405   PHOSPHORUS mg/dL 4 5 3 9 2 3*      Results from last 7 days   Lab Units 04/12/21  1741 04/11/21  0611 04/10/21  0428   INR  1 86* 2 32* 2 14*     Results from last 7 days   Lab Units 04/09/21  0059   LACTIC ACID mmol/L 1 5     Results from last 7 days   Lab Units 04/08/21  1701   TROPONIN I ng/mL <0 02       IMAGING & DIAGNOSTIC TESTING     Radiology Results: I have personally reviewed pertinent reports  MRI brain seizure wo and w contrast   Final Result by Jazzy Hood MD (04/11 7533)         1  Subtle, faint diffusion restriction in the pulvinar of the right thalamus with associated T2/FLAIR hyperintensity, a nonspecific finding which may be related to subacute infarction, which statistically would probably be the most likely differential    diagnosis  Other unusual differential considerations would include Behcet's disease or other connective tissue disorders like Sjogren's syndrome  Infectious encephalitis disease such as West Nile virus could be considered in the appropriate clinical    setting  A nonenhancing glioma is also possible as are reports of status epilepticus  The unilateral appearance argues against prion disease such as CJD  Further clinical assessment recommended  Recommend repeat follow-up contrast enhanced MRI of the    brain in 6-12 weeks to assess for stability  2   A few white matter lesions elsewhere may represent microangiopathy, although given the findings in the right thalamic pulvinar, other etiologies are certainly possible  3  No MR evidence of mesial temporal sclerosis  4   No classic MR findings of herpes encephalitis  Further clinical assessment and follow-up recommended  Workstation performed: [id number]         XR chest portable ICU   Final Result by Danielle Huerta MD (04/11 7302)      Small left apical pneumothorax, unchanged from previous examination        Unchanged bilateral pleural effusions and pulmonary parenchymal airspace opacities  Workstation performed: [id number]         XR chest portable ICU   Final Result by Aba Stahl MD (04/10 1253)      Small left apical pneumothorax identified possibly slightly increased from prior exam       Bilateral alveolar opacities in keeping with pneumonia  Small bilateral pleural effusions  Workstation performed: [id number]         XR chest portable ICU   Final Result by Bruce Mantilla MD (04/09 1610)   Left internal jugular central line terminates in the right atrium, and should be pulled back by 3 cm  Unchanged small left pneumothorax  Worsening bilateral pneumonia  Stable small left pleural effusion  The study was marked in [facility] for immediate notification  Workstation performed: [id number]         [facility] [place]-patient lumbar puncture    (Results Pending)       Other Diagnostic Testing: I have personally reviewed pertinent reports        ACTIVE MEDICATIONS     Current Facility-Administered Medications   Medication Dose Route Frequency    amiodarone (CORDARONE) 900 mg in dextrose 5 % 500 mL infusion  1 mg/min Intravenous Continuous    Followed by   Kishor Polio amiodarone (CORDARONE) 900 mg in dextrose 5 % 500 mL infusion  0 5 mg/min Intravenous Continuous    amiodarone 150 mg in dextrose 5 % 100 mL IV bolus  150 mg Intravenous Once    ampicillin-sulbactam (UNASYN) 3 g in sodium chloride 0 9 % 100 mL IVPB  3 g Intravenous Q6H    chlorhexidine (PERIDEX) 0 12 % oral rinse 15 mL  15 mL Swish & Spit Q12H [facility]    cholecalciferol (VITAMIN D3) tablet 400 Units  400 Units Oral Daily    dextrose 5 % infusion  50 mL/hr Intravenous Continuous    diazepam (VALIUM) tablet 10 mg  10 mg Oral Z7U    folic acid (FOLVITE) tablet 1 mg  1 mg Oral Daily    ketamine 500 mg (DOUBLE CONCENTRATION) IV in sodium chloride 0 9% 250 mL  2 mg/kg/hr (Order-Specific) Intravenous Continuous    lacosamide (VIMPAT) 200 mg in sodium chloride 0 9 % 100 mL IVPB  200 mg Intravenous Q12H    lactulose oral solution 30 g  30 g Oral BID    levETIRAcetam (KEPPRA) 2,500 mg in sodium chloride 0 9 % 250 mL IVPB  2,500 mg Intravenous Q12H [address]    midazolam (VERSED) 1 mg/mL (DOUBLE CONCENTRATION) 100 mL infusion  60 mg/hr Intravenous Continuous    nicotine (NICODERM CQ) 14 mg/24hr TD 24 hr patch 1 patch  1 patch Transdermal Daily    norepinephrine (LEVOPHED) 4 mg (STANDARD CONCENTRATION) IV in sodium chloride 0 9% 250 mL  1-30 mcg/min Intravenous Titrated    omeprazole (PRILOSEC) suspension 2 mg/mL  20 mg Oral Early Morning    ondansetron (ZOFRAN) injection 4 mg  4 mg Intravenous Q6H PRN    phytonadione (MEPHYTON) tablet 5 mg  5 mg Oral Daily    rifaximin (XIFAXAN) tablet 550 mg  550 mg Oral Q12H [address]    thiamine tablet 100 mg  100 mg Oral Daily    topiramate (TOPAMAX) tablet 200 mg  200 mg Oral Q12H [address]       Prior to Admission medications    Medication Sig Start Date End Date Taking?  Authorizing Provider   baclofen 10 mg tablet Take 10 mg by mouth 5/7/19   Historical Provider, MD   cholecalciferol (VITAMIN D3) 1,000 units tablet     Historical Provider, MD   furosemide (LASIX) 40 mg tablet Take 40 mg by mouth 5/20/19   Historical Provider, MD   gabapentin (NEURONTIN) 300 mg capsule Take 1 capsule (300 mg total) by mouth 2 (two) times a day 8/4/20   Harini Arredondo DO   lactulose 20 g/30 mL Take 45 mL (30 g total) by mouth 3 (three) times a day To achieve 3 bowel movements a day 3/16/21   SILVANO De La Garza   spironolactone (ALDACTONE) 100 mg tablet TAKE 1 & 1/2 TABLETS BY MOUTH DAILY 5/20/19   Historical Provider, MD         VTE Pharmacologic Prophylaxis: Pharmacologic VTE Prophylaxis contraindicated due to low platelet count  VTE Mechanical Prophylaxis: sequential compression device    ==  MD Oamri Muniz's Neurology Residency, PGY-2

## 2021-04-13 NOTE — PROGRESS NOTES
Progress Note - Infectious Disease   Aidee Vee 63 y o  female MRN: [id number]  Unit/Bed#: [facility] 01 Encounter: [id number]      IMPRESSION & RECOMMENDATIONS:   Impression/Recommendations:  1  Acute hypoxic respiratory failure   Patient was intubated during rapid response for airway protection in the setting of altered mentation   Also concern for developing aspiration pneumonia as chest x-ray shows worsening bilateral airspace disease  No fevers or significant leukocytosis   Patient is hypotensive which is more likely sedation related  Serial procalcitonin levels remain negative  Sputum culture showed no growth      -continue IV Unasyn     -complete 5 day course of Unasyn  Last day is today  Patient also received several days of ceftriaxone previously  -monitor temperatures and hemodynamics  -monitor ventilator requirements     2  Hypotension   Showed more likely related to sedation  Aidan Guzman consider developing acute infection, as above   No associated fever  Blood cultures all remain negative      -antibiotic plan as above  -monitor temperatures and hemodynamics  -monitor CBC     3  Acute encephalopathy   Initially presented to [facility] on 04/03 and has had waxing and waning mentation   Felt to be secondary to hepatic encephalopathy, alcohol withdrawal   Ultimately transferred to [facility] to seizure-like activity   EEG now shows status epilepticus, presumably secondary to alcohol withdrawal   Negative CT head x2   Very low suspicion for primary CNS infection given duration of symptoms, absence of fevers, and other much more plausible explanations   Patient was started on IV acyclovir for possible HSV CNS infection   LP cannot be obtained due to coagulopathy  MRI does not show any temporal lobe involvement    Suspicion for HSV CNS infection remains very low      -continue to observe off anymore antiviral  -anti-epileptic management as per Neurology  -serial neuro exams  -monitor temperatures and hemodynamics     4  Alcoholic/HCV cirrhosis complicated by [address]  post ablation in    With portal vein thrombosis, hepatic encephalopathy, coagulopathy   GI follow-up ongoing      5  Bilateral lower extremity wounds   Right leg wound culture from  showed E coli and Acinetobacter   No clinical evidence of acute infection of any of the wounds on exam   Discussed with Podiatry      -no antibiotics indicated  -daily local wound care and dressing changes     6  Recent bacteriuria   Urine culture from  showed pansensitive E coli   Blood cultures were negative   Patient already received adequate antibiotic course for the possibility of UTI   No further antibiotic indicated for this      Antibiotics:  Antibiotic 11  Unasyn 5               Subjective:  Subclinical seizure overnight  Remains sedated on ventilator  Periods of hypothermia  Receiving diuresis today  Objective:  Vitals:  Temp:  [94 6 °F (34 8 °C)-98 6 °F (37 °C)] 94 6 °F (34 8 °C)  HR:  [] 90  Resp:  [11-24] 12  BP: ()/(45-62) 97/50  SpO2:  [94 %-99 %] 94 %  Temp (24hrs), Av 1 °F (35 6 °C), Min:94 6 °F (34 8 °C), Max:98 6 °F (37 °C)  Current: Temperature: (!) 94 6 °F (34 8 °C)    Physical Exam:   General:  Sedated on ventilator  Eyes:  Conjunctive clear with no hemorrhages or effusions  Oropharynx:  ET tube in place  Neck:  Trachea midline  Lungs:  Ventilated breath sounds  Abdomen:  Soft, non-tender, non-distented  Extremities:  Symmetric edema  Lower extremity dressings are intact  Skin:  No diffuse rashes  Neurological:  Sedated on ventilator    Lab Results:  I have personally reviewed pertinent labs    Results from last 7 days   Lab Units 21  0818 21  0530 21  1741  04/10/21  0428  21  0521  21  1653   POTASSIUM mmol/L 4 0 4 0 4 2   < > 4 1   < > 5 0   < >  --    CHLORIDE mmol/L 137* 129* 134*   < > 123*   < > 120*   < >  --    CO2 mmol/L 19* 19* 17*   < > 23   < > 22   < >  --    CO2, I-STAT mmol/L  --   --   --   --   --   --   --   --  15*   BUN mg/dL 12 11 12   < > 17   < > 18   < >  --    CREATININE mg/dL 1 04 1 04 1 13   < > 0 93   < > 0 93   < >  --    EGFR ml/min/1 73sq m 59 59 53   < > 67   < > 67   < >  --    GLUCOSE, ISTAT mg/dl  --   --   --   --   --   --   --   --  100   CALCIUM mg/dL 7 6* 8 0* 7 8*   < > 8 2*   < > 8 5   < >  --    AST U/L  --  42  --   --  65*  --  85*   < >  --    ALT U/L  --  20  --   --  30  --  34   < >  --    ALK PHOS U/L  --  112  --   --  90  --  97   < >  --     < > = values in this interval not displayed  Results from last 7 days   Lab Units 04/13/21  0530 04/12/21  0437 04/11/21  0405   WBC Thousand/uL 8 27 7 91 8 43   HEMOGLOBIN g/dL 8 4* 8 6* 8 8*   PLATELETS Thousands/uL 55* 44* 59*     Results from last 7 days   Lab Units 04/09/21  1728 04/09/21  0923 04/09/21  0908   BLOOD CULTURE  No Growth at 72 hrs  No Growth After 4 Days  --    SPUTUM CULTURE   --   --  No growth   GRAM STAIN RESULT   --   --  No Epithelial cells per low power field  1+ Polys  No bacteria seen       Imaging Studies:   I have personally reviewed pertinent imaging study reports and images in PACS  EKG, Pathology, and Other Studies:   I have personally reviewed pertinent reports

## 2021-04-13 NOTE — BRIEF OP NOTE (RAD/CATH)
FLUOROSCOPICALLY GUIDED LUMBAR PUNCTURE     INDICATION:  Status epilepticus in a 53-year-old female with underlying hepatitis C / alcohol cirrhosis and HCC  FLUOROSCOPY TIME:  1 39 mft    IMAGES:  1      TECHNIQUE:       The risks of the procedure were discussed in detail with the patient's sister Daxa Berry on the phone ([phone])  The risks discussed included, however were not limited to, infection, bleeding, injury to surrounding structures (nerves, spinal cord, and blood vessels), allergic reactions, arachnoiditis, encephalitis,and spinal headaches  The patient verbalized her understanding of the above risks and wished to proceed with the lumbar puncture  Prior to the procedure, I discussed with MICU resident Dr Sachi Nance, having the patient receive an additional unit of FFP due to her coagulopathy (INR 1 64 ) after 2 units of FFP  I asked to have the FFP sent to Radiology and run during the lumbar puncture  Per Dr Scott Mandujano, they would tube the FFP immediately  The patient was placed in left lateral decubitus position  She was prepped and draped in the usual sterile fashion  1% lidocaine was infiltrated at the puncture site  Utilizing right paravertebral approach, a 20 gauge 3 5 inch spinal needle was advanced under fluoroscopic guidance into the subarachnoid space at the L3-L4 level, utilizing sterile technique  Once in position, opening pressure was 41 cm H2O  Approximately 16 5 cc of CSF were removed and placed into 4 tubes which subsequently were transported to the lab for requested analysis  It is to be noted the patient's 1st tube of CSF was clear yellow, the 2nd tube was bloody, thus the needle was withdrawn slightly and the CSF began to clear  Tube 3 and 4 hours CSF was serosanguineous  The needle was removed  The patient tolerated the procedure well  The patient was discharged from the department   I spoke to Dr Scott Mandujano and Dr Aaliyah Salmeron with the MICU team and discussed the above  They were agreeable to have the patient receive an additional unit of FFP, upon returning to the MICU since she did not receive one during her LP  IMPRESSION:    Successful fluoroscopically guided lumbar puncture with an opening pressure of 41 cm H2O  Approximately 16 5 mL's of CSF was sent to lab for requested analysis  As noted above, the patient's CSF was initially clear yellow and became bloody during the collection process  The needle was withdrawn slightly and the CSF fluid slowly cleared  After a discussion with the MICU team, the patient will receive an additional unit of FFP post procedure  Please feel free to contact us with any questions or concerns  I reviewed the above findings and procedure with Dr Mathew Soto       PERFORMED, DICTATED AND SIGNED BY: Óscar Lowry PA-C

## 2021-04-13 NOTE — QUICK NOTE
PRELIM EEG report    This report is related to the monitoring period 4/13/21 2535 to 0984  There were two right hemispheric subclinical seizures seen thus far at 0039 (2 minutes in duration) and 0336 (1 minute in duration)  These findings were relayed to the overnight ICU attending  The patient was given a loading dose of topiramate and then started on maintenance (200 BID)  Since that time there have been no more seizures  Additionally, cortical activity slow becomes more prominent  Cortical burst are predominantly a right hemispheric 2-2 5 Hz lateralized periodic discharge pattern, lasting between 2-4 seconds and still encompassing less than 10% of the total background

## 2021-04-13 NOTE — PROGRESS NOTES
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 EtOH abuse   - Hepatocellular carcinoma  - Cellulitis  - Anemia and thrombocytopenia     - Neuro  - Dx: Refractory Status epilepticus                          - Ketamine at 2mg/kg/hr                         - Versed at 62              - Keppra at 2 5g daily and vimpat 200mg BID              - Valium PO to 10mg q6h   - Possible LP today by IR     - TF held this morning     - If confirmed will transfuse FFP with repeat labs    - Continue video EEG   - Appreciate recommendation from Neuro   - CAM-ICU  - Good sleep hygiene     CV:   - Dx: Shock state most likely medication induced               - Levophed at 10 from 9 yesterday               - Wean as tolerated  - ECHO showed EF of 60% with no valvular disorder  - Dx:  Tachycardia    - EKG ordered   - Continue telemetry monitoring   - MAP goal > 65         Lung:   - Dx: Acute respiratory failure 2/2 seizures  - Vent day 6                - SCMV 12/400/6/40%              - Added xopenex/atrovent nebs              - Continue VAP precautions  - Dx: Mild Apical pneumothorax              - Continue to monitor airway pressures on vent   - Continue Respiratory Protocol and Airway Clearance Protocol        GI:   - Dx: 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 / [address]  post ablation   - Continue lactulose 30g TID and rifaximin 550mg BID for HE               - MELD score 17               - HCC stable per CT   - Stress ulcer ppx: Prilosec  - Holding TF in anticipation for LP today - Nutrition rec based on d/c of propofol   - Triglyceride this AM 74        FEN: Hypernatremia and hyperchloremia  - Fluids: D5W   - Electrolytes: Will trend and replete as needed  - Nutrition: Will restart TF once LP status confirmed              - Increase FWF to 300cc q4h      :   - Dx: Resolved DARRYL   - Cr 1 04 this AM  - Dx: Volume overload              - Received 40 of lasix with increased urine output (3 4L in 24hr)              - Consider scheduled lasix to address net positive I/Os              - BMP BID  - Hamlin in place      ID:   - Dx: Polymycrobial LE wound              - Continue unasyn 5/7 days              - Sputum culture showed no growth   - Blood culture no growth at 72 hours   - 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              - MRI inconclusive              - Continue acyclovir               - Monitor renal function while on acyclovir   - Monitor WBC/temp curve     Heme:   - Dx: Anemia and thrombocytopenia  - Hb: Stable at 8 4 from 8 6   - Continue to monitor Hb and platelet in addition to INR              - Haptoglobin <10 w/ Fib 149 (Likely due to cirrhosis) and no schistocytes              - No signs of active bleeding or hemolytic anemia  - Pt: Post transfusion platelet 55 from 44   - DVT ppx: Hold pharm ppx, SCDs      Endo:   - Dx: No acute issues  - Goal -180     Msk/Skin:   - Dx: Bilateral lower extremity wounds              - Wound care and Podiatry service managing              - Patient on unasyn   - PT/OT  - Turning/repositioning  - Wound care      Disposition: Continue ICU care    Invasive lines and devices:   Invasive Devices     Central Venous Catheter Line            CVC Central Lines 04/09/21 Triple 20cm 3 days          Peripheral Intravenous Line            Peripheral IV 04/12/21 Proximal;Right;Ventral (anterior) Forearm less than 1 day          Arterial Line            Arterial Line 04/09/21 Radial 3 days          Drain            Rectal Tube 5 days NG/OG/Enteral Tube 18 Fr Center mouth 4 days    Urethral Catheter Temperature probe 16 Fr  4 days          Airway            ETT  Oral 7 5 mm 4 days                   Physical exam  General: Patient intubated  Neuro: GCS 3T (Eye 1, Verbal 1, Motor 1)  HENT: Normocephalic and atraumatic, PERRL   - scleral icterus   Heart:  RRR, pulses intact  Lungs: Bilateral breath sounds present  Abdomen: Bowel sounds present, soft not distended  Skin:  Bilateral LE edema in addition to wound that is covered in dressing     Vitals  Temperature:   Temp (24hrs), Av 1 °F (35 6 °C), Min:95 °F (35 °C), Max:98 6 °F (37 °C)    Current: Temperature: (!) 96 8 °F (36 °C)    Vitals:    21 0400 21 0500 21 0600 21 0715   BP:       BP Location:       Pulse: (!) 110 (!) 112 (!) 110    Resp: (!) 11 12 12    Temp: 97 9 °F (36 6 °C) (!) 97 2 °F (36 2 °C) (!) 96 8 °F (36 °C)    TempSrc:       SpO2: 97% 97% 97% 97%   Weight:   81 1 kg (178 lb 12 7 oz)      Arterial Line BP: 110/54  Arterial Line MAP (mmHg): 72 mmHg    Labs:   Results from last 7 days   Lab Units 21  0530 21  0437 21  0405  21  0521   WBC Thousand/uL 8 27 7 91 8 43   < > 7 27   HEMOGLOBIN g/dL 8 4* 8 6* 8 8*   < > 6 6*   HEMATOCRIT % 28 3* 28 1* 29 1*   < > 17 8*   PLATELETS Thousands/uL 55* 44* 59*   < > 52*   NEUTROS PCT % 76* 62  --   --  77*   MONOS PCT % 8 9  --   --  7    < > = values in this interval not displayed        Results from last 7 days   Lab Units 21  0530 21  1741 21  0515  04/10/21  0428  21  0521  21  1653   SODIUM mmol/L 157* 156* 153*   < > 150*   < > 148*   < >  --    POTASSIUM mmol/L 4 0 4 2 4 5   < > 4 1   < > 5 0   < >  --    CHLORIDE mmol/L 129* 134* 130*   < > 123*   < > 120*   < >  --    CO2 mmol/L 19* 17* 18*   < > 23   < > 22   < >  --    CO2, I-STAT mmol/L  --   --   --   --   --   --   --   --  15*   BUN mg/dL 11 12 11   < > 17   < > 18   < >  --    CREATININE mg/dL 1 04 1  13 1 04   < > 0 93   < > 0 93   < >  --    CALCIUM mg/dL 8 0* 7 8* 7 8*   < > 8 2*   < > 8 5   < >  --    ALK PHOS U/L 112  --   --   --  90  --  97   < >  --    ALT U/L 20  --   --   --  30  --  34   < >  --    AST U/L 42  --   --   --  65*  --  85*   < >  --    GLUCOSE, ISTAT mg/dl  --   --   --   --   --   --   --   --  100    < > = values in this interval not displayed  Results from last 7 days   Lab Units 04/13/21  0530 04/12/21  0515 04/11/21  0405   MAGNESIUM mg/dL 1 9 1 8 2 0     Results from last 7 days   Lab Units 04/13/21  0530 04/12/21  0515 04/11/21  0405   PHOSPHORUS mg/dL 4 5 3 9 2 3*      Results from last 7 days   Lab Units 04/12/21  1741 04/11/21  0611 04/10/21  0428   INR  1 86* 2 32* 2 14*     Results from last 7 days   Lab Units 04/09/21  0059   LACTIC ACID mmol/L 1 5       Other Labs    Intake and Outputs:  I/O       04/11 0701 - 04/12 0700 04/12 0701 - 04/13 0700    I V  (mL/kg) 5129 8 (63 4) 3108 (38 3)    Blood  300    NG/ 1600    IV Piggyback 1000 1000    Feedings 276 615    Total Intake(mL/kg) 7360 8 (91) 6623 (81 7)    Urine (mL/kg/hr) 2170 (1 1) 3160 (1 6)    Stool 1225 1450    Total Output 3395 4610    Net +3965 8 +2013                Imaging Studies          Non-Invasive/Invasive Ventilation Settings:  Respiratory    Lab Data (Last 4 hours)    None         O2/Vent Data (Last 4 hours)      04/13 0715          Patient safety screen outcome: Failed                 No results found for: PHART, EMS9WGW, PO2ART, JXI6MEN, M6XIYFHQ, BEART, SOURCE    Imaging:   MRI brain seizure wo and w contrast   Final Result by William Baltazar MD (04/11 7719)         1  Subtle, faint diffusion restriction in the pulvinar of the right thalamus with associated T2/FLAIR hyperintensity, a nonspecific finding which may be related to subacute infarction, which statistically would probably be the most likely differential    diagnosis    Other unusual differential considerations would include Behcet's disease or other connective tissue disorders like Sjogren's syndrome  Infectious encephalitis disease such as West Nile virus could be considered in the appropriate clinical    setting  A nonenhancing glioma is also possible as are reports of status epilepticus  The unilateral appearance argues against prion disease such as CJD  Further clinical assessment recommended  Recommend repeat follow-up contrast enhanced MRI of the    brain in 6-12 weeks to assess for stability  2   A few white matter lesions elsewhere may represent microangiopathy, although given the findings in the right thalamic pulvinar, other etiologies are certainly possible  3  No MR evidence of mesial temporal sclerosis  4   No classic MR findings of herpes encephalitis  Further clinical assessment and follow-up recommended  Workstation performed: [id number]         XR chest portable ICU   Final Result by Gildardo Alexander MD (04/11 1142)      Small left apical pneumothorax, unchanged from previous examination  Unchanged bilateral pleural effusions and pulmonary parenchymal airspace opacities  Workstation performed: [id number]         XR chest portable ICU   Final Result by Saritha Mitchell MD (04/10 1253)      Small left apical pneumothorax identified possibly slightly increased from prior exam       Bilateral alveolar opacities in keeping with pneumonia  Small bilateral pleural effusions  Workstation performed: [id number]         XR chest portable ICU   Final Result by Radha Cheek MD (04/09 1610)   Left internal jugular central line terminates in the right atrium, and should be pulled back by 3 cm  Unchanged small left pneumothorax  Worsening bilateral pneumonia  Stable small left pleural effusion  The study was marked in [facility] for immediate notification           Workstation performed: [id number]         [place] lumbar puncture    (Results Pending)     I have personally reviewed pertinent reports  Micro:  Lab Results   Component Value Date    BLOODCX No Growth at 72 hrs  04/09/2021    BLOODCX No Growth at 72 hrs  04/09/2021    BLOODCX No Growth After 5 Days   04/03/2021    URINECX >100,000 cfu/ml Escherichia coli (A) 04/03/2021    WOUNDCULT 4+ Growth of Escherichia coli (A) 04/04/2021    WOUNDCULT 4+ Growth of Acinetobacter baumannii (A) 04/04/2021    WOUNDCULT 4+ Growth of  04/04/2021    SPUTUMCULTUR No growth 04/09/2021         Allergies: No Known Allergies      Medications:   Scheduled Meds:  Current Facility-Administered Medications   Medication Dose Route Frequency Provider Last Rate    albumin human  12 5 g Intravenous Once Ty Morrow MD      ampicillin-sulbactam  3 g Intravenous Q6H Lou Bhat DO Stopped (04/13/21 0533)    chlorhexidine  15 mL Swish & Spit Q12H [address] Lyndell Litten, PA-C      cholecalciferol  400 Units Oral Daily Lyndell Litten, PA-C      dextrose  50 mL/hr Intravenous Continuous Ty Morrow MD 50 mL/hr (04/13/21 0046)    diazepam  10 mg Oral Q6H Amberly Cole, CRNP      folic acid  1 mg Oral Daily Amberly Mangum, CRNP      ipratropium  0 5 mg Nebulization TID SILVANO Shanks      ketamine  2 mg/kg/hr (Order-Specific) Intravenous Continuous Thomson Mangum, CRNP 2 mg/kg/hr (04/13/21 2172)    lacosamide (VIMPAT) IVPB  200 mg Intravenous Q12H Erlin Hood DO Stopped (04/13/21 0000)    lactulose  30 g Oral TID Lyndell Litten, PA-C      levalbuterol  1 25 mg Nebulization TID SILVANO Shanks      levETIRAcetam  2,500 mg Intravenous Q12H [address] Erlin Hood DO 2,500 mg (04/13/21 0800)    midazolam  60 mg/hr Intravenous Continuous Thomson Cole, CRNP 60 mg/hr (04/13/21 0630)    nicotine  1 patch Transdermal Daily Gianfranco Litten, PA-C      norepinephrine  1-30 mcg/min Intravenous Titrated Gianfranco Nicholasten, PA-C 10 mcg/min (04/13/21 0535)    omeprazole (PRILOSEC) suspension 2 mg/mL  20 mg Oral Early Morning Arlester Gitelman, CRNP      ondansetron  4 mg Intravenous Q6H PRN Shaila Coy PA-C      phytonadione  5 mg Oral Daily Arlester Gitelman, CRNP      propofol  5-60 mcg/kg/min (Adjusted) Intravenous Titrated SILVANO Alvarez Stopped (04/12/21 1934)    rifaximin  550 mg Oral Q12H [facility] Shaila Coy PA-C      thiamine  100 mg Oral Daily Arlester Gitelman, CRNP      topiramate  200 mg Oral Q12H [facility] Anni Burris,        Continuous Infusions:dextrose, 50 mL/hr, Last Rate: 50 mL/hr (04/13/21 1397)  ketamine, 2 mg/kg/hr (Order-Specific), Last Rate: 2 mg/kg/hr (04/13/21 0609)  midazolam, 60 mg/hr, Last Rate: 60 mg/hr (04/13/21 0630)  norepinephrine, 1-30 mcg/min, Last Rate: 10 mcg/min (04/13/21 0535)  propofol, 5-60 mcg/kg/min (Adjusted), Last Rate: Stopped (04/12/21 1934)      PRN Meds:  ondansetron, 4 mg, Q6H PRN        Counseling / Coordination of Care  Total Critical Care time spent 30 minutes excluding procedures, teaching and family updates  Code Status: Level 1 - Full Code     Portions of the record may have been created with voice recognition software  Occasional wrong word or "sound a like" substitutions may have occurred due to the inherent limitations of voice recognition software  Read the chart carefully and recognize, using context, where substitutions have occurred       Raman Correa MD

## 2021-04-13 NOTE — RESPIRATORY THERAPY NOTE
RT Ventilator Management Note  Reji Mckeon 63 y o  female MRN: [id number]  Unit/Bed#: [facility] 01 Encounter: [id number]      Daily Screen       4/11/2021  0739 4/12/2021  0717          Patient safety screen outcome[de-identified]  Failed  Failed      Not Ready for Weaning due to[de-identified]  Underline problem not resolved  Underline problem not resolved              Physical Exam:   Assessment Type: Assess only  General Appearance: Sedated  Respiratory Pattern: Assisted  Chest Assessment: Chest expansion symmetrical  Bilateral Breath Sounds: Diminished, Coarse  O2 Device: vent      Resp Comments: pt remained on vent, CMV mode/settings, all evening/night   No vent changes made

## 2021-04-13 NOTE — RESPIRATORY THERAPY NOTE
RT Ventilator Management Note  Marcelina Rodriguez 63 y o  female MRN: [id number]  Unit/Bed#: [facility] 01 Encounter: [id number]      Daily Screen       4/12/2021  0717 4/13/2021  0715          Patient safety screen outcome[de-identified]  Failed  Failed      Not Ready for Weaning due to[de-identified]  Underline problem not resolved  Underline problem not resolved              Physical Exam:   Assessment Type: Assess only  General Appearance: Sedated  Respiratory Pattern: Assisted  Chest Assessment: Chest expansion symmetrical  Bilateral Breath Sounds: Diminished, Coarse  O2 Device: (P) vent      Resp Comments: (P) pt appears comfortable on current settings, pt does not follow commands, no weaning attempted, will continue to monitor

## 2021-04-14 NOTE — PROGRESS NOTES
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 and [address]  who was initially admitted to [facility] with altered mental status  Rapid response was activated on 4/8 due to witnessed seizure activity and neurology was consulted for further assessment  She was given 3 mg Ativan total and 1000 mg Keppra was ordered to be given  During the evaluation by neurology on 4/8 she was noted to have leftward gaze with horizontal nystagmus  CTH was completed and showed no acute abnormalities  Due to concern for ongoing seizure activity decsion was made to transfer patient to [facility] [place] [facility] vEEG  Patient was transferred to intensive care unit and ultimately intubated  · Timeline of Events:   ·  4/08: Lorazapam 1mg IV, > Lorazapam 1mg IV > Keppra 2G IV > ativan 2mg IV > on 4/8/ 22:20: ativan 2mg IV  · 4/09: 05:30 Propofol ggt started at 30 then increased to 50  and at: 06:03 Lorazepam 2mg IV > 09:32 Keppra 750 mg > Right temporal seizures started around 14:15 and occurred every 2 minutes roughly > 15:04 Lorazapam 5mg IV >15:30 no significant improvement >4/09: 15:48 Lorazapam 8mg IV >16:04 no significant improvement > 16:19 Lorazapam 10mg IV >16:20 Keppra 1 5G IV > 16:54 5mg Valium >16:58 slight improvement, but persistent seizures >17:13 Midazolam ggt titration >17:25 Lorazapam 10mg IV  · 4/10: refractory status-On Versed gtt increased to 60, Propafol gtt at 100, Vimpat load, valium 20 mg, Ketamine bolus and ketamine 2 mg/kg gtt, > v EEG shows further seizure activity in left temporal region   At 13:32- No seizures EEG completely suppressed except for isolated right temporal discharges occurring every 15-40 seconds  · 4/11: Propofol at 100 , Ketamine at 2mg/kg/hr, Versed at 60 and Keppra dose to 2 5g and vimpat to 200mg BID, no further seizures on EMU, got her MRI brain, and plan to decrease Propofol by 10 mg/hr  · 4/11: MRI brain with and without: inconclusive   · 4/12: Continued to decrease Propofol, propofol stopped around 1900  · 4/13: 2 episodes of right hemispheric seizures at 0039 and 0336  Loaded with Topamax 400 mg and continue with Topamax 200mg BID with maintained Versed at 60, Ketamine at 20, keppra 2 5mg BID and Vimpat 200 q12  · 4/13: LP done  Plan:   · Current AEDs:  · Continue Keppra 2500 BID maintained dose   · Continue Vimpat 200 mg bid  · Loaded with Tomapax 4/13 with 400, continue 200mg BID  · Current Sedation: on Versed at 60, ketamine at 2 mg/kg   · Recommend coming down on Ketamine next, and continue vEEG monitoring  · Continue Seizure precautions  · MRI brain with and without contrast obtained, however inconclusive  · ID on board, recommendations appreciated   · Received a total of 11 days Abx, and 4 days of Antiviral-Acyclovir, d/c on 4/12  · Medical management as per critical care team    · LP on 4/13/2021  · Pending CSF Labs : [facility] panel, West Nile, MS panel, Flow Cytometry and Flow Cytology, MS panel, Myelin Basic Protein,   · Normal: Diff, Gram stain, White count, Glucose  (65), ME,   · Abnormal: Xanthrochromia, Elevated CSF protein (100), RBC count: (3,746),     LP studies:    Results from last 7 days   Lab Units 04/13/21  1709   APPEARANCE CSF  hazy/bloody   WBC CSF /uL 3   GLUCOSE CSF mg/dL 65   RBC CSF uL 3,746*   TOTAL COUNTED  100   NEUTROPHILS % (CSF) % 74   LYMPHS % (CSF) % 19   MONOCYTES % (CSF) % 5   EOSINOPHILS % (CSF) % 2   PROTEIN CSF mg/dL 100*     MRSA (+) 4/3  Urine 4/3 (+) E  Coli  Bld Cx 4/9 no growth  Sputum Cx 4/9 1+ pmn no bacteria  Wound Cx 4/4 (+) E coli/acinetobacter     Hypernatremia  Assessment & Plan  · Continue watching Acid/Base Status  · Recommend increasing Free water flushes  · Continue monitoring Sodium            SUBJECTIVE     Patient was seen and examined  Review of Systems   Unable to perform ROS: Intubated       OBJECTIVE     Patient ID: Gumaro Addison is a 63 y o  female  Vitals:    21 0645 21 0700 21 0750 21 1124   BP:       Pulse: 78  80    Resp:   12    Temp: (!) 97 2 °F (36 2 °C)  97 5 °F (36 4 °C)    TempSrc:   Probe    SpO2:  94% 95% 92%   Weight:          Temperature:   Temp (24hrs), Av 5 °F (34 7 °C), Min:90 °F (32 2 °C), Max:97 5 °F (36 4 °C)    Temperature: 97 5 °F (36 4 °C)      Physical Exam  Vitals signs and nursing note reviewed  Constitutional:       Appearance: She is ill-appearing and toxic-appearing  Interventions: She is sedated, intubated and restrained  HENT:      Head: Normocephalic and atraumatic  Nose: Nose normal    Pulmonary:      Effort: She is intubated  Comments: Ventilate via ETT  Musculoskeletal:         General: Swelling present  Skin:     Comments: B/l LE wounds   Neurological:      Deep Tendon Reflexes:      Reflex Scores:       Bicep reflexes are 0 on the right side and 0 on the left side  Brachioradialis reflexes are 0 on the right side and 0 on the left side  Patellar reflexes are 0 on the right side and 0 on the left side  Achilles reflexes are 0 on the right side and 0 on the left side  Neurologic Exam     Mental Status   Patient was examined on 60 of versed, ketamine 20  She was not able to participate in a formal neurological assessment      Cranial Nerves     CN III, IV, VI   Right pupil: Reactivity: non-reactive  Left pupil: Reactivity: non-reactive  Vestibulo-ocular reflex: absent  Patient was unable to participate in an exam  She had no gag or cough present, she had no corneal reflexes and did not blink on eyelash stroke        Motor Exam   Muscle bulk: decreased  Overall muscle tone: decreased  Right arm tone: decreased  Left arm tone: decreased  Right leg tone: decreased  Left leg tone: decreasedPatient did not respond to nox stim in all four extremities      Sensory Exam   Did not respond to nox stim proximally or distally in all 4 extremities  Gait, Coordination, and Reflexes     Tremor   Resting tremor: absent  Intention tremor: absent  Action tremor: absent    Reflexes   Right brachioradialis: 0  Left brachioradialis: 0  Right biceps: 0  Left biceps: 0  Right patellar: 0  Left patellar: 0  Right achilles: 0  Left achilles: 0  Right plantar: equivocal  Left plantar: equivocal  Right Card: absent  Left Card: absent  Right ankle clonus: absent  Left ankle clonus: absent         LABORATORY DATA     Labs: I have personally reviewed pertinent reports  Results from last 7 days   Lab Units 04/14/21  0451 04/13/21  2355 04/13/21  1547 04/13/21  0530 04/12/21  0437   WBC Thousand/uL 6 19  --   --  8 27 7 91   HEMOGLOBIN g/dL 8 0* 7 8*  --  8 4* 8 6*   HEMATOCRIT % 26 2* 25 5*  --  28 3* 28 1*   PLATELETS Thousands/uL 63*  --  75* 55* 44*   NEUTROS PCT % 74  --   --  76* 62   MONOS PCT % 6  --   --  8 9      Results from last 7 days   Lab Units 04/14/21  0451 04/13/21  1454 04/13/21  0818 04/13/21  0530  04/10/21  0428  04/08/21  1653   SODIUM mmol/L 156* 155* 158* 157*   < > 150*   < >  --    POTASSIUM mmol/L 3 5 3 7 4 0 4 0   < > 4 1   < >  --    CHLORIDE mmol/L 134* 130* 137* 129*   < > 123*   < >  --    CO2 mmol/L 17* 18* 19* 19*   < > 23   < >  --    CO2, I-STAT mmol/L  --   --   --   --   --   --   --  15*   BUN mg/dL 13 12 12 11   < > 17   < >  --    CREATININE mg/dL 0 92 1 01 1 04 1 04   < > 0 93   < >  --    CALCIUM mg/dL 7 5* 7 9* 7 6* 8 0*   < > 8 2*   < >  --    ALK PHOS U/L 89  --   --  112  --  90   < >  --    ALT U/L 20  --   --  20  --  30   < >  --    AST U/L 35  --   --  42  --  65*   < >  --    GLUCOSE, ISTAT mg/dl  --   --   --   --   --   --   --  100    < > = values in this interval not displayed       Results from last 7 days   Lab Units 04/14/21  0451 04/13/21  0530 04/12/21  0515   MAGNESIUM mg/dL 2 2 1 9 1 8     Results from last 7 days   Lab Units 04/13/21  0530 04/12/21  0515 04/11/21  0405   PHOSPHORUS mg/dL 4 5 3 9 2 3*      Results from last 7 days   Lab Units 04/13/21  2150 04/13/21  1454 04/12/21  1741   INR  1 73* 1 64* 1 86*     Results from last 7 days   Lab Units 04/09/21  0059   LACTIC ACID mmol/L 1 5     Results from last 7 days   Lab Units 04/08/21  1701   TROPONIN I ng/mL <0 02       IMAGING & DIAGNOSTIC TESTING     Radiology Results: I have personally reviewed pertinent reports  FL IN-patient lumbar puncture   Final Result by Susy Villar MD (04/14 0755)      Successful fluoroscopically guided lumbar puncture with an opening pressure of 41 cm H2O  Approximately 16 5 mL's of CSF was sent to lab for requested analysis  As noted above, the patient's CSF was initially clear yellow and became bloody during the    collection process  The needle was withdrawn slightly and the CSF fluid slowly cleared  After a discussion with the MICU team, the patient will receive an additional unit of FFP post procedure  Please feel free to contact us with any questions or concerns  I reviewed the above findings and procedure with Dr Shalonda Ascencio  PERFORMED, DICTATED AND SIGNED BY: Marylen Fees, PA-C         Workstation performed: [id number]         MRI brain seizure wo and w contrast   Final Result by Chen Baeza MD (04/11 7523)         1  Subtle, faint diffusion restriction in the pulvinar of the right thalamus with associated T2/FLAIR hyperintensity, a nonspecific finding which may be related to subacute infarction, which statistically would probably be the most likely differential    diagnosis  Other unusual differential considerations would include Behcet's disease or other connective tissue disorders like Sjogren's syndrome    Infectious encephalitis disease such as West Nile virus could be considered in the appropriate clinical    setting  A nonenhancing glioma is also possible as are reports of status epilepticus  The unilateral appearance argues against prion disease such as CJD  Further clinical assessment recommended  Recommend repeat follow-up contrast enhanced MRI of the    brain in 6-12 weeks to assess for stability  2   A few white matter lesions elsewhere may represent microangiopathy, although given the findings in the right thalamic pulvinar, other etiologies are certainly possible  3  No MR evidence of mesial temporal sclerosis  4   No classic MR findings of herpes encephalitis  Further clinical assessment and follow-up recommended  Workstation performed: [id number]         XR chest portable ICU   Final Result by Adele Torres MD (04/11 1142)      Small left apical pneumothorax, unchanged from previous examination  Unchanged bilateral pleural effusions and pulmonary parenchymal airspace opacities  Workstation performed: [id number]         XR chest portable ICU   Final Result by Елена Mcgill MD (04/10 1253)      Small left apical pneumothorax identified possibly slightly increased from prior exam       Bilateral alveolar opacities in keeping with pneumonia  Small bilateral pleural effusions  Workstation performed: [id number]         XR chest portable ICU   Final Result by Maykel Buck MD (04/09 1610)   Left internal jugular central line terminates in the right atrium, and should be pulled back by 3 cm  Unchanged small left pneumothorax  Worsening bilateral pneumonia  Stable small left pleural effusion  The study was marked in [facility] for immediate notification  Workstation performed: [id number]             Other Diagnostic Testing: I have personally reviewed pertinent reports        ACTIVE MEDICATIONS     Current Facility-Administered Medications   Medication Dose Route Frequency    amiodarone (CORDARONE) 900 mg in dextrose 5 % 500 mL infusion  0 5 mg/min Intravenous Continuous    chlorhexidine (PERIDEX) 0 12 % oral rinse 15 mL  15 mL Swish & Spit Q12H [address]    cholecalciferol (VITAMIN D3) tablet 400 Units  400 Units Oral Daily    diazepam (VALIUM) tablet 10 mg  10 mg Oral Q6H    enoxaparin (LOVENOX) subcutaneous injection 40 mg  40 mg Subcutaneous Q24H CITLALLI    fentanyl citrate (PF) 100 MCG/2ML 50 mcg  50 mcg Intravenous J7Z PRN    folic acid (FOLVITE) tablet 1 mg  1 mg Oral Daily    ketamine 500 mg (DOUBLE CONCENTRATION) IV in sodium chloride 0 9% 250 mL  2 mg/kg/hr (Order-Specific) Intravenous Continuous    lacosamide (VIMPAT) oral solution 200 mg  200 mg Oral Q12H [address]    lactulose oral solution 30 g  30 g Oral BID    levETIRAcetam (KEPPRA) oral solution 2,500 mg  2,500 mg Oral Q12H [address]    midazolam (VERSED) 1 mg/mL (DOUBLE CONCENTRATION) 100 mL infusion  60 mg/hr Intravenous Continuous    nicotine (NICODERM CQ) 14 mg/24hr TD 24 hr patch 1 patch  1 patch Transdermal Daily    norepinephrine (LEVOPHED) 4 mg (STANDARD CONCENTRATION) IV in sodium chloride 0 9% 250 mL  1-30 mcg/min Intravenous Titrated    omeprazole (PRILOSEC) suspension 2 mg/mL  20 mg Oral Early Morning    ondansetron (ZOFRAN) injection 4 mg  4 mg Intravenous Q6H PRN    rifaximin (XIFAXAN) tablet 550 mg  550 mg Oral Q12H [facility]    thiamine tablet 100 mg  100 mg Oral Daily    topiramate (TOPAMAX) tablet 200 mg  200 mg Oral Q12H [address]       Prior to Admission medications    Medication Sig Start Date End Date Taking?  Authorizing Provider   baclofen 10 mg tablet Take 10 mg by mouth 5/7/19   Historical Provider, MD   cholecalciferol (VITAMIN D3) 1,000 units tablet     Historical Provider, MD   furosemide (LASIX) 40 mg tablet Take 40 mg by mouth 5/20/19   Historical Provider, MD   gabapentin (NEURONTIN) 300 mg capsule Take 1 capsule (300 mg total) by mouth 2 (two) times a day 8/4/20   Hannah Sell,    lactulose 20 g/30 mL Take 45 mL (30 g total) by mouth 3 (three) times a day To achieve 3 bowel movements a day 3/16/21   Mearl Grade, CRNP   spironolactone (ALDACTONE) 100 mg tablet TAKE 1 & 1/2 TABLETS BY MOUTH DAILY 5/20/19   Historical Provider, MD         VTE Mechanical Prophylaxis: sequential compression device    ==  MD [facility] Neurology Residency, PGY-2

## 2021-04-14 NOTE — CASE MANAGEMENT
CM received phone call from [facility]  Sahra Rodney stating the investigation will be closed at this time

## 2021-04-14 NOTE — PROGRESS NOTES
Progress Note - Infectious Disease   [facility] 63 y o  female MRN: [id number]  Unit/Bed#: [facility] 01 Encounter: [id number]      IMPRESSION & RECOMMENDATIONS:   Impression/Recommendations:  1  Acute hypoxic respiratory failure   Patient was intubated during rapid response for airway protection in the setting of altered mentation   Also concern for developing aspiration pneumonia as chest x-ray shows worsening bilateral airspace disease  No fevers or significant leukocytosis   Patient is hypotensive which is more likely sedation related   Serial procalcitonin levels remain negative   Sputum culture showed no growth  Completed antibiotic course      -continue to observe closely off anymore antibiotics  -monitor temperatures and hemodynamics  -monitor ventilator requirements     2  Hypotension   Showed more likely related to sedation  Kayla Sanchez consider developing acute infection, as above   No associated fever   Blood cultures all remain negative      -antibiotic plan as above  -monitor temperatures and hemodynamics  -monitor CBC     3  Acute encephalopathy   Initially presented to [facility] on 04/03 and has had waxing and waning mentation   Felt to be secondary to hepatic encephalopathy, alcohol withdrawal   Ultimately [facility] to seizure-like activity   EEG now shows status epilepticus, presumably secondary to alcohol withdrawal   Negative CT head x2   Very low suspicion for primary CNS infection given duration of symptoms, absence of fevers, and other much more plausible explanations   Patient was started on IV acyclovir for possible HSV CNS infection  Initially LP cannot be obtained due to coagulopathy   MRI does not show any temporal lobe involvement  Now status post LP showing 3 wbc's, negative ME panel   Suspicion for HSV CNS infection remains very low      -continue to observe off anymore antiviral  -anti-epileptic management as per Neurology  -follow-up LP results  -serial neuro exams  -monitor temperatures and hemodynamics     4  Alcoholic/HCV cirrhosis complicated by [address]  post ablation in    With portal vein thrombosis, hepatic encephalopathy, coagulopathy   GI follow-up ongoing      5  Bilateral lower extremity wounds   Right leg wound culture from  showed E coli and Acinetobacter   No clinical evidence of acute infection of any of the wounds on exam   Discussed with Podiatry      -no antibiotics indicated  -daily local wound care and dressing changes     6  Recent bacteriuria   Urine culture from  showed pansensitive E coli   Blood cultures were negative   Patient already received adequate antibiotic course for the possibility of UTI   No further antibiotic indicated for this      Antibiotics:  Off antibiotics 1     I discussed above plan with critical care service          Subjective:  Underwent LP yesterday which was a bloody tap  Remains hypothermic requiring Steven Hugger  No reported seizures overnight  Remains on Levophed drip  Objective:  Vitals:  Temp:  [90 °F (32 2 °C)-97 5 °F (36 4 °C)] 97 5 °F (36 4 °C)  HR:  [56-90] 80  Resp:  [11-24] 12  BP: (92-98)/(45-51) 98/50  SpO2:  [89 %-95 %] 92 %  Temp (24hrs), Av 4 °F (34 7 °C), Min:90 °F (32 2 °C), Max:97 5 °F (36 4 °C)  Current: Temperature: 97 5 °F (36 4 °C)    Physical Exam:   General:  Sedated on ventilator  Oropharynx:  ET tube in place  Neck:  Supple, no lymphadenopathy  Lungs:  Ventilated breath sounds  Abdomen:  Soft, non-tender, non-distented  Extremities:  Symmetric edema  Skin:  No rashes, no ulcers  Neurological:  Sedated on ventilator    Lab Results:  I have personally reviewed pertinent labs    Results from last 7 days   Lab Units 21  0451 21  1454 21  0818 21  0530  04/10/21  0428  21  1653   POTASSIUM mmol/L 3 5 3 7 4 0 4 0   < > 4 1   < >  --    CHLORIDE mmol/L 134* 130* 137* 129*   < > 123*   < >  --    CO2 mmol/L 17* 18* 19* 19*   < > 23   < >  --    CO2, I-STAT mmol/L  --   --   --   --   --   --   --  15*   BUN mg/dL 13 12 12 11   < > 17   < >  --    CREATININE mg/dL 0 92 1 01 1 04 1 04   < > 0 93   < >  --    EGFR ml/min/1 73sq m 68 61 59 59   < > 67   < >  --    GLUCOSE, ISTAT mg/dl  --   --   --   --   --   --   --  100   CALCIUM mg/dL 7 5* 7 9* 7 6* 8 0*   < > 8 2*   < >  --    AST U/L 35  --   --  42  --  65*   < >  --    ALT U/L 20  --   --  20  --  30   < >  --    ALK PHOS U/L 89  --   --  112  --  90   < >  --     < > = values in this interval not displayed  Results from last 7 days   Lab Units 04/14/21  0451 04/13/21  2355 04/13/21  1547 04/13/21  0530 04/12/21  0437   WBC Thousand/uL 6 19  --   --  8 27 7 91   HEMOGLOBIN g/dL 8 0* 7 8*  --  8 4* 8 6*   PLATELETS Thousands/uL 63*  --  75* 55* 44*     Results from last 7 days   Lab Units 04/13/21  1709 04/09/21  1728 04/09/21  0923 04/09/21  0908   BLOOD CULTURE   --  No Growth After 4 Days  No Growth After 5 Days  --    SPUTUM CULTURE   --   --   --  No growth   GRAM STAIN RESULT  2+ Polys  1+ Mononuclear Cells  No No bacteria seen  --   --  No Epithelial cells per low power field  1+ Polys  No bacteria seen       Imaging Studies:   I have personally reviewed pertinent imaging study reports and images in PACS  EKG, Pathology, and Other Studies:   I have personally reviewed pertinent reports

## 2021-04-14 NOTE — SOCIAL WORK
LSW joined Dr Matti Keys to discuss patient's current medical status with patient's brother and sister  Both brother and sister have been estranged from patient for 5-7 years  They state that she chose to separate herself from the family  They unfortunately are unable to identify anyone that would be able to  for [facility]  Dr Matti Keys gave a thorough medical review  With this information the siblings request that patient be changed to DNR      A follow-up meeting to discuss ventilator support will be held on Monday 19th at 3:30 via phone

## 2021-04-14 NOTE — PHYSICAL THERAPY NOTE
PT orders received  Chart reviewed  Pt currently intubated/sedated and not appropriate for PT at this time   Will continue to follow  Andrew Waterman, Pt, DPT       04/14/21 1152   PT Last Visit   PT Visit Date 04/14/21   Note Type   Note type Evaluation   Cancel Reasons Intubated/sedated

## 2021-04-14 NOTE — WOUND OSTOMY CARE
Progress Note - Wound   Michelle Hogue 63 y o  female MRN: [id number]  Unit/Bed#: MICU 01 Encounter: [id number]      Assessment:  Wound care to see patient for weekly follow-up for multiple wounds  Patient seen in MICU, critically ill  Patient is intubated and sedated, on vasopressors  Seen on critical care low air loss mattress  Patient is dependent for care, max assist of two with turning for the assessment  Foam wedges in use for repositioning  Rectal tube and olea cath in place for urinary management  Generalized edema noted  Nutrition is following along  B/l lower extremities are being followed by podiatry, care as per their recommendations  1  R posterior forearm skin tear - irregular shaped, partial thickness wound  100% pink moist tissue  No skin flap to approximate  Edges fragile and attached, no maceration  Alena-wound is intact with fragile ecchymosis  Noted edema to the RLE, and areas of well adhered dry scabbing noted  -will recommend dressing to manage the drainage/weeping due to edema  2  R buttock - unstageable pressure injury - POA  Oval shape, full thickness wound  Wound bed is approx: 80% moist adhered yellow tissue, 20% moist red/pink tissue and buds  Edges fragile and attached, no maceration  Alena-wound is intact with blanchable hyperpigmented and pink colored skin  3  L sacrum/buttock - unstageable pressure injury - POA  Round shape, full thickness wound  Wound bed is approx: 95% moist adhered yellow tissue, 5% moist red/pink tissue and buds  Edges fragile and attached, no maceration  Alena-wound is intact with blanchable hyperpigmented and pink colored skin  Areas of intact healed blanchable pink scarring  Remainder of skin to the b/l buttocks/sacrum is intact  No maceration  Recommend to continue with dressings to the buttock/sacrum wounds in place as patients continence is being managed      No induration, fluctuance, odor, warmth, redness, or purulence noted to the above noted wounds  New dressings applied  Patient tolerated well- no s/s of non-verbal pain noted during the assessment, patient is sedated  Primary nurse aware of plan of care  See flow sheets for more detailed assessment findings  Will follow along  Plan:   1- Sacral and right buttocks - cleanse with Normal saline then apply maxorb then top with allevyn foam syed with a T and date change every other day and PRN for soilage/dislodgement  2- Cleanse R posterior forearm skin tear with NSS, pat dry, apply adaptic to the wound bed and cover with maxorb  Top with ABD pad and change every day and PRN for soilage/dislodgement  3-Elevate heels to offload pressure  4-Ehob cushion when out of bed  5-Turn/repoisiton q2h or when medically stable for pressure re-distribution on skin  6-Moisturize skin daily with skin nourishing cream  7- care to b/l lower extremities as per podiatry  8- continue on low air loss mattress  9- Wound care will follow along with patient weekly, please call with questions and concerns    Objective:    Vitals: Blood pressure 98/50, pulse 80, temperature 97 5 °F (36 4 °C), temperature source Probe, resp  rate 12, weight 81 1 kg (178 lb 12 7 oz), SpO2 92 %  ,Body mass index is 27 19 kg/m²        Wound 04/04/21 Skin Tear Forearm Posterior Lateral;Right (Active)   Wound Image    04/14/21 0945   Wound Description [place] 04/14/21 1154   Alena-wound Assessment Intact, fragile, ecchymosis 04/14/21 1154   Wound Length (cm) 0 5 cm 04/14/21 0945   Wound Width (cm) 0 5 cm 04/14/21 0945   Wound Depth (cm) 0 1 cm 04/14/21 0945   Wound Surface Area (cm^2) 0 25 cm^2 04/14/21 0945   Wound Volume (cm^3) 0 02 cm^3 04/14/21 0945   Calculated Wound Volume (cm^3) 0 02 cm^3 04/14/21 0945   Tunneling 0 cm 04/14/21 0945   Tunneling in depth located at 0 04/14/21 0945   Undermining 0 04/14/21 0945   Undermining is depth extending from 0 04/14/21 0945   Drainage Amount Small 04/14/21 1154   Drainage Description Serosanguineous 04/14/21 1154   Non-staged Wound Description Partial thickness 04/14/21 0945   Treatments Cleansed;Irrigation with NSS;Site care;Elevated 04/14/21 0945   Dressing Adaptic, maxorb, ABD 04/14/21 1154   Wound packed? No 04/14/21 0945   Packing- # removed 0 04/14/21 0945   Packing- # inserted 0 04/14/21 0945   Dressing Changed New 04/14/21 0945   Patient Tolerance Tolerated well 04/14/21 0945   Dressing Status Dry;Clean; Intact 04/14/21 1154       Wound 04/04/21 Pressure Injury Buttocks Right (Active)   Wound Image   04/14/21 0949   Wound Description Beefy Red, Pink, Yellow, [place] 04/14/21 1154   Pressure Injury Stage U 04/14/21 0949   Alena-wound Assessment Intact, Hyperpigmented, pink  04/14/21 1154   Wound Length (cm) 3 cm 04/14/21 0949   Wound Width (cm) 1 5 cm 04/14/21 0949   Wound Depth (cm) 0 2 cm 04/14/21 0949   Wound Surface Area (cm^2) 4 5 cm^2 04/14/21 0949   Wound Volume (cm^3) 0 9 cm^3 04/14/21 0949   Calculated Wound Volume (cm^3) 0 9 cm^3 04/14/21 0949   Change in Wound Size % 57 14 04/14/21 0949   Tunneling 0 cm 04/14/21 0949   Tunneling in depth located at 0 04/14/21 0949   Undermining 0 04/14/21 0949   Undermining is depth extending from 0 04/14/21 0949   Drainage Amount Scant 04/14/21 1154   Drainage Description Serosanguineous  04/14/21 1154   Non-staged Wound Description Full thickness 04/14/21 0949   Treatments Cleansed;Irrigation with NSS;Site care;Elevated 04/14/21 0949   Dressing Calcium Alginate; Foam, Silicon (eg  Allevyn, etc) 04/14/21 1154   Wound packed? No 04/14/21 0949   Packing- # removed 0 04/14/21 0949   Packing- # inserted 0 04/14/21 0949   Dressing Changed New 04/14/21 0949   Patient Tolerance Tolerated well 04/14/21 0949   Dressing Status Clean;Dry; Intact 04/14/21 1154       Wound 04/04/21 Pressure Injury Buttocks Left;Mid; Inner (Active)   Wound Image   04/14/21 0949   Wound Description Beefy Red, Pink, Yellow, [facility] 04/14/21 1154   Pressure Injury Stage U 04/14/21 8702   Alena-wound Assessment Intact, Hyperpigmented, Pink 04/14/21 1154   Wound Length (cm) 0 5 cm 04/14/21 0949   Wound Width (cm) 0 5 cm 04/14/21 0949   Wound Depth (cm) 0 2 cm 04/14/21 0949   Wound Surface Area (cm^2) 0 25 cm^2 04/14/21 0949   Wound Volume (cm^3) 0 05 cm^3 04/14/21 0949   Calculated Wound Volume (cm^3) 0 05 cm^3 04/14/21 0949   Change in Wound Size % 84 38 04/14/21 0949   Tunneling 0 cm 04/14/21 0949   Tunneling in depth located at 0 04/14/21 0949   Undermining 0 04/14/21 0949   Undermining is depth extending from 0 04/14/21 0949   Drainage Amount Scant 04/14/21 1154   Drainage Description Serosanguineous  04/14/21 1154   Non-staged Wound Description Full thickness 04/14/21 0949   Treatments Cleansed;Site care;Irrigation with NSS;Elevated 04/14/21 0949   Dressing Calcium Alginate; Foam, Silicon (eg  Allevyn, etc) 04/14/21 1154   Wound packed? No 04/14/21 0949   Packing- # removed 0 04/14/21 0949   Packing- # inserted 0 04/14/21 0949   Dressing Changed New 04/14/21 0949   Patient Tolerance Tolerated well 04/14/21 0949   Dressing Status Clean;Dry; Intact 04/14/21 1154           Recommendations written as orders  AVS updated    Sallie Flores RN BSN CWON

## 2021-04-14 NOTE — DISCHARGE INSTR - OTHER ORDERS
1- Sacral and right buttocks - cleanse with Normal saline then apply maxorb then top with allevyn foam syed with a T and date change every other day and PRN for soilage/dislodgement  2- Cleanse R posterior forearm skin tear with NSS, pat dry, apply adaptic to the wound bed and cover with maxorb  Top with ABD pad and change every day and PRN for soilage/dislodgement  3-Elevate heels to offload pressure  4-Ehob cushion when out of bed  5-Turn/repoisiton q2h or when medically stable for pressure re-distribution on skin  6-Moisturize skin daily with skin nourishing cream  7- care to b/l lower extremities as per podiatry

## 2021-04-14 NOTE — RESPIRATORY THERAPY NOTE
RT Ventilator Management Note  Matthew Celaya 63 y o  female MRN: [id number]  [facility] [id number]      Daily Screen       4/13/2021  0715 4/14/2021  0700          Patient safety screen outcome[de-identified]  Failed  Failed      Not Ready for Weaning due to[de-identified]  Underline problem not resolved  Underline problem not resolved              Physical Exam:   Assessment Type: Assess only  General Appearance: Sedated  Respiratory Pattern: Assisted, Normal  Chest Assessment: Chest expansion symmetrical  Bilateral Breath Sounds: Coarse  R Breath Sounds: Coarse  L Breath Sounds: Coarse  Cough: None  Suction: ET Tube  O2 Device: ventilator      Resp Comments: Pt tolerating current vent settings and appears to be resting comfortably  No vent changes at this time  Plan to discuss vent management with critical care team  Will continue to monitor and assess per resp protocol

## 2021-04-14 NOTE — OCCUPATIONAL THERAPY NOTE
OT CANCEL NOTE    OT orders received  Chart reviewed  Pt is currently intubated/sedated and not appropriate to engage in skilled OT services at this time  Will hold initial OT evaluation  Will continue to follow pt on caseload and see pt when medically stable and as clinically appropriate         04/14/21 7737   Note Type   Cancel Reasons Intubated/sedated       Anatoliy Dunaway MS, OTR/L

## 2021-04-14 NOTE — RESPIRATORY THERAPY NOTE
RT Ventilator Management Note  [facility] 63 y o  female MRN: [id number]  Unit/Bed#: MICU 01 Encounter: [id number]      Daily Screen       4/12/2021  0717 4/13/2021  0715          Patient safety screen outcome[de-identified]  Failed  Failed      Not Ready for Weaning due to[de-identified]  Underline problem not resolved  Underline problem not resolved              Physical Exam:   Assessment Type: (P) Assess only  General Appearance: (P) Sedated  Respiratory Pattern: (P) Assisted  Chest Assessment: (P) Chest expansion symmetrical  Bilateral Breath Sounds: (P) Clear  O2 Device: (P) vent      Resp Comments: (P) No vent changes made overnight  Pt is stable  Will cont to monitor pt per protocol

## 2021-04-14 NOTE — PROGRESS NOTES
Progress Note - Critical Care   Jorge Da Silva 63 y o  female MRN: [id number]  Unit/Bed#: [facility] 01 Encounter: [id number]    Assessment & Plan:   HPI/24HR Event:  LP performed by Diagnostic Radiology despite INR of 1 6 and collected a few blood tinged samples   1 unit of FFP administered after lumbar puncture   H&H postprocedure was stable   Patient hypothermic overnight so received bear zafarer  Per epileptology no seizure event overnight and suggested considering weaning her off of her current sedation        ROS  Patient intubated     Assessment & Plan:   Principle problem:  - Status epilepticus  Active problems:   - 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 EtOH abuse   - Hepatocellular carcinoma  - Cellulitis  - Anemia and thrombocytopenia      - Neuro  - Dx: Refractory Status epilepticus    - Infusion                         - Ketamine at 2mg/kg/hr                         - Versed at 60     - Consider weaning off of versed              - Keppra at 2 5g daily and vimpat 200mg BID              - Valium PO to 10mg q6h              - 4/13 LP completed w/ INR 1 64 despite 2 units of FFP    - Received 1 additional unit of FFP post procedure    - 1st tube clear CSF fluid following tubes contained blood                           - Hb this morning 8    - LP labs pending               - Continue video EEG              - Appreciate recommendation from Neuro      CV:   - Dx: Shock state most likely medication induced               - Levophed 12              - May consider Albumin   - ECHO showed EF of 60% with no valvular disorder  - Dx:  Tachycardia               - Amio drip started yesterday with rate control   - MAP goal > 65         Lung:   - Dx: Acute respiratory failure 2/2 seizures  - Vent day 7                - SCMV 12/400/6/40              - Continue VAP precautions  - Dx: Mild Apical pneumothorax              - Continue to monitor airway pressures on vent   - Continue Respiratory Protocol and Airway Clearance Protocol        GI:   - Dx: 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 / [address]  post ablation              - Continue lactulose 30g TID and rifaximin 550mg BID for HE   - MELD 17    - Stress ulcer ppx: Prilosec  - Restarted on TF today            FEN: Hypernatremia and hyperchloremia  - Fluids: D5W @ 50ml/hr for hypernatremia  - Electrolytes: Will trend and replete as needed  - Nutrition: Will restart TF               - Per nutrition rec Jevity 1 2 @ 66 with  q6hr     :   - Dx: Resolved DARRYL   - Cr 0 92 this AM  - Dx: Volume overload              - Received another 40 of lasix yesterday   - Urine output (1 5L in 24hr)   - Still net positive 2/2 Blood products and medication gtt               - Consider scheduled lasix to address net positive I/Os              - BMP BID  - [place] in place      ID:   - Dx: Polymycrobial LE wound              - Per ID day 5 is her last day of unasyn so d/c   - Dx: Hypothermia of unknown etiology   - Patient in bear hugger   - Temperature improving   - 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              - MRI inconclusive              - 4/12 Acyclovir discontinued  - Monitor WBC/temp curve     Heme:   - Dx: Anemia and thrombocytopenia  - Hb: 8 from 7 8  - Continue to monitor Hb and platelet in addition to INR              - Haptoglobin <10 w/ Fib 149 (Likely due to cirrhosis) and no schistocytes              - No signs of active bleeding or hemolytic anemia  - Pt: Post procedure platelet 63  - DVT ppx: Hold pharm ppx, SCDs       Endo:   - Dx: No acute issues  - Goal -180     Msk/Skin:   - Dx: Bilateral lower extremity wounds              - Wound care and Podiatry service managing  - Turning/repositioning    Disposition: Continue ICU care      Invasive lines and devices:   Invasive Devices     Central Venous Catheter Line            CVC Central Lines 04/09/21 Triple 20cm 4 days          Peripheral Intravenous Line            Peripheral IV 04/12/21 Proximal;Right;Ventral (anterior) Forearm 1 day Arterial Line            Arterial Line 21 Radial 4 days          Drain            Rectal Tube 6 days    NG/OG/Enteral Tube 18 Fr Center mouth 5 days    Urethral Catheter Temperature probe 16 Fr  5 days          Airway            ETT  Oral 7 5 mm 5 days                   Physical exam  General: Intubated  Neuro: GCS 3T (Eye 1, Verbal 1, Motor 1)  HENT: Normocephalic and atraumatic, PERRL   - Scleral icterus   Heart: RRR, pulses intact  Lungs: Bilateral breath sounds present  Abdomen:  Bowel sounds present, soft  Skin:  Extensive bilateral lower extremity wounds wrapped in dressing     Vitals  Temperature:   Temp (24hrs), Av 6 °F (34 8 °C), Min:90 °F (32 2 °C), Max:97 2 °F (36 2 °C)    Current: Temperature: (!) 97 2 °F (36 2 °C)    Vitals:    21 0615 21 0630 21 0645 21 0700   BP:       Pulse: 76 76 78    Resp:       Temp: (!) 96 4 °F (35 8 °C) (!) 97 2 °F (36 2 °C) (!) 97 2 °F (36 2 °C)    TempSrc:       SpO2:  94%  94%   Weight:         Arterial Line BP: 92/46  Arterial Line MAP (mmHg): 62 mmHg      Intake and Outputs:  I/O        07 -  0700  07 -  0700    I V  (mL/kg) 4096 2 (50 5) 5439 8 (67 1)    Blood 300 845    NG/GT 1600 270    IV Piggyback 1000 1170    Feedings 615     Total Intake(mL/kg) 7611 2 (93 8) 7724 8 (95 3)    Urine (mL/kg/hr) 3160 (1 6) 1530 (0 8)    Emesis/NG output  1250    Stool 1450 100    Total Output 4610 2880    Net +3001 2 +4844 8                    Labs:   Results from last 7 days   Lab Units 21  0451 21  2355 21  1547 21  0530 21  0437   WBC Thousand/uL 6 19  --   --  8 27 7 91   HEMOGLOBIN g/dL 8 0* 7 8*  --  8 4* 8 6*   HEMATOCRIT % 26 2* 25 5*  --  28 3* 28 1*   PLATELETS Thousands/uL 63*  --  75* 55* 44*   NEUTROS PCT % 74  --   --  76* 62   MONOS PCT % 6  --   --  8 9      Results from last 7 days   Lab Units 21  0451 21  1454 21  0818 21  0530  04/10/21  0428  21  1655 SODIUM mmol/L 156* 155* 158* 157*   < > 150*   < >  --    POTASSIUM mmol/L 3 5 3 7 4 0 4 0   < > 4 1   < >  --    CHLORIDE mmol/L 134* 130* 137* 129*   < > 123*   < >  --    CO2 mmol/L 17* 18* 19* 19*   < > 23   < >  --    CO2, I-STAT mmol/L  --   --   --   --   --   --   --  15*   BUN mg/dL 13 12 12 11   < > 17   < >  --    CREATININE mg/dL 0 92 1 01 1 04 1 04   < > 0 93   < >  --    CALCIUM mg/dL 7 5* 7 9* 7 6* 8 0*   < > 8 2*   < >  --    ALK PHOS U/L 89  --   --  112  --  90   < >  --    ALT U/L 20  --   --  20  --  30   < >  --    AST U/L 35  --   --  42  --  65*   < >  --    GLUCOSE, ISTAT mg/dl  --   --   --   --   --   --   --  100    < > = values in this interval not displayed  Results from last 7 days   Lab Units 04/14/21  0451 04/13/21  0530 04/12/21  0515   MAGNESIUM mg/dL 2 2 1 9 1 8     Results from last 7 days   Lab Units 04/13/21  0530 04/12/21  0515 04/11/21  0405   PHOSPHORUS mg/dL 4 5 3 9 2 3*      Results from last 7 days   Lab Units 04/13/21  2150 04/13/21  1454 04/12/21  1741   INR  1 73* 1 64* 1 86*     Results from last 7 days   Lab Units 04/09/21  0059   LACTIC ACID mmol/L 1 5           Other Labs  - Meningitis/Encephalitis Panel: Normal   - RBC count,CSF: 3746  - Protein CSF: 100     Pending:   - SHANNON  - Gram stain CSF  - Spinal fluid culture   - Fungal culture  - West Nile  - Leukemia/Lymphoma flow cytometry  - Protein Electrophoresis  - Myelin basic protein  - MS panel       Imaging Studies        Non-Invasive/Invasive Ventilation Settings:  Respiratory    Lab Data (Last 4 hours)    None         O2/Vent Data (Last 4 hours)      04/14 0700          Patient safety screen outcome: Failed                 No results found for: PHART, LMI0MXS, PO2ART, YWR8ZFZ, U4AHPEQT, BEART, SOURCE    Imaging:   MRI brain seizure wo and w contrast   Final Result by Shilpa Zamorano MD (04/11 1723)         1    Subtle, faint diffusion restriction in the pulvinar of the right thalamus with associated T2/FLAIR hyperintensity, a nonspecific finding which may be related to subacute infarction, which statistically would probably be the most likely differential    diagnosis  Other unusual differential considerations would include Behcet's disease or other connective tissue disorders like Sjogren's syndrome  Infectious encephalitis disease such as West Nile virus could be considered in the appropriate clinical    setting  A nonenhancing glioma is also possible as are reports of status epilepticus  The unilateral appearance argues against prion disease such as CJD  Further clinical assessment recommended  Recommend repeat follow-up contrast enhanced MRI of the    brain in 6-12 weeks to assess for stability  2   A few white matter lesions elsewhere may represent microangiopathy, although given the findings in the right thalamic pulvinar, other etiologies are certainly possible  3  No MR evidence of mesial temporal sclerosis  4   No classic MR findings of herpes encephalitis  Further clinical assessment and follow-up recommended  Workstation performed: [id number]         XR chest portable ICU   Final Result by Constance Schilling MD (04/11 1142)      Small left apical pneumothorax, unchanged from previous examination  Unchanged bilateral pleural effusions and pulmonary parenchymal airspace opacities  Workstation performed: [id number]         XR chest portable ICU   Final Result by Sanya Meza MD (04/10 1253)      Small left apical pneumothorax identified possibly slightly increased from prior exam       Bilateral alveolar opacities in keeping with pneumonia  Small bilateral pleural effusions  Workstation performed: [id number]         XR chest portable ICU   Final Result by Wendy Smyth MD (04/09 1610)   Left internal jugular central line terminates in the right atrium, and should be pulled back by 3 cm  Unchanged small left pneumothorax        Worsening bilateral pneumonia  Stable small left pleural effusion  The study was marked in [facility] for immediate notification  Workstation performed: [id number]         [facility] [place]-patient lumbar puncture    (Results Pending)       Micro:  Lab Results   Component Value Date    BLOODCX No Growth After 4 Days  04/09/2021    BLOODCX No Growth After 4 Days  04/09/2021    BLOODCX No Growth After 5 Days   04/03/2021    URINECX >100,000 cfu/ml Escherichia coli (A) 04/03/2021    WOUNDCULT 4+ Growth of Escherichia coli (A) 04/04/2021    WOUNDCULT 4+ Growth of Acinetobacter baumannii (A) 04/04/2021    WOUNDCULT 4+ Growth of  04/04/2021    SPUTUMCULTUR No growth 04/09/2021         Allergies: No Known Allergies      Medications:   Scheduled Meds:  Current Facility-Administered Medications   Medication Dose Route Frequency Provider Last Rate    amiodarone  0 5 mg/min Intravenous Continuous Alexandra Myers, OLIVERIONP 0 5 mg/min (04/13/21 1921)    chlorhexidine  15 mL Swish & Spit Q12H [address] Fredie Vinh, PA-C      cholecalciferol  400 Units Oral Daily Fredie Vinh, PA-C      dextrose  50 mL/hr Intravenous Continuous Vj Murray MD 50 mL/hr (04/14/21 0321)    diazepam  10 mg Oral Q6H Jason Jane, CRNP      folic acid  1 mg Oral Daily Jason Jane, CRNP      ketamine  2 mg/kg/hr (Order-Specific) Intravenous Continuous Jason Jane, CRNP 2 mg/kg/hr (04/14/21 0700)    lacosamide  200 mg Oral Q12H [address] SILVANO Jones      lactulose  30 g Oral BID Alexandra Ramosest, SILVANO      levETIRAcetam  2,500 mg Oral Q12H [address] SILVANO Hackett      midazolam  60 mg/hr Intravenous Continuous Jason Jane, CRNP 60 mg/hr (04/14/21 0625)    nicotine  1 patch Transdermal Daily Fredie Vinh, PA-C      norepinephrine  1-30 mcg/min Intravenous Titrated Fredie Vinh, PA-C 12 mcg/min (04/14/21 0634)    omeprazole (PRILOSEC) suspension 2 mg/mL  20 mg Oral Early Morning SILVANO Luis      ondansetron 4 mg Intravenous Q6H PRN Monty Mandujano PA-C      phytonadione  5 mg Oral Daily SILVANO Ponce      rifaximin  550 mg Oral Q12H [facility] Monty Mandujano PA-C      thiamine  100 mg Oral Daily SILVANO Ponce      topiramate  200 mg Oral Q12H [facility] Anni Burris,        Continuous Infusions:amiodarone, 0 5 mg/min, Last Rate: 0 5 mg/min (04/13/21 1921)  dextrose, 50 mL/hr, Last Rate: 50 mL/hr (04/14/21 0321)  ketamine, 2 mg/kg/hr (Order-Specific), Last Rate: 2 mg/kg/hr (04/14/21 0700)  midazolam, 60 mg/hr, Last Rate: 60 mg/hr (04/14/21 5194)  norepinephrine, 1-30 mcg/min, Last Rate: 12 mcg/min (04/14/21 0634)      PRN Meds:  ondansetron, 4 mg, Q6H PRN        Counseling / Coordination of Care  Total Critical Care time spent 30 minutes excluding procedures, teaching and family updates  Code Status: Level 1 - Full Code     Portions of the record may have been created with voice recognition software  Occasional wrong word or "sound a like" substitutions may have occurred due to the inherent limitations of voice recognition software  Read the chart carefully and recognize, using context, where substitutions have occurred       Charline Mackenzie MD

## 2021-04-15 NOTE — RESPIRATORY THERAPY NOTE
RT Ventilator Management Note  Thania Mireles 63 y o  female MRN: [id number]  Unit/Bed#: [facility] 01 Encounter: [id number]      Daily Screen       4/14/2021  0700 4/15/2021  0715          Patient safety screen outcome[de-identified]  Failed  Failed      Not Ready for Weaning due to[de-identified]  Underline problem not resolved  Underline problem not resolved              Physical Exam:   Assessment Type: Assess only  General Appearance: Sedated  Respiratory Pattern: Assisted, Normal  Chest Assessment: Chest expansion symmetrical  Bilateral Breath Sounds: Coarse  R Breath Sounds: Coarse  L Breath Sounds: Coarse  Cough: None  Suction: ET Tube  O2 Device: ventilator      Resp Comments: Pt tolerating current vent settings and appears to be resting comfortably  Pt appears to have vomited  Moderate amount of yellow, material suctioned via inline and hi/lo tube  Spo2 at 86%  Increased PEEP and fio2  Plan to discuss vent management with critcal care team  Will continue to monitor and asses per resp protocol

## 2021-04-15 NOTE — PROGRESS NOTES
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 and [address]  who was initially admitted to [facility] with altered mental status  Rapid response was activated on 4/8 due to witnessed seizure activity and neurology was consulted for further assessment  She was given 3 mg Ativan total and 1000 mg Keppra was ordered to be given  During the evaluation by neurology on 4/8 she was noted to have leftward gaze with horizontal nystagmus  CTH was completed and showed no acute abnormalities  Due to concern for ongoing seizure activity decsion was made to transfer patient to [facility] for vEEG  Patient was transferred to intensive care unit and ultimately intubated  · Timeline of Events:   ·  4/08: Lorazapam 1mg IV, > Lorazapam 1mg IV > Keppra 2G IV > ativan 2mg IV > on 4/8/ 22:20: ativan 2mg IV  · 4/09: 05:30 Propofol ggt started at 30 then increased to 50  and at: 06:03 Lorazepam 2mg IV > 09:32 Keppra 750 mg > Right temporal seizures started around 14:15 and occurred every 2 minutes roughly > 15:04 Lorazapam 5mg IV >15:30 no significant improvement >4/09: 15:48 Lorazapam 8mg IV >16:04 no significant improvement > 16:19 Lorazapam 10mg IV >16:20 Keppra 1 5G IV > 16:54 5mg Valium >16:58 slight improvement, but persistent seizures >17:13 Midazolam ggt titration >17:25 Lorazapam 10mg IV  · 4/10: refractory status-On Versed gtt increased to 60, Propafol gtt at 100, Vimpat load, valium 20 mg, Ketamine bolus and ketamine 2 mg/kg gtt, > v EEG shows further seizure activity in left temporal region   At 13:32- No seizures EEG completely suppressed except for isolated right temporal discharges occurring every 15-40 seconds  · 4/11: Propofol at 100 , Ketamine at 2mg/kg/hr, Versed at 60 and Keppra dose to 2 5g and vimpat to 200mg BID, no further seizures on EMU, got her MRI brain, and plan to decrease Propofol by 10 mg/hr  · 4/11: MRI brain with and without: inconclusive   · 4/12: Continued to decrease Propofol, propofol stopped around 1900  · 4/13: 2 episodes of right hemispheric seizures at 0039 and 0336  Loaded with Topamax 400 mg and continue with Topamax 200mg BID with maintained Versed at 60, Ketamine at 20, keppra 2 5mg BID and Vimpat 200 q12  · 4/13: LP done  · 4/14:  Weaned off of ketamine  · 4/15:  Planned for weaning off of midazolam  Plan:   · Current AEDs:  · Continue Keppra 2500 BID maintained dose   · Continue Vimpat 200 mg bid  · Loaded with Tomapax 4/13 with 400, continue 200mg BID  · Current Sedation: on Versed at 60, plan to wean by 10 q6  · Continue vEEG monitoring  · Continue Seizure precautions  · MRI brain with and without contrast obtained, however inconclusive  · ID on board, recommendations appreciated   · Received a total of 11 days Abx, and 4 days of Antiviral-Acyclovir, d/c on 4/12  · Medical management as per critical care team    · LP on 4/13/2021  · Pending CSF Labs : [facility] panel, West Nile, MS panel, Flow Cytology, MS panel, Myelin Basic Protein,   · Normal: Diff, Gram stain, White count, Glucose  (65), ME, Flow Cytometry   · Abnormal: Xanthrochromia, Elevated CSF protein (100), RBC count: (3,746),     LP studies:    Results from last 7 days   Lab Units 04/13/21  1709   APPEARANCE CSF  hazy/bloody   WBC CSF /uL 3   GLUCOSE CSF mg/dL 65   RBC CSF uL 3,746*   TOTAL COUNTED  100   NEUTROPHILS % (CSF) % 74   LYMPHS % (CSF) % 19   MONOCYTES % (CSF) % 5   EOSINOPHILS % (CSF) % 2   CSF CULTURE  No growth   PROTEIN CSF mg/dL 100*     MRSA (+) 4/3  Urine 4/3 (+) E  Coli  Bld Cx 4/9 no growth  Sputum Cx 4/9 1+ pmn no bacteria  Wound Cx 4/4 (+) E coli/acinetobacter     Hypernatremia  Assessment & Plan  · Continue watching Acid/Base Status  · Recommend increasing Free water flushes  · Continue monitoring Sodium            SUBJECTIVE     Patient was seen and examined  Review of Systems   Unable to perform ROS: Intubated       OBJECTIVE     Patient ID: Garry Elkins is a 63 y o  female  Vitals:    04/15/21 1515 04/15/21 1551 04/15/21 1600 04/15/21 1630   BP:       BP Location:       Pulse:   (!) 108 (!) 110   Resp:   12 12   Temp: 98 6 °F (37 °C)  97 9 °F (36 6 °C) 98 2 °F (36 8 °C)   TempSrc:       SpO2:  94% 94% 95%   Weight:          Temperature:   Temp (24hrs), Av 3 °F (36 3 °C), Min:93 9 °F (34 4 °C), Max:99 °F (37 2 °C)    Temperature: 98 2 °F (36 8 °C)      Physical Exam  Vitals signs and nursing note reviewed  Constitutional:       Appearance: She is ill-appearing and toxic-appearing  Interventions: She is sedated, intubated and restrained  HENT:      Head: Normocephalic and atraumatic  Nose: Nose normal    Pulmonary:      Effort: She is intubated  Comments: Ventilate via ETT  Musculoskeletal:         General: Swelling present  Skin:     Comments: B/l LE wounds   Neurological:      Deep Tendon Reflexes:      Reflex Scores:       Bicep reflexes are 0 on the right side and 0 on the left side  Brachioradialis reflexes are 0 on the right side and 0 on the left side  Patellar reflexes are 0 on the right side and 0 on the left side  Achilles reflexes are 0 on the right side and 0 on the left side  Neurologic Exam     Mental Status   Patient was examined on 61 of versed She was not able to participate in a formal neurological assessment      Cranial Nerves     CN III, IV, VI   Right pupil: Reactivity: non-reactive  Left pupil: Reactivity: non-reactive  Vestibulo-ocular reflex: absent  Patient was unable to participate in an exam  She had no gag or cough present, she had no corneal reflexes and did not blink on eyelash stroke        Motor Exam   Muscle bulk: decreased  Overall muscle tone: decreased  Right arm tone: decreased  Left arm tone: decreased  Right leg tone: decreased  Left leg tone: decreasedPatient did not respond to nox stim in all four extremities      Sensory Exam   Did not respond to nox stim proximally or distally in all 4 extremities  Gait, Coordination, and Reflexes     Tremor   Resting tremor: absent  Intention tremor: absent  Action tremor: absent    Reflexes   Right brachioradialis: 0  Left brachioradialis: 0  Right biceps: 0  Left biceps: 0  Right patellar: 0  Left patellar: 0  Right achilles: 0  Left achilles: 0  Right plantar: equivocal  Left plantar: equivocal  Right Card: absent  Left Card: absent  Right ankle clonus: absent  Left ankle clonus: absent         LABORATORY DATA     Labs: I have personally reviewed pertinent reports  Results from last 7 days   Lab Units 04/15/21  1036 04/15/21  0446 04/14/21  0451 04/13/21  0530 04/12/21  0437   WBC Thousand/uL 8 03 8 04 6 19  --  8 27 7 91   HEMOGLOBIN g/dL 8 7* 8 5* 8 0*   < > 8 4* 8 6*   HEMATOCRIT % 27 6* 27 1* 26 2*   < > 28 3* 28 1*   PLATELETS Thousands/uL 71* 75* 63*   < > 55* 44*   NEUTROS PCT %  --   --  74  --  76* 62   MONOS PCT %  --   --  6  --  8 9   MONO PCT % 7  --   --   --   --   --     < > = values in this interval not displayed        Results from last 7 days   Lab Units 04/15/21  0446 04/14/21  0451 04/13/21  1454  04/13/21  0530  04/08/21  1653   SODIUM mmol/L 153* 156* 155*   < > 157*   < >  --    POTASSIUM mmol/L 4 0 3 5 3 7   < > 4 0   < >  --    CHLORIDE mmol/L 129* 134* 130*   < > 129*   < >  --    CO2 mmol/L 17* 17* 18*   < > 19*   < >  --    CO2, I-STAT mmol/L  --   --   --   --   --   --  15*   BUN mg/dL 20 13 12   < > 11   < >  --    CREATININE mg/dL 1 19 0 92 1 01   < > 1 04   < >  --    CALCIUM mg/dL 8 2* 7 5* 7 9*   < > 8 0*   < >  --    ALK PHOS U/L 95 89  --   --  112   < >  --    ALT U/L 35 20  --   --  20   < >  --    AST U/L 97* 35  --   --  42 < >  --    GLUCOSE, ISTAT mg/dl  --   --   --   --   --   --  100    < > = values in this interval not displayed  Results from last 7 days   Lab Units 04/15/21  0446 04/14/21  0451 04/13/21  0530   MAGNESIUM mg/dL 2 1 2 2 1 9     Results from last 7 days   Lab Units 04/15/21  0446 04/13/21  0530 04/12/21  0515   PHOSPHORUS mg/dL 5 1* 4 5 3 9      Results from last 7 days   Lab Units 04/15/21  1036 04/15/21  0446 04/13/21  2150   INR  2 17* 2 03* 1 73*     Results from last 7 days   Lab Units 04/15/21  1036   LACTIC ACID mmol/L 1 8     Results from last 7 days   Lab Units 04/08/21  1701   TROPONIN I ng/mL <0 02       IMAGING & DIAGNOSTIC TESTING     Radiology Results: I have personally reviewed pertinent reports  XR abdomen 1 view kub   Final Result by Lizeth Dewey DO (04/15 1034)      Dilated loops of small bowel, suggestive of ileus  Continued follow-up is recommended to exclude obstruction  No free air  Tube or drain tip overlies the gastroesophageal junction  If this represents an enteric tube, this should be advanced  Correlate clinically for appropriate position  The study was marked in [facility] for immediate notification  Workstation performed: [id number]         XR chest portable ICU   Final Result by Lizeth Dewey DO (04/15 1036)      Unchanged left hydropneumothorax  Hazy left greater than right lung consolidations unchanged  Additional probable enteric tube overlying the gastroesophageal junction, although this could be external to the patient  Correlate with external devices  Workstation performed: [id number]         [facility] [place]-patient lumbar puncture   Final Result by Nolan Mcnamara MD (04/14 7438)      Successful fluoroscopically guided lumbar puncture with an opening pressure of 41 cm H2O  Approximately 16 5 mL's of CSF was sent to lab for requested analysis    As noted above, the patient's CSF was initially clear yellow and became bloody during the    collection process  The needle was withdrawn slightly and the CSF fluid slowly cleared  After a discussion with the MICU team, the patient will receive an additional unit of FFP post procedure  Please feel free to contact us with any questions or concerns  I reviewed the above findings and procedure with Dr Kavon Moeller  PERFORMED, DICTATED AND SIGNED BY: Wagner Whatley PA-C         Workstation performed: [id number]         MRI brain seizure wo and w contrast   Final Result by David Young MD (04/11 2933)         1  Subtle, faint diffusion restriction in the pulvinar of the right thalamus with associated T2/FLAIR hyperintensity, a nonspecific finding which may be related to subacute infarction, which statistically would probably be the most likely differential    diagnosis  Other unusual differential considerations would include Behcet's disease or other connective tissue disorders like Sjogren's syndrome  Infectious encephalitis disease such as West Nile virus could be considered in the appropriate clinical    setting  A nonenhancing glioma is also possible as are reports of status epilepticus  The unilateral appearance argues against prion disease such as CJD  Further clinical assessment recommended  Recommend repeat follow-up contrast enhanced MRI of the    brain in 6-12 weeks to assess for stability  2   A few white matter lesions elsewhere may represent microangiopathy, although given the findings in the right thalamic pulvinar, other etiologies are certainly possible  3  No MR evidence of mesial temporal sclerosis  4   No classic MR findings of herpes encephalitis  Further clinical assessment and follow-up recommended  Workstation performed: [id number]         XR chest portable ICU   Final Result by Wan Rodríguez MD (04/11 1082)      Small left apical pneumothorax, unchanged from previous examination        Unchanged bilateral pleural effusions and pulmonary parenchymal airspace opacities  Workstation performed: [id number]         XR chest portable ICU   Final Result by Corey Ortega MD (04/10 1253)      Small left apical pneumothorax identified possibly slightly increased from prior exam       Bilateral alveolar opacities in keeping with pneumonia  Small bilateral pleural effusions  Workstation performed: [id number]         XR chest portable ICU   Final Result by Jayde Segal MD (04/09 1610)   Left internal jugular central line terminates in the right atrium, and should be pulled back by 3 cm  Unchanged small left pneumothorax  Worsening bilateral pneumonia  Stable small left pleural effusion  The study was marked in [facility] for immediate notification  Workstation performed: [id number]             Other Diagnostic Testing: I have personally reviewed pertinent reports        ACTIVE MEDICATIONS     Current Facility-Administered Medications   Medication Dose Route Frequency    chlorhexidine (PERIDEX) 0 12 % oral rinse 15 mL  15 mL Swish & Spit Q12H [address]    cholecalciferol (VITAMIN D3) tablet 400 Units  400 Units Oral Daily    dextrose 5 % infusion  50 mL/hr Intravenous Continuous    diazepam (VALIUM) tablet 10 mg  10 mg Oral Q6H    enoxaparin (LOVENOX) subcutaneous injection 40 mg  40 mg Subcutaneous Q24H CITLALLI    fentanyl citrate (PF) 100 MCG/2ML 50 mcg  50 mcg Intravenous E0N PRN    folic acid (FOLVITE) tablet 1 mg  1 mg Oral Daily    lacosamide (VIMPAT) oral solution 200 mg  200 mg Oral Q12H [address]    lactulose oral solution 30 g  30 g Oral TID    levETIRAcetam (KEPPRA) oral solution 2,500 mg  2,500 mg Oral Q12H [address]    midazolam (VERSED) 1 mg/mL (DOUBLE CONCENTRATION) 100 mL infusion  50 mg/hr Intravenous Continuous    nicotine (NICODERM CQ) 14 mg/24hr TD 24 hr patch 1 patch  1 patch Transdermal Daily    norepinephrine (LEVOPHED) 4 mg (STANDARD CONCENTRATION) IV in sodium chloride 0 9% 250 mL  1-30 mcg/min Intravenous Titrated    omeprazole (PRILOSEC) suspension 2 mg/mL  20 mg Oral Early Morning    ondansetron (ZOFRAN) injection 4 mg  4 mg Intravenous Q6H PRN    rifaximin (XIFAXAN) tablet 550 mg  550 mg Oral Q12H CITLALLI    thiamine tablet 100 mg  100 mg Oral Daily    topiramate (TOPAMAX) tablet 200 mg  200 mg Oral Q12H [address]       Prior to Admission medications    Medication Sig Start Date End Date Taking?  Authorizing Provider   baclofen 10 mg tablet Take 10 mg by mouth 5/7/19   Historical Provider, MD   cholecalciferol (VITAMIN D3) 1,000 units tablet     Historical Provider, MD   furosemide (LASIX) 40 mg tablet Take 40 mg by mouth 5/20/19   Historical Provider, MD   gabapentin (NEURONTIN) 300 mg capsule Take 1 capsule (300 mg total) by mouth 2 (two) times a day 8/4/20   Tory Modi DO   lactulose 20 g/30 mL Take 45 mL (30 g total) by mouth 3 (three) times a day To achieve 3 bowel movements a day 3/16/21   SILVANO Garcia   spironolactone (ALDACTONE) 100 mg tablet TAKE 1 & 1/2 TABLETS BY MOUTH DAILY 5/20/19   Historical Provider, MD         VTE Mechanical Prophylaxis: sequential compression device    ==  MD Marija Garcia's Neurology Residency, PGY-2

## 2021-04-15 NOTE — RESPIRATORY THERAPY NOTE
RT Ventilator Management Note  Micaela Maya 63 y o  female MRN: [id number]  Unit/Bed#: [facility] 01 Encounter: [id number]      Daily Screen       4/13/2021  0715 4/14/2021  0700          Patient safety screen outcome[de-identified]  Failed  Failed      Not Ready for Weaning due to[de-identified]  Underline problem not resolved  Underline problem not resolved              Physical Exam:   Assessment Type: Assess only  General Appearance: Sedated  Respiratory Pattern: Assisted  Chest Assessment: Chest expansion symmetrical  Bilateral Breath Sounds: Coarse      Resp Comments: (P) Pt remains on the current vent setting with no issues at this time  Bs coarse before suctioning  Will cont' monitor

## 2021-04-15 NOTE — OCCUPATIONAL THERAPY NOTE
OT CANCEL NOTE    OT orders received  Chart reviewed  Pt is currently intubated/sedated and not appropriate to engage in skilled OT services at this time  Will hold initial OT evaluation  Will continue to follow pt on caseload and see pt when medically stable and as clinically appropriate         04/15/21 7321   Note Type   Cancel Reasons Intubated/sedated       Kit Shah MS, OTR/L

## 2021-04-15 NOTE — PROGRESS NOTES
Progress Note - Infectious Disease   Leann Santos 63 y o  female MRN: [id number]  Unit/Bed#: [facility] 01 Encounter: [id number]      IMPRESSION & RECOMMENDATIONS:   Impression/Recommendations:  1  Acute hypoxic respiratory failure   Patient was intubated during rapid response for airway protection in the setting of altered mentation   Also concern for developing aspiration pneumonia as chest x-ray shows worsening bilateral airspace disease  No fevers or significant leukocytosis   Patient is hypotensive which is more likely sedation related   Serial procalcitonin levels remain low  Recent sputum culture showed no growth  Patient completed appropriate course of antibiotics     -continue to observe closely off anymore antibiotics  -monitor temperatures and hemodynamics  -monitor ventilator requirements     2  Hypotension   More likely related to sedation   Also consider developing acute infection, as above   No associated fever   Blood cultures all remain negative  Remains on Levophed drip      -antibiotic plan as above  -monitor temperatures and hemodynamics  -monitor CBC     3  Acute encephalopathy   Initially presented to [facility] on 04/03 and has had waxing and waning mentation   Felt to be secondary to hepatic encephalopathy, alcohol withdrawal   Ultimately transferred to [facility] to seizure-like activity   EEG now shows status epilepticus, presumably secondary to alcohol withdrawal   Negative CT head x2   Very low suspicion for primary CNS infection given duration of symptoms, absence of fevers, and other much more plausible explanations   Patient was started on IV acyclovir for possible HSV CNS infection    Initially LP cannot be obtained due to coagulopathy   MRI does not show any temporal lobe involvement  Now status post LP showing 3 wbc's, negative ME panel  Suspicion for HSV CNS infection remains very low      -continue to observe off anymore antiviral  -anti-epileptic management as per Neurology  -serial neuro exams  -monitor temperatures and hemodynamics  -palliative care follow-up ongoing     4  Alcoholic/HCV cirrhosis complicated by [address]  post ablation in    With portal vein thrombosis, hepatic encephalopathy, coagulopathy   GI follow-up ongoing      5  Bilateral lower extremity wounds   Right leg wound culture from  showed E coli and Acinetobacter   No clinical evidence of acute infection of any of the wounds on exam   Discussed with Podiatry      -no antibiotics indicated  -daily local wound care and dressing changes     6  Recent bacteriuria   Urine culture from  showed pansensitive E coli   Blood cultures were negative   Patient already received adequate antibiotic course for the possibility of UTI   No further antibiotic indicated for this      Antibiotics:  Off antibiotics 2         Subjective:  Remains sedated on ventilator  Remains on Levophed drip at 16 an hour  No reported seizure activity  Increased secretions from ET tube  No fevers  Objective:  Vitals:  Temp:  [93 9 °F (34 4 °C)-99 °F (37 2 °C)] 96 8 °F (36 °C)  HR:  [] 106  Resp:  [11-15] 14  BP: (100)/(59) 100/59  SpO2:  [86 %-100 %] 99 %  Temp (24hrs), Av 9 °F (36 1 °C), Min:93 9 °F (34 4 °C), Max:99 °F (37 2 °C)  Current: Temperature: (!) 96 8 °F (36 °C)    Physical Exam:   General:  Sedated on ventilator  Oropharynx:  ET tube in place  Neck:  Supple, no lymphadenopathy  Lungs:  Ventilated breath sounds  Abdomen:  Soft, non-tender, non-distented  Extremities:  Symmetric edema  Skin:  No rashes, no ulcers  Neurological:  Sedated on ventilator      Lab Results:  I have personally reviewed pertinent labs    Results from last 7 days   Lab Units 04/15/21  0446 21  0451 21  1454  21  0530  21  1653   POTASSIUM mmol/L 4 0 3 5 3 7   < > 4 0   < >  --    CHLORIDE mmol/L 129* 134* 130*   < > 129*   < >  --    CO2 mmol/L 17* 17* 18*   < > 19*   < >  --    CO2, I-STAT mmol/L  --   --   -- --   --   --  15*   BUN mg/dL 20 13 12   < > 11   < >  --    CREATININE mg/dL 1 19 0 92 1 01   < > 1 04   < >  --    EGFR ml/min/1 73sq m 50 68 61   < > 59   < >  --    GLUCOSE, ISTAT mg/dl  --   --   --   --   --   --  100   CALCIUM mg/dL 8 2* 7 5* 7 9*   < > 8 0*   < >  --    AST U/L 97* 35  --   --  42   < >  --    ALT U/L 35 20  --   --  20   < >  --    ALK PHOS U/L 95 89  --   --  112   < >  --     < > = values in this interval not displayed  Results from last 7 days   Lab Units 04/15/21  1036 04/15/21  0446 04/14/21  0451   WBC Thousand/uL 8 03 8 04 6 19   HEMOGLOBIN g/dL 8 7* 8 5* 8 0*   PLATELETS Thousands/uL 71* 75* 63*     Results from last 7 days   Lab Units 04/13/21  1709 04/09/21  1728 04/09/21  0923 04/09/21  0908   BLOOD CULTURE   --  No Growth After 5 Days  No Growth After 5 Days  --    SPUTUM CULTURE   --   --   --  No growth   GRAM STAIN RESULT  2+ Polys  1+ Mononuclear Cells  No No bacteria seen  --   --  No Epithelial cells per low power field  1+ Polys  No bacteria seen       Imaging Studies:   I have personally reviewed pertinent imaging study reports and images in PACS  EKG, Pathology, and Other Studies:   I have personally reviewed pertinent reports

## 2021-04-16 NOTE — OCCUPATIONAL THERAPY NOTE
OT CANCEL NOTE    OT orders received  Chart reviewed  Pt is currently intubated/sedated and not appropriate to engage in skilled OT services at this time  Will D/C OT, Please re-consult once medically stable  Thanks         04/16/21 1100   Note Type   Cancel Reasons Intubated/sedated       Nafisa Hughes MS, OTR/L

## 2021-04-16 NOTE — RESPIRATORY THERAPY NOTE
RT Ventilator Management Note  Leann Santos 63 y o  female MRN: [id number]  Unit/Bed#: [facility] 01 Encounter: [id number]      Daily Screen       4/15/2021  1105 4/16/2021  0822          Patient safety screen outcome[de-identified]  Failed  Failed      Not Ready for Weaning due to[de-identified]  Underline problem not resolved  Underline problem not resolved              Physical Exam:   Assessment Type: Assess only  General Appearance: Sedated  Respiratory Pattern: Assisted  Chest Assessment: Chest expansion symmetrical, Trachea midline  Bilateral Breath Sounds: Diminished, Clear  Cough: Non-productive  Suction: ET Tube  O2 Device: Ventilator      Resp Comments: (P) Pt remains on S(CMV) at this time and tolerating well  no issues  No changes made and no weaning done  Pt sating 100% on 40% fio2  tube at proper postion and rotated   tongue remains swollen

## 2021-04-16 NOTE — NUTRITION
Once Ileus ruled out consider trickle TF of Jevity 1 2 @ 20 ml/hr  If tolerated consider incr by 10 ml q 8 hrs as ebony to goal of 66 ml/hr as noted on 4/13  Consider 200 ml Free H2O flush q 4hrs as per elevated sodium  If unable to resume TF in next 24 hrs then consider TPN  Request recheck RIGOBERTO level

## 2021-04-16 NOTE — PHYSICAL THERAPY NOTE
Physical Therapy Cancellation Note    PT orders received chart review completed  Pt is currently intubated/sedated and not appropriate to participate in skilled PT at this time  PT will sign off, please re-consult when medically appropriate to participate       04/16/21 1500   Note Type   Cancel Reasons Intubated/sedated     Andrés Lynch, PT

## 2021-04-16 NOTE — PROGRESS NOTES
Progress Note - Critical Care   Thania Mireles 63 y o  female MRN: [id number]  Unit/Bed#: [facility] 01 Encounter: [id number]      HPI/24HR Event:  Patient with high residual and put out close to a Liter via OG tube  No output via the rectal tube in past 24 hrs   Tachycardic since yesterday and remained in the 90s to 110s throughout the night        ROS  Unable to gather ROS  Patient intubated and sedated     Assessment & Plan:   - Neuro  - Dx: Refractory Status epilepticus               - Infusion                         - Ketamine and propofol off                         - Versed at 30  Goal is to wean 10 q6h              - Keppra at 2 5g daily and vimpat 200mg BID              - Valium PO to 10mg q6h              - Topamax 200mg BID for 2 more days and wean pending patient's                 control of subclinical seizures              - 4/80 LP completed w/ INR 1 64 despite 2 units of FFP                          - Received 1 additional unit of FFP post procedure                          - 1st tube clear CSF fluid following tubes contained blood                           - LP labs pending               - Continue video EEG              - Appreciate recommendation from Neuro   - Analgesia: Fentanyl 50mcg prn      CV:   - Dx: Shock state most likely medication induced               - Levophed 17              - Monitor BP as patient weans off of Versed               - Wean Pressor as tolerated while maintaining MAP goal >65   - ECHO showed EF of 60% with no valvular disorder  - Dx:  Tachycardic               - Amio drip discontinued yesterday with conversion    - Will obtain EKG                 - Hold AC in the setting of coagulopathy and anemia          Lung:   - Dx: Acute respiratory failure 2/2 seizures  - Vent day 9                - SCMV 12/400/8/40%              - Continue VAP precautions  - Dx: Mild Apical pneumothorax              - Continue to monitor airway pressures on vent   - Dx: Aspiration pneumonitis/pneumonia   - In the setting of hypotension, tachycardia Sepsis workup yesterday   - Blood cx pending   - Obtained consent from sister in regards to bronchoscopy   - Continue Respiratory Protocol and Airway Clearance Protocol        GI:   - Dx: 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 / [address]  post ablation              - MELD today at 22   - AST 97 to 99 / ALT 35 to 39    - Bilirubin trending up      - High baseline Bilirubin with averaging close to 10     - Direct Bilirubin 1 97 today (Max 8)               - Continue lactulose 30g TID and rifaximin 550mg BID for HE  - Dx: GI dysmotility               - Stool output 0 despite lactulose   - KUB showed signs of Ileus / obstruction                - High residual with 1L of output     - Serial abdominal exam               - 4/15 Started on metoclopramide    - Consider obtaining a CT for obstruction   - Stress ulcer ppx: Prilosec  - TF on hold             FEN: Hypernatremia and hyperchloremia  - Fluids: D5W at 50cc/hr   - Electrolytes: Will trend and replete as needed  - Nutrition: Will restart TF later today after erythromycin               - Per nutrition rec Jevity 1 2 @ 66 with  q6hr     :   - Dx: DARRYL   - BUN 27 and Creatinine 1 23              - Currently on D5W   - Avoid nephrotoxins    - Dx: Volume overload              - Received another 40 of lasix yesterday              - Urine output (1 8L in 24hr)              - Still net positive 2/2 medication gtt               - May consider another dose of lasix               - BMP BID  - Hamlin in place for accurate I/Os      ID:   - Dx: Polymycrobial LE wound              - 4/13 Unasyn discontinued per ID   - Dx: Possible Aspiration pneumonia vs pneumonitis   - Repeat CXR                - Sepsis workup pending   - Antibiotics on hold    - 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              - MRI inconclusive              - 4/12 Acyclovir discontinued  - Monitor WBC/temp curve     Heme:   - Dx: Anemia and thrombocytopenia  - Hb: 8 4  - Continue to monitor Hb and platelet in addition to INR              - Haptoglobin <10 w/ Fib 149 (Likely due to cirrhosis) and no schistocytes              - No signs of active bleeding or hemolytic anemia  - Pt: 59 from 71  - Oral vitamin K discontinued   - DVT ppx: subq Lovenox 40, SCDs       Endo:   - Dx: No acute issues  - Goal -180     Msk/Skin:   - Dx: Bilateral lower extremity wounds              - Wound care and Podiatry service managing  - Turning/repositioning     Disposition: Continue ICU care    Invasive lines and devices: Invasive Devices     Central Venous Catheter Line            CVC Central Lines 21 Triple 20cm 6 days          Arterial Line            Arterial Line 21 Radial 6 days          Drain            Rectal Tube 8 days    NG/OG/Enteral Tube 18 Fr Center mouth 7 days    Urethral Catheter Temperature probe 16 Fr  7 days          Airway            ETT  Oral 7 5 mm 7 days                   Physical exam  General: Sedated and intubated  Neuro: GCS 3T (Eye 1, Verbal 1, Motor 1)  HENT: Normocephalic and atraumatic, PERRL  Heart: Irregular rate, tachycardic, pulses intact  Lungs: Bilateral breath sounds present  Abdomen:  Bowel sounds present, soft non distended  Skin:  Right arm petechia, very edematous arms and legs with wound in LE that is wrapped in dressing     Vitals  Temperature:   Temp (24hrs), Av 1 °F (36 2 °C), Min:95 4 °F (35 2 °C), Max:98 6 °F (37 °C)    Current: Temperature: (!) 95 7 °F (35 4 °C)    Vitals:    21 0745 21 0800 21 0815 21 0826   BP:       BP Location:       Pulse:  104     Resp:  12     Temp: (!) 96 4 °F (35 8 °C) (!) 95 4 °F (35 2 °C) (!) 95 7 °F (35 4 °C)    TempSrc:       SpO2:  98%  100%   Weight:         Arterial Line BP: 110/52  Arterial Line MAP (mmHg): 70 mmHg    Non-Invasive/Invasive Ventilation Settings:  Respiratory    Lab Data (Last 4 hours)    None         O2/Vent Data (Last 4 hours)       7403 Patient safety screen outcome: Failed                 No results found for: PHART, QSH5GNJ, PO2ART, LGE9FLP, Z1XXFKRO, BEART, SOURCE    Intake and Outputs:  I/O       04/14 0701 - 04/15 0700 04/15 0701 - 04/16 0700    I V  (mL/kg) 4251 5 (44 8) 3571 9 (35 4)    NG/GT 1035 860    IV Piggyback  50    Feedings 504     Total Intake(mL/kg) 5790 5 (61) 4481 9 (44 4)    Urine (mL/kg/hr) 1830 (0 8) 1020 (0 4)    Emesis/NG output 500 850    Stool 100 0    Total Output 2430 1870    Net +3360 5 +2611 9                Labs:   Results from last 7 days   Lab Units 04/16/21  0342 04/15/21  1036 04/15/21  0446 04/14/21  0451  04/13/21  0530 04/12/21  0437   WBC Thousand/uL 7 60 8 03 8 04 6 19  --  8 27 7 91   HEMOGLOBIN g/dL 8 4* 8 7* 8 5* 8 0*   < > 8 4* 8 6*   HEMATOCRIT % 26 8* 27 6* 27 1* 26 2*   < > 28 3* 28 1*   PLATELETS Thousands/uL 59* 71* 75* 63*   < > 55* 44*   NEUTROS PCT %  --   --   --  74  --  76* 62   MONOS PCT %  --   --   --  6  --  8 9   MONO PCT %  --  7  --   --   --   --   --     < > = values in this interval not displayed  Results from last 7 days   Lab Units 04/16/21  0342 04/15/21  0446 04/14/21  0451   SODIUM mmol/L 152* 153* 156*   POTASSIUM mmol/L 4 1 4 0 3 5   CHLORIDE mmol/L 129* 129* 134*   CO2 mmol/L 18* 17* 17*   BUN mg/dL 27* 20 13   CREATININE mg/dL 1 23 1 19 0 92   CALCIUM mg/dL 8 5 8 2* 7 5*   ALK PHOS U/L 90 95 89   ALT U/L 39 35 20   AST U/L 99* 97* 35     Results from last 7 days   Lab Units 04/16/21  0342 04/15/21  0446 04/14/21  0451   MAGNESIUM mg/dL 2 1 2 1 2 2     Results from last 7 days   Lab Units 04/16/21  0342 04/15/21  0446 04/13/21  0530   PHOSPHORUS mg/dL 4 5 5 1* 4 5      Results from last 7 days   Lab Units 04/15/21  1036 04/15/21  0446 04/13/21  2150   INR  2 17* 2 03* 1 73*     Results from last 7 days   Lab Units 04/15/21  1036   LACTIC ACID mmol/L 1 8       Micro:  Lab Results   Component Value Date    BLOODCX Received in Microbiology Lab  Culture in Progress  04/16/2021    BLOODCX Received in Microbiology Lab  Culture in Progress  04/15/2021    BLOODCX No Growth After 5 Days  04/09/2021    URINECX >100,000 cfu/ml Escherichia coli (A) 04/03/2021    WOUNDCULT 4+ Growth of Escherichia coli (A) 04/04/2021    WOUNDCULT 4+ Growth of Acinetobacter baumannii (A) 04/04/2021    WOUNDCULT 4+ Growth of  04/04/2021    SPUTUMCULTUR No growth 04/09/2021       Resulted Labs  - Meningitis/Encephalitis Panel: Normal   - RBC count,CSF: 3746  - Protein CSF: 100   - Leukemia/Lymphoma flow cytometry: no signs of leukemia or lymphoma    Pending Labs  ** Blood culture pending   - SHANNON  - Gram stain CSF: 2+ Polys, 1+ Mononuclear cells, no bacteria   - Spinal fluid culture   - Fungal culture  - West Nile  - Protein Electrophoresis  - Myelin basic protein  - MS panel     Imaging Studies    Imaging:   XR abdomen 1 view kub   Final Result by Jelena Walker DO (04/15 1034)      Dilated loops of small bowel, suggestive of ileus  Continued follow-up is recommended to exclude obstruction  No free air  Tube or drain tip overlies the gastroesophageal junction  If this represents an enteric tube, this should be advanced  Correlate clinically for appropriate position  The study was marked in [facility] for immediate notification  Workstation performed: [id number]         XR chest portable ICU   Final Result by Jelena Walker DO (04/15 1036)      Unchanged left hydropneumothorax  Hazy left greater than right lung consolidations unchanged  Additional probable enteric tube overlying the gastroesophageal junction, although this could be external to the patient  Correlate with external devices  Workstation performed: [id number]         [facility] [place]-patient lumbar puncture   Final Result by Nano Hearn MD (04/14 3610)      Successful fluoroscopically guided lumbar puncture with an opening pressure of 41 cm H2O    Approximately 16 5 mL's of CSF was sent to lab for requested analysis  As noted above, the patient's CSF was initially clear yellow and became bloody during the    collection process  The needle was withdrawn slightly and the CSF fluid slowly cleared  After a discussion with the MICU team, the patient will receive an additional unit of FFP post procedure  Please feel free to contact us with any questions or concerns  I reviewed the above findings and procedure with Dr Rajani Dunaway  PERFORMED, DICTATED AND SIGNED BY: Gloria Estevez PA-C         Workstation performed: [id number]         MRI brain seizure wo and w contrast   Final Result by Chloé Ortiz MD (04/11 1723)         1  Subtle, faint diffusion restriction in the pulvinar of the right thalamus with associated T2/FLAIR hyperintensity, a nonspecific finding which may be related to subacute infarction, which statistically would probably be the most likely differential    diagnosis  Other unusual differential considerations would include Behcet's disease or other connective tissue disorders like Sjogren's syndrome  Infectious encephalitis disease such as West Nile virus could be considered in the appropriate clinical    setting  A nonenhancing glioma is also possible as are reports of status epilepticus  The unilateral appearance argues against prion disease such as CJD  Further clinical assessment recommended  Recommend repeat follow-up contrast enhanced MRI of the    brain in 6-12 weeks to assess for stability  2   A few white matter lesions elsewhere may represent microangiopathy, although given the findings in the right thalamic pulvinar, other etiologies are certainly possible  3  No MR evidence of mesial temporal sclerosis  4   No classic MR findings of herpes encephalitis  Further clinical assessment and follow-up recommended           Workstation performed: [id number]         XR chest portable ICU   Final Result by Marivel Magallanes MD (04/11 6352)      Small left apical pneumothorax, unchanged from previous examination  Unchanged bilateral pleural effusions and pulmonary parenchymal airspace opacities  Workstation performed: [id number]         XR chest portable ICU   Final Result by Alka Baker MD (04/10 1253)      Small left apical pneumothorax identified possibly slightly increased from prior exam       Bilateral alveolar opacities in keeping with pneumonia  Small bilateral pleural effusions  Workstation performed: [id number]         XR chest portable ICU   Final Result by Joshua Jasso MD (04/09 1610)   Left internal jugular central line terminates in the right atrium, and should be pulled back by 3 cm  Unchanged small left pneumothorax  Worsening bilateral pneumonia  Stable small left pleural effusion  The study was marked in [facility] for immediate notification  Workstation performed: [id number]           I have personally reviewed pertinent reports          Allergies: No Known Allergies      Medications:   Scheduled Meds:  Current Facility-Administered Medications   Medication Dose Route Frequency Provider Last Rate    chlorhexidine  15 mL Swish & Spit Q12H [address] Kathy Brennan PA-C      cholecalciferol  400 Units Oral Daily Kathy Brennan PA-C      dextrose  50 mL/hr Intravenous Continuous Yina Davis MD 50 mL/hr (04/15/21 1628)    diazepam  10 mg Oral Q6H Coco Brittany, CRNP      enoxaparin  40 mg Subcutaneous Q24H [address] Yina Davis MD      fentanyl citrate (PF)  50 mcg Intravenous Q1H PRN Leanord Councilman, PA-C      folic acid  1 mg Oral Daily Coco Brittany, CRNP      lacosamide  200 mg Oral Q12H [address] SILVANO Ibarra      lactulose  30 g Oral TID Yina Davis MD      levETIRAcetam  2,500 mg Oral Q12H [address] SILVANO Ibarra      metoclopramide  10 mg Intravenous BID Yina Davis MD      midazolam  50 mg/hr Intravenous Continuous Yina Davis MD 30 mg/hr (04/16/21 6242)    nicotine  1 patch Transdermal Daily Dasha Hernandez, PA-C      norepinephrine           norepinephrine  1-30 mcg/min Intravenous Titrated Argdrew Hernandez, PA-C 17 mcg/min (04/16/21 0650)    omeprazole (PRILOSEC) suspension 2 mg/mL  20 mg Oral Early Morning Rockne Bosworth, CRNP      ondansetron  4 mg Intravenous Q6H PRN Dasha Hernandez, PA-C      rifaximin  550 mg Oral Q12H [address] TOMMY Hatfield-C      thiamine  100 mg Oral Daily Rockne Bosworth, CRNP      topiramate  200 mg Oral Q12H [address] Anni Burris,        Continuous Infusions:dextrose, 50 mL/hr, Last Rate: 50 mL/hr (04/15/21 1628)  midazolam, 50 mg/hr, Last Rate: 30 mg/hr (04/16/21 0752)  norepinephrine, 1-30 mcg/min, Last Rate: 17 mcg/min (04/16/21 0650)      PRN Meds:  fentanyl citrate (PF), 50 mcg, Q1H PRN  ondansetron, 4 mg, Q6H PRN        Counseling / Coordination of Care  Total Critical Care time spent 30 minutes excluding procedures, teaching and family updates  Code Status: Level 2 - DNAR: but accepts endotracheal intubation     Portions of the record may have been created with voice recognition software  Occasional wrong word or "sound a like" substitutions may have occurred due to the inherent limitations of voice recognition software  Read the chart carefully and recognize, using context, where substitutions have occurred       Lang Wilson MD

## 2021-04-16 NOTE — PROGRESS NOTES
Progress Note - Infectious Disease   No Thakur 63 y o  female MRN: [id number]  Unit/Bed#: [facility] 01 Encounter: [id number]      IMPRESSION & RECOMMENDATIONS:   Impression/Recommendations:  1  Acute hypoxic respiratory failure   Patient was intubated during rapid response for airway protection in the setting of altered mentation   Also concern for developing aspiration pneumonia as chest x-ray shows worsening bilateral airspace disease  No fevers or significant leukocytosis   Patient is hypotensive which is more likely sedation related   Serial procalcitonin levels remain low  Recent sputum culture showed no growth  Patient completed appropriate course of antibiotics     -continue to observe closely off anymore antibiotics  -monitor temperatures and hemodynamics  -monitor ventilator requirements     2  Hypotension   More likely related to sedation   Also consider developing acute infection, as above   No associated fever   Blood cultures all remain negative  Remains on Levophed drip  Lactic acid remains normal   Ongoing low suspicion for active infection      -continue to observe closely off anymore antibiotics  -follow-up repeat blood cultures  -monitor temperatures and hemodynamics  -monitor CBC     3  Acute encephalopathy   Initially presented to [facility] on 04/03 and has had waxing and waning mentation   Felt to be secondary to hepatic encephalopathy, alcohol withdrawal   Ultimately transferred to [facility] to seizure-like activity    EEG now shows status epilepticus, presumably secondary to alcohol withdrawal   Negative CT head x2   Very low suspicion for primary CNS infection given duration of symptoms, absence of fevers, and other much more plausible explanations   Patient was started on IV acyclovir for possible HSV CNS infection   Initially LP cannot be obtained due to coagulopathy   MRI does not show any temporal lobe involvement  Now status post LP showing 3 wbc's, negative ME panel  Suspicion for HSV CNS infection remains very low      -continue to observe off anymore antiviral  -anti-epileptic management as per Neurology  -serial neuro exams  -monitor temperatures and hemodynamics  -palliative care follow-up ongoing      4  Alcoholic/HCV cirrhosis complicated by [address]  post ablation in    With portal vein thrombosis, hepatic encephalopathy, coagulopathy   GI follow-up ongoing      5  Bilateral lower extremity wounds   Right leg wound culture from  showed E coli and Acinetobacter   No clinical evidence of acute infection of any of the wounds on exam   Discussed with Podiatry      -no antibiotics indicated  -daily local wound care and dressing changes     6  Recent bacteriuria   Urine culture from  showed pansensitive E coli   Blood cultures were negative   Patient already received adequate antibiotic course for the possibility of UTI   No further antibiotic indicated for this  7  Ileus  Management per Critical Care service  Serial abdominal exams      Antibiotics:  Off antibiotics 3    Will formally re-evaluate patient on   Please call us with any new questions in the interim           Subjective:  Remains sedated on ventilator  Periods of hypothermia  No fevers  No further seizures  Remains on Levophed drip  Objective:  Vitals:  Temp:  [95 4 °F (35 2 °C)-98 6 °F (37 °C)] 96 8 °F (36 °C)  HR:  [] 98  Resp:  [11-17] 12  SpO2:  [88 %-100 %] 95 %  Temp (24hrs), Av 1 °F (36 2 °C), Min:95 4 °F (35 2 °C), Max:98 6 °F (37 °C)  Current: Temperature: (!) 96 8 °F (36 °C)    Physical Exam:   General:  Intubated and sedated  HEENT:  Atraumatic normocephalic  ET tube in place  Pulmonary:  Normal respiratory excursion without accessory muscle use  Abdomen:  Soft, nontender  Extremities:  No edema  Skin:  No rashes  Neuro:  Sedated on ventilator    Lab Results:  I have personally reviewed pertinent labs    Results from last 7 days   Lab Units 21  9282 04/15/21  0446 04/14/21  0451   POTASSIUM mmol/L 4 1 4 0 3 5   CHLORIDE mmol/L 129* 129* 134*   CO2 mmol/L 18* 17* 17*   BUN mg/dL 27* 20 13   CREATININE mg/dL 1 23 1 19 0 92   EGFR ml/min/1 73sq m 48 50 68   CALCIUM mg/dL 8 5 8 2* 7 5*   AST U/L 99* 97* 35   ALT U/L 39 35 20   ALK PHOS U/L 90 95 89     Results from last 7 days   Lab Units 04/16/21  0342 04/15/21  1036 04/15/21  0446   WBC Thousand/uL 7 60 8 03 8 04   HEMOGLOBIN g/dL 8 4* 8 7* 8 5*   PLATELETS Thousands/uL 59* 71* 75*     Results from last 7 days   Lab Units 04/16/21  0309 04/15/21  1722 04/13/21  1709 04/09/21  1728   BLOOD CULTURE  Received in Microbiology Lab  Culture in Progress  Received in Microbiology Lab  Culture in Progress  --  No Growth After 5 Days  GRAM STAIN RESULT   --   --  2+ Polys  1+ Mononuclear Cells  No No bacteria seen  --        Imaging Studies:   I have personally reviewed pertinent imaging study reports and images in PACS  KUB shows dilated loops of small bowel suggestive of ileus  EKG, Pathology, and Other Studies:   I have personally reviewed pertinent reports

## 2021-04-16 NOTE — CASE MANAGEMENT
Pt remains on vent in MICU  Palliative following - appreciate involvement  Follow-up mtg to discuss ventilator support on Mon 4/19 at 3:30 via phone  CM following for dcp

## 2021-04-16 NOTE — QUICK NOTE
Spoke with sister who originally wanted brothers input regarding bronchoscopy for patient  But 2/2 brother not being available gave consent to bronchoscopy  Risks were mentioned including possible bleed, lung collapse, infection, chest pain, and low oxygen  Patient understood and agreed to move forward

## 2021-04-16 NOTE — PROGRESS NOTES
NEUROLOGY RESIDENCY PROGRESS NOTE     Name: Thania Mireles   Age & Sex: 63 y o  female   MRN: [id number]  Unit/Bed#: MICU 01   Encounter: [id number]    Recommendations for outpatient neurological follow up have yet to be determined  ASSESSMENT & PLAN     Status epilepticus [facility])  Assessment & Plan  Assessment:  · 63 y o  female with alcoholic/HCV cirrhosis and [address]  who was initially admitted to [facility] with altered mental status  Rapid response was activated on 4/8 due to witnessed seizure activity and neurology was consulted for further assessment  She was given 3 mg Ativan total and 1000 mg Keppra was ordered to be given  During the evaluation by neurology on 4/8 she was noted to have leftward gaze with horizontal nystagmus  CTH was completed and showed no acute abnormalities  Due to concern for ongoing seizure activity decsion was made to transfer patient to [facility] for vEEG  Patient was transferred to intensive care unit and ultimately intubated  · Timeline of Events:   ·  4/08: Lorazapam 1mg IV, > Lorazapam 1mg IV > Keppra 2G IV > ativan 2mg IV > on 4/8/ 22:20: ativan 2mg IV  · 4/09: 05:30 Propofol ggt started at 30 then increased to 50  and at: 06:03 Lorazepam 2mg IV > 09:32 Keppra 750 mg > Right temporal seizures started around 14:15 and occurred every 2 minutes roughly > 15:04 Lorazapam 5mg IV >15:30 no significant improvement >4/09: 15:48 Lorazapam 8mg IV >16:04 no significant improvement > 16:19 Lorazapam 10mg IV >16:20 Keppra 1 5G IV > 16:54 5mg Valium >16:58 slight improvement, but persistent seizures >17:13 Midazolam ggt titration >17:25 Lorazapam 10mg IV  · 4/10: refractory status-On Versed gtt increased to 60, Propafol gtt at 100, Vimpat load, valium 20 mg, Ketamine bolus and ketamine 2 mg/kg gtt, > v EEG shows further seizure activity in left temporal region   At 13:32- No seizures EEG completely suppressed except for isolated right temporal discharges occurring every 15-40 seconds  · 4/11: Propofol at 100 , Ketamine at 2mg/kg/hr, Versed at 60 and Keppra dose to 2 5g and vimpat to 200mg BID, no further seizures on EMU, got her MRI brain, and plan to decrease Propofol by 10 mg/hr  · 4/11: MRI brain with and without: inconclusive   · 4/12: Continued to decrease Propofol, propofol stopped around 1900  · 4/13: 2 episodes of right hemispheric seizures at 0039 and 0336  Loaded with Topamax 400 mg and continue with Topamax 200mg BID with maintained Versed at 60, Ketamine at 20, keppra 2 5mg BID and Vimpat 200 q12  · 4/13: LP done  · 4/14:  Weaned off of ketamine  · 4/15:  Planned for weaning off of midazolam  Plan:   · Current AEDs:  · Continue Keppra 2500 BID maintained dose   · Continue Vimpat 200 mg bid  · Loaded with Tomapax 4/13 with 400, continue 200mg BID  · Levels obtain for Keppra and topamax  · Current Sedation: on Versed at 20, plan to wean by 10q6  · Continue vEEG monitoring  · Recommend obtaining MRI brain with and without  · Continue Seizure precautions  · MRI brain with and without contrast obtained, however inconclusive     · ID on board, recommendations appreciated   · Received a total of 11 days Abx, and 4 days of Antiviral-Acyclovir, d/c on 4/12  · Medical management as per critical care team    · LP on 4/13/2021  · Pending CSF Labs : [facility] panel, West Nile, MS panel, Flow Cytology, MS panel, Myelin Basic Protein,   · Normal: Diff, Gram stain, White count, Glucose  (65), ME, Flow Cytometry   · Abnormal: Xanthrochromia, Elevated CSF protein (100), RBC count: (3,746),     LP studies:    Results from last 7 days   Lab Units 04/13/21  1709   APPEARANCE CSF  hazy/bloody   WBC CSF /uL 3   GLUCOSE CSF mg/dL 65   RBC CSF uL 3,746*   TOTAL COUNTED  100   NEUTROPHILS % (CSF) % 74   LYMPHS % (CSF) % 19   MONOCYTES % (CSF) % 5   EOSINOPHILS % (CSF) % 2   CSF CULTURE  No growth   PROTEIN CSF mg/dL 100*       Hypernatremia  Assessment & Plan  · Continue watching Acid/Base Status  · Recommend increasing Free water flushes  · Continue monitoring Sodium      SUBJECTIVE     Patient was seen and examined  Patient remains intubated and sedated on Midazolam    She remains on Levophed      Review of Systems   Unable to perform ROS: Intubated       OBJECTIVE     Patient ID: Lisa Rodriguez is a 63 y o  female  Vitals:    21 1215 21 1230 21 1245 21 1300   BP:       BP Location:       Pulse:    94   Resp:    15   Temp: 97 5 °F (36 4 °C) 97 5 °F (36 4 °C) 97 9 °F (36 6 °C) 97 9 °F (36 6 °C)   TempSrc:       SpO2:       Weight:          Temperature:   Temp (24hrs), Av °F (36 1 °C), Min:95 4 °F (35 2 °C), Max:98 6 °F (37 °C)    Temperature: 97 9 °F (36 6 °C)      Physical Exam  Vitals signs and nursing note reviewed  Constitutional:       Appearance: She is ill-appearing and toxic-appearing  Interventions: She is sedated, intubated and restrained  HENT:      Head: Normocephalic and atraumatic  Nose: Nose normal    Pulmonary:      Effort: She is intubated  Comments: Ventilate via ETT  Musculoskeletal:         General: Swelling present  Right lower leg: Edema present  Left lower leg: Edema present  Skin:     Coloration: Skin is jaundiced  Findings: Bruising, erythema and lesion present  Comments: B/l LE wounds   Neurological:      Deep Tendon Reflexes:      Reflex Scores:       Bicep reflexes are 0 on the right side and 0 on the left side  Brachioradialis reflexes are 0 on the right side and 0 on the left side  Patellar reflexes are 0 on the right side and 0 on the left side  Achilles reflexes are 0 on the right side and 0 on the left side  Neurologic Exam     Mental Status   Patient was examined on 30 of versed She was not able to participate in a formal neurological assessment      Cranial Nerves     CN III, IV, VI   Right pupil: Reactivity: non-reactive  Left pupil: Reactivity: non-reactive     Vestibulo-ocular reflex: absent  Patient was unable to participate in an exam  She had no gag or cough present, she had no corneal reflexes and did not blink on eyelash stroke  Motor Exam   Muscle bulk: decreased  Overall muscle tone: decreased  Right arm tone: decreased  Left arm tone: decreased  Right leg tone: decreased  Left leg tone: decreasedPatient did not respond to nox stim in all four extremities      Sensory Exam   Did not respond to nox stim proximally or distally in all 4 extremities  Gait, Coordination, and Reflexes     Tremor   Resting tremor: absent  Intention tremor: absent  Action tremor: absent    Reflexes   Right brachioradialis: 0  Left brachioradialis: 0  Right biceps: 0  Left biceps: 0  Right patellar: 0  Left patellar: 0  Right achilles: 0  Left achilles: 0  Right plantar: equivocal  Left plantar: equivocal  Right Card: absent  Left Card: absent  Right ankle clonus: absent  Left ankle clonus: absent       LABORATORY DATA     Labs: I have personally reviewed pertinent reports  Results from last 7 days   Lab Units 04/16/21  0342 04/15/21  1036 04/15/21  0446 04/14/21  0451  04/13/21  0530 04/12/21  0437   WBC Thousand/uL 7 60 8 03 8 04 6 19  --  8 27 7 91   HEMOGLOBIN g/dL 8 4* 8 7* 8 5* 8 0*   < > 8 4* 8 6*   HEMATOCRIT % 26 8* 27 6* 27 1* 26 2*   < > 28 3* 28 1*   PLATELETS Thousands/uL 59* 71* 75* 63*   < > 55* 44*   NEUTROS PCT %  --   --   --  74  --  76* 62   MONOS PCT %  --   --   --  6  --  8 9   MONO PCT %  --  7  --   --   --   --   --     < > = values in this interval not displayed        Results from last 7 days   Lab Units 04/16/21  0342 04/15/21  0446 04/14/21  0451   SODIUM mmol/L 152* 153* 156*   POTASSIUM mmol/L 4 1 4 0 3 5   CHLORIDE mmol/L 129* 129* 134*   CO2 mmol/L 18* 17* 17*   BUN mg/dL 27* 20 13   CREATININE mg/dL 1 23 1 19 0 92   CALCIUM mg/dL 8 5 8 2* 7 5*   ALK PHOS U/L 90 95 89   ALT U/L 39 35 20   AST U/L 99* 97* 35     Results from last 7 days   Lab Units 04/16/21  0342 04/15/21  0446 04/14/21  0451   MAGNESIUM mg/dL 2 1 2 1 2 2     Results from last 7 days   Lab Units 04/16/21  0342 04/15/21  0446 04/13/21  0530   PHOSPHORUS mg/dL 4 5 5 1* 4 5      Results from last 7 days   Lab Units 04/16/21  1027 04/15/21  1036 04/15/21  0446   INR  2 14* 2 17* 2 03*     Results from last 7 days   Lab Units 04/16/21  1027   LACTIC ACID mmol/L 2 0           IMAGING & DIAGNOSTIC TESTING     Radiology Results: I have personally reviewed pertinent reports  XR abdomen 1 view kub   Final Result by Xu Burns DO (04/15 1034)      Dilated loops of small bowel, suggestive of ileus  Continued follow-up is recommended to exclude obstruction  No free air  Tube or drain tip overlies the gastroesophageal junction  If this represents an enteric tube, this should be advanced  Correlate clinically for appropriate position  The study was marked in [facility] for immediate notification  Workstation performed: [id number]         XR chest portable ICU   Final Result by Xu Burns DO (04/15 1036)      Unchanged left hydropneumothorax  Hazy left greater than right lung consolidations unchanged  Additional probable enteric tube overlying the gastroesophageal junction, although this could be external to the patient  Correlate with external devices  Workstation performed: [id number]         [facility] [place]-patient lumbar puncture   Final Result by Elizabeth Pereyra MD (04/14 6555)      Successful fluoroscopically guided lumbar puncture with an opening pressure of 41 cm H2O  Approximately 16 5 mL's of CSF was sent to lab for requested analysis  As noted above, the patient's CSF was initially clear yellow and became bloody during the    collection process  The needle was withdrawn slightly and the CSF fluid slowly cleared  After a discussion with the MICU team, the patient will receive an additional unit of FFP post procedure         Please feel free to contact us with any questions or concerns  I reviewed the above findings and procedure with Dr Rylee Lopes  PERFORMED, DICTATED AND SIGNED BY: Ame Manning PA-C         Workstation performed: [id number]         MRI brain seizure wo and w contrast   Final Result by Clint Serrato MD (04/11 3303)         1  Subtle, faint diffusion restriction in the pulvinar of the right thalamus with associated T2/FLAIR hyperintensity, a nonspecific finding which may be related to subacute infarction, which statistically would probably be the most likely differential    diagnosis  Other unusual differential considerations would include Behcet's disease or other connective tissue disorders like Sjogren's syndrome  Infectious encephalitis disease such as West Nile virus could be considered in the appropriate clinical    setting  A nonenhancing glioma is also possible as are reports of status epilepticus  The unilateral appearance argues against prion disease such as CJD  Further clinical assessment recommended  Recommend repeat follow-up contrast enhanced MRI of the    brain in 6-12 weeks to assess for stability  2   A few white matter lesions elsewhere may represent microangiopathy, although given the findings in the right thalamic pulvinar, other etiologies are certainly possible  3  No MR evidence of mesial temporal sclerosis  4   No classic MR findings of herpes encephalitis  Further clinical assessment and follow-up recommended  Workstation performed: [id number]         XR chest portable ICU   Final Result by Gay Denver, MD (04/11 1142)      Small left apical pneumothorax, unchanged from previous examination  Unchanged bilateral pleural effusions and pulmonary parenchymal airspace opacities                 Workstation performed: [id number]         XR chest portable ICU   Final Result by Keesha Martinez MD (04/10 0513)      Small left apical pneumothorax identified possibly slightly increased from prior exam       Bilateral alveolar opacities in keeping with pneumonia  Small bilateral pleural effusions  Workstation performed: [id number]         XR chest portable ICU   Final Result by Sheila Butler MD (04/09 1610)   Left internal jugular central line terminates in the right atrium, and should be pulled back by 3 cm  Unchanged small left pneumothorax  Worsening bilateral pneumonia  Stable small left pleural effusion  The study was marked in [facility] for immediate notification  Workstation performed: [id number]         FL small bowel    (Results Pending)       Other Diagnostic Testing: I have personally reviewed pertinent reports        ACTIVE MEDICATIONS     Current Facility-Administered Medications   Medication Dose Route Frequency    chlorhexidine (PERIDEX) 0 12 % oral rinse 15 mL  15 mL Swish & Spit Q12H [address]    cholecalciferol (VITAMIN D3) tablet 400 Units  400 Units Oral Daily    dextrose 5 % infusion  50 mL/hr Intravenous Continuous    diazepam (VALIUM) injection 10 mg  10 mg Intravenous Q6H PRN    enoxaparin (LOVENOX) subcutaneous injection 40 mg  40 mg Subcutaneous Q24H CITLALLI    fentanyl citrate (PF) 100 MCG/2ML 50 mcg  50 mcg Intravenous S1W PRN    folic acid 1 mg in sodium chloride 0 9 % 50 mL IVPB  1 mg Intravenous Daily    furosemide (LASIX) injection 40 mg  40 mg Intravenous BID (diuretic)    lacosamide (VIMPAT) 200 mg in sodium chloride 0 9 % 100 mL IVPB  200 mg Intravenous Q12H    levETIRAcetam (KEPPRA) 2,500 mg in sodium chloride 0 9 % 250 mL IVPB  2,500 mg Intravenous Q12H [address]    midazolam (VERSED) 1 mg/mL (DOUBLE CONCENTRATION) 100 mL infusion  30 mg/hr Intravenous Continuous    nicotine (NICODERM CQ) 14 mg/24hr TD 24 hr patch 1 patch  1 patch Transdermal Daily    norepinephrine (LEVOPHED) 1 mg/mL injection **ADS Override Pull**        norepinephrine (LEVOPHED) 4 mg (STANDARD CONCENTRATION) IV in sodium chloride 0 9% 250 mL  1-30 mcg/min Intravenous Titrated    ondansetron (ZOFRAN) injection 4 mg  4 mg Intravenous Q6H PRN    pantoprazole (PROTONIX) injection 40 mg  40 mg Intravenous Q24H CITLALLI    rifaximin (XIFAXAN) tablet 550 mg  550 mg Oral Q12H [facility]    thiamine (VITAMIN B1) 100 mg in sodium chloride 0 9 % 50 mL IVPB  100 mg Intravenous Daily    topiramate (TOPAMAX) tablet 200 mg  200 mg Oral Q12H [facility]    vasopressin (PITRESSIN) 20 Units in sodium chloride 0 9 % 100 mL infusion  0 03 Units/min Intravenous Continuous       Prior to Admission medications    Medication Sig Start Date End Date Taking?  Authorizing Provider   baclofen 10 mg tablet Take 10 mg by mouth 5/7/19   Historical Provider, MD   cholecalciferol (VITAMIN D3) 1,000 units tablet     Historical Provider, MD   furosemide (LASIX) 40 mg tablet Take 40 mg by mouth 5/20/19   Historical Provider, MD   gabapentin (NEURONTIN) 300 mg capsule Take 1 capsule (300 mg total) by mouth 2 (two) times a day 8/4/20   Lety Corea DO   lactulose 20 g/30 mL Take 45 mL (30 g total) by mouth 3 (three) times a day To achieve 3 bowel movements a day 3/16/21   SILVANO Pryor   spironolactone (ALDACTONE) 100 mg tablet TAKE 1 & 1/2 TABLETS BY MOUTH DAILY 5/20/19   Historical Provider, MD         VTE Mechanical Prophylaxis: sequential compression device    ==  Bertha Liang MD   [facility], PGY-2

## 2021-04-17 PROBLEM — A41.9 SEPSIS (HCC): Status: RESOLVED | Noted: 2021-01-01 | Resolved: 2021-01-01

## 2021-04-17 PROBLEM — E87.8 HYPERCHLOREMIA: Status: ACTIVE | Noted: 2021-01-01

## 2021-04-17 PROBLEM — N17.9 AKI (ACUTE KIDNEY INJURY) (HCC): Status: RESOLVED | Noted: 2021-01-01 | Resolved: 2021-01-01

## 2021-04-17 PROBLEM — K56.7 ILEUS (HCC): Status: ACTIVE | Noted: 2021-01-01

## 2021-04-17 PROBLEM — D69.6 THROMBOCYTOPENIA (HCC): Status: ACTIVE | Noted: 2021-01-01

## 2021-04-17 PROBLEM — I48.91 A-FIB (HCC): Status: ACTIVE | Noted: 2021-01-01

## 2021-04-17 PROBLEM — E87.2 METABOLIC ACIDOSIS, NORMAL ANION GAP (NAG): Status: ACTIVE | Noted: 2021-01-01

## 2021-04-17 PROBLEM — N39.0 UTI (URINARY TRACT INFECTION): Status: RESOLVED | Noted: 2021-01-01 | Resolved: 2021-01-01

## 2021-04-17 PROBLEM — E87.2 LACTIC ACIDOSIS: Status: RESOLVED | Noted: 2021-01-01 | Resolved: 2021-01-01

## 2021-04-17 NOTE — PROGRESS NOTES
NEUROLOGY RESIDENCY PROGRESS NOTE     Name: Jasmine Erwin   Age & Sex: 63 y o  female   MRN: [id number]  Unit/Bed#: MICU 01   Encounter: [id number]    Recommendations for outpatient neurological follow up have yet to be determined  ASSESSMENT & PLAN     Status epilepticus [facility])  Assessment & Plan  Assessment:  · 63 y o  female with alcoholic/HCV cirrhosis and [address]  who was initially admitted to [facility] with altered mental status  Rapid response was activated on 4/8 due to witnessed seizure activity and neurology was consulted for further assessment  She was given 3 mg Ativan total and 1000 mg Keppra was ordered to be given  During the evaluation by neurology on 4/8 she was noted to have leftward gaze with horizontal nystagmus  CTH was completed and showed no acute abnormalities  Due to concern for ongoing seizure activity decsion was made to transfer patient to [facility] for vEEG  Patient was transferred to intensive care unit and ultimately intubated  · Timeline of Events:   ·  4/08: Lorazapam 1mg IV, > Lorazapam 1mg IV > Keppra 2G IV > ativan 2mg IV > on 4/8/ 22:20: ativan 2mg IV  · 4/09: 05:30 Propofol ggt started at 30 then increased to 50  and at: 06:03 Lorazepam 2mg IV > 09:32 Keppra 750 mg > Right temporal seizures started around 14:15 and occurred every 2 minutes roughly > 15:04 Lorazapam 5mg IV >15:30 no significant improvement >4/09: 15:48 Lorazapam 8mg IV >16:04 no significant improvement > 16:19 Lorazapam 10mg IV >16:20 Keppra 1 5G IV > 16:54 5mg Valium >16:58 slight improvement, but persistent seizures >17:13 Midazolam ggt titration >17:25 Lorazapam 10mg IV  · 4/10: refractory status-On Versed gtt increased to 60, Propafol gtt at 100, Vimpat load, valium 20 mg, Ketamine bolus and ketamine 2 mg/kg gtt, > v EEG shows further seizure activity in left temporal region   At 13:32- No seizures EEG completely suppressed except for isolated right temporal discharges occurring every 15-40 seconds  · 4/11: Propofol at 100 , Ketamine at 2mg/kg/hr, Versed at 60 and Keppra dose to 2 5g and vimpat to 200mg BID, no further seizures on EMU, got her MRI brain, and plan to decrease Propofol by 10 mg/hr  · 4/11: MRI brain with and without: inconclusive   · 4/12: Continued to decrease Propofol, propofol stopped around 1900  · 4/13: 2 episodes of right hemispheric seizures at 0039 and 0336  Loaded with Topamax 400 mg and continue with Topamax 200mg BID with maintained Versed at 60, Ketamine at 20, keppra 2 5mg BID and Vimpat 200 q12  · 4/13: LP done  · 4/14:  Weaned off of ketamine  · 4/15:  Planned for weaning off of midazolam, midazolam   On 04/17 at 9:00 a m  Plan:   · Current AEDs:  · Continue Keppra 2500 BID maintained dose   · Continue Vimpat 200 mg bid  · Loaded with Tomapax 4/13 with 400, continue 200mg BID  · At this time given that patient is being kept NPO may consider transitioning off of Topamax to a different medication possibly Dilantin  · Levels obtain for Keppra and topamax:  Ordered and pending  · Current Sedation:  Off sedation, weaned off midazolam on 04/17 at 9:00 a m  · Continue vEEG monitoring  · Recommend repeating MRI brain with and without, may help with prognostic factors  · Continue Seizure precautions  · MRI brain with and without contrast obtained, however inconclusive     · ID on board, recommendations appreciated   · Received a total of 11 days Abx, and 4 days of Antiviral-Acyclovir, d/c on 4/12  · Medical management as per critical care team    · LP on 4/13/2021  · Pending CSF Labs : [facility] panel, West Nile, MS panel, Flow Cytology, MS panel, Myelin Basic Protein,   · Normal: Diff, Gram stain, White count, Glucose  (65), ME, Flow Cytometry   · Abnormal: Xanthrochromia, Elevated CSF protein (100), RBC count: (3,746),     LP studies:    Results from last 7 days   Lab Units 04/13/21  1709   APPEARANCE CSF  hazy/bloody   WBC CSF /uL 3   GLUCOSE CSF mg/dL 65   RBC CSF uL 3,746*   TOTAL COUNTED 100   NEUTROPHILS % (CSF) % 74   LYMPHS % (CSF) % 19   MONOCYTES % (CSF) % 5   EOSINOPHILS % (CSF) % 2   CSF CULTURE  No growth   PROTEIN CSF mg/dL 100*       Hypernatremia  Assessment & Plan  · Continue watching Acid/Base Status  · Recommend increasing Free water flushes  · Continue monitoring Sodium      SUBJECTIVE     Patient was seen and examined  No acute events overnight  Versed was stopped on  at 9:00 a m  Shiloh Jessica MRI brain is ordered and pending  Review of Systems   Unable to perform ROS: Intubated       OBJECTIVE     Patient ID: Gumaro Addison is a 63 y o  female  Vitals:    21 1500 21 1504 21 1600 21 1700   BP:       BP Location:       Pulse: 64  64 64   Resp: 12  12 12   Temp: (!) 97 2 °F (36 2 °C)  (!) 97 2 °F (36 2 °C) (!) 97 2 °F (36 2 °C)   TempSrc: Esophageal  Esophageal    SpO2: 99% 100% 99% 99%   Weight:          Temperature:   Temp (24hrs), Av 8 °F (36 6 °C), Min:97 2 °F (36 2 °C), Max:98 6 °F (37 °C)    Temperature: (!) 97 2 °F (36 2 °C)      Physical Exam  Vitals signs and nursing note reviewed  Constitutional:       Appearance: She is ill-appearing and toxic-appearing  Interventions: She is sedated, intubated and restrained  HENT:      Head: Normocephalic and atraumatic  Nose: Nose normal    Pulmonary:      Effort: She is intubated  Comments: Ventilate via ETT  Musculoskeletal:         General: Swelling present  Right lower leg: Edema present  Left lower leg: Edema present  Skin:     Coloration: Skin is jaundiced  Findings: Bruising, erythema and lesion present  Comments: B/l LE wounds   Neurological:      Deep Tendon Reflexes:      Reflex Scores:       Bicep reflexes are 0 on the right side and 0 on the left side  Brachioradialis reflexes are 0 on the right side and 0 on the left side  Patellar reflexes are 0 on the right side and 0 on the left side         Achilles reflexes are 0 on the right side and 0 on the left side  Neurologic Exam     Mental Status   Patient was examined off of sedation  She is unable to participate in a formal neurological assessment      Cranial Nerves     CN III, IV, VI   Right pupil: Reactivity: non-reactive  Left pupil: Reactivity: non-reactive  Vestibulo-ocular reflex: absent  Patient was unable to participate in an exam  She had no gag or cough present, she had no corneal reflexes and did not blink on eyelash stroke  Motor Exam   Muscle bulk: decreased  Overall muscle tone: decreased  Right arm tone: decreased  Left arm tone: decreased  Right leg tone: decreased  Left leg tone: decreasedPatient did not respond to nox stim in all four extremities      Sensory Exam   Did not respond to nox stim proximally or distally in all 4 extremities  Gait, Coordination, and Reflexes     Tremor   Resting tremor: absent  Intention tremor: absent  Action tremor: absent    Reflexes   Right brachioradialis: 0  Left brachioradialis: 0  Right biceps: 0  Left biceps: 0  Right patellar: 0  Left patellar: 0  Right achilles: 0  Left achilles: 0  Right plantar: equivocal  Left plantar: equivocal  Right Card: absent  Left Card: absent  Right ankle clonus: absent  Left ankle clonus: absent         LABORATORY DATA     Labs: I have personally reviewed pertinent reports  Results from last 7 days   Lab Units 04/17/21  0426 04/16/21  0342 04/15/21  1036  04/14/21  0451  04/13/21  0530   WBC Thousand/uL 6 15 7 60 8 03   < > 6 19  --  8 27   HEMOGLOBIN g/dL 9 4* 8 4* 8 7*   < > 8 0*   < > 8 4*   HEMATOCRIT % 29 7* 26 8* 27 6*   < > 26 2*   < > 28 3*   PLATELETS Thousands/uL 49* 59* 71*   < > 63*   < > 55*   NEUTROS PCT % 86*  --   --   --  74  --  76*   MONOS PCT % 4  --   --   --  6  --  8   MONO PCT %  --   --  7  --   --   --   --     < > = values in this interval not displayed        Results from last 7 days   Lab Units 04/17/21  1559 04/17/21  0426 04/16/21  1354 04/16/21  3541 04/15/21  0446   SODIUM mmol/L 152* 150* 154* 152* 153*   POTASSIUM mmol/L 3 6 3 4* 3 8 4 1 4 0   CHLORIDE mmol/L 125* 124* 130* 129* 129*   CO2 mmol/L 23 20* 19* 18* 17*   BUN mg/dL 35* 30* 29* 27* 20   CREATININE mg/dL 1 13 1 08 1 25 1 23 1 19   CALCIUM mg/dL 8 2* 8 6 8 3 8 5 8 2*   ALK PHOS U/L  --  98  --  90 95   ALT U/L  --  41  --  39 35   AST U/L  --  73*  --  99* 97*     Results from last 7 days   Lab Units 04/17/21  0426 04/16/21  0342 04/15/21  0446   MAGNESIUM mg/dL 2 0 2 1 2 1     Results from last 7 days   Lab Units 04/17/21  0426 04/16/21  0342 04/15/21  0446   PHOSPHORUS mg/dL 3 6 4 5 5 1*      Results from last 7 days   Lab Units 04/17/21  0426 04/16/21  1027 04/15/21  1036   INR  2 01* 2 14* 2 17*     Results from last 7 days   Lab Units 04/16/21  1027   LACTIC ACID mmol/L 2 0           IMAGING & DIAGNOSTIC TESTING     Radiology Results: I have personally reviewed pertinent reports  FL small bowel   Final Result by Ayo Dunn MD (04/16 1844)      No evidence of Gastrografin distal to the stomach, 6 hours following administration by NG tube  Workstation performed: [id number]         XR abdomen 1 view kub   Final Result by Xu Burns DO (04/15 1034)      Dilated loops of small bowel, suggestive of ileus  Continued follow-up is recommended to exclude obstruction  No free air  Tube or drain tip overlies the gastroesophageal junction  If this represents an enteric tube, this should be advanced  Correlate clinically for appropriate position  The study was marked in [facility] for immediate notification  Workstation performed: [id number]         XR chest portable ICU   Final Result by Xu Burns DO (04/15 1036)      Unchanged left hydropneumothorax  Hazy left greater than right lung consolidations unchanged  Additional probable enteric tube overlying the gastroesophageal junction, although this could be external to the patient    Correlate with external devices  Workstation performed: [id number]         [facility] [place]-patient lumbar puncture   Final Result by Lona Martinez MD (04/14 2858)      Successful fluoroscopically guided lumbar puncture with an opening pressure of 41 cm H2O  Approximately 16 5 mL's of CSF was sent to lab for requested analysis  As noted above, the patient's CSF was initially clear yellow and became bloody during the    collection process  The needle was withdrawn slightly and the CSF fluid slowly cleared  After a discussion with the MICU team, the patient will receive an additional unit of FFP post procedure  Please feel free to contact us with any questions or concerns  I reviewed the above findings and procedure with Dr Ariana Sanchez  PERFORMED, DICTATED AND SIGNED BY: Ayesha Moreno PA-C         Workstation performed: [id number]         MRI brain seizure wo and w contrast   Final Result by Ruth Koenig MD (04/11 4952)         1  Subtle, faint diffusion restriction in the pulvinar of the right thalamus with associated T2/FLAIR hyperintensity, a nonspecific finding which may be related to subacute infarction, which statistically would probably be the most likely differential    diagnosis  Other unusual differential considerations would include Behcet's disease or other connective tissue disorders like Sjogren's syndrome  Infectious encephalitis disease such as West Nile virus could be considered in the appropriate clinical    setting  A nonenhancing glioma is also possible as are reports of status epilepticus  The unilateral appearance argues against prion disease such as CJD  Further clinical assessment recommended  Recommend repeat follow-up contrast enhanced MRI of the    brain in 6-12 weeks to assess for stability  2   A few white matter lesions elsewhere may represent microangiopathy, although given the findings in the right thalamic pulvinar, other etiologies are certainly possible  3   No MR evidence of mesial temporal sclerosis  4   No classic MR findings of herpes encephalitis  Further clinical assessment and follow-up recommended  Workstation performed: [id number]         XR chest portable ICU   Final Result by Wan Rodríguez MD (04/11 1142)      Small left apical pneumothorax, unchanged from previous examination  Unchanged bilateral pleural effusions and pulmonary parenchymal airspace opacities  Workstation performed: [id number]         XR chest portable ICU   Final Result by Alfreda Mueller MD (04/10 1253)      Small left apical pneumothorax identified possibly slightly increased from prior exam       Bilateral alveolar opacities in keeping with pneumonia  Small bilateral pleural effusions  Workstation performed: [id number]         XR chest portable ICU   Final Result by Abril Garcia MD (04/09 1610)   Left internal jugular central line terminates in the right atrium, and should be pulled back by 3 cm  Unchanged small left pneumothorax  Worsening bilateral pneumonia  Stable small left pleural effusion  The study was marked in [facility] for immediate notification  Workstation performed: [id number]         XR chest portable    (Results Pending)   MRI brain seizure wo and w contrast    (Results Pending)       Other Diagnostic Testing: I have personally reviewed pertinent reports        ACTIVE MEDICATIONS     Current Facility-Administered Medications   Medication Dose Route Frequency    chlorhexidine (PERIDEX) 0 12 % oral rinse 15 mL  15 mL Swish & Spit Q12H [address]    cholecalciferol (VITAMIN D3) tablet 400 Units  400 Units Oral Daily    dextrose 5 % infusion  150 mL/hr Intravenous Continuous    diazepam (VALIUM) injection 10 mg  10 mg Intravenous Q6H PRN    fentanyl citrate (PF) 100 MCG/2ML 50 mcg  50 mcg Intravenous D5N PRN    folic acid 1 mg in sodium chloride 0 9 % 50 mL IVPB  1 mg Intravenous Daily    furosemide (LASIX) injection 40 mg  40 mg Intravenous TID (diuretic)    hydrocortisone sodium succinate (PF) (Solu-CORTEF) injection 100 mg  100 mg Intravenous Q8H [address]    insulin lispro (HumaLOG) 100 units/mL subcutaneous injection 1-5 Units  1-5 Units Subcutaneous Q6H [address]    lacosamide (VIMPAT) 200 mg in sodium chloride 0 9 % 100 mL IVPB  200 mg Intravenous Q12H    levETIRAcetam (KEPPRA) 2,500 mg in sodium chloride 0 9 % 250 mL IVPB  2,500 mg Intravenous Q12H [address]    nicotine (NICODERM CQ) 14 mg/24hr TD 24 hr patch 1 patch  1 patch Transdermal Daily    norepinephrine (LEVOPHED) 8 mg (DOUBLE CONCENTRATION) IV in sodium chloride 0 9% 250 mL  1-30 mcg/min Intravenous Titrated    ondansetron (ZOFRAN) injection 4 mg  4 mg Intravenous Q6H PRN    pantoprazole (PROTONIX) injection 40 mg  40 mg Intravenous Q24H CITLALLI    potassium chloride 40 mEq IVPB (premix)  40 mEq Intravenous Once    rifaximin (XIFAXAN) tablet 550 mg  550 mg Oral Q12H [address]    thiamine (VITAMIN B1) 100 mg in sodium chloride 0 9 % 50 mL IVPB  100 mg Intravenous Daily    topiramate (TOPAMAX) tablet 200 mg  200 mg Oral Q12H [address]    vasopressin (PITRESSIN) 20 Units in sodium chloride 0 9 % 100 mL infusion  0 04 Units/min Intravenous Continuous       Prior to Admission medications    Medication Sig Start Date End Date Taking?  Authorizing Provider   baclofen 10 mg tablet Take 10 mg by mouth 5/7/19   Historical Provider, MD   cholecalciferol (VITAMIN D3) 1,000 units tablet     Historical Provider, MD   furosemide (LASIX) 40 mg tablet Take 40 mg by mouth 5/20/19   Historical Provider, MD   gabapentin (NEURONTIN) 300 mg capsule Take 1 capsule (300 mg total) by mouth 2 (two) times a day 8/4/20   Curtistine Hotter, DO   lactulose 20 g/30 mL Take 45 mL (30 g total) by mouth 3 (three) times a day To achieve 3 bowel movements a day 3/16/21   SILVANO Holder   spironolactone (ALDACTONE) 100 mg tablet TAKE 1 & 1/2 TABLETS BY MOUTH DAILY 5/20/19   Historical Provider, MD VTE Mechanical Prophylaxis: sequential compression device    ==  MD Ivana Schuler's Neurology Residency, PGY-2

## 2021-04-17 NOTE — PROGRESS NOTES
Progress Note- Drew Hassan 63 y o  female MRN: [id number]  Unit/Bed#: MICU 01 Encounter: [id number]    Assessment and Plan:  Drew Hassan is a 63 y o  female with PMH:  Decompensated cirrhosis secondary to hepatitis C complicated by portal vein thrombus, hepatic encephalopathy, hepatocellular carcinoma who presented for pneumonia found have worsening acute mental status in the setting seizure history who is currently intubated and on vasopressin medications, GI team consulted for:    #Ileus  #Decreased Gastric Motility  Patient has been intubated for multiple days and now presenting with evidence of ileus  Patient was most recently on large amounts of benzodiazepines which could be contributing to her decreased bowel motility  In addition the fact that she has been intubated and has not got out of bed increased her risk of having decreased motility  Small-bowel follow-through did show decreased motility of contrast within her stomach but given that she is not having any significant vomiting or output less likely to be concerning for acute gastric outlet obstruction  Would continue to avoid opiate therapy at this time  There does not seem to be any evidence of Norton will syndrome or pseudo obstruction to need colonic decompression      Plan:  Plan:  -continue NG tube to intermittent suction   -keep patient NPO for at least next 12-24 hours   -closely monitor NG tube output  -if no improvement can consider EGD in next few days to rule out gastric outlet obstruction  -no need for colonic decompression at this time based on imaging  -daily KUB PRN  -aggressive bowel regimen   -Senna   -Colase   -Miralax   -Avoid lactulose due to worsening of distention/blaoting  -tap water enemas p r n   -aggressive repletion of electrolytes, K>4, Mg>2  -avoid narcotics    #Decompensated Cirrhosis secondary to Hep C  #Hepatocellular Carcinoma s/p Microwave ablation  MELD: 25  Patient's diagnosis cirrhosis in 2013 initially felt to be from alcohol abuse but she did not receive treatment for hepatitis C infection  Had viral load as far back as 2019  Was diagnosed with hepatocellular carcinoma status post ablation in 2020   - CMP q day  - INR q day  - continue diuresis with Lasix IV  - continue rifaximin 550 b i d   - hold lactulose    ______________________________________________________________________    Subjective:      Intubated and sedated    Medication Administration - last 24 hours from 04/16/2021 1143 to 04/17/2021 1143       Date/Time Order Dose Route Action Action by     04/17/2021 0927 chlorhexidine (PERIDEX) 0 12 % oral rinse 15 mL 15 mL Swish & Spit Given João Franklin RN     04/16/2021 2212 chlorhexidine (PERIDEX) 0 12 % oral rinse 15 mL 15 mL Swish & Spit Given Buddy Barger RN     04/17/2021 4898 nicotine (NICODERM CQ) 14 mg/24hr TD 24 hr patch 1 patch 1 patch Transdermal Medication Applied João Franklin RN     04/17/2021 0926 nicotine (NICODERM CQ) 14 mg/24hr TD 24 hr patch 1 patch 1 patch Transdermal Patch Removed João Franklin RN     04/17/2021 0931 cholecalciferol (VITAMIN D3) tablet 400 Units 400 Units Oral Given João Franklin RN     04/17/2021 0936 rifaximin Javid Jester) tablet 550 mg 550 mg Oral Not Given João Franklin RN     04/16/2021 2217 rifaximin (XIFAXAN) tablet 550 mg 550 mg Oral Not Given Buddy Barger RN     04/17/2021 1000 norepinephrine (LEVOPHED) 4 mg (STANDARD CONCENTRATION) IV in sodium chloride 0 9% 250 mL 13 mcg/min Intravenous Rate/Dose Change João Franklin RN     04/17/2021 3376 norepinephrine (LEVOPHED) 4 mg (STANDARD CONCENTRATION) IV in sodium chloride 0 9% 250 mL 15 mcg/min Intravenous 12 [facility], [address] [place] [address]     04/17/2021 0902 norepinephrine (LEVOPHED) 4 mg (STANDARD CONCENTRATION) IV in sodium chloride 0 9% 250 mL 0 mcg/min Intravenous Stopped [facility],      04/17/2021 6104 norepinephrine (LEVOPHED) 4 mg (STANDARD CONCENTRATION) IV in sodium chloride 0 9% 250 mL 17 mcg/min Intravenous Rate/Dose Change Az Maldonado, FRANCINE     04/17/2021 0327 norepinephrine (LEVOPHED) 4 mg (STANDARD CONCENTRATION) IV in sodium chloride 0 9% 250 mL 15 mcg/min Intravenous Rate/Dose Change Luis Quinn RN     04/17/2021 0545 norepinephrine (LEVOPHED) 4 mg (STANDARD CONCENTRATION) IV in sodium chloride 0 9% 250 mL 16 mcg/min Intravenous Rate/Dose Change Luis Quinn RN     04/17/2021 0435 norepinephrine (LEVOPHED) 4 mg (STANDARD CONCENTRATION) IV in sodium chloride 0 9% 250 mL 17 mcg/min Intravenous [address] Luis Quinn RN     04/17/2021 0200 norepinephrine (LEVOPHED) 4 mg (STANDARD CONCENTRATION) IV in sodium chloride 0 9% 250 mL 18 mcg/min Intravenous [address] Luis Quinn RN     04/16/2021 2130 norepinephrine (LEVOPHED) 4 mg (STANDARD CONCENTRATION) IV in sodium chloride 0 9% 250 mL 18 mcg/min Intravenous [address] Luis Quinn RN     04/16/2021 1754 norepinephrine (LEVOPHED) 4 mg (STANDARD CONCENTRATION) IV in sodium chloride 0 9% 250 mL 18 mcg/min Intravenous [address] Soniya Ho RN     04/16/2021 1753 norepinephrine (LEVOPHED) 4 mg (STANDARD CONCENTRATION) IV in sodium chloride 0 9% 250 mL 0 mcg/min Intravenous Stopped Soniya Ho RN     04/16/2021 1418 norepinephrine (LEVOPHED) 4 mg (STANDARD CONCENTRATION) IV in sodium chloride 0 9% 250 mL 17 mcg/min Intravenous Rate/Dose Change Soniya Ho RN     04/16/2021 1413 norepinephrine (LEVOPHED) 4 mg (STANDARD CONCENTRATION) IV in sodium chloride 0 9% 250 mL 16 mcg/min Intravenous [address] Soniya Ho RN     04/16/2021 1412 norepinephrine (LEVOPHED) 4 mg (STANDARD CONCENTRATION) IV in sodium chloride 0 9% 250 mL 0 mcg/min Intravenous Stopped Soniya Ho RN     04/16/2021 1402 norepinephrine (LEVOPHED) 4 mg (STANDARD CONCENTRATION) IV in sodium chloride 0 9% 250 mL 16 mcg/min Intravenous Rate/Dose Change Soniya Ho RN     04/16/2021 4954 norepinephrine (LEVOPHED) 4 mg (STANDARD CONCENTRATION) IV in sodium chloride 0 9% 250 mL 15 mcg/min Intravenous Rate/Dose Change Juana Mcgowan, RN     04/16/2021 1145 norepinephrine (LEVOPHED) 4 mg (STANDARD CONCENTRATION) IV in sodium chloride 0 9% 250 mL 14 mcg/min Intravenous Rate/Dose Change Buhler Malrafael, RN     04/17/2021 0050 midazolam (VERSED) 1 mg/mL (DOUBLE CONCENTRATION) 100 mL infusion 0 mg/hr Intravenous Stopped Kayli Medina, RN     04/16/2021 1755 midazolam (VERSED) 1 mg/mL (DOUBLE CONCENTRATION) 100 mL infusion 10 mg/hr Intravenous Rate/Dose Change Juana Roslyn, RN     04/16/2021 1700 midazolam (VERSED) 1 mg/mL (DOUBLE CONCENTRATION) 100 mL infusion 20 mg/hr Intravenous [address] Buhler Travonrafael, RN     04/16/2021 1542 midazolam (VERSED) 1 mg/mL (DOUBLE CONCENTRATION) 100 mL infusion 20 mg/hr Intravenous Rate/Dose Change Juana Mcgowan, RN     04/16/2021 1149 midazolam (VERSED) 1 mg/mL (DOUBLE CONCENTRATION) 100 mL infusion 20 mg/hr Intravenous Rate/Dose Change Juana Travonrafael, RN     04/17/2021 0936 topiramate (TOPAMAX) tablet 200 mg 200 mg Oral Not Given Janet Melissa, RN     04/16/2021 2217 topiramate (TOPAMAX) tablet 200 mg 200 mg Oral Not Given Kayli Oas, RN     04/17/2021 0930 enoxaparin (LOVENOX) subcutaneous injection 40 mg 40 mg Subcutaneous Not Given Janet Melissa, RN     04/17/2021 0162 dextrose 5 % infusion 100 mL/hr Intravenous Rate/Dose Change Dorla Melissa, RN     04/17/2021 0304 dextrose 5 % infusion 75 mL/hr Intravenous [address], [address] [place] [address]     04/17/2021 0303 dextrose 5 % infusion 0 mL/hr Intravenous Stopped Tommy Simms, RN     04/16/2021 1541 dextrose 5 % infusion 75 mL/hr Intravenous Rate/Dose Change Juana Mcgowan, RN     04/17/2021 0949 levETIRAcetam (KEPPRA) 2,500 mg in sodium chloride 0 9 % 250 mL IVPB 0 mg Intravenous Stopped Janet Torres RN     04/17/2021 6343 levETIRAcetam (KEPPRA) 2,500 mg in sodium chloride 0 9 % 250 mL IVPB 2,500 mg Intravenous New Bag Janet Torres RN     04/16/2021 7499 levETIRAcetam (KEPPRA) 2,500 mg in sodium chloride 0 9 % 250 mL IVPB 0 mg Intravenous Stopped Luis Livers, RN     04/16/2021 2200 levETIRAcetam (KEPPRA) 2,500 mg in sodium chloride 0 9 % 250 mL IVPB 2,500 mg Intravenous [address] Luis Livers, RN     04/17/2021 1037 lacosamide (VIMPAT) 200 mg in sodium chloride 0 9 % 100 mL IVPB 0 mg Intravenous Stopped Cherise Mejía, RN     04/17/2021 1007 lacosamide (VIMPAT) 200 mg in sodium chloride 0 9 % 100 mL IVPB 200 mg Intravenous New Bag Cherise Mejía, RN     04/16/2021 2230 lacosamide (VIMPAT) 200 mg in sodium chloride 0 9 % 100 mL IVPB 0 mg Intravenous Stopped Luis Livers, RN     04/16/2021 2200 lacosamide (VIMPAT) 200 mg in sodium chloride 0 9 % 100 mL IVPB 200 mg Intravenous [address] Luis Livers, RN     04/17/2021 1387 pantoprazole (PROTONIX) injection 40 mg 40 mg Intravenous Given Cherise Mejía, RN     04/17/2021 0428 vasopressin (PITRESSIN) 20 Units in sodium chloride 0 9 % 100 mL infusion 0 04 Units/min Intravenous [address] Luis Livers, RN     04/16/2021 1838 vasopressin (PITRESSIN) 20 Units in sodium chloride 0 9 % 100 mL infusion 0 04 Units/min Intravenous New [address] [place] [address] Rachel Dawkins, RN     04/16/2021 1837 vasopressin (PITRESSIN) 20 Units in sodium chloride 0 9 % 100 mL infusion 0 04 Units/min Intravenous Rate/Dose Change Rachel Dawkins, RN     04/17/2021 5661 furosemide (LASIX) injection 40 mg 40 mg Intravenous Given Cherise Mejía, RN     04/16/2021 1559 furosemide (LASIX) injection 40 mg 40 mg Intravenous Given Rachel Dawkins, RN     04/16/2021 1838 potassium chloride 20 mEq IVPB (premix) 0 mEq Intravenous Stopped Rachel Dawkins, RN     04/16/2021 1556 potassium chloride 20 mEq IVPB (premix) 20 mEq Intravenous [address] Rachel Dawkins RN     04/17/2021 3809 hydrocortisone sodium succinate (PF) (Solu-CORTEF) injection 100 mg 100 mg Intravenous Given Luis Alvarenga RN     04/16/2021 2207 hydrocortisone sodium succinate (PF) (Solu-CORTEF) injection 100 mg 100 mg Intravenous Given Reba Justice RN     04/16/2021 1200 diatrizoate meglumine-sodium (GASTROGRAFIN) solution 120 mL 120 mL Per NG Tube Given by Elyse Tobias     53/74/7074 0238 furosemide (LASIX) injection 40 mg 40 mg Intravenous Given Reba Justice RN     04/17/2021 1015 potassium chloride 20 mEq IVPB (premix) 20 mEq Intravenous New Bag Alexis Morrison RN          Objective:     Vitals: Blood pressure 100/59, pulse 62, temperature 97 9 °F (36 6 °C), resp  rate 12, weight 99 2 kg (218 lb 11 1 oz), SpO2 100 %  ,Body mass index is 33 25 kg/m²  Intake/Output Summary (Last 24 hours) at 4/17/2021 1143  Last data filed at 4/17/2021 1118  Gross per 24 hour   Intake 4712 81 ml   Output 5700 ml   Net -987 19 ml       Physical Exam:   General Appearance:    intubated and sedated   HEENT:   Normocephalic, atraumatic, anicteric  Neck:  Supple, symmetrical, trachea midline   Lungs:   Clear to auscultation bilaterally; no rales, rhonchi or wheezing; respirations unlabored    Heart[de-identified]   Regular rate and rhythm; no murmur, rub, or gallop  Abdomen:    soft nontender   Genitalia:   Deferred    Rectal:   Deferred    Extremities:  No cyanosis, clubbing or edema    Pulses:  2+ and symmetric all extremities    Skin:  No jaundice, rashes, or lesions    Lymph nodes:  No palpable cervical lymphadenopathy        Invasive Devices     Central Venous Catheter Line            CVC Central Lines 04/09/21 Triple 20cm 7 days          Arterial Line            Arterial Line 04/09/21 Radial 7 days          Drain            Rectal Tube 9 days    NG/OG/Enteral Tube 18 Fr Center mouth 8 days    Urethral Catheter Temperature probe 16 Fr  8 days          Airway            ETT  Oral 7 5 mm 8 days                Lab Results:  No results displayed because visit has over 200 results  Imaging Studies: I have personally reviewed pertinent imaging studies        ---------------------------------------------------  Note Electronically Signed By:    MD Trisha Loya 75 Gastroenterology Fellow PGY-5  [address] #: [id number]

## 2021-04-17 NOTE — RESPIRATORY THERAPY NOTE
RT Ventilator Management Note  Annette De La Rosa 63 y o  female MRN: [id number]  Unit/Bed#: [facility] 01 Encounter: [id number]      Daily Screen       4/16/2021  0822 4/17/2021  0740          Patient safety screen outcome[de-identified]  Failed  Failed  (Pended)       Not Ready for Weaning due to[de-identified]  Underline problem not resolved  Underline problem not resolved  (Pended)               Physical Exam:   Assessment Type: Assess only  General Appearance: Sedated  Respiratory Pattern: Assisted  Chest Assessment: Chest expansion symmetrical  Bilateral Breath Sounds: Diminished, Clear  Suction: ET Tube      Resp Comments: SBT not indicated due to mental status  Sx for scant white secretions  No vent changes at this time

## 2021-04-18 NOTE — PROGRESS NOTES
NEUROLOGY RESIDENCY PROGRESS NOTE     Name: Hayde Kothari   Age & Sex: 63 y o  female   MRN: [id number]  [facility]   Encounter: [id number]    ASSESSMENT & PLAN     Status epilepticus [facility])  Assessment & Plan  Assessment:  · 63 y o  female with alcoholic/HCV cirrhosis and [address]  who was initially admitted to [facility] with altered mental status  Rapid response was activated on 4/8 due to witnessed seizure activity and neurology was consulted for further assessment  She was given 3 mg Ativan total and 1000 mg Keppra was ordered to be given  During the evaluation by neurology on 4/8 she was noted to have leftward gaze with horizontal nystagmus  CTH was completed and showed no acute abnormalities  Due to concern for ongoing seizure activity decsion was made to transfer patient to [facility] for vEEG  Patient was transferred to intensive care unit and ultimately intubated  · Timeline of Events:   ·  4/08: Lorazapam 1mg IV, > Lorazapam 1mg IV > Keppra 2G IV > ativan 2mg IV > on 4/8/ 22:20: ativan 2mg IV  · 4/09: 05:30 Propofol ggt started at 30 then increased to 50  and at: 06:03 Lorazepam 2mg IV > 09:32 Keppra 750 mg > Right temporal seizures started around 14:15 and occurred every 2 minutes roughly > 15:04 Lorazapam 5mg IV >15:30 no significant improvement >4/09: 15:48 Lorazapam 8mg IV >16:04 no significant improvement > 16:19 Lorazapam 10mg IV >16:20 Keppra 1 5G IV > 16:54 5mg Valium >16:58 slight improvement, but persistent seizures >17:13 Midazolam ggt titration >17:25 Lorazapam 10mg IV  · 4/10: refractory status-On Versed gtt increased to 60, Propafol gtt at 100, Vimpat load, valium 20 mg, Ketamine bolus and ketamine 2 mg/kg gtt, > v EEG shows further seizure activity in left temporal region   At 13:32- No seizures EEG completely suppressed except for isolated right temporal discharges occurring every 15-40 seconds  · 4/11: Propofol at 100 , Ketamine at 2mg/kg/hr, Versed at 60 and Keppra dose to 2 5g and vimpat to 200mg BID, no further seizures on EMU, got her MRI brain, and plan to decrease Propofol by 10 mg/hr  · 4/11: MRI brain with and without: inconclusive   · 4/12: Continued to decrease Propofol, propofol stopped around 1900  · 4/13: 2 episodes of right hemispheric seizures at 0039 and 0336  Loaded with Topamax 400 mg and continue with Topamax 200mg BID with maintained Versed at 60, Ketamine at 20, keppra 2 5mg BID and Vimpat 200 q12  · 4/13: LP done  · 4/14:  Weaned off of ketamine  · 4/15:  Planned for weaning off of midazolam, midazolam   Weaned off on 04/17 at 9:00 a m   · 4/18- 5:30 am electrographic seizure right occipital area, low amplitude, eeg suppressed with occasional right posterior epileptiform discharges with low-amplitude  Pending MRI brain  Plan:   · Current AEDs:  · Continue Keppra 2500 BID maintained dose   · Increase Vimpat from 200 b i d  to 300 b i d  And will load 400 once on 4/18   · Loaded with Tomapax 4/13 with 400, continue 200mg BID, d/c Topamax on 04/17  · Levels obtain for Keppra and topamax:  Ordered and pending  · Current Sedation:  Off sedation, weaned off midazolam on 04/17 at 9:00 a m  · Pending MRI brain  · Continue vEEG monitoring  · Continue Seizure precautions     · ID on board, recommendations appreciated   · Received a total of 11 days Abx, and 4 days of Antiviral-Acyclovir, d/c on 4/12  · Medical management as per critical care team    · LP on 4/13/2021  · Pending CSF Labs : [facility] panel, West Nile, MS panel, Flow Cytology, MS panel, Myelin Basic Protein,   · Normal: Diff, Gram stain, White count, Glucose  (65), ME, Flow Cytometry   · Abnormal: Xanthrochromia, Elevated CSF protein (100), RBC count: (3,746),     LP studies:    Results from last 7 days   Lab Units 04/13/21  1709   APPEARANCE CSF  hazy/bloody   WBC CSF /uL 3   GLUCOSE CSF mg/dL 65   RBC CSF uL 3,746*   TOTAL COUNTED  100   NEUTROPHILS % (CSF) % 74   LYMPHS % (CSF) % 19   MONOCYTES % (CSF) % 5   EOSINOPHILS % (CSF) % 2   CSF CULTURE  No growth   PROTEIN CSF mg/dL 100*       Hypernatremia  Assessment & Plan  · Continue watching Acid/Base Status  · Recommend increasing Free water flushes  · Continue monitoring Sodium            SUBJECTIVE     Patient was seen and examined  Patient had two electrographic seizure early morning since 5:30 am from right occipital area  The seizures are really low amplitude an EEG now is suppressed with occasional right posterior epileptiform discharges  She is off sedation from  at 9:00 a m  and currently on two vaso suppressors  Patient underwent MRI brain, pending results    Review of Systems   Unable to perform ROS: Mental status change       OBJECTIVE     Patient ID: Thania Mireles is a 63 y o  female  Vitals:    21 1325 21 1400 21 1500 21 1621   BP: 124/78      BP Location: Right arm      Pulse: 60 60 60    Resp: (!) 11 (!) 11 (!) 11    Temp: 97 5 °F (36 4 °C) 97 5 °F (36 4 °C) 97 5 °F (36 4 °C)    TempSrc:       SpO2: 98% 98% 98% 99%   Weight:          Temperature:   Temp (24hrs), Av 6 °F (36 4 °C), Min:95 7 °F (35 4 °C), Max:99 5 °F (37 5 °C)    Temperature: 97 5 °F (36 4 °C)      Physical Exam  Vitals signs and nursing note reviewed  Constitutional:       Appearance: She is ill-appearing and toxic-appearing  Interventions: She is sedated, intubated and restrained  Comments: Attached leads for video EEG    HENT:      Head: Normocephalic and atraumatic  Nose: Nose normal    Pulmonary:      Effort: She is intubated  Comments: Ventilate via ETT  Musculoskeletal:         General: Swelling present  Right lower leg: Edema present  Left lower leg: Edema present  Skin:     Coloration: Skin is jaundiced  Findings: Bruising, erythema and lesion present        Comments: B/l LE wounds  Bilateral upper extremity bruising   Neurological:      Deep Tendon Reflexes:      Reflex Scores:       Bicep reflexes are 0 on the right side and 0 on the left side  Brachioradialis reflexes are 0 on the right side and 0 on the left side  Patellar reflexes are 0 on the right side and 0 on the left side  Achilles reflexes are 0 on the right side and 0 on the left side  Mental Status   Patient was examined off of sedation  She is unable to participate in a formal neurological assessment       Cranial Nerves      CN III, IV, VI   Right pupil: Reactivity:  sluggishly reactive  Left pupil: Reactivity:  reactive  Vestibulo-ocular reflex: absent  Patient was unable to participate in an exam  She had no gag or cough present, she had possible Left corneal reflex, no corneal on right and did not blink on eyelash stroke        Motor Exam   Muscle bulk: decreased  Overall muscle tone: decreased  Right arm tone: decreased  Left arm tone: decreased  Right leg tone: decreased  Left leg tone: decreasedPatient did not respond to nox stim in all four extremities       Sensory Exam   Did not respond to nox stim proximally or distally in all 4 extremities       Gait, Coordination, and Reflexes      Tremor   Resting tremor: absent  Intention tremor: absent  Action tremor: absent     Reflexes   Right brachioradialis: 0  Left brachioradialis: 0  Right biceps: 0  Left biceps: 0  Right patellar: 0  Left patellar: 0  Right achilles: 0  Left achilles: 0  Right plantar: equivocal  Left plantar: equivocal  Right Card: absent  Left Card: absent  Right ankle clonus: absent  Left ankle clonus: absent    LABORATORY DATA     Labs: I have personally reviewed pertinent reports      Results from last 7 days   Lab Units 04/18/21  0508 04/17/21  0426 04/16/21  0342 04/15/21  1036  04/14/21  0451  04/13/21  0530   WBC Thousand/uL 5 49 6 15 7 60 8 03   < > 6 19  --  8 27   HEMOGLOBIN g/dL 8 1* 9 4* 8 4* 8 7*   < > 8 0*   < > 8 4*   HEMATOCRIT % 26 4* 29 7* 26 8* 27 6*   < > 26 2*   < > 28 3*   PLATELETS Thousands/uL 42* 49* 59* 71*   < > 63*   < > 55*   NEUTROS PCT %  --  86*  --   --   --  74  --  76*   MONOS PCT %  --  4  --   --   --  6  --  8   MONO PCT %  --   --   --  7  --   --   --   --     < > = values in this interval not displayed  Results from last 7 days   Lab Units 04/18/21  1552 04/18/21  0508 04/17/21  2216  04/17/21  0426  04/16/21  0342   SODIUM mmol/L 146* 149* 148*   < > 150*   < > 152*   POTASSIUM mmol/L 3 9 3 9 5 6*   < > 3 4*   < > 4 1   CHLORIDE mmol/L 118* 122* 122*   < > 124*   < > 129*   CO2 mmol/L 22 25 22   < > 20*   < > 18*   BUN mg/dL 40* 40* 35*   < > 30*   < > 27*   CREATININE mg/dL 1 06 1 15 1 13   < > 1 08   < > 1 23   CALCIUM mg/dL 8 3 8 6 8 5   < > 8 6   < > 8 5   ALK PHOS U/L  --  84  --   --  98  --  90   ALT U/L  --  34  --   --  41  --  39   AST U/L  --  39  --   --  73*  --  99*    < > = values in this interval not displayed  Results from last 7 days   Lab Units 04/18/21  0508 04/17/21  0426 04/16/21  0342   MAGNESIUM mg/dL 2 1 2 0 2 1     Results from last 7 days   Lab Units 04/18/21  0508 04/17/21  0426 04/16/21  0342   PHOSPHORUS mg/dL 2 7 3 6 4 5      Results from last 7 days   Lab Units 04/18/21  0508 04/17/21  0426 04/16/21  1027   INR  1 91* 2 01* 2 14*     Results from last 7 days   Lab Units 04/16/21  1027   LACTIC ACID mmol/L 2 0           IMAGING & DIAGNOSTIC TESTING     Radiology Results: I have personally reviewed pertinent reports  XR chest portable   Final Result by Slime Marc MD (04/18 2088)      No change in large left and moderate effusion with bibasilar atelectasis  Resolution of left apical pneumothorax  Workstation performed: [id number]         FL small bowel   Final Result by Ayo Dunn MD (04/16 2504)      No evidence of Gastrografin distal to the stomach, 6 hours following administration by NG tube                 Workstation performed: [id number]         XR abdomen 1 view kub   Final Result by Xu Burns DO (04/15 1034)      Dilated loops of small bowel, suggestive of ileus  Continued follow-up is recommended to exclude obstruction  No free air  Tube or drain tip overlies the gastroesophageal junction  If this represents an enteric tube, this should be advanced  Correlate clinically for appropriate position  The study was marked in [facility] for immediate notification  Workstation performed: [id number]         XR chest portable ICU   Final Result by Paras Weston DO (04/15 1036)      Unchanged left hydropneumothorax  Hazy left greater than right lung consolidations unchanged  Additional probable enteric tube overlying the gastroesophageal junction, although this could be external to the patient  Correlate with external devices  Workstation performed: [id number]         [facility] [place] lumbar puncture   Final Result by Natasha Barahona MD (04/14 1766)      Successful fluoroscopically guided lumbar puncture with an opening pressure of 41 cm H2O  Approximately 16 5 mL's of CSF was sent to lab for requested analysis  As noted above, the patient's CSF was initially clear yellow and became bloody during the    collection process  The needle was withdrawn slightly and the CSF fluid slowly cleared  After a discussion with the MICU team, the patient will receive an additional unit of FFP post procedure  Please feel free to contact us with any questions or concerns  I reviewed the above findings and procedure with Dr Alverto Lynch  PERFORMED, DICTATED AND SIGNED BY: Ildefonso Angeles PA-C         Workstation performed: [id number]         MRI brain seizure wo and w contrast   Final Result by Rani Colorado MD (04/11 1723)         1  Subtle, faint diffusion restriction in the pulvinar of the right thalamus with associated T2/FLAIR hyperintensity, a nonspecific finding which may be related to subacute infarction, which statistically would probably be the most likely differential    diagnosis    Other unusual differential considerations would include Behcet's disease or other connective tissue disorders like Sjogren's syndrome  Infectious encephalitis disease such as West Nile virus could be considered in the appropriate clinical    setting  A nonenhancing glioma is also possible as are reports of status epilepticus  The unilateral appearance argues against prion disease such as CJD  Further clinical assessment recommended  Recommend repeat follow-up contrast enhanced MRI of the    brain in 6-12 weeks to assess for stability  2   A few white matter lesions elsewhere may represent microangiopathy, although given the findings in the right thalamic pulvinar, other etiologies are certainly possible  3  No MR evidence of mesial temporal sclerosis  4   No classic MR findings of herpes encephalitis  Further clinical assessment and follow-up recommended  Workstation performed: [id number]         XR chest portable ICU   Final Result by Erlin Vázquez MD (04/11 1142)      Small left apical pneumothorax, unchanged from previous examination  Unchanged bilateral pleural effusions and pulmonary parenchymal airspace opacities  Workstation performed: [id number]         XR chest portable ICU   Final Result by Cleve Izquierdo MD (04/10 1253)      Small left apical pneumothorax identified possibly slightly increased from prior exam       Bilateral alveolar opacities in keeping with pneumonia  Small bilateral pleural effusions  Workstation performed: [id number]         XR chest portable ICU   Final Result by Priscilla Bran MD (04/09 1610)   Left internal jugular central line terminates in the right atrium, and should be pulled back by 3 cm  Unchanged small left pneumothorax  Worsening bilateral pneumonia  Stable small left pleural effusion  The study was marked in [facility] for immediate notification           Workstation performed: [id number]         MRI brain seizure wo and w contrast    (Results Pending)       Other Diagnostic Testing: I have personally reviewed pertinent reports        ACTIVE MEDICATIONS     Current Facility-Administered Medications   Medication Dose Route Frequency    chlorhexidine (PERIDEX) 0 12 % oral rinse 15 mL  15 mL Swish & Spit Q12H [address]    cholecalciferol (VITAMIN D3) tablet 400 Units  400 Units Oral Daily    dextrose 5 % infusion  150 mL/hr Intravenous Continuous    diazepam (VALIUM) injection 10 mg  10 mg Intravenous Q6H PRN    docusate sodium (COLACE) capsule 100 mg  100 mg Oral BID    fentanyl citrate (PF) 100 MCG/2ML 50 mcg  50 mcg Intravenous A1A PRN    folic acid 1 mg in sodium chloride 0 9 % 50 mL IVPB  1 mg Intravenous Daily    furosemide (LASIX) injection 60 mg  60 mg Intravenous TID (diuretic)    hydrocortisone sodium succinate (PF) (Solu-CORTEF) injection 100 mg  100 mg Intravenous Q8H [address]    insulin lispro (HumaLOG) 100 units/mL subcutaneous injection 1-5 Units  1-5 Units Subcutaneous Q6H CITLALLI    lacosamide (VIMPAT) 300 mg in sodium chloride 0 9 % 100 mL IVPB  300 mg Intravenous Q12H    levETIRAcetam (KEPPRA) 2,500 mg in sodium chloride 0 9 % 250 mL IVPB  2,500 mg Intravenous Q12H [address]    metoclopramide (REGLAN) injection 10 mg  10 mg Intravenous Q6H ICTLALLI    nicotine (NICODERM CQ) 14 mg/24hr TD 24 hr patch 1 patch  1 patch Transdermal Daily    norepinephrine (LEVOPHED) 8 mg (DOUBLE CONCENTRATION) IV in sodium chloride 0 9% 250 mL  1-30 mcg/min Intravenous Titrated    ondansetron (ZOFRAN) injection 4 mg  4 mg Intravenous Q6H PRN    pantoprazole (PROTONIX) injection 40 mg  40 mg Intravenous Q24H CITLALLI    polyethylene glycol (MIRALAX) packet 17 g  17 g Oral Daily    rifaximin (XIFAXAN) tablet 550 mg  550 mg Oral Q12H [address]    senna oral syrup 8 8 mg  8 8 mg Per NG Tube BID    thiamine (VITAMIN B1) 100 mg in sodium chloride 0 9 % 50 mL IVPB  100 mg Intravenous Daily    topiramate (TOPAMAX) tablet 200 mg  200 mg Oral Q12H [address]    vasopressin (PITRESSIN) 20 Units in sodium chloride 0 9 % 100 mL infusion  0 04 Units/min Intravenous Continuous       Prior to Admission medications    Medication Sig Start Date End Date Taking?  Authorizing Provider   baclofen 10 mg tablet Take 10 mg by mouth 5/7/19   Historical Provider, MD   cholecalciferol (VITAMIN D3) 1,000 units tablet     Historical Provider, MD   furosemide (LASIX) 40 mg tablet Take 40 mg by mouth 5/20/19   Historical Provider, MD   gabapentin (NEURONTIN) 300 mg capsule Take 1 capsule (300 mg total) by mouth 2 (two) times a day 8/4/20   Vivi Freeman,    lactulose 20 g/30 mL Take 45 mL (30 g total) by mouth 3 (three) times a day To achieve 3 bowel movements a day 3/16/21   Ruthmicah Amado, CRNP   spironolactone (ALDACTONE) 100 mg tablet TAKE 1 & 1/2 TABLETS BY MOUTH DAILY 5/20/19   Historical Provider, MD       VTE Mechanical Prophylaxis: sequential compression device    ==  MD Trisha Pratt 75 Neurology Residency, PGY-2

## 2021-04-18 NOTE — PROGRESS NOTES
Progress Note - Critical Care   Aidee Vee 63 y o  female MRN: [id number]  Unit/Bed#: [facility] 01 Encounter: [id number]      HPI/24HR Event:  No acute events overnight     ABG pH 7 3 / pCO2 45 1 / pO2 138 9 / HCO3 21 8 / BE -4 3 / O2 Hb 97 2  Albumin 2 2 to 1 8    ROS  Patient intubated and unresponsive     Assessment & Plan:   -Neuro:   Dx: Acute Encephalopathy / Refractory status epilepticus 2/2 possible alcohol withdrawal, cirrhosis,    - Infusion    - Ketamine, propofol and midazolam all off   - Keppra 2500BID Lacosamide 200 BID transitioned to IV    - PO Topamax  Neurology considering transitioning to dilantin    - Keppra and Topamax level pending before trans to IV    - Valium 10mg IV PRN   - Continue thiamine and folate      - vEEG   - MRI at 10AM today    - LP labs pending:     - ENC2 panel, [place], MS panel, flow cytology, myelin basic protein  - Analgesia: prn Fentnayl  - Delirium ppx: CAM-ICU, sleep hygiene      CV:   - Dx: Hypotension 2/2 sedation vs cirrhosis vs sepsis   - Levo 10   - Vaso 0 04   - Solucortef 100mg q8   - If Ileus resolves consider starting on midodrine   - Currently on lasix 2/2 volume overload  Hold if vasopressor requirement increases  Dx: Afib   - Currently rate control   - Holding AC in the setting of anemia & thrombocytopenia  - MAP goal > 65        Lung:   - Dx: Acute hypoxic respiratory failure 2/2 seizure   - Vent day 11    - SCMV 12/400/8/50    - Dx: Mild L apical pneumo   - Monitor airway pressures  - Dx: Aspiration PNA vs pneumonitis   - Elevated procalcitonin   - 1st Blood cx showed NG 48hrs and 2nd blood cx NG in 24   - CXR for PTX assessment pending from yesterday   - ABG pending  - Continue Respiratory Protocol and Airway Clearance Protocol      GI:   - Dx: Cirrhosis Presumed 2/2 HCV, EtOH  W/ Hx of HCC s/p ablation, Portal Vein Thrombosis     - MELD 18               - GI recommends continuing rifaximin 550mg BID despite poss SBO   - Hold lactulose due to worsening distension and bloating   - CMP and INR daily   - Dx: SB Ileus vs obstruction     - KUB 4/15: dilated loops of small bowel suggestive of ileus     - Gastrograffin 4/17: no passage beyond stomach      - Unable to tolerate Erythromycin, metoclopramide D/C'd  - Low intermittent suction via NG              - Hold PO meds  - GI following  - Stress ulcer ppx: pantoprazole 40  - Bowel regimen: per GI Senna, Colace, Miralax, and tap water enema        FEN:   - Dx: Hypernatremia    - Today 148 from 152  - Fluids: d5 @ 150 cc/hr  - Electrolytes: trend and replete as needed  - Nutrition: TF and FWF on hold      :   Dx: Vol overload  Net +29L   - Lasix 40 TID   - Goal of net negative 2L    - Continue to monitor I/Os  - Monitor BUN and creatinine      ID:   - Dx: no acute infections  ID following pt  - Abx: none indicated at this time per ID recs  - Sputum Cxs and Blood cx  Bronchial culture showed 3+ Citrobacter  - Pulm cytology, Fungal culture pending  - No acid fast bacilli seen   - Monitor WBC/temp curve     Heme:   - Dx: Anemia, Thrombocytopenia, Elevated INR  Likely 2/2 Liver - as above  - Hb: 9 4 to 8 1  - DVT ppx: Lovenox 40 QD, SCDs      Endo:   - Dx: Hyperglycemia  - May consider starting on morning basal insulin in addition to SSI    - Goal -180     Msk/Skin:   - Dx: b/l LE wounds w/o acute evidence of infection               - RLE Cx 4/4: E coli, acinetobacter              - appreciate podiatry, ID recs              - cont wound care  - OT - signed off, will re-evaluate once medically stable  - Turning/repositioning      Invasive lines and devices:   Invasive Devices     Central Venous Catheter Line            CVC Central Lines 04/09/21 Triple 20cm 8 days          Arterial Line            Arterial Line 04/09/21 Radial 8 days          Drain            Rectal Tube 10 days    NG/OG/Enteral Tube 18 Fr Center mouth 9 days    Urethral Catheter Temperature probe 16 Fr  9 days          Airway ETT  Oral 7 5 mm 9 days                   Physical exam  General: Intubated, unresponsive  Neuro: GCS 3T (Eye 1, Verbal 1, Motor 1)  HENT: Normocephalic and atraumatic, PERRL  Heart:  RRR, pulses intact  Lungs:  Bilateral breath sounds present  Abdomen: Bowel sounds present, soft non distended  Skin:   Warm, dry skin/Incision site clean dry and intact   - Right arm petechia    - Bilateral wound covered in dressing    Vitals  Temperature:   Temp (24hrs), Av 3 °F (36 3 °C), Min:95 7 °F (35 4 °C), Max:99 5 °F (37 5 °C)    Current: Temperature: 99 5 °F (37 5 °C)    Vitals:    21 0600 21 0700 21 0750 21 0753   BP:    110/61   BP Location:    Right arm   Pulse: 70 72  73   Resp: (!) 11 12  12   Temp: 99 °F (37 2 °C) 99 3 °F (37 4 °C)  99 5 °F (37 5 °C)   TempSrc:    Esophageal   SpO2: 99% 99% 99% 98%   Weight: 92 3 kg (203 lb 7 8 oz)        Arterial Line BP: 116/48  Arterial Line MAP (mmHg): 72 mmHg    Non-Invasive/Invasive Ventilation Settings:  Respiratory    Lab Data (Last 4 hours)    None         O2/Vent Data (Last 4 hours)       0750          Patient safety screen outcome: Failed                 Lab Results   Component Value Date    PHART 7 303 (L) 2021    IAI4TDO 45 1 (H) 2021    PO2ART 138 9 (H) 2021    GTX0DOG 21 8 (L) 2021    BEART -4 3 2021    SOURCE Line, Arterial 2021           Intake and Outputs:  I/O        0701 -  0700  0701 -  0700    I V  (mL/kg) 3668 2 (37) 3817 3 (41 4)    NG/ 0    IV Piggyback 550 600    Total Intake(mL/kg) 4688 2 (47 3) 4417 3 (47 9)    Urine (mL/kg/hr) 5115 (2 1) 2260 (1)    Emesis/NG output  800    Stool  100    Total Output 5115 3160    Net -426 8 +1257 3                Labs:   Results from last 7 days   Lab Units 21  0508 21  0426 21  0342 04/15/21  1036  21  0451  21  0530   WBC Thousand/uL 5 49 6 15 7 60 8 03   < > 6 19  --  8 27   HEMOGLOBIN g/dL 8 1* 9 4* 8 4* 8 7*   < > 8 0*   < > 8 4*   HEMATOCRIT % 26 4* 29 7* 26 8* 27 6*   < > 26 2*   < > 28 3*   PLATELETS Thousands/uL 42* 49* 59* 71*   < > 63*   < > 55*   NEUTROS PCT %  --  86*  --   --   --  74  --  76*   MONOS PCT %  --  4  --   --   --  6  --  8   MONO PCT %  --   --   --  7  --   --   --   --     < > = values in this interval not displayed  Results from last 7 days   Lab Units 04/18/21  0508 04/17/21  2216 04/17/21  1559 04/17/21  0426  04/16/21  0342   SODIUM mmol/L 149* 148* 152* 150*   < > 152*   POTASSIUM mmol/L 3 9 5 6* 3 6 3 4*   < > 4 1   CHLORIDE mmol/L 122* 122* 125* 124*   < > 129*   CO2 mmol/L 25 22 23 20*   < > 18*   BUN mg/dL 40* 35* 35* 30*   < > 27*   CREATININE mg/dL 1 15 1 13 1 13 1 08   < > 1 23   CALCIUM mg/dL 8 6 8 5 8 2* 8 6   < > 8 5   ALK PHOS U/L 84  --   --  98  --  90   ALT U/L 34  --   --  41  --  39   AST U/L 39  --   --  73*  --  99*    < > = values in this interval not displayed  Results from last 7 days   Lab Units 04/18/21  0508 04/17/21  0426 04/16/21  0342   MAGNESIUM mg/dL 2 1 2 0 2 1     Results from last 7 days   Lab Units 04/18/21  0508 04/17/21  0426 04/16/21  0342   PHOSPHORUS mg/dL 2 7 3 6 4 5      Results from last 7 days   Lab Units 04/18/21  0508 04/17/21  0426 04/16/21  1027   INR  1 91* 2 01* 2 14*     Results from last 7 days   Lab Units 04/16/21  1027   LACTIC ACID mmol/L 2 0       Micro:  Lab Results   Component Value Date    BLOODCX No Growth at 48 hrs  04/16/2021    BLOODCX No Growth at 48 hrs  04/15/2021    BLOODCX No Growth After 5 Days   04/09/2021    URINECX >100,000 cfu/ml Escherichia coli (A) 04/03/2021    WOUNDCULT 4+ Growth of Escherichia coli (A) 04/04/2021    WOUNDCULT 4+ Growth of Acinetobacter baumannii (A) 04/04/2021    WOUNDCULT 4+ Growth of  04/04/2021    SPUTUMCULTUR No growth 04/09/2021       Resulted Labs    Pending Labs      Imaging Studies    Imaging:   FL small bowel   Final Result by Pablo Kirkpatrick MD (04/16 2368)      No evidence of Gastrografin distal to the stomach, 6 hours following administration by NG tube  Workstation performed: [id number]         XR abdomen 1 view kub   Final Result by Ella Haas DO (04/15 1034)      Dilated loops of small bowel, suggestive of ileus  Continued follow-up is recommended to exclude obstruction  No free air  Tube or drain tip overlies the gastroesophageal junction  If this represents an enteric tube, this should be advanced  Correlate clinically for appropriate position  The study was marked in [facility] for immediate notification  Workstation performed: [id number]         XR chest portable ICU   Final Result by Ella Haas DO (04/15 1036)      Unchanged left hydropneumothorax  Hazy left greater than right lung consolidations unchanged  Additional probable enteric tube overlying the gastroesophageal junction, although this could be external to the patient  Correlate with external devices  Workstation performed: [id number]         [facility] [place]-patient lumbar puncture   Final Result by Ana Sam MD (04/14 7937)      Successful fluoroscopically guided lumbar puncture with an opening pressure of 41 cm H2O  Approximately 16 5 mL's of CSF was sent to lab for requested analysis  As noted above, the patient's CSF was initially clear yellow and became bloody during the    collection process  The needle was withdrawn slightly and the CSF fluid slowly cleared  After a discussion with the MICU team, the patient will receive an additional unit of FFP post procedure  Please feel free to contact us with any questions or concerns  I reviewed the above findings and procedure with Dr Diony Cruz  PERFORMED, DICTATED AND SIGNED BY: Osiel Thakur PA-C         Workstation performed: [id number]         MRI brain seizure wo and w contrast   Final Result by Stephan Fajardo MD (04/11 4873)         1    Subtle, faint diffusion restriction in the pulvinar of the right thalamus with associated T2/FLAIR hyperintensity, a nonspecific finding which may be related to subacute infarction, which statistically would probably be the most likely differential    diagnosis  Other unusual differential considerations would include Behcet's disease or other connective tissue disorders like Sjogren's syndrome  Infectious encephalitis disease such as West Nile virus could be considered in the appropriate clinical    setting  A nonenhancing glioma is also possible as are reports of status epilepticus  The unilateral appearance argues against prion disease such as CJD  Further clinical assessment recommended  Recommend repeat follow-up contrast enhanced MRI of the    brain in 6-12 weeks to assess for stability  2   A few white matter lesions elsewhere may represent microangiopathy, although given the findings in the right thalamic pulvinar, other etiologies are certainly possible  3  No MR evidence of mesial temporal sclerosis  4   No classic MR findings of herpes encephalitis  Further clinical assessment and follow-up recommended  Workstation performed: [id number]         XR chest portable ICU   Final Result by Gay Denver, MD (04/11 1142)      Small left apical pneumothorax, unchanged from previous examination  Unchanged bilateral pleural effusions and pulmonary parenchymal airspace opacities  Workstation performed: [id number]         XR chest portable ICU   Final Result by Keesha Martinez MD (04/10 1253)      Small left apical pneumothorax identified possibly slightly increased from prior exam       Bilateral alveolar opacities in keeping with pneumonia  Small bilateral pleural effusions  Workstation performed: [id number]         XR chest portable ICU   Final Result by Mansi Acharya MD (04/09 1610)   Left internal jugular central line terminates in the right atrium, and should be pulled back by 3 cm  Unchanged small left pneumothorax  Worsening bilateral pneumonia  Stable small left pleural effusion  The study was marked in [facility] for immediate notification  Workstation performed: [id number]         XR chest portable    (Results Pending)   MRI brain seizure wo and w contrast    (Results Pending)     I have personally reviewed pertinent reports          Allergies: No Known Allergies      Medications:   Scheduled Meds:  Current Facility-Administered Medications   Medication Dose Route Frequency Provider Last Rate    chlorhexidine  15 mL Swish [employer] Spit Q12H [address] Jennifer Hagan PA-C      cholecalciferol  400 Units Oral Daily Jennifer Hagan PA-C      dextrose  150 mL/hr Intravenous Continuous Belen Luna PA-C 150 mL/hr (04/18/21 0408)    diazepam  10 mg Intravenous Q6H PRN Lum CrassSHIRLEY      fentanyl citrate (PF)  50 mcg Intravenous Q1H PRN Lum CrassSHIRLEY      folic acid IVPB  1 mg Intravenous Daily Jasiel Sweeney PA-C 1 mg (04/18/21 0800)    furosemide  40 mg Intravenous TID (diuretic) Belen Luna PA-C      hydrocortisone sodium succinate  100 mg Intravenous Q8H [address] Stacie Aparicio MD      insulin lispro  1-5 Units Subcutaneous Q6H [address] Belen Luna PA-C      lacosamide (VIMPAT) IVPB  200 mg Intravenous Q12H Jasiel Tamez PA-C Stopped (04/17/21 2337)    levETIRAcetam  2,500 mg Intravenous Q12H [address] Jasiel Tamez PA-C Stopped (04/17/21 2252)    nicotine  1 patch Transdermal Daily Jennifer Hagan PA-C      norepinephrine  1-30 mcg/min Intravenous Titrated Belen Luna PA-C 10 mcg/min (04/18/21 0745)    ondansetron  4 mg Intravenous Q6H PRN Jennifer Hagan PA-C      pantoprazole  40 mg Intravenous Q24H [address] David Sweeney PA-C      rifaximin  550 mg Oral Q12H [address] Jennifer Hagan PA-C      thiamine  100 mg Intravenous Daily Jasiel Tamez PA-C Stopped (04/17/21 1326)    topiramate  200 mg Oral Q12H 25 [facility],       vasopressin  0 04 Units/min Intravenous Continuous Sammie Friday, MD 0 04 Units/min (04/18/21 0705)     Continuous Infusions:dextrose, 150 mL/hr, Last Rate: 150 mL/hr (04/18/21 0408)  norepinephrine, 1-30 mcg/min, Last Rate: 10 mcg/min (04/18/21 0745)  vasopressin, 0 04 Units/min, Last Rate: 0 04 Units/min (04/18/21 0705)      PRN Meds:  diazepam, 10 mg, Q6H PRN  fentanyl citrate (PF), 50 mcg, Q1H PRN  ondansetron, 4 mg, Q6H PRN        Counseling / Coordination of Care  Total Critical Care time spent 30 minutes excluding procedures, teaching and family updates  Code Status: Level 2 - DNAR: but accepts endotracheal intubation     Portions of the record may have been created with voice recognition software  Occasional wrong word or "sound a like" substitutions may have occurred due to the inherent limitations of voice recognition software  Read the chart carefully and recognize, using context, where substitutions have occurred       Severiano Overlie, MD

## 2021-04-18 NOTE — RESPIRATORY THERAPY NOTE
RT Ventilator Management Note  Reji Mckeon 63 y o  female MRN: [id number]  Unit/Bed#: [facility] 01 Encounter: [id number]      Daily Screen       4/17/2021  0740 4/18/2021  0750          Patient safety screen outcome[de-identified]  Failed  Failed  (Pended)       Not Ready for Weaning due to[de-identified]  Underline problem not resolved  Underline problem not resolved  (Pended)               Physical Exam:   Assessment Type: Assess only  General Appearance: Sedated  Respiratory Pattern: Assisted  Chest Assessment: Chest expansion symmetrical  Bilateral Breath Sounds: Clear, Diminished      Resp Comments: No SBT indicated due to pts mental status  For MRI today  BS clear, sx scant white secretions  No vent changes at this time

## 2021-04-19 NOTE — RESPIRATORY THERAPY NOTE
resp care      04/19/21 3314   Respiratory Assessment   Resp Comments Pt extubated to RMA per comfort care protocol   Pt had cuff leak prior to extubation

## 2021-04-19 NOTE — SOCIAL WORK
NOAHW joined critical care in contacting patient's family for a medical update  Patient's sister and brother both are in agreement that the patient would not want further escalation of care  They are both in agreement that due to no improvement in her clinical status they would like to focus on her comfort at this time  The family is unable to pay the cost of  services and is interested in the possibility of donation to science  Information emailed to brother at [email or web address]

## 2021-04-19 NOTE — PROGRESS NOTES
Progress Note - Critical Care   Tere Otto 63 y o  female MRN: [id number]  Unit/Bed#: MICU 01 Encounter: [id number]     SUBJECTIVE:  63 y o  female on HD#16 w/ PMH of EtOH, Cirrhosis, HCV, HCC, PV Thrombosis, Chronic LE wound  Presented 4/3 w/ AMS contributed to worsening liver status w/ metabolic derangements as well as infxn from UTI vs wounds  Req intubation on 4/8 for status epilepticus  Burst suppressed at that time, but became refractory to AEDs and req high dose sedation (midazolam up to 80mg/hr)  Sedation off since 4/16  Began having epileptographic seizures again on AM of 4/18  Pt examined at bedside  Intubated and comatose but not on sedation  Family meeting today  HPI/24HR Event:  Levo continuing to wean, currently @ 4  Depacon added per neuro recs @ loading dose 320 mcg, cont   0530 - fluttering eyes, epileptology notified    No fecal output since 4/17    Medications  Vaso 0 04 @ 12ml/hr  Keppra 2500 BID  Lacosamide 300 BID  Depacon 250 BID  Hydrocortisone 100 q8      ROS  Unable to assess    Vitals  Temperature:   Temp:  [96 8 °F (36 °C)-99 5 °F (37 5 °C)] 98 6 °F (37 °C)  HR:  [58-74] 60  Resp:  [11-14] 12  BP: (108-128)/(61-81) 111/68  Arterial Line BP: ()/() 100/62  FiO2 (%):  [50] 50   Arterial Line BP: 100/62  Arterial Line MAP (mmHg): 80 mmHg    Vent day 11 : SCMV 12/400/8/40%    Intake and Outputs:  I/O       04/17 0701 - 04/18 0700 04/18 0701 - 04/19 0700    I V  (mL/kg) 3817 3 (41 4) 4309 4 (45)    NG/GT 0 340    IV Piggyback 600 700    Total Intake(mL/kg) 4417 3 (47 9) 5349 4 (55 8)    Urine (mL/kg/hr) 2260 (1) 1800 (0 8)    Emesis/NG output 800 0    Stool 100     Total Output 3160 1800    Net +1257 3 +3549 4                Invasive lines and devices:   Invasive Devices     Central Venous Catheter Line            CVC Central Lines 04/09/21 Triple 20cm 9 days          Arterial Line            Arterial Line 04/09/21 Radial 9 days          Drain            Rectal Tube 11 days    NG/OG/Enteral Tube 18 Fr Center mouth 10 days    Urethral Catheter Temperature probe 16 Fr  10 days          Airway            ETT  Oral 7 5 mm 10 days                   Physical exam  General: intubated, comatose  appears in no acute distress  Neuro: GCS 3  (Eye 1, Verbal 1, Motor 1)   HENT: Pupils 4mm and L>R reactive  Tongue and face swollen  Normocephalic and atraumatic,  Heart:  RRR, pulses weakly palpable  Lungs: Clear to Auscultation Bilaterally  Abdomen: distended Bowel sounds weak  Skin:  diffusely 3+ pitting edematous skin  Cold feet  B/L UE petechia and scabbing  LEs wrapped  Labs:   Results from last 7 days   Lab Units 04/19/21  0511 04/18/21  0508 04/17/21  0426  04/15/21  1036  04/14/21  0451  04/13/21  0530   WBC Thousand/uL 6 21 5 49 6 15   < > 8 03   < > 6 19  --  8 27   HEMOGLOBIN g/dL 8 5* 8 1* 9 4*   < > 8 7*   < > 8 0*   < > 8 4*   HEMATOCRIT % 27 0* 26 4* 29 7*   < > 27 6*   < > 26 2*   < > 28 3*   PLATELETS Thousands/uL 39* 42* 49*   < > 71*   < > 63*   < > 55*   NEUTROS PCT %  --   --  86*  --   --   --  74  --  76*   MONOS PCT %  --   --  4  --   --   --  6  --  8   MONO PCT %  --   --   --   --  7  --   --   --   --     < > = values in this interval not displayed  Results from last 7 days   Lab Units 04/19/21  0511 04/19/21  0041 04/18/21  1552 04/18/21  0508  04/17/21  0426  04/16/21  0342   SODIUM mmol/L 143 144 146* 149*   < > 150*   < > 152*   POTASSIUM mmol/L 4 2 3 5 3 9 3 9   < > 3 4*   < > 4 1   CHLORIDE mmol/L 117* 115* 118* 122*   < > 124*   < > 129*   CO2 mmol/L 24 23 22 25   < > 20*   < > 18*   BUN mg/dL 45* 42* 40* 40*   < > 30*   < > 27*   CREATININE mg/dL 1 06 1 08 1 06 1 15   < > 1 08   < > 1 23   CALCIUM mg/dL 8 6 8 7 8 3 8 6   < > 8 6   < > 8 5   ALK PHOS U/L  --   --   --  84  --  98  --  90   ALT U/L  --   --   --  34  --  41  --  39   AST U/L  --   --   --  39  --  73*  --  99*    < > = values in this interval not displayed       Results from last 7 days   Lab Units 04/18/21  0508 04/17/21  0426 04/16/21  0342   MAGNESIUM mg/dL 2 1 2 0 2 1     Results from last 7 days   Lab Units 04/18/21  0508 04/17/21  0426 04/16/21  0342   PHOSPHORUS mg/dL 2 7 3 6 4 5      Results from last 7 days   Lab Units 04/18/21  0508 04/17/21  0426 04/16/21  1027   INR  1 91* 2 01* 2 14*     Results from last 7 days   Lab Units 04/16/21  1027   LACTIC ACID mmol/L 2 0     Ammonia: 101    Imaging Studies  4/19/21 MRI - Slightly more pronounced symmetric signal abnormality within the deep gray matter involving the lentiform nuclei, globus pallidus, bilateral thalami and central ranjeet without enhancement  The findings are nonspecific but highly concerning for a toxic-metabolic insult such as hepatic encephalopathy with hyperammonemia  These findings have also been reported in patients with Valproic acid-induced hyperammonemia  An alternative less likely considerations such as viral encephalitis should be   excluded  on a clinical basis with CSF cytology        Few new punctate foci of cerebral gradient susceptibility artifact suggesting microhemorrhage  This may be secondary to underlying coagulopathy  Previously noted areas of prior cerebral and cerebellar chronic microhemorrhage are unchanged  Micro:  4/16 BCx: NGTD  4/16 Sput Cx: Citrobacter Amalonaticus  4/13 CSF Fungal Cx: NGTD  4/13 CSF Bact Cx: NGTD      Assessment & Plan:   -Neuro:   Dx: Acute Encephalopathy c/b Refractory Status Epilepticus of unclear etiology, poss 2/2 EtOH, Cirrhosis  Negative CTH x 2  Completed course of acyclovir  4/13 LP: prot 100, 3 wbc, neg ME panel  Neg cytology  Neg Flow Cytometry  4/18 electrographic seizure in R occipital area                 - 4/18 MRI for prognostication - concerning for toxic-metabolic insult (hepatic encephalopathy w/ hyperammonemia)   -  LP results waiting: Bacterial/Fungla Cxs NGTD, [place] Nile virus, SHANNON   - Topamax has been held x 2 days 2/2 cirrhosis and NPO status    - D/C'd per neuro note              - serial neuro exams, cVEEG  - AEDs: Keppra 2500 BID IV  Lacosamide 300 BID IV, Depakone 250 BID   - Analgesia: PRN Fentanyl, midazolam  - Sedation - none     CV:   - Dx: Hypotension  Likely 2/2 sedation  ECHO w/ EF 60% and no valvular disorders              - Levo, @ 4 right now, continue weaning as tolerated              - Vaso @ 0 04   - goal MAP > 65  - Dx: Atrial Fibrillation  EKG 03/11/21 sinus  Repeat EKGs since 04/09 have alternated sinus vs Afib  D/C'd Amnio 4/15 w/ conversion                - monitor VS, hold systemic AC w/ anemia/thrombocytopenia     Lung:   - Dx: AHRF likely 2/2 seizures, encephalopathy  - Vent day 11 : SCMV 12/400/8/40%  Riding vent rate   - Dx: Mild L apical Pneumo - resolved 4/19  - Continue Respiratory Protocol and Airway Clearance Protocol    GI:   - Dx: Cirrhosis w/ elevated INR (1 91), anemia (8 5), thrombocytopenia(39), Hyperbilirubinemia (2 01), Hyperammonemia (101)  Presumed 2/2 HCV, EtOH  W/ Hx of HCC s/p ablation, Portal Vein Thrombosis  MELD 17               - Rifaximin 550mg BID, Hold lactulose w/ c/f GI distention in ileus   - f/u ammonia labs , elevated today @ 101   - consider enema vs HD since PO meds might not be passing stomach   - Dx: SB Ileus (vs less likely obstruction)  No fecal output since 4/17  AXR 4/15: dilated loops of small bowel suggestive of ileus  Gastrograffin 4/17: no passage beyond stomach  Unable to tolerate Erythromycin               - GI C/S: no endoscopic evaluation necessary at this time  Add senna, miralax   Hold lactulose              - Low intermittent suction via NG   - Metoclopramide 10 mg BID   - Stress ulcer ppx: pantoprazole 40  - Bowel regimen: Senna, Miralax, Rectal tube in place     FEN:   - Dx: Hypernatremia likely 2/2 lasix  - currently resolved  - Fluids: d5 @ 125 cc/hr  - Electrolytes: trend and replete as needed  - Nutrition: TF, Vit D on hold 2/2 poss SBO              - cont IV Thiamine, Folate :   Dx: Vol overload  Net +33L  Cr 1 06   - I/O last 24 hours: I: 7643, O: 1800 Net: +3553              - Lasix 60mg TID w/ goal -2 to -3 L   - [place] day # 10       ID:   - Dx: Poss Citrobacter PNA              - 4/15 Elevated procalcitonin, no leukocytosis, no fever   - Cultures: 4/16 BCx NG@ 72H    - 4/16 Sputum Cx: Citrobacter Amalonaticus   - ID following patient, will discuss today whether Abx indicated at this time  - Monitor WBC/temp curve  - Dx: E coli UTI on 4/3 - resolved              - Finished Abx course     Heme:   - Dx: Anemia, Thrombocytopenia, Elevated INR  Likely 2/2 Liver - as above  - Hb: 9 4  - DVT ppx: Lovenox 40 QD (hold today 2/2 PLT 39), SCDs      Endo:   - Dx: no active problems  - SSI 1-5 units  - Goal -180, current 150s-180     Msk/Skin:   - Dx: b/l LE wounds w/o acute evidence of infection               - RLE Cx 4/4: E coli, acinetobacter              - appreciate podiatry, ID recs              - cont wound care  - OT - signed off, will re-evaluate once medically stable  - Turning/repositioning    Disposition: cont ICU care  Family meeting today  Code Status: Level 2 - DNAR: but accepts endotracheal intubation    Allergies: No Known Allergies    XR chest portable   Final Result by Tricia Jaimes MD (04/18 0483)      No change in large left and moderate effusion with bibasilar atelectasis  Resolution of left apical pneumothorax  Workstation performed: [id number]         FL small bowel   Final Result by Elma Ulloa MD (04/16 9764)      No evidence of Gastrografin distal to the stomach, 6 hours following administration by NG tube  Workstation performed: [id number]         XR abdomen 1 view kub   Final Result by Ella Haas DO (04/15 0614)      Dilated loops of small bowel, suggestive of ileus  Continued follow-up is recommended to exclude obstruction  No free air  Tube or drain tip overlies the gastroesophageal junction    If this represents an enteric tube, this should be advanced  Correlate clinically for appropriate position  The study was marked in [facility] for immediate notification  Workstation performed: [id number]         XR chest portable ICU   Final Result by Heidi Schmidt DO (04/15 9796)      Unchanged left hydropneumothorax  Hazy left greater than right lung consolidations unchanged  Additional probable enteric tube overlying the gastroesophageal junction, although this could be external to the patient  Correlate with external devices  Workstation performed: [id number]         [place] lumbar puncture   Final Result by Teodora Flores MD (04/14 9385)      Successful fluoroscopically guided lumbar puncture with an opening pressure of 41 cm H2O  Approximately 16 5 mL's of CSF was sent to lab for requested analysis  As noted above, the patient's CSF was initially clear yellow and became bloody during the    collection process  The needle was withdrawn slightly and the CSF fluid slowly cleared  After a discussion with the MICU team, the patient will receive an additional unit of FFP post procedure  Please feel free to contact us with any questions or concerns  I reviewed the above findings and procedure with Dr Cecil Ganser  PERFORMED, DICTATED AND SIGNED BY: Juan M Pineda PA-C         Workstation performed: [id number]         MRI brain seizure wo and w contrast   Final Result by Joseph Oliva MD (04/11 5363)         1  Subtle, faint diffusion restriction in the pulvinar of the right thalamus with associated T2/FLAIR hyperintensity, a nonspecific finding which may be related to subacute infarction, which statistically would probably be the most likely differential    diagnosis  Other unusual differential considerations would include Behcet's disease or other connective tissue disorders like Sjogren's syndrome    Infectious encephalitis disease such as [place] Nile virus could be considered in the appropriate clinical    setting  A nonenhancing glioma is also possible as are reports of status epilepticus  The unilateral appearance argues against prion disease such as CJD  Further clinical assessment recommended  Recommend repeat follow-up contrast enhanced MRI of the    brain in 6-12 weeks to assess for stability  2   A few white matter lesions elsewhere may represent microangiopathy, although given the findings in the right thalamic pulvinar, other etiologies are certainly possible  3  No MR evidence of mesial temporal sclerosis  4   No classic MR findings of herpes encephalitis  Further clinical assessment and follow-up recommended  Workstation performed: [id number]         XR chest portable ICU   Final Result by Gumaro Jamil MD (04/11 1142)      Small left apical pneumothorax, unchanged from previous examination  Unchanged bilateral pleural effusions and pulmonary parenchymal airspace opacities  Workstation performed: [id number]         XR chest portable ICU   Final Result by Alka Baker MD (04/10 1253)      Small left apical pneumothorax identified possibly slightly increased from prior exam       Bilateral alveolar opacities in keeping with pneumonia  Small bilateral pleural effusions  Workstation performed: [id number]         XR chest portable ICU   Final Result by Joshua Jasso MD (04/09 1610)   Left internal jugular central line terminates in the right atrium, and should be pulled back by 3 cm  Unchanged small left pneumothorax  Worsening bilateral pneumonia  Stable small left pleural effusion  The study was marked in [facility] for immediate notification           Workstation performed: [id number]         MRI brain seizure wo and w contrast    (Results Pending)        Medications:   Scheduled Meds:  Current Facility-Administered Medications   Medication Dose Route Frequency Provider Last Rate    chlorhexidine  15 mL Swish & Spit Q12H [address] Mehrdad Desai PA-C      cholecalciferol  400 Units Oral Daily Mehrdad Desai PA-C      dextrose  125 mL/hr Intravenous Continuous Marcos Scott  mL/hr (04/19/21 0218)    diazepam  10 mg Intravenous Q6H PRN Magdalena Heredia PA-C      fentanyl citrate (PF)  50 mcg Intravenous Q1H PRN Magdalena Heredia PA-C      folic acid IVPB  1 mg Intravenous Daily Elli Tamez PA-C Stopped (04/18/21 0830)    furosemide  60 mg Intravenous TID (diuretic) Eligio Soriano PA-C      hydrocortisone sodium succinate  100 mg Intravenous Q8H [address] Jojo Salguero MD      insulin lispro  1-5 Units Subcutaneous Q6H [address] Elgiio Soriano PA-C      lacosamide (VIMPAT) IVPB  300 mg Intravenous Q12H Hedy James MD Stopped (04/18/21 2301)    levETIRAcetam  2,500 mg Intravenous Q12H [address] David Sweeney PA-C 2,500 mg (04/18/21 2132)    nicotine  1 patch Transdermal Daily Mehrdad Desai PA-C      norepinephrine  1-30 mcg/min Intravenous Titrated Eligio Soriano PA-C 8 mcg/min (04/18/21 1948)    ondansetron  4 mg Intravenous Q6H PRN Mehrdad Desai PA-C      pantoprazole  40 mg Intravenous Q24H [address] David Tamez PA-C      polyethylene glycol  17 g Oral Daily Eligio Soriano PA-C      rifaximin  550 mg Oral Q12H [address] Mehrdad Desai PA-C      senna  8 8 mg Per NG Tube BID Eligio Soriano PA-C      thiamine  100 mg Intravenous Daily Magdalena Heredia PA-C Stopped (04/18/21 0910)    topiramate  200 mg Oral Q12H [address] Anni Burris DO      valproate sodium  250 mg Intravenous BID Marcos Scott MD Stopped (04/19/21 0121)    vasopressin  0 04 Units/min Intravenous Continuous Jojo Salguero MD 0 04 Units/min (04/19/21 0116)     Continuous Infusions:dextrose, 125 mL/hr, Last Rate: 125 mL/hr (04/19/21 0218)  norepinephrine, 1-30 mcg/min, Last Rate: 8 mcg/min (04/18/21 1948)  vasopressin, 0 04 Units/min, Last Rate: 0 04 Units/min (04/19/21 0116)      PRN Meds:  diazepam, 10 mg, Q6H PRN  fentanyl citrate (PF), 50 mcg, Q1H PRN  ondansetron, 4 mg, Q6H PRN        Counseling / Coordination of Care       Portions of the record may have been created with voice recognition software  Occasional wrong word or "sound a like" substitutions may have occurred due to the inherent limitations of voice recognition software  Read the chart carefully and recognize, using context, where substitutions have occurred       Wili Huddleston [facility]

## 2021-04-19 NOTE — PROGRESS NOTES
Progress Note - [facility]  Marisa Guerra 63 y o  female MRN: [id number]  [facility] En[facility]: [id number]      ASSESSMENT AND PLAN:    1  Ileus, decreased gastric motility - suspect ileus and decreased gastric motility secondary to critical illness and benzodiazepines  Small bowel follow thru with retained contrast in gastric fundus after 6 hours  No significant output from below  Less likely to be GOO but possible  No evidence of Powderly's syndrome, pseudo-obstruction, or SBO  · Plan for bedside EGD today to rule out GOO  · No indication for colonic decompression at this time  · Limit benzodiazepines as able  · Avoid narcotics  · Aggressive bowel regimen:  · Increase Senna PO syrup to 17 6 mg BID  · Continue Miralax daily; can increase to BID if needed  · Okay with rectal lactulose as detailed below  · Consider starting Dulcolax after EGD if no evidence of obstruction  · Optimize electrolytes:  · K >4  · Phos >3  · Mg >2  · OGT to intermittent suction; monitor OGT and rectal tube output  · Serial abdominal exams  · Tap water enemas PRN  · Daily KUB as needed    2  Decompensated HCV cirrhosis, hyperammonemia - complicated by [address]  Unclear if she received treatment for HCV infection  Had viral load as far back as 2019    · MELD score 25  · Child-Da Silva C  · Ascites:  · On Lasix and spironolactone as outpatient  · No clinical findings of ascites at this time  · Continue IV diuresis per primary team  · Hold spironolactone for now  · Trend MELD labs including CMP and PT/INR daily  · Resume outpatient diuretic regimen when able pending clinical progress  · Varices:  · No evidence of hemorrhage  · Last EGD 11/2019 with no evidence of esophageal or gastric varices; only with mild pre-pyloric gastritis  · Repeat EGD is indicated given possible decompensation with hyperammonemia  · Plan for bedside EGD today as noted above but will also assess for varices  · No indication for NSBB at this time  · Follow clinically  · PSE:  · Encephalopathy likely multifactorial with component of worsening hyperammonemia secondary to ileus  · Ammonia level 100 this AM  · PO lactulose held due to ileus and delayed gastric motility with significant OGT output  · Start lactulose enemas 2-3 times daily  · Monitor stool output  · Okay to trend ammonia levels for now given inability to assess neurologic status in setting of sedation/intubation  · Continue rifaximin 550 mg BID  · HCC:  · Diagnosed in 2020  · Status post ablation  · RUQ US on 3/12/2021 showed no discernible focal lesion  · AFP on 3/13/2021 was normal at 2 9  · Continue surveillance with serial liver imaging   · Outpatient follow-up    3  Chronic HCV - positive viremia last month  Does not appear she was treated for HCV  HAV IgM negative  · Consider checking HBV surface antibody, HBV core total antibody, and HAV total antibody levels  · Recommend outpatient follow-up to discuss treatment options    ______________________________________________________________________    SUBJECTIVE:     Patient seen and examined  Intubated and sedated      Medication Administration - last 24 hours from 04/18/2021 0825 to 04/19/2021 0825       Date/Time Order Dose Route Action Action by     04/19/2021 0805 chlorhexidine (PERIDEX) 0 12 % oral rinse 15 mL 15 mL Swish & Spit Given Jacky Wilkinson, [facility]     04/18/2021 2133 chlorhexidine (PERIDEX) 0 12 % oral rinse 15 mL 15 mL Swish & Spit Given Kenny Rosas RN     04/19/2021 0809 nicotine (NICODERM CQ) 14 mg/24hr TD 24 hr patch 1 patch 1 patch Transdermal Medication Applied Jacky Wilkinson RN     04/19/2021 3636 nicotine (NICODERM CQ) 14 mg/24hr TD 24 hr patch 1 patch 1 patch Transdermal Patch Removed Jacky Wilkinson RN     04/19/2021 0805 rifaximin Heidi Bridget) tablet 550 mg 550 mg Oral Given Jacky Wilkinson RN     04/18/2021 2136 rifaximin Heidi Bridget) tablet 550 mg 550 mg Oral Given Kenny Rosas RN     04/18/2021 0840 rifaximin (XIFAXAN) tablet 550 mg 550 mg Oral Not Given Gage Puckett, RN     04/18/2021 2134 topiramate (TOPAMAX) tablet 200 mg 200 mg Oral Not Given Elester Cockayne, RN     04/18/2021 0916 topiramate (TOPAMAX) tablet 200 mg 200 mg Oral Not Given Gage Puckett, RN     04/19/2021 0524 dextrose 5 % infusion 125 mL/hr Intravenous Rate/Dose Verify Adriana Butler, RN     04/19/2021 0218 dextrose 5 % infusion 125 mL/hr Intravenous Agustin Oliva, RN     04/19/2021 0121 dextrose 5 % infusion 125 mL/hr Intravenous Rate/Dose Change Elester Cockayne, RN     04/18/2021 1933 dextrose 5 % infusion 150 mL/hr Intravenous [address] Elester Cockayne, FRANCINE     04/18/2021 2132 levETIRAcetam (KEPPRA) 2,500 mg in sodium chloride 0 9 % 250 mL IVPB 2,500 mg Intravenous [address] Elester Cockayne, FRANCINE     04/18/2021 1118 levETIRAcetam (KEPPRA) 2,500 mg in sodium chloride 0 9 % 250 mL IVPB 2,500 mg Intravenous [address] Gage Puckett, RN     04/18/2021 1212 lacosamide (VIMPAT) 200 mg in sodium chloride 0 9 % 100 mL IVPB 0 mg Intravenous Stopped Gage Puckett, RN     04/18/2021 1142 lacosamide (VIMPAT) 200 mg in sodium chloride 0 9 % 100 mL IVPB 200 mg Intravenous New Bag Gage Puckett, RN     91/02/7546 5892 folic acid 1 mg in sodium chloride 0 9 % 50 mL IVPB 0 mg Intravenous Stopped Gage Puckett, RN     04/19/2021 0809 thiamine (VITAMIN B1) 100 mg in sodium chloride 0 9 % 50 mL IVPB 100 mg Intravenous [id number] [facility] [place] [address]     04/18/2021 0552 thiamine (VITAMIN B1) 100 mg in sodium chloride 0 9 % 50 mL IVPB 0 mg Intravenous Stopped Gage Puckett, RN     04/18/2021 0840 thiamine (VITAMIN B1) 100 mg in sodium chloride 0 9 % 50 mL IVPB 100 mg Intravenous New Bag Gage Lavern, FRANCINE     04/19/2021 0805 pantoprazole (PROTONIX) injection 40 mg 40 mg Intravenous Given Adrianabecky Butler, FRANCINE     04/19/2021 0733 vasopressin (PITRESSIN) 20 Units in sodium chloride 0 9 % 100 mL infusion 0 04 Units/min Intravenous Rate/Dose Verify Remy Live RN     04/19/2021 0116 vasopressin (PITRESSIN) 20 Units in sodium chloride 0 9 % 100 mL infusion 0 04 Units/min Intravenous [address] Aguila Davis RN     04/19/2021 0552 hydrocortisone sodium succinate (PF) (Solu-CORTEF) injection 100 mg 100 mg Intravenous Given Aguila Davis RN     04/18/2021 2136 hydrocortisone sodium succinate (PF) (Solu-CORTEF) injection 100 mg 100 mg Intravenous Given Aguila Davis RN     04/18/2021 1412 hydrocortisone sodium succinate (PF) (Solu-CORTEF) injection 100 mg 100 mg Intravenous Given Shabbir Davison RN     04/18/2021 1222 furosemide (LASIX) injection 40 mg 40 mg Intravenous Given Shabbri Davison RN     04/19/2021 0549 insulin lispro (HumaLOG) 100 units/mL subcutaneous injection 1-5 Units 1 Units Subcutaneous Given Aguila Davis RN     04/19/2021 0004 insulin lispro (HumaLOG) 100 units/mL subcutaneous injection 1-5 Units 1 Units Subcutaneous Given Aguila Davis RN     04/18/2021 1738 insulin lispro (HumaLOG) 100 units/mL subcutaneous injection 1-5 Units 2 Units Subcutaneous Given Shabbir Davison RN     04/18/2021 1230 insulin lispro (HumaLOG) 100 units/mL subcutaneous injection 1-5 Units 1 Units Subcutaneous Given Shabbir Davison RN     04/19/2021 0756 norepinephrine (LEVOPHED) 8 mg (DOUBLE CONCENTRATION) IV in sodium chloride 0 9% 250 mL 4 mcg/min Intravenous Rate/Dose Change Remy Live RN     04/19/2021 1802 norepinephrine (LEVOPHED) 8 mg (DOUBLE CONCENTRATION) IV in sodium chloride 0 9% 250 mL 6 mcg/min Intravenous Rate/Dose Change Remy Live RN     04/18/2021 1948 norepinephrine (LEVOPHED) 8 mg (DOUBLE CONCENTRATION) IV in sodium chloride 0 9% 250 mL 8 mcg/min Intravenous [address] Aguila Davis RN     04/18/2021 1947 norepinephrine (LEVOPHED) 8 mg (DOUBLE CONCENTRATION) IV in sodium chloride 0 9% 250 mL 8 mcg/min Intravenous Rate/Dose Change Aguila Davis, RN 04/18/2021 2301 lacosamide (VIMPAT) 400 mg in sodium chloride 0 9 % 100 mL IVPB 0 mg Intravenous Stopped Pretty Tooele, RN     04/18/2021 1956 lacosamide (VIMPAT) 400 mg in sodium chloride 0 9 % 100 mL IVPB 0 mg Intravenous Stopped Pretty [place]le, RN     04/18/2021 1228 lacosamide (VIMPAT) 400 mg in sodium chloride 0 9 % 100 mL IVPB 400 mg Intravenous [address] Samantha Ochoa, RN     04/18/2021 2301 lacosamide (VIMPAT) 300 mg in sodium chloride 0 9 % 100 mL IVPB 0 mg Intravenous Stopped Pretty [place]le, RN     04/18/2021 2208 lacosamide (VIMPAT) 300 mg in sodium chloride 0 9 % 100 mL IVPB 300 mg Intravenous [address] Pretty Tooele, RN     04/18/2021 1023 Gadobutrol injection (SINGLE-DOSE) SOLN 9 mL 9 mL Intravenous Given Era Levo     04/18/2021 1229 lacosamide (VIMPAT) 200 mg in sodium chloride 0 9 % 100 mL IVPB 200 mg Intravenous Not Given Samantha Ochoa, FRANCINE     04/19/2021 0552 furosemide (LASIX) injection 60 mg 60 mg Intravenous Given Pretty Tooele, RN     04/18/2021 1736 furosemide (LASIX) injection 60 mg 60 mg Intravenous Given Samantha Ochoa, FRANCINE     04/19/2021 0552 metoclopramide (REGLAN) injection 10 mg 10 mg Intravenous Given Pretty Tooele, RN     04/19/2021 0006 metoclopramide (REGLAN) injection 10 mg 10 mg Intravenous Given Pretty Tooele, RN     04/18/2021 1736 metoclopramide (REGLAN) injection 10 mg 10 mg Intravenous Given Samantha Ochoa, RN     04/18/2021 1412 metoclopramide (REGLAN) injection 10 mg 10 mg Intravenous Given Samantha Ochoa, FRANCNIE     04/19/2021 0805 senna oral syrup 8 8 mg 8 8 mg Per NG Tube Given Jigna Hearn RN     04/18/2021 1737 senna oral syrup 8 8 mg 8 8 mg Per NG Tube Given Samantha OchoaFRANCINE     04/19/2021 0805 polyethylene glycol (MIRALAX) packet 17 g 17 g Oral Given Jigna Hearn RN     04/18/2021 1415 polyethylene glycol (MIRALAX) packet 17 g 17 g Oral Given Samantha Ochoa RN     04/18/2021 1956 valproate (DEPACON) 320 mg in sodium chloride 0 9 % 50 mL BOLUS 0 mg Intravenous Stopped Rivera JaquezFRANCINE nevarez     04/18/2021 1934 valproate (DEPACON) 320 mg in sodium chloride 0 9 % 50 mL BOLUS 320 mg Intravenous [address] Rivera Hernandez RN     04/19/2021 0121 valproate (DEPACON) 250 mg in sodium chloride 0 9 % 50 mL IVPB 0 mg Intravenous Stopped Rivera DavidFRANCINE nevarez     04/18/2021 2301 valproate (DEPACON) 250 mg in sodium chloride 0 9 % 50 mL IVPB 250 mg [facility]venous [address] [facility] Rivera DavidFRANCINE nevarez     04/19/2021 0301 potassium chloride 40 mEq IVPB (premix) 0 mEq Intravenous Stopped Rivera DavidFRANCINE nevarez     04/19/2021 0223 potassium chloride 40 mEq IVPB (premix) 40 mEq Intravenous New Bag Rivera Hernandez RN          OBJECTIVE:     Objective   Blood pressure 114/67, pulse 58, temperature 98 6 °F (37 °C), resp  rate 13, weight 95 8 kg (211 lb 3 2 oz), SpO2 95 %  Body mass index is 32 11 kg/m²  Intake/Output Summary (Last 24 hours) at 4/19/2021 0825  Last data filed at 4/19/2021 9378  Gross per 24 hour   Intake 5239 92 ml   Output 2550 ml   Net 2689 92 ml       PHYSICAL EXAM:   General Appearance: No acute distress, intubated and sedated  Abdomen: Soft, non-tender, non-distended; bowel sounds normal; no masses or no organomegaly    Invasive Devices     Central Venous Catheter Line            CVC Central Lines 04/09/21 Triple 20cm 9 days          Arterial Line            Arterial Line 04/09/21 Radial 9 days          Drain            Rectal Tube 11 days    NG/OG/Enteral Tube 18 Fr Center mouth 10 days    Urethral Catheter Temperature probe 16 Fr  10 days          Airway            ETT  Oral 7 5 mm 10 days                LAB RESULTS:  No results displayed because visit has over 200 results  RADIOLOGY RESULTS: I have personally reviewed pertinent imaging studies  DODIE Wayne  Gastroenterology Fellow  [facility]  Available on [email or web address]

## 2021-04-19 NOTE — RESPIRATORY THERAPY NOTE
RT Ventilator Management Note  Jasmine Erwin 63 y o  female MRN: [id number]  Unit/Bed#: MICU 01 Encounter: [id number]      Daily Screen       4/18/2021  0750 4/19/2021  0734          Patient safety screen outcome[de-identified]  Failed  Passed      Not Ready for Weaning due to[de-identified]  Underline problem not resolved  Underline problem not resolved              Physical Exam:          Resp Comments: (P) Pt received this am on A/C VC mode, no vent settings change and no SBT indicated this am  ETT noticed to be 20cm a lip then advanced back to 23@ lip

## 2021-04-19 NOTE — TELEPHONE ENCOUNTER
Received reply from LUIS M Pop  She ordered the incorrect study   She will change the order to a routine study

## 2021-04-19 NOTE — TELEPHONE ENCOUNTER
[place] text sent to ordering provider to clarify order    Ordered as a CTA but ordered with po/ iv contrast

## 2021-04-19 NOTE — PROGRESS NOTES
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 and [address]  who was initially admitted to [facility] with altered mental status  Rapid response was activated on 4/8 due to witnessed seizure activity and neurology was consulted for further assessment  She was given 3 mg Ativan total and 1000 mg Keppra was ordered to be given  During the evaluation by neurology on 4/8 she was noted to have leftward gaze with horizontal nystagmus  CTH was completed and showed no acute abnormalities  Due to concern for ongoing seizure activity decsion was made to transfer patient to [facility] for vEEG  Patient was transferred to intensive care unit and ultimately intubated  · Timeline of Events:   ·  4/08: Lorazapam 1mg IV, > Lorazapam 1mg IV > Keppra 2G IV > ativan 2mg IV > on 4/8/ 22:20: ativan 2mg IV  · 4/09: 05:30 Propofol ggt started at 30 then increased to 50  and at: 06:03 Lorazepam 2mg IV > 09:32 Keppra 750 mg > Right temporal seizures started around 14:15 and occurred every 2 minutes roughly > 15:04 Lorazapam 5mg IV >15:30 no significant improvement >4/09: 15:48 Lorazapam 8mg IV >16:04 no significant improvement > 16:19 Lorazapam 10mg IV >16:20 Keppra 1 5G IV > 16:54 5mg Valium >16:58 slight improvement, but persistent seizures >17:13 Midazolam ggt titration >17:25 Lorazapam 10mg IV  · 4/10: refractory status-On Versed gtt increased to 60, Propafol gtt at 100, Vimpat load, valium 20 mg, Ketamine bolus and ketamine 2 mg/kg gtt, > v EEG shows further seizure activity in left temporal region   At 13:32- No seizures EEG completely suppressed except for isolated right temporal discharges occurring every 15-40 seconds  · 4/11: Propofol at 100 , Ketamine at 2mg/kg/hr, Versed at 60 and Keppra dose to 2 5g and vimpat to 200mg BID, no further seizures on EMU, got her MRI brain, and plan to decrease Propofol by 10 mg/hr  · 4/11: MRI brain with and without: inconclusive   · 4/12: Continued to decrease Propofol, propofol stopped around 1900  · 4/13: 2 episodes of right hemispheric seizures at 0039 and 0336  Loaded with Topamax 400 mg and continue with Topamax 200mg BID with maintained Versed at 60, Ketamine at 20, keppra 2 5mg BID and Vimpat 200 q12  · 4/13: LP done  · 4/14:  Weaned off of ketamine  · 4/15:  Planned for weaning off of midazolam, midazolam   Weaned off on 04/17 at 9:00 a m   · 4/18- 5:30 am electrographic seizure right occipital area, low amplitude, eeg suppressed with occasional right posterior epileptiform discharges with low-amplitude  Loaded with Depacon and continued on 250 mg BID  · 4/19: MRI brain with and without: Slightly more pronounced symmetric signal abnormality within the deep gray matter involving the lentiform nuclei, globus pallidus, bilateral thalami and central ranjeet without enhancement  The findings are nonspecific but highly concerning for a toxic-metabolic insult such as hepatic encephalopathy with hyperammonemia  These findings have also been reported in patients with Valproic acid-induced hyperammonemia  An alternative less likely considerations such as viral encephalitis should be excluded  on a clinical basis with CSF cytology  Few new punctate foci of cerebral gradient susceptibility artifact suggesting microhemorrhage  This may be secondary to underlying coagulopathy  Previously noted areas of prior cerebral and cerebellar chronic microhemorrhage are unchanged  Plan:   · Current AEDs:  · Continue Keppra 2500 BID maintained dose   · Increase Vimpat from 200 b i d  to 300 b i d   And will load 400 once on 4/18   · Loaded with Tomapax 4/13 with 400, continue 200mg BID, d/c Topamax on 04/17  · Loaded with Depacon on  and continued with 250 mg BID  · Levels obtain for Keppra and topamax:  Ordered and pending  · Current Sedation:  Off sedation as of   at 9:00 a m  · ID on board, recommendations appreciated   · Received a total of 11 days Abx, and 4 days of Antiviral-Acyclovir, d/c on   · Medical management as per critical care team    · Patient has been transitioned to comfort care, neurology will sign off       Hypernatremia  Assessment & Plan  · Continue watching Acid/Base Status  · Recommend increasing Free water flushes  · Continue monitoring Sodium      SUBJECTIVE     Patient was seen and examined  No acute events overnight  Review of Systems   Unable to perform ROS: Intubated       OBJECTIVE     Patient ID: Aidee Vee is a 63 y o  female  Vitals:    21 1354 21 1400 21 1500 21 1600   BP:  98/65 110/71 112/67   Pulse: (!) 48 (!) 46 (!) 50 (!) 48   Resp: 16 (!) 11 (!) 11 12   Temp: (!) 95 7 °F (35 4 °C) (!) 95 7 °F (35 4 °C) (!) 95 7 °F (35 4 °C) (!) 95 7 °F (35 4 °C)   TempSrc:       SpO2: 96% 97% 96% 96%   Weight:          Temperature:   Temp (24hrs), Av 5 °F (36 4 °C), Min:95 7 °F (35 4 °C), Max:99 °F (37 2 °C)    Temperature: (!) 95 7 °F (35 4 °C)      Physical Exam  Vitals signs and nursing note reviewed  Constitutional:       Appearance: She is ill-appearing and toxic-appearing  Interventions: She is sedated, intubated and restrained  HENT:      Head: Normocephalic and atraumatic  Nose: Nose normal    Pulmonary:      Effort: She is intubated  Comments: Ventilate via ETT  Musculoskeletal:         General: Swelling present  Right lower leg: Edema present  Left lower leg: Edema present  Skin:     Coloration: Skin is jaundiced  Findings: Bruising, erythema and lesion present        Comments: B/l LE wounds   Neurological:      Deep Tendon Reflexes:      Reflex Scores:       Bicep reflexes are 0 on the right side and 0 on the left side  Brachioradialis reflexes are 0 on the right side and 0 on the left side  Patellar reflexes are 0 on the right side and 0 on the left side  Achilles reflexes are 0 on the right side and 0 on the left side  Neurologic Exam     Mental Status   Patient was examined off of sedation  She is unable to participate in a formal neurological assessment      Cranial Nerves     CN III, IV, VI   Right pupil: Reactivity: non-reactive  Left pupil: Reactivity: non-reactive  Vestibulo-ocular reflex: absent  Patient was unable to participate in an exam  She had no gag or cough present, she had no corneal reflexes and did not blink on eyelash stroke  Motor Exam   Muscle bulk: decreased  Overall muscle tone: decreased  Right arm tone: decreased  Left arm tone: decreased  Right leg tone: decreased  Left leg tone: decreasedPatient did not respond to nox stim in all four extremities      Sensory Exam   Did not respond to nox stim proximally or distally in all 4 extremities  Gait, Coordination, and Reflexes     Tremor   Resting tremor: absent  Intention tremor: absent  Action tremor: absent    Reflexes   Right brachioradialis: 0  Left brachioradialis: 0  Right biceps: 0  Left biceps: 0  Right patellar: 0  Left patellar: 0  Right achilles: 0  Left achilles: 0  Right plantar: equivocal  Left plantar: equivocal  Right Card: absent  Left Card: absent  Right ankle clonus: absent  Left ankle clonus: absent       LABORATORY DATA     Labs: I have personally reviewed pertinent reports      Results from last 7 days   Lab Units 04/19/21  0511 04/18/21  0508 04/17/21  0426  04/15/21  1036  04/14/21  0451  04/13/21  0530   WBC Thousand/uL 6 21 5 49 6 15   < > 8 03   < > 6 19  --  8 27   HEMOGLOBIN g/dL 8 5* 8 1* 9 4*   < > 8 7*   < > 8 0*   < > 8 4*   HEMATOCRIT % 27 0* 26 4* 29 7*   < > 27 6*   < > 26 2*   < > 28 3*   PLATELETS Thousands/uL 39* 42* 49*   < > 71*   < > 63*   < > 55*   NEUTROS PCT %  --   --  86*  --   --   --  74  --  76*   MONOS PCT %  --   --  4  --   --   --  6  --  8   MONO PCT %  --   --   --   --  7  --   --   --   --     < > = values in this interval not displayed  Results from last 7 days   Lab Units 04/19/21  0511 04/19/21  0041 04/18/21  1552 04/18/21  0508  04/17/21  0426  04/16/21  0342   SODIUM mmol/L 143 144 146* 149*   < > 150*   < > 152*   POTASSIUM mmol/L 4 2 3 5 3 9 3 9   < > 3 4*   < > 4 1   CHLORIDE mmol/L 117* 115* 118* 122*   < > 124*   < > 129*   CO2 mmol/L 24 23 22 25   < > 20*   < > 18*   BUN mg/dL 45* 42* 40* 40*   < > 30*   < > 27*   CREATININE mg/dL 1 06 1 08 1 06 1 15   < > 1 08   < > 1 23   CALCIUM mg/dL 8 6 8 7 8 3 8 6   < > 8 6   < > 8 5   ALK PHOS U/L  --   --   --  84  --  98  --  90   ALT U/L  --   --   --  34  --  41  --  39   AST U/L  --   --   --  39  --  73*  --  99*    < > = values in this interval not displayed  Results from last 7 days   Lab Units 04/18/21  0508 04/17/21  0426 04/16/21  0342   MAGNESIUM mg/dL 2 1 2 0 2 1     Results from last 7 days   Lab Units 04/18/21  0508 04/17/21  0426 04/16/21  0342   PHOSPHORUS mg/dL 2 7 3 6 4 5      Results from last 7 days   Lab Units 04/18/21  0508 04/17/21  0426 04/16/21  1027   INR  1 91* 2 01* 2 14*     Results from last 7 days   Lab Units 04/16/21  1027   LACTIC ACID mmol/L 2 0           IMAGING & DIAGNOSTIC TESTING     Radiology Results: I have personally reviewed pertinent reports  MRI brain seizure wo and w contrast   Final Result by Shu Cash MD (04/19 0809)      Slightly more pronounced symmetric signal abnormality within the deep gray matter involving the lentiform nuclei, globus pallidus, bilateral thalami and central ranjeet without enhancement  The findings are nonspecific but highly concerning for a    toxic-metabolic insult such as hepatic encephalopathy with hyperammonemia  These findings have also been reported in patients with Valproic acid-induced hyperammonemia    An alternative less likely considerations such as viral encephalitis should be    excluded  on a clinical basis with CSF cytology  Few new punctate foci of cerebral gradient susceptibility artifact suggesting microhemorrhage  This may be secondary to underlying coagulopathy  Previously noted areas of prior cerebral and cerebellar chronic microhemorrhage are unchanged  I personally discussed this study with Dr Ofelia London on 4/19/2021 at 8:08 AM                Workstation performed: [id number]         XR chest portable   Final Result by Karthik Serrato MD (04/18 1148)      No change in large left and moderate effusion with bibasilar atelectasis  Resolution of left apical pneumothorax  Workstation performed: [id number]         FL small bowel   Final Result by Rachael Duarte MD (04/16 1844)      No evidence of Gastrografin distal to the stomach, 6 hours following administration by NG tube  Workstation performed: [id number]         XR abdomen 1 view kub   Final Result by Paras Weston DO (04/15 1034)      Dilated loops of small bowel, suggestive of ileus  Continued follow-up is recommended to exclude obstruction  No free air  Tube or drain tip overlies the gastroesophageal junction  If this represents an enteric tube, this should be advanced  Correlate clinically for appropriate position  The study was marked in [facility] for immediate notification  Workstation performed: [id number]         XR chest portable ICU   Final Result by Paras Weston DO (04/15 1036)      Unchanged left hydropneumothorax  Hazy left greater than right lung consolidations unchanged  Additional probable enteric tube overlying the gastroesophageal junction, although this could be external to the patient  Correlate with external devices              Workstation performed: [id number]         [facility] [place]-patient lumbar puncture   Final Result by Natasha Barahona MD (59/28 0023) Successful fluoroscopically guided lumbar puncture with an opening pressure of 41 cm H2O  Approximately 16 5 mL's of CSF was sent to lab for requested analysis  As noted above, the patient's CSF was initially clear yellow and became bloody during the    collection process  The needle was withdrawn slightly and the CSF fluid slowly cleared  After a discussion with the MICU team, the patient will receive an additional unit of FFP post procedure  Please feel free to contact us with any questions or concerns  I reviewed the above findings and procedure with Dr Onofre Young  PERFORMED, DICTATED AND SIGNED BY: Galilea Lazo PA-C         Workstation performed: [id number]         MRI brain seizure wo and w contrast   Final Result by Mohan Allen MD (04/11 1723)         1  Subtle, faint diffusion restriction in the pulvinar of the right thalamus with associated T2/FLAIR hyperintensity, a nonspecific finding which may be related to subacute infarction, which statistically would probably be the most likely differential    diagnosis  Other unusual differential considerations would include Behcet's disease or other connective tissue disorders like Sjogren's syndrome  Infectious encephalitis disease such as West Nile virus could be considered in the appropriate clinical    setting  A nonenhancing glioma is also possible as are reports of status epilepticus  The unilateral appearance argues against prion disease such as CJD  Further clinical assessment recommended  Recommend repeat follow-up contrast enhanced MRI of the    brain in 6-12 weeks to assess for stability  2   A few white matter lesions elsewhere may represent microangiopathy, although given the findings in the right thalamic pulvinar, other etiologies are certainly possible  3  No MR evidence of mesial temporal sclerosis  4   No classic MR findings of herpes encephalitis  Further clinical assessment and follow-up recommended  Workstation performed: [id number]         XR chest portable ICU   Final Result by Marsha Washington MD (04/11 1142)      Small left apical pneumothorax, unchanged from previous examination  Unchanged bilateral pleural effusions and pulmonary parenchymal airspace opacities  Workstation performed: [id number]         XR chest portable ICU   Final Result by Louisa Hemphill MD (04/10 1253)      Small left apical pneumothorax identified possibly slightly increased from prior exam       Bilateral alveolar opacities in keeping with pneumonia  Small bilateral pleural effusions  Workstation performed: [id number]         XR chest portable ICU   Final Result by Laura López MD (04/09 1610)   Left internal jugular central line terminates in the right atrium, and should be pulled back by 3 cm  Unchanged small left pneumothorax  Worsening bilateral pneumonia  Stable small left pleural effusion  The study was marked in [facility] for immediate notification  Workstation performed: [id number]             Other Diagnostic Testing: I have personally reviewed pertinent reports        ACTIVE MEDICATIONS     Current Facility-Administered Medications   Medication Dose Route Frequency    diazepam (VALIUM) injection 10 mg  10 mg Intravenous Q30 Min PRN    fentanyl citrate (PF) 100 MCG/2ML 50 mcg  50 mcg Intravenous Q10 Min PRN    furosemide (LASIX) injection 60 mg  60 mg Intravenous TID (diuretic)    glycopyrrolate (ROBINUL) injection 0 1 mg  0 1 mg Intravenous Q1H PRN    hydrocortisone sodium succinate (PF) (Solu-CORTEF) injection 100 mg  100 mg Intravenous Q8H CITLALLI    lacosamide (VIMPAT) 300 mg in sodium chloride 0 9 % 100 mL IVPB  300 mg Intravenous Q12H    levETIRAcetam (KEPPRA) 2,500 mg in sodium chloride 0 9 % 250 mL IVPB  2,500 mg Intravenous Q12H CITLALLI    LORazepam (ATIVAN) injection 1 mg  1 mg Intravenous Q1H PRN    ondansetron (ZOFRAN) injection 4 mg  4 mg Intravenous Q6H PRN    valproate (DEPACON) 250 mg in sodium chloride 0 9 % 50 mL IVPB  250 mg Intravenous BID       Prior to Admission medications    Medication Sig Start Date End Date Taking?  Authorizing Provider   baclofen 10 mg tablet Take 10 mg by mouth 5/7/19   Historical Provider, MD   cholecalciferol (VITAMIN D3) 1,000 units tablet     Historical Provider, MD   furosemide (LASIX) 40 mg tablet Take 40 mg by mouth 5/20/19   Historical Provider, MD   gabapentin (NEURONTIN) 300 mg capsule Take 1 capsule (300 mg total) by mouth 2 (two) times a day 8/4/20   Josh Castillo DO   lactulose 20 g/30 mL Take 45 mL (30 g total) by mouth 3 (three) times a day To achieve 3 bowel movements a day 3/16/21   SILVANO Givens   spironolactone (ALDACTONE) 100 mg tablet TAKE 1 & 1/2 TABLETS BY MOUTH DAILY 5/20/19   Historical Provider, MD           VTE Mechanical Prophylaxis: sequential compression device    ==  MD Javi [facility]ier's Neurology Residency, PGY-2

## 2021-04-19 NOTE — PROGRESS NOTES
Progress Note - Infectious Disease   Nichelle Dust 63 y o  female MRN: [id number]  Unit/Bed#: [facility] 01 Encounter: [id number]      IMPRESSION & RECOMMENDATIONS:   Impression/Recommendations:  1  Acute hypoxic respiratory failure   Patient was intubated during rapid response for airway protection in the setting of altered mentation   Also concern for developing aspiration pneumonia as chest x-ray shows worsening bilateral airspace disease  No fevers or significant leukocytosis   Patient is hypotensive which is more likely sedation related   Serial procalcitonin levels remain low   Recent sputum culture showed no growth  Now status post bronchoscopy on 04/16 showing clear secretions  BAL culture now shows CRE Citrobacter, suspect colonizer  No new findings on CXR  Remains afebrile with no leukocytosis  -would continue to observe closely off anymore antibiotics  -monitor temperatures and hemodynamics  -monitor ventilator requirements     2  Hypotension   More likely related to sedation   Also consider developing acute infection, as above   No associated fever   Blood cultures all remain negative   Remains on low-dose vasopressors  Lactic acid remains normal   Ongoing low suspicion for active infection      -continue to observe closely off anymore antibiotics  -follow-up repeat blood cultures  -monitor temperatures and hemodynamics  -monitor CBC     3  Acute encephalopathy   Initially presented to [facility] on 04/03 and has had waxing and waning mentation   Felt to be secondary to hepatic encephalopathy, alcohol withdrawal   Ultimately transferred to [facility] to seizure-like activity    EEG now shows status epilepticus, presumably secondary to alcohol withdrawal   Negative CT head x2   Very low suspicion for primary CNS infection given duration of symptoms, absence of fevers, and other much more plausible explanations   Patient was started on IV acyclovir for possible HSV CNS infection   Initially LP cannot be obtained due to coagulopathy   MRI does not show any temporal lobe involvement  Now status post LP showing 3 wbc's, negative ME panel  Repeat MRI shows findings concerning for toxic metabolic encephalopathy  My suspicion for primary CNS infection remains very low      -continue to observe off anymore antiviral/antibiotic  -anti-epileptic management as per Neurology  -serial neuro exams  -monitor temperatures and hemodynamics  -palliative care follow-up ongoing      4  Alcoholic/HCV cirrhosis complicated by [address]  post ablation in    With portal vein thrombosis, hepatic encephalopathy, coagulopathy   GI follow-up ongoing      5  Bilateral lower extremity wounds   Right leg wound culture from  showed E coli and Acinetobacter   No clinical evidence of acute infection of any of the wounds on exam   Discussed with Podiatry      -no antibiotics indicated  -daily local wound care and dressing changes     6  Recent bacteriuria   Urine culture from  showed pansensitive E coli   Blood cultures were negative   Patient already received adequate antibiotic course for the possibility of UTI   No further antibiotic indicated for this      7  Ileus  Management per Critical Care service  Serial abdominal exams      Antibiotics:  Off antibiotics 6          Subjective:  Remains sedated on ventilator  No fevers  Underwent bronchoscopy on  showing erythematous mucosa  Minimal thin and clear secretions  Remains on low-dose Levophed and vasopressin      Objective:  Vitals:  Temp:  [96 8 °F (36 °C)-99 °F (37 2 °C)] 97 2 °F (36 2 °C)  HR:  [54-62] 54  Resp:  [11-14] 12  BP: (101-128)/(63-81) 103/66  SpO2:  [95 %-100 %] 98 %  Temp (24hrs), Av °F (36 7 °C), Min:96 8 °F (36 °C), Max:99 °F (37 2 °C)  Current: Temperature: (!) 97 2 °F (36 2 °C)    Physical Exam:   General:  Sedated on ventilator  Oropharynx:  ET tube in place  Neck:  Supple, no lymphadenopathy  Lungs:  Ventilated breath sounds  Cardiac: Regular rate and rhythm  Abdomen:  Soft, non-tender, non-distented  Extremities:  Symmetric edema  Skin:  No rashes, no ulcers  Neurological:  Sedated on ventilator    Lab Results:  I have personally reviewed pertinent labs  Results from last 7 days   Lab Units 04/19/21  0511 04/19/21  0041 04/18/21  1552 04/18/21  0508  04/17/21  0426  04/16/21  0342   POTASSIUM mmol/L 4 2 3 5 3 9 3 9   < > 3 4*   < > 4 1   CHLORIDE mmol/L 117* 115* 118* 122*   < > 124*   < > 129*   CO2 mmol/L 24 23 22 25   < > 20*   < > 18*   BUN mg/dL 45* 42* 40* 40*   < > 30*   < > 27*   CREATININE mg/dL 1 06 1 08 1 06 1 15   < > 1 08   < > 1 23   EGFR ml/min/1 73sq m 58 56 58 52   < > 56   < > 48   CALCIUM mg/dL 8 6 8 7 8 3 8 6   < > 8 6   < > 8 5   AST U/L  --   --   --  39  --  73*  --  99*   ALT U/L  --   --   --  34  --  41  --  39   ALK PHOS U/L  --   --   --  84  --  98  --  90    < > = values in this interval not displayed  Results from last 7 days   Lab Units 04/19/21  0511 04/18/21  0508 04/17/21  0426   WBC Thousand/uL 6 21 5 49 6 15   HEMOGLOBIN g/dL 8 5* 8 1* 9 4*   PLATELETS Thousands/uL 39* 42* 49*     Results from last 7 days   Lab Units 04/16/21  1402 04/16/21  0309 04/15/21  1722 04/13/21  1709   BLOOD CULTURE   --  No Growth at 72 hrs  No Growth at 72 hrs   --    GRAM STAIN RESULT  No Polys or Bacteria seen  --   --  2+ Polys  1+ Mononuclear Cells  No No bacteria seen       Imaging Studies:   I have personally reviewed pertinent imaging study reports and images in PACS  MRI brain shows slightly more pronounced symmetric signal abnormality within gray matter without enhancement concerning for toxic metabolic insult  Chest x-ray shows no change in large left and moderate effusion with bibasilar atelectasis  Resolution of left apical pneumothorax  EKG, Pathology, and Other Studies:   I have personally reviewed pertinent reports  Bronchoscopy report reviewed

## 2021-04-19 NOTE — QUICK NOTE
GI Quick Note:    Patient made level 4 comfort care  GI will sign off for now  Please call with any questions or concerns  DODIE Ross  Gastroenterology Fellow  [facility]  Available on [email or web address]

## 2021-04-19 NOTE — PROGRESS NOTES
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)    Hyperkalemia    Alcohol abuse    Anemia    Pancytopenia (HCC)    Cellulitis    Wounds, multiple    Hypernatremia    Status epilepticus (HCC)    Hyperammonemia (HCC)    Acute respiratory failure with hypoxia (HCC)    Portal vein thrombosis    Seizure (HCC)    Hyperchloremia    Metabolic acidosis, normal anion gap (NAG)    Thrombocytopenia (HCC)    Ileus (HCC)    A-fib (HCC)     Plan:  1  Symptom management -    - fentanyl 50mcg Q10 minute PRN pain or increased WOB   - ativan 0 5mg IV Q1H PRN anxiety or increased WOB   - robinul 0 1mg IV Q1H PRN secretions   - valium 10mg Q30 minutes PRN seizure activity   - continue AED, steroids, and lasix for comfort    2  Goals -   Record of Family Meeting - Palliative and Supportive Care   Reji Mckeon 63 y o  female [id number]      Recommendations and Plan:  · Extubate and transition to comfort measures only  · If stable, consider transfer to hospice IPU tomorrow        A family meeting was held to provide medical update and discuss goals of care  This meeting was necessary for determine the appropriate course of treatment    Time of Meeting: 3:30  Meeting Location: MICU  Participants:   Patient's brother and sister, Riley Chaidez and Yessenia Nolasco  Dr Marcel Farrar, critical care  Alfredito Messina, resident with palliative medicine  Dilcia Woods PA-C, palliative medicine    Patient Participation: unable secondary to AMS    Patient Support System: siblings     Advanced Directive of POLST available: no    Topics of Discussion:  · Dr Monroy Parents provided medical update including increasing critical illness and dependence on ventilator, tube feeds, and now potentially HD given progressive renal dysfunction, no neurologic recovery, new concern of bowel obstruction  · Family was clear in expressing not  To pursue further invasive care, trach, peg, or HD  · Discussed alternative of comfort directed care including compassionate extubation which they agreed was in [address] [place] [address] best interest    Other Content of Meeting:  · Provided supportive listening  · Zainab Ramirez to provide body donation options  · Family respectfully declined to visit prior to extubation    Time Involved in Meetin minutes, beginning at approximately  3:30 and ending at approximately 4:10     Code Status: comfort - Level 4   Decisional apparatus:  Patient is not competent on my exam today  If competence is lost, patient's substitute decision maker would default to adult siblings [place]  Advance Directive / Living Will / POLST:  none    Interval history:       No events overnight  Patient continues to be GCS3T despite weaning all sedation  MEDICATIONS / ALLERGIES:     all current active meds have been reviewed    No Known Allergies    OBJECTIVE:    Physical Exam  Physical Exam  Constitutional:       Appearance: She is ill-appearing  HENT:      Head:      Comments: EEG wires in place  Temporal wasting  Cardiovascular:      Rate and Rhythm: Normal rate  Pulmonary:      Comments: Ventilated via ETT  Abdominal:      General: There is distension  Skin:     Comments: B/l LE wounds   Neurological:      Comments: GCS3T         Lab Results:   I have personally reviewed pertinent labs  , CBC:   Lab Results   Component Value Date    WBC 6 21 2021    HGB 8 5 (L) 2021    HCT 27 0 (L) 2021     (H) 2021    PLT 39 (LL) 2021    MCH 38 1 (H) 2021    MCHC 31 5 2021    RDW 29 1 (H) 2021    MPV 11 4 2021 , CMP:   Lab Results   Component Value Date    SODIUM 143 04/19/2021    K 4 2 04/19/2021     (H) 04/19/2021    CO2 24 04/19/2021    BUN 45 (H) 04/19/2021    CREATININE 1 06 04/19/2021    CALCIUM 8 6 04/19/2021    EGFR 58 04/19/2021     Imaging Studies: reviewed pertinent studies  EKG, Pathology, and Other Studies: reviewed pertinent studies    Counseling / Coordination of Care    Total floor / unit time spent today 75+ minutes  Greater than 50% of total time was spent with the patient and / or family counseling and / or coordination of care  A description of the counseling / coordination of care: time spent in family meeting, coordinating care witH rn, primary team, RT, EEG tech, pursuing comfort care and compassionate extubation

## 2021-04-20 LAB
ANTINUCLEAR AB IGG ELISA FLUID: NEGATIVE EU/ML
BACTERIA BLD CULT: NORMAL
LEVETIRACETAM SERPL-MCNC: 94.7 UG/ML (ref 10–40)

## 2021-04-20 NOTE — DISCHARGE SUMMARY
Discharge Summary - Nichelle Martell 63 y o  female MRN: [id number]    Unit/Bed#: MICU 01 Encounter: [id number] PCP: Sintia Dash DO     Admission Date:   Admission Orders (From admission, onward)     Ordered        04/09/21 0004  Inpatient Admission  Once                     Admitting Diagnosis: Encephalopathy [G93 40]    HPI: From [place] - 63 y o  female with PMH significant for alcoholic cirrhosis, ETOH abuse, hepatocellular carcinoma status post ablation in 2020, history of hepatic encephalopathy, and extensive bilateral lower extremity wounds who presented to the [facility] ER on 4/3 for altered mental status  Patient's roommate called EMS for decreased mental status  Patient was noted to have elevated ammonia as well as concern about possible lower extremity cellulitis secondary to multiple lower extremity wounds that had been poorly cared for  Patient was admitted to [facility] and had a waxing and waning mental status  On 4/8 she began seizing and was intubated for airway protection  She has multiple seizures in a row and was then transferred to [facility] for continuous video EEG monitoring in the setting of presumed status epilepticus  Procedures Performed:   Orders Placed This Encounter   Procedures   [address]       Summary of Hospital Course:  Patient continued to have severe seizures noted on continuous EEG requiring extremely high doses of multiple antiepileptic medications including high-dose midazolam drip, ketamine drip, propofol, Keppra, Vimpat, scheduled diazepam, and later topiramate to achieve burst suppression  She developed hypotension requiring vasopressor support secondary to her high dose sedative regimen  She underwent a lumbar puncture that showed no obvious infectious etiology for her seizures, after which antibiotics for possible meningitis were discontinued    MRI brain was performed, which showed nonspecific changes consistent with toxic/metabolic encephalopathy versus less likely an infectious encephalitis  Neurology service followed throughout patient's hospitalization  She was treated for likely hepatic encephalopathy with lactulose and rifaximin  With high doses of antiepileptics and other sedatives patient had profoundly depressed mental status and also developed ileus  Propofol, midazolam, and ketamine drips were able to be weaned without recurring severe seizures, but the patient's mental status never returned to her baseline  Palliative care and patient's family involved in goals of care discussion  Given severity of patient's illness and low likelihood of successful extubation/return to her baseline mental status the decision was made to withdraw aggressive care and focus on comfort  She was compassionately extubated on the afternoon of  and  that evening at 8:50 p m  Omega Lujan     Disposition:      Final Diagnosis:  Toxic/metabolic encephalopathy    Resolved Problems  Date Reviewed: 2021          Resolved    DARRYL (acute kidney injury) ([facility] [address] ) 2021     Resolved by  Chase Mandel PA-C    Lactic acidosis 2021     Resolved by  Chase Mandel PA-C    UTI (urinary tract infection) 2021     Resolved by  Chase Mandel PA-C    Sepsis ([facility] [address] ) 2021     Resolved by  Chase Mandel PA-C          Condition at Time of Death: Critically ill, level 4 / comfort care status    Date, Time and Cause of Death    Date of Death: 21  Time of Death:  8:50 PM  Preliminary Cause of Death: Toxic metabolic encephalopathy  Entered by: Ángle Newman MD[ST1 1]     Attribution     HH4 4 Orlando Lombardo MD 21 21:49          Death Note:    INPATIENT DEATH NOTE  Nichelle Martell 63 y o  female MRN: [id number]  Unit/Bed#: MICU 01 Encounter: [id number]    Date, Time and Cause of Death    Date of Death: 21  Time of Death:  8:50 PM  Preliminary Cause of Death: Toxic metabolic encephalopathy  Entered by: Ángel Newman MD[ST1 1]     Attribution ST1 1 Sharla Ulloa MD 21 21:49           Patient's Information  Pronounced by: Omega Gama MD  Did the patient's death occur in the ED?: No  Did the patient's death occur in the OR?: No  Did the patient's death occur less than 10 days post-op?: No  Did the patient's death occur within 24 hours of admission?: No  Was code status DNR at the time of death?: Yes    PHYSICAL EXAM:  Unresponsive to noxious stimuli, Spontaneous respirations absent, Breath sounds absent, Carotid pulse absent, Heart sounds absent, Pupillary light reflex absent and Corneal blink reflex absent    Medical Examiner notification criteria:  NONE APPLICABLE   Medical Examiner's office notified?:  No, does not meet ME notification criteria   Medical Examiner accepted case?:  No  Name of Medical Examiner: N/A    Family Notification  Was the family notified?: Yes  Date Notified: 21  Time Notified:   Notified by: Omega Gama MD  Name of Family Notified of Death: Nae Brito   Relationship to Patient: Sister  Family Notification Route: Telephone  Was the family told to contact a  home?: No(Anticipated body donation)    Autopsy Options: Post-mortem examination declined by next of kin    Primary Service Attending Physician notified?: N/A    Physician/Resident responsible for completing Discharge Summary: JORDANA Caro MD

## 2021-04-20 NOTE — UTILIZATION REVIEW
Notification of Discharge   This is a Notification of Discharge from our facility [address]  Please be advised that this patient has been discharge from our facility  Below you will find the admission and discharge date and time including the patients disposition  UTILIZATION REVIEW CONTACT:  Mirian Tracey  Utilization   Network Utilization Review Department  Phone: [phone] x carefully listen to the prompts  All voicemails are confidential   Email: [email or web address]     PHYSICIAN ADVISORY SERVICES:  FOR EGEG-SD-KXBO REVIEW - MEDICAL NECESSITY DENIAL  Phone: [phone]  Fax: [phone]  Email: [email or web address]     PRESENTATION DATE: 2021 11:54 PM  OBERVATION ADMISSION DATE:   INPATIENT ADMISSION DATE: 21   DISCHARGE DATE: 2021 11:00 PM  DISPOSITION:        IMPORTANT INFORMATION:  Send all requests for admission clinical reviews, approved or denied determinations and any other requests to dedicated fax number below belonging to the campus where the patient is receiving treatment   List of dedicated fax numbers:  [address] DENIALS (Administrative/Medical Necessity) [phone]   [address] [place] [address] (Maternity/NICU/Pediatrics) [phone]   Haider Deluna [phone]   [facility] [phone]   David Galicia [phone]   [facility] [address] [facility] [phone]   [facility]  [phone]   [address] [facility] [address]  [address] [facility] [address] [facility] [phone]

## 2021-04-20 NOTE — DEATH NOTE
INPATIENT DEATH NOTE  Reji Mckeon 63 y o  female MRN: [id number]  Unit/Bed#: MICU 01 Encounter: [id number]    Date, Time and Cause of Death    Date of Death: 21  Time of Death:  8:50 PM  Preliminary Cause of Death: Toxic metabolic encephalopathy  Entered by: Jacqui Robert MD[ST1 1]     Attribution     GC2 7 Rani Batista MD 21 21:49           Patient's Information  Pronounced by: Jacqui Robert MD  Did the patient's death occur in the ED?: No  Did the patient's death occur in the OR?: No  Did the patient's death occur less than 10 days post-op?: No  Did the patient's death occur within 24 hours of admission?: No  Was code status DNR at the time of death?: Yes    PHYSICAL EXAM:  Unresponsive to noxious stimuli, Spontaneous respirations absent, Breath sounds absent, Carotid pulse absent, Heart sounds absent, Pupillary light reflex absent and Corneal blink reflex absent    Medical Examiner notification criteria:  NONE APPLICABLE   Medical Examiner's office notified?:  No, does not meet ME notification criteria   Medical Examiner accepted case?:  No  Name of Medical Examiner: N/A    Family Notification  Was the family notified?: Yes  Date Notified: 21  Time Notified:   Notified by: Jacqui Robert MD  Name of Family Notified of Death: Christine Ferreira   Relationship to Patient: Sister  Family Notification Route: Telephone  Was the family told to contact a  home?: No(Anticipated body donation)    Autopsy Options: Post-mortem examination declined by next of kin    Primary Service Attending Physician notified?: N/A    Physician/Resident responsible for completing Discharge Summary: JORDANA Coppola MD

## 2021-04-21 LAB
BACTERIA BLD CULT: NORMAL
FUNGUS SPEC CULT: ABNORMAL

## 2021-04-21 NOTE — CASE MANAGEMENT
4/21 :    CM consulted for unclaimed body on 4/21  On 4/19, palliative SW emailed whole body donation information to brother   CM placed call to pt's sister Quan Parekh to followup  CM left VM with contact information requesting return call  ADDENDUM 1:31 : CM received return call from Quan Parekh and spoke via phone  CM provided update  CM provided phone# [phone] to Michaela of [employer]  Michaela to call; CM made Michaela aware that [employer] may not accept due to communicable diseases  ADDENDUM 2:40 : CM received phone call from [facility] stating he received email list  CM provided ph# for [employer] and ph# for [facility] [place]  [phone]  CM updated hospital supervisor

## 2021-04-22 LAB
PCR AMPLIFICATION + DETECTION: NORMAL
WNV RNA SPEC QL NAA+PROBE: NEGATIVE

## 2021-04-23 LAB — MISCELLANEOUS LAB TEST RESULT: NORMAL

## 2021-05-16 LAB — FUNGUS SPEC CULT: NORMAL

## 2021-06-01 LAB
MYCOBACTERIUM SPEC CULT: NORMAL
RHODAMINE-AURAMINE STN SPEC: NORMAL

## 2022-08-04 NOTE — ASSESSMENT & PLAN NOTE
In the setting of previous alcohol abuse    Currently she denies any current alcohol abuse    Monitor CMP, if worsening may need further workup patient

## 2023-04-07 NOTE — ASSESSMENT & PLAN NOTE
History of alcohol abuse  Alcohol level on admission was noted to be 0 so no acute alcohol intoxication  Will place on CIWA protocol Oral Minoxidil Counseling- I discussed with the patient the risks of oral minoxidil including but not limited to shortness of breath, swelling of the feet or ankles, dizziness, lightheadedness, unwanted hair growth and allergic reaction.  The patient verbalized understanding of the proper use and possible adverse effects of oral minoxidil.  All of the patient's questions and concerns were addressed.

## 2023-04-20 NOTE — PROGRESS NOTES
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 EtOH abuse   - Hepatocellular carcinoma  - Cellulitis  - Anemia and thrombocytopenia      - Neuro  - Dx: Refractory Status epilepticus               - Infusion                         - Ketamine off                         - Versed at 60                                      - Consider weaning off of versed today               - Keppra at 2 5g daily and vimpat 200mg BID              - Valium PO to 10mg q6h   - Topamax 200mg BID for 3 more days and wean pending patient's      control of subclinical seizures              - 4/13 LP completed w/ INR 1 64 despite 2 units of FFP                          - Received 1 additional unit of FFP post procedure                          - 1st tube clear CSF fluid following tubes contained blood                           - LP labs pending               - Continue video EEG              - Appreciate recommendation from Neuro   - Analgesia: Fentanyl 50mcg prn      CV:   - Dx: Shock state most likely medication induced               - Levophed 18              - Monitor BP as patient weans off of Versed    - Wean Pressor as tolerated while maintaining MAP goal >65   - ECHO showed EF of 60% with no valvular disorder  - Dx: New onset Afib              - Amio drip started 4/13     - Hold anticoagulation in the setting of coagulopathy         Lung:   - Dx: Acute respiratory failure 2/2 seizures  - Vent day 8                - SCMV 12/400/6/50              - Continue VAP precautions  - Dx: Mild Apical pneumothorax              - Continue to monitor airway pressures on vent   - Continue Respiratory Protocol and Airway Clearance Protocol        GI:   - Dx: 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 / [address]  post ablation   - MELD today at 20               - Continue lactulose 30g TID and rifaximin 550mg BID for HE   - AST elevated at 97 from 28 yesterday    - Possibly from AEM  - Dx: GI dysmotility    - Stool output only 100 despite lactulose    - High residual overnight and TF held    - Erythromycin   - Stress ulcer ppx: Prilosec  - TF on hold             FEN: Hypernatremia and hyperchloremia  - Fluids: D5W at 50cc/hr   - Electrolytes: Will trend and replete as needed  - Nutrition: Will restart TF later today after erythromycin               - Per nutrition rec Jevity 1 2 @ 66 with  q6hr     :   - Dx: Resolved DARRYL   - Cr 1 19 this AM   - Restarted D5W while TF and FWF on hold   - Dx: Volume overload              - Received another 40 of lasix yesterday              - Urine output (1 8L in 24hr)              - Still net positive 2/2 medication gtt               - May consider another dose of lasix               - BMP BID  - [place] in place for accurate I/Os      ID:   - Dx: Polymycrobial LE wound              - 4/13 Unasyn discontinued per ID   - Dx: Hypothermia of unknown etiology   - Resolved               - Patient in [facility]   - 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              - MRI inconclusive              - 4/12 Acyclovir discontinued  - Monitor WBC/temp curve     Heme:   - Dx: Anemia and thrombocytopenia  - Hb: 8 04  - Continue to monitor Hb and platelet in addition to INR              - Haptoglobin <10 w/ Fib 149 (Likely due to cirrhosis) and no schistocytes              - No signs of active bleeding or hemolytic anemia  - Pt: 77  - Oral vitamin K discontinued   - DVT ppx: subq Lovenox 40, SCDs       Endo:   - Dx: No acute issues  - Goal -180     Msk/Skin:   - Dx: Bilateral lower extremity wounds              - Wound care and Podiatry service managing  - Turning/repositioning     Disposition: Continue ICU care        Invasive lines and devices:   Invasive Devices     Central Venous Catheter Line            CVC Central Lines 21 Triple 20cm 5 days          Peripheral Intravenous Line            Peripheral IV 21 Proximal;Right;Ventral (anterior) Forearm 2 days          Arterial Line            Arterial Line 21 Radial 5 days          Drain            Rectal Tube 7 days    NG/OG/Enteral Tube 18 Fr Center mouth 6 days    Urethral Catheter Temperature probe 16 Fr  6 days          Airway            ETT  Oral 7 5 mm 6 days                   Physical exam  General: Highly sedated and intubated  Neuro: GCS 3T (Eye 1, Verbal 1, Motor 1)  HENT: Normocephalic and atraumatic, PERRL   - EEG leads placed, ETT tube, and NG tube  Heart: RRR, pulses intact  Lungs: Bilateral breath sounds present  Abdomen: Minimal bowel sounds present, soft not distended   Skin:  Bilateral leg wounds with dressing     Vitals  Temperature:   Temp (24hrs), Av °F (36 1 °C), Min:93 9 °F (34 4 °C), Max:99 °F (37 2 °C)    Current: Temperature: 97 5 °F (36 4 °C)    Vitals:    04/15/21 0600 04/15/21 0700 04/15/21 0715 04/15/21 0800   BP:       Pulse: (!) 106 104  104   Resp: 13 13  15   Temp: 98 6 °F (37 °C) 98 6 °F (37 °C)  97 5 °F (36 4 °C)   TempSrc:       SpO2: 91% (!) 87% (!) 86% 99%   Weight: 95 kg (209 lb 7 oz)        Arterial Line BP: 116/58  Arterial Line MAP (mmHg): 76 mmHg      Intake and Outputs:  I/O        07 -  0700  07 - 04/15 0700    I V  (mL/kg) 5609 4 (69 2) 3930 7 (48 5)    Blood 845     NG/ 1035    IV Piggyback 1170     Feedings  504    Total Intake(mL/kg) 7994 4 (98 6) 5469 7 (67 4)    Urine (mL/kg/hr) 1565 (0 8) 1735 (0 9)    Emesis/NG output 1250 500    Stool 100 100    Total Output 2915 2335    Net +5079 4 +3134 7                  Labs:   Results from last 7 days   Lab Units 04/15/21  0446 04/14/21  0451 04/13/21  2355 04/13/21  1547 04/13/21  0530 04/12/21  0437   WBC Thousand/uL 8 04 6 19  --   --  8 27 7 91   HEMOGLOBIN g/dL 8 5* 8 0* 7 8*  --  8 4* 8 6*   HEMATOCRIT % 27 1* 26 2* 25 5*  --  28 3* 28 1*   PLATELETS Thousands/uL 75* 63*  --  75* 55* 44*   NEUTROS PCT %  --  74  --   --  76* 62   MONOS PCT %  --  6  --   --  8 9      Results from last 7 days   Lab Units 04/15/21  0446 04/14/21  0451 04/13/21  1454  04/13/21  0530  04/08/21  1653   SODIUM mmol/L 153* 156* 155*   < > 157*   < >  --    POTASSIUM mmol/L 4 0 3 5 3 7   < > 4 0   < >  --    CHLORIDE mmol/L 129* 134* 130*   < > 129*   < >  --    CO2 mmol/L 17* 17* 18*   < > 19*   < >  --    CO2, I-STAT mmol/L  --   --   --   --   --   --  15*   BUN mg/dL 20 13 12   < > 11   < >  --    CREATININE mg/dL 1 19 0 92 1 01   < > 1 04   < >  --    CALCIUM mg/dL 8 2* 7 5* 7 9*   < > 8 0*   < >  --    ALK PHOS U/L 95 89  --   --  112   < >  --    ALT U/L 35 20  --   --  20   < >  --    AST U/L 97* 35  --   --  42   < >  --    GLUCOSE, ISTAT mg/dl  --   --   --   --   --   --  100    < > = values in this interval not displayed       Results from last 7 days   Lab Units 04/15/21  0446 04/14/21  0451 04/13/21  0530   MAGNESIUM mg/dL 2 1 2 2 1 9     Results from last 7 days   Lab Units 04/15/21  0446 04/13/21  0530 04/12/21  0515   PHOSPHORUS mg/dL 5 1* 4 5 3 9      Results from last 7 days   Lab Units 04/15/21  0446 04/13/21  2150 04/13/21  1454   INR  2 03* 1 73* 1 64*     Results from last 7 days   Lab Units 04/09/21  0059   LACTIC ACID mmol/L 1 5       Other Labs  - Meningitis/Encephalitis Panel: Normal   - RBC count,CSF: 3746  - Protein CSF: 100   - Leukemia/Lymphoma flow cytometry: no signs of leukemia or lymphoma       Pending:   - SHANNON  - Gram stain CSF  - Spinal fluid culture   - Fungal culture  - West Nile  - Protein Electrophoresis  - Myelin basic protein  - MS panel         Non-Invasive/Invasive Ventilation Settings:  Respiratory    Lab Data (Last 4 hours)    None         O2/Vent Data (Last 4 hours)      04/15 0715          Patient safety screen outcome: Failed                 No results found for: PHART, TDZ2CFN, PO2ART, OIG0ZOV, F5UPGNZK, BEART, SOURCE    Imaging:   [facility] [place] lumbar puncture   Final Result by Matthias Goldsmith MD (04/14 3570)      Successful fluoroscopically guided lumbar puncture with an opening pressure of 41 cm H2O  Approximately 16 5 mL's of CSF was sent to lab for requested analysis  As noted above, the patient's CSF was initially clear yellow and became bloody during the    collection process  The needle was withdrawn slightly and the CSF fluid slowly cleared  After a discussion with the MICU team, the patient will receive an additional unit of FFP post procedure  Please feel free to contact us with any questions or concerns  I reviewed the above findings and procedure with Dr Rylee Lopes  PERFORMED, DICTATED AND SIGNED BY: Ame Manning PA-C         Workstation performed: [id number]         MRI brain seizure wo and w contrast   Final Result by Clint Serrato MD (04/11 0163)         1  Subtle, faint diffusion restriction in the pulvinar of the right thalamus with associated T2/FLAIR hyperintensity, a nonspecific finding which may be related to subacute infarction, which statistically would probably be the most likely differential    diagnosis  Other unusual differential considerations would include Behcet's disease or other connective tissue disorders like Sjogren's syndrome  Infectious encephalitis disease such as West Nile virus could be considered in the appropriate clinical    setting  A nonenhancing glioma is also possible as are reports of status epilepticus  The unilateral appearance argues against prion disease such as CJD  Further clinical assessment recommended    Recommend repeat follow-up contrast enhanced MRI of the    brain in 6-12 weeks to assess for stability  2   A few white matter lesions elsewhere may represent microangiopathy, although given the findings in the right thalamic pulvinar, other etiologies are certainly possible  3  No MR evidence of mesial temporal sclerosis  4   No classic MR findings of herpes encephalitis  Further clinical assessment and follow-up recommended  Workstation performed: [id number]         XR chest portable ICU   Final Result by Cassie Aiken MD (04/11 1142)      Small left apical pneumothorax, unchanged from previous examination  Unchanged bilateral pleural effusions and pulmonary parenchymal airspace opacities  Workstation performed: [id number]         XR chest portable ICU   Final Result by Ger Rocha MD (04/10 1253)      Small left apical pneumothorax identified possibly slightly increased from prior exam       Bilateral alveolar opacities in keeping with pneumonia  Small bilateral pleural effusions  Workstation performed: [id number]         XR chest portable ICU   Final Result by Nato Lee MD (04/09 1610)   Left internal jugular central line terminates in the right atrium, and should be pulled back by 3 cm  Unchanged small left pneumothorax  Worsening bilateral pneumonia  Stable small left pleural effusion  The study was marked in [facility] for immediate notification  Workstation performed: [id number]         XR abdomen 1 view kub    (Results Pending)   XR chest portable ICU    (Results Pending)     I have personally reviewed pertinent reports  Micro:  Lab Results   Component Value Date    BLOODCX No Growth After 5 Days  04/09/2021    BLOODCX No Growth After 5 Days  04/09/2021    BLOODCX No Growth After 5 Days   04/03/2021    URINECX >100,000 cfu/ml Escherichia coli (A) 04/03/2021    WOUNDCULT 4+ Growth of Escherichia coli (A) 04/04/2021    WOUNDCULT 4+ Growth of Acinetobacter baumannii (A) 04/04/2021    WOUNDCULT 4+ Growth of 04/04/2021    SPUTUMCULTUR No growth 04/09/2021         Allergies: No Known Allergies      Medications:   Scheduled Meds:  Current Facility-Administered Medications   Medication Dose Route Frequency Provider Last Rate    amiodarone  0 5 mg/min Intravenous Continuous SILVANO Hackett 0 5 mg/min (04/14/21 1614)    chlorhexidine  15 mL Swish & Spit Q12H [facility] Carlos Justice PA-C      cholecalciferol  400 Units Oral Daily Carlos Justice PA-C      dextrose  50 mL/hr Intravenous Continuous Vj Murray MD 50 mL/hr (04/15/21 8372)    diazepam  10 mg Oral Q6H Jason SILVANO Jane      enoxaparin  40 mg Subcutaneous Q24H Girma Malcolm MD      erythromycin ethylsuccinate  200 mg Oral Once Vj Murray MD      Followed by   [facility] erythromycin ethylsuccinate  200 mg Oral Once Vj Murray MD      fentanyl citrate (PF)  50 mcg Intravenous Q1H PRN Miladys Vivar PA-C      folic acid  1 mg Oral Daily Jason SILVANO Jane      lacosamide  200 mg Oral Q12H [facility] SILVANO Hackett      lactulose  30 g Oral BID SILVANO Hakcett      levETIRAcetam  2,500 mg Oral Q12H [facility] SILVANO Hackett      metoclopramide  20 mg Intravenous Once Vj Murray MD      midazolam  60 mg/hr Intravenous Continuous JasonSILVANO Watters 60 mg/hr (04/15/21 0851)    nicotine  1 patch Transdermal Daily Carlos Justice PA-C      norepinephrine  1-30 mcg/min Intravenous Titrated Carlos Justice PA-C 18 mcg/min (04/15/21 0617)    omeprazole (PRILOSEC) suspension 2 mg/mL  20 mg Oral Early Morning Jason SILVANO Jane      ondansetron  4 mg Intravenous Q6H PRN Carlos Justice PA-C      rifaximin  550 mg Oral Q12H [facility] Carlos Justice PA-C      thiamine  100 mg Oral Daily Jason SILVANO Jane      topiramate  200 mg Oral Q12H [facility] Anni Burris DO       Continuous Infusions:amiodarone, 0 5 mg/min, Last Rate: 0 5 mg/min (04/14/21 0494)  dextrose, 50 mL/hr, Last Rate: 50 mL/hr (04/15/21 1013)  midazolam, 60 mg/hr, Last Rate: 60 mg/hr (04/15/21 0851)  norepinephrine, 1-30 mcg/min, Last Rate: 18 mcg/min (04/15/21 0617)      PRN Meds:  fentanyl citrate (PF), 50 mcg, Q1H PRN  ondansetron, 4 mg, Q6H PRN        Counseling / Coordination of Care  Total Critical Care time spent 30 minutes excluding procedures, teaching and family updates  Code Status: Level 2 - DNAR: but accepts endotracheal intubation     Portions of the record may have been created with voice recognition software  Occasional wrong word or "sound a like" substitutions may have occurred due to the inherent limitations of voice recognition software  Read the chart carefully and recognize, using context, where substitutions have occurred       Lawyer Thomas MD Negative

## 2024-03-19 NOTE — TELEPHONE ENCOUNTER
PA SENT   Results given  She has H pylori  She is not on any anti cholesterol medicine  She has no allergies    Please call in  Amoxicillin 500 mg, 2 tabs p o  B i d  x2 weeks  Biaxin 500 mg p o  B i d  X2 weeks  Prilosec 20 mg p o  B i d  X2 weeks  Stool for H pylori antigen 1 month after treatment